# Patient Record
Sex: FEMALE | Race: WHITE | NOT HISPANIC OR LATINO | Employment: OTHER | URBAN - METROPOLITAN AREA
[De-identification: names, ages, dates, MRNs, and addresses within clinical notes are randomized per-mention and may not be internally consistent; named-entity substitution may affect disease eponyms.]

---

## 2019-04-25 ENCOUNTER — TELEPHONE (OUTPATIENT)
Dept: GASTROENTEROLOGY | Facility: CLINIC | Age: 54
End: 2019-04-25

## 2019-04-25 NOTE — TELEPHONE ENCOUNTER
----- Message from Tata Elizabeth sent at 4/24/2019  1:15 PM CDT -----  Contact: Pt   Pt calling in to schedule a Np appointment for Chron[facility] . Pt was seen by doctor colorado and treated several years ago      Pt can be reached at [phone]    Thanks

## 2019-04-25 NOTE — TELEPHONE ENCOUNTER
Last seen by Dr.James Crowell 10 years or longer ago.  Has Crohn's. Recently moved back to [place].  Has been in remission for past 8 years.  On no meds for her Crohn's at this time.  Has had flares but treated thru ED where she was living.  Because of recent flare she feels she needs to reestablish herself with .         Appointment scheduled for her to see Dr.Kevin Aguilar and Dr.James Crowell on 5/13 at 3:00.   Confirmation mailed.

## 2019-04-25 NOTE — TELEPHONE ENCOUNTER
----- Message from Tata Elizabeth sent at 4/24/2019  1:15 PM CDT -----  Contact: Pt   Pt calling in to schedule a Np appointment for Chron's . Pt was seen by doctor colorado and treated several years ago      Pt can be reached at [phone]    Thanks

## 2019-06-12 LAB — CRC RECOMMENDATION EXT: NORMAL

## 2019-10-29 ENCOUNTER — HOSPITAL ENCOUNTER (OUTPATIENT)
Dept: HOSPITAL 92 - SDC | Age: 54
Discharge: HOME | End: 2019-10-29
Attending: ANESTHESIOLOGY
Payer: COMMERCIAL

## 2019-10-29 VITALS — BODY MASS INDEX: 28.2 KG/M2

## 2019-10-29 DIAGNOSIS — Z79.899: ICD-10-CM

## 2019-10-29 DIAGNOSIS — Z88.5: ICD-10-CM

## 2019-10-29 DIAGNOSIS — I10: ICD-10-CM

## 2019-10-29 DIAGNOSIS — G89.4: ICD-10-CM

## 2019-10-29 DIAGNOSIS — Z91.048: ICD-10-CM

## 2019-10-29 DIAGNOSIS — Z88.8: ICD-10-CM

## 2019-10-29 DIAGNOSIS — T85.733A: Primary | ICD-10-CM

## 2019-10-29 DIAGNOSIS — M96.1: ICD-10-CM

## 2019-10-29 DIAGNOSIS — M51.16: ICD-10-CM

## 2019-10-29 PROCEDURE — 0JPT0MZ REMOVAL OF STIMULATOR GENERATOR FROM TRUNK SUBCUTANEOUS TISSUE AND FASCIA, OPEN APPROACH: ICD-10-PCS | Performed by: ANESTHESIOLOGY

## 2019-10-29 PROCEDURE — 00PU3MZ REMOVAL OF NEUROSTIMULATOR LEAD FROM SPINAL CANAL, PERCUTANEOUS APPROACH: ICD-10-PCS | Performed by: ANESTHESIOLOGY

## 2019-10-29 NOTE — OP
DATE OF PROCEDURE:  10/29/2019



PREOPERATIVE DIAGNOSES:  

1. Chronic pain syndrome.

2. Post-laminectomy syndrome.

3. Lumbar radiculopathy.

4. Erosion of lead anchor with skin infection.



POSTOPERATIVE DIAGNOSES:  

1. Chronic pain syndrome.

2. Post-laminectomy syndrome.

3. Lumbar radiculopathy.

4. Erosion of lead anchor with skin infection.



PROCEDURES PERFORMED:  

1. Removal of SCS battery.

2. Removal of lead x2.

3. Removal of extensions x2.

4. Removal of anchors x2.



SPECIMENS REMOVED:  See above.  All intact, all disposed.



ESTIMATED BLOOD LOSS:  5 mL.



DESCRIPTION OF PROCEDURE:  The patient was taken to the procedure room, placed prone

on the procedure room table.  A time-out was performed.  Using DuraPrep, the back

was prepped and sterile drapes were applied.  The skin above the battery site and

the anchors was anesthetized using 0.5% Marcaine with epinephrine.  We took care not

to anesthetize or stick a needle into any erythematous skin.  An incision was made

with a 10 blade scalpel at both levels and again not through the erythematous skin

where the presumed infection was, but inferior to it.  This was blunt dissected down

to fascia.  The battery pocket was opened and the battery was removed easily.  This

was disconnected from the wire.  The battery was removed and discarded.  The

extensions were then dissected out and removed intact and discarded.  The anchors

were then dissected and removed with the leads at the same time x2.  These were also

removed intact.  Irrigation was performed.  Vancomycin powder was then placed

through the anchor sites.  The battery pocket was cauterized and Vicryl sutures were

used to collapse the pocket.  0 Vicryl sutures were used to approximate the fascia

on both sides.  Staples were used to approximate the skin layer.  Telfa dressing and

sterile 4x4s were placed on top of this.  The patient was taken to the PACU under

stable condition. 







Job ID:  [id number]

## 2019-11-13 ENCOUNTER — HOSPITAL ENCOUNTER (OUTPATIENT)
Dept: HOSPITAL 92 - SCSMRI | Age: 54
Discharge: HOME | End: 2019-11-13
Attending: ANESTHESIOLOGY
Payer: COMMERCIAL

## 2019-11-13 DIAGNOSIS — M47.26: ICD-10-CM

## 2019-11-13 DIAGNOSIS — M48.061: ICD-10-CM

## 2019-11-13 DIAGNOSIS — Z98.890: ICD-10-CM

## 2019-11-13 DIAGNOSIS — R60.0: ICD-10-CM

## 2019-11-13 DIAGNOSIS — M51.16: Primary | ICD-10-CM

## 2019-11-13 LAB — ESTIMATED GFR-MDRD - POC: (no result)

## 2019-11-13 PROCEDURE — 72100 X-RAY EXAM L-S SPINE 2/3 VWS: CPT

## 2019-11-13 PROCEDURE — 72158 MRI LUMBAR SPINE W/O & W/DYE: CPT

## 2019-11-13 PROCEDURE — 82565 ASSAY OF CREATININE: CPT

## 2019-11-13 PROCEDURE — A9579 GAD-BASE MR CONTRAST NOS,1ML: HCPCS

## 2019-11-13 NOTE — RAD
Two views lumbar spine:

11/13/19

 

HISTORY: 

Evaluate for residual fragments of a removed stimulator lead.

 

FINDINGS: 

There is no radiographic evidence of a generator or leads associated with a dorsal nerve root stimula
tor. There are bilateral transpedicular screws at L5 and S1 without perihardware lucency. Disc prosth
esis at L5-S1. 

 

Five lumbar type vertebrae. No fractures. There is a laminectomy defect at L5. 

 

IMPRESSION: 

No radiographic evidence of a dorsal column stimulator. 

 

POS: GUERA

## 2019-11-13 NOTE — MRI
MRI lumbar spine with and without contrast:

11/13/2019



COMPARISON: 8/8/2013



HISTORY: Intervertebral disc disorder with radiculopathy, prior lumbar spine surgery recent removal o
f a pain stimulator secondary to infection.



TECHNIQUE: Multiplanar multisequence MR imaging of the lumbar spine with and without contrast



FINDINGS: On the basis of 5 lumbar type vertebral bodies, conus medullaris terminates at T12-L1.



T12-L1: Intervertebral disc height and signal intensity within normal limits with no significant cent
ral canal or neural foraminal stenosis.



L1-2: Mild bilateral facet hypertrophy. Intervertebral disc height and signal intensity within normal
 limits with no significant central canal or neural foraminal stenosis.



L2-3: Mild bilateral facet hypertrophy. Intervertebral disc height and signal intensity within normal
 limits. Mild facet hypertrophy bilaterally, right greater than left. No significant central canal

or neural foraminal stenosis.



L3-4: Intervertebral disc height and signal intensity within normal limits. Bilateral facet hypertrop
hy and hypertrophy of the ligamentum flavum. There is fluid signal intensity within the facet joint

on the right, similar when compared to the prior examination.



No significant central canal or neural foraminal stenosis.



L4-5: Bilateral facet hypertrophy with hypertrophy of the ligamentum flavum, progressed since the priscilla
or exam. Mild central canal stenosis, new. On the basis of facet disease there is mild/moderate

right neural foraminal stenosis and severe left neural foraminal stenosis, worsened bilaterally when 
compared to the prior examination.



L5-S1: Intervertebral disc device noted. Bilateral L5 and S1 pedicle screws are present. Bilateral la
minectomy changes are present at the L5-S1 level. No significant central canal or neural foraminal

stenosis.



STIR imaging demonstrates significant nonspecific diffuse increased signal intensity within the poste
rior subcutaneous fat from the axial level of the L1-2 intervertebral disc space to the axial level

of the upper sacrum, new when compared to the prior examination. There is a fluid collection centered
 within the posterior midline subcutaneous fat at the axial level of the L1-2 interspace measuring

2.5 x 3.1 x 1.9 cm, new when compared to the prior exam. The postcontrast imaging demonstrates rim en
hancement of the above-described fluid collection at the axial level of the L1-2 interspace

posterior to the spinous process of the L1 vertebral body. There is minimal enhancement of the poster
ior subcutaneous fat from the L1-2 level through the sacrum in the region of the above-described

subcutaneous adipose edema.



The postcontrast imaging demonstrates no abnormal enhancement involving the imaged osseous structures
, including the L1 spinous process. There is also no abnormal enhancement involving the

intervertebral discs or the contents of the thecal sac.



Review of the retroperitoneal structures demonstrates no abnormality.



IMPRESSION: Postoperative and degenerative change within the lumbar spine as detailed above. The most
 significant stenosis involves the bilateral neural foramina at the L4-5 level.



There is diffuse edematous change within the subcutaneous fat posteriorly which may be on the basis o
f cellulitis given recent removal of pain stimulator for infection. In addition, the nonspecific

fluid collection posterior to the L1 spinous process within the subcutaneous fat could represent a st
erile or infected collection on the basis of abscess given provided history. Clinical correlation

is essential. This fluid collection is likely amenable to ultrasound-guided aspiration.



CODE T



Reported By: Gunner Millan 

Electronically Signed:  11/13/2019 3:27 PM

## 2020-12-02 ENCOUNTER — CLINICAL SUPPORT (OUTPATIENT)
Dept: URGENT CARE | Facility: CLINIC | Age: 55
End: 2020-12-02
Payer: MEDICARE

## 2020-12-02 DIAGNOSIS — Z20.822 COVID-19 RULED OUT: Primary | ICD-10-CM

## 2020-12-02 LAB
CTP QC/QA: YES
SARS-COV-2 RDRP RESP QL NAA+PROBE: NEGATIVE

## 2020-12-02 PROCEDURE — U0002: ICD-10-PCS | Mod: QW,S$GLB,, | Performed by: PHYSICIAN ASSISTANT

## 2020-12-02 PROCEDURE — U0002 COVID-19 LAB TEST NON-CDC: HCPCS | Mod: QW,S$GLB,, | Performed by: PHYSICIAN ASSISTANT

## 2021-01-28 DIAGNOSIS — M51.16 INTERVERTEBRAL DISC DISORDER WITH RADICULOPATHY OF LUMBAR REGION: Primary | ICD-10-CM

## 2021-01-29 ENCOUNTER — HOSPITAL ENCOUNTER (OUTPATIENT)
Dept: RADIOLOGY | Facility: HOSPITAL | Age: 56
Discharge: HOME | End: 2021-01-29
Attending: ANESTHESIOLOGY
Payer: MEDICARE

## 2021-01-29 DIAGNOSIS — M51.16 INTERVERTEBRAL DISC DISORDER WITH RADICULOPATHY OF LUMBAR REGION: ICD-10-CM

## 2021-01-29 PROCEDURE — 25500020 PHARM REV CODE 255

## 2021-01-29 PROCEDURE — 72158 MRI LUMBAR SPINE W/O & W/DYE: CPT | Mod: 26,,, | Performed by: RADIOLOGY

## 2021-01-29 PROCEDURE — A9585 GADOBUTROL INJECTION: HCPCS

## 2021-01-29 PROCEDURE — 72158 MRI LUMBAR SPINE W/O & W/DYE: CPT | Mod: TC

## 2021-01-29 PROCEDURE — 72158 MRI LUMBAR SPINE W WO CONTRAST: ICD-10-PCS | Mod: 26,,, | Performed by: RADIOLOGY

## 2021-01-29 RX ORDER — GADOBUTROL 604.72 MG/ML
8 INJECTION INTRAVENOUS
Status: COMPLETED | OUTPATIENT
Start: 2021-01-29 | End: 2021-01-29

## 2021-01-29 RX ADMIN — GADOBUTROL 8 ML: 604.72 INJECTION INTRAVENOUS at 07:01

## 2021-02-03 ENCOUNTER — TELEPHONE (OUTPATIENT)
Dept: NEUROSURGERY | Facility: CLINIC | Age: 56
End: 2021-02-03

## 2021-02-05 ENCOUNTER — TELEPHONE (OUTPATIENT)
Dept: NEUROSURGERY | Facility: CLINIC | Age: 56
End: 2021-02-05

## 2021-02-05 RX ORDER — HYDROCODONE BITARTRATE AND ACETAMINOPHEN 10; 325 MG/1; MG/1
1-2 TABLET ORAL EVERY 8 HOURS
COMMUNITY
Start: 2021-01-30

## 2021-02-05 RX ORDER — ESTERIFIED ESTROGEN AND METHYLTESTOSTERONE 1.25; 2.5 MG/1; MG/1
1 TABLET ORAL
COMMUNITY
End: 2021-03-11

## 2021-02-05 RX ORDER — ESTRADIOL 2 MG/1
2 TABLET ORAL
COMMUNITY
Start: 2020-10-15 | End: 2021-08-25 | Stop reason: SDUPTHER

## 2021-02-05 RX ORDER — HYDROCHLOROTHIAZIDE 25 MG/1
25 TABLET ORAL
COMMUNITY
Start: 2020-03-19 | End: 2021-03-11

## 2021-02-05 RX ORDER — HYDROCODONE BITARTRATE AND ACETAMINOPHEN 10; 325 MG/1; MG/1
1 TABLET ORAL EVERY 8 HOURS PRN
COMMUNITY
End: 2021-03-11

## 2021-02-05 RX ORDER — FENTANYL 50 UG/1
1 PATCH TRANSDERMAL
Status: ON HOLD | COMMUNITY
Start: 2021-01-18 | End: 2022-11-29

## 2021-02-05 RX ORDER — ESTRADIOL 2 MG/1
2 TABLET ORAL DAILY
COMMUNITY
Start: 2021-01-14 | End: 2022-08-11 | Stop reason: SDUPTHER

## 2021-02-12 ENCOUNTER — TELEPHONE (OUTPATIENT)
Dept: NEUROSURGERY | Facility: CLINIC | Age: 56
End: 2021-02-12

## 2021-02-12 DIAGNOSIS — M54.9 DORSALGIA, UNSPECIFIED: ICD-10-CM

## 2021-02-23 ENCOUNTER — DOCUMENTATION ONLY (OUTPATIENT)
Dept: INTERNAL MEDICINE | Facility: CLINIC | Age: 56
End: 2021-02-23

## 2021-02-23 ENCOUNTER — TELEPHONE (OUTPATIENT)
Dept: NEUROSURGERY | Facility: CLINIC | Age: 56
End: 2021-02-23

## 2021-03-08 ENCOUNTER — HOSPITAL ENCOUNTER (OUTPATIENT)
Dept: RADIOLOGY | Facility: HOSPITAL | Age: 56
Discharge: HOME | End: 2021-03-08
Attending: NEUROLOGICAL SURGERY
Payer: COMMERCIAL

## 2021-03-08 ENCOUNTER — OFFICE VISIT (OUTPATIENT)
Dept: INTERNAL MEDICINE | Facility: CLINIC | Age: 56
End: 2021-03-08
Payer: COMMERCIAL

## 2021-03-08 ENCOUNTER — TELEPHONE (OUTPATIENT)
Dept: ENDOCRINOLOGY | Facility: CLINIC | Age: 56
End: 2021-03-08

## 2021-03-08 ENCOUNTER — OFFICE VISIT (OUTPATIENT)
Dept: SPINE | Facility: CLINIC | Age: 56
End: 2021-03-08
Payer: COMMERCIAL

## 2021-03-08 VITALS
SYSTOLIC BLOOD PRESSURE: 148 MMHG | DIASTOLIC BLOOD PRESSURE: 88 MMHG | TEMPERATURE: 99 F | BODY MASS INDEX: 29.71 KG/M2 | WEIGHT: 178.31 LBS | HEART RATE: 78 BPM | HEIGHT: 65 IN | OXYGEN SATURATION: 97 %

## 2021-03-08 VITALS
HEIGHT: 65 IN | WEIGHT: 178.13 LBS | SYSTOLIC BLOOD PRESSURE: 137 MMHG | HEART RATE: 83 BPM | DIASTOLIC BLOOD PRESSURE: 95 MMHG | TEMPERATURE: 99 F | BODY MASS INDEX: 29.68 KG/M2

## 2021-03-08 DIAGNOSIS — E66.3 OVER WEIGHT: ICD-10-CM

## 2021-03-08 DIAGNOSIS — Z86.14 HISTORY OF MRSA INFECTION: ICD-10-CM

## 2021-03-08 DIAGNOSIS — K50.919 CROHN'S DISEASE WITH COMPLICATION, UNSPECIFIED GASTROINTESTINAL TRACT LOCATION: ICD-10-CM

## 2021-03-08 DIAGNOSIS — M54.17 LUMBOSACRAL RADICULOPATHY AT L4: Primary | ICD-10-CM

## 2021-03-08 DIAGNOSIS — I10 ESSENTIAL HYPERTENSION: ICD-10-CM

## 2021-03-08 DIAGNOSIS — Z01.818 PREOP EXAMINATION: Primary | ICD-10-CM

## 2021-03-08 DIAGNOSIS — E11.65 TYPE 2 DIABETES MELLITUS WITH HYPERGLYCEMIA, WITHOUT LONG-TERM CURRENT USE OF INSULIN: ICD-10-CM

## 2021-03-08 DIAGNOSIS — Z79.890 HORMONE REPLACEMENT THERAPY (HRT): ICD-10-CM

## 2021-03-08 DIAGNOSIS — Z87.891 FORMER SMOKER: ICD-10-CM

## 2021-03-08 DIAGNOSIS — F11.90 CHRONIC, CONTINUOUS USE OF OPIOIDS: ICD-10-CM

## 2021-03-08 DIAGNOSIS — Z87.19 HISTORY OF FATTY INFILTRATION OF LIVER: ICD-10-CM

## 2021-03-08 DIAGNOSIS — M54.9 DORSALGIA, UNSPECIFIED: ICD-10-CM

## 2021-03-08 DIAGNOSIS — E89.0 H/O PARTIAL THYROIDECTOMY: ICD-10-CM

## 2021-03-08 PROBLEM — K50.90 CROHN'S DISEASE: Status: ACTIVE | Noted: 2021-03-08

## 2021-03-08 PROCEDURE — 72110 XR LUMBAR SPINE 5 VIEW WITH FLEX AND EXT: ICD-10-PCS | Mod: 26,COE,, | Performed by: RADIOLOGY

## 2021-03-08 PROCEDURE — 99203 PR OFFICE/OUTPT VISIT, NEW, LEVL III, 30-44 MIN: ICD-10-PCS | Mod: S$GLB,COE,, | Performed by: NEUROLOGICAL SURGERY

## 2021-03-08 PROCEDURE — 72110 X-RAY EXAM L-2 SPINE 4/>VWS: CPT | Mod: TC

## 2021-03-08 PROCEDURE — 72110 X-RAY EXAM L-2 SPINE 4/>VWS: CPT | Mod: 26,COE,, | Performed by: RADIOLOGY

## 2021-03-08 PROCEDURE — 99204 OFFICE O/P NEW MOD 45 MIN: CPT | Mod: S$GLB,COE,, | Performed by: HOSPITALIST

## 2021-03-08 PROCEDURE — 99999 PR PBB SHADOW E&M-EST. PATIENT-LVL III: CPT | Mod: PBBFAC,COE,, | Performed by: HOSPITALIST

## 2021-03-08 PROCEDURE — 99204 PR OFFICE/OUTPT VISIT, NEW, LEVL IV, 45-59 MIN: ICD-10-PCS | Mod: S$GLB,COE,, | Performed by: HOSPITALIST

## 2021-03-08 PROCEDURE — 99999 PR PBB SHADOW E&M-EST. PATIENT-LVL III: ICD-10-PCS | Mod: PBBFAC,COE,, | Performed by: HOSPITALIST

## 2021-03-08 PROCEDURE — 99999 PR PBB SHADOW E&M-EST. PATIENT-LVL III: ICD-10-PCS | Mod: PBBFAC,COE,, | Performed by: NEUROLOGICAL SURGERY

## 2021-03-08 PROCEDURE — 99213 OFFICE O/P EST LOW 20 MIN: CPT | Mod: PBBFAC,25 | Performed by: NEUROLOGICAL SURGERY

## 2021-03-08 PROCEDURE — 99203 OFFICE O/P NEW LOW 30 MIN: CPT | Mod: S$GLB,COE,, | Performed by: NEUROLOGICAL SURGERY

## 2021-03-08 PROCEDURE — 99213 OFFICE O/P EST LOW 20 MIN: CPT | Mod: PBBFAC,25,27 | Performed by: HOSPITALIST

## 2021-03-08 PROCEDURE — 99999 PR PBB SHADOW E&M-EST. PATIENT-LVL III: CPT | Mod: PBBFAC,COE,, | Performed by: NEUROLOGICAL SURGERY

## 2021-03-08 RX ORDER — ESTERIFIED ESTROGEN AND METHYLTESTOSTERONE .625; 1.25 MG/1; MG/1
1 TABLET ORAL
COMMUNITY
End: 2021-03-11

## 2021-03-08 RX ORDER — FENTANYL 50 UG/1
50 PATCH TRANSDERMAL
COMMUNITY
End: 2021-03-11

## 2021-03-08 RX ORDER — BISMUTH SUBSALICYLATE 525 MG/30ML
15 LIQUID ORAL
COMMUNITY
End: 2021-05-04

## 2021-03-09 ENCOUNTER — TELEPHONE (OUTPATIENT)
Dept: ENDOCRINOLOGY | Facility: CLINIC | Age: 56
End: 2021-03-09

## 2021-03-09 RX ORDER — LANCETS 33 GAUGE
EACH MISCELLANEOUS
Qty: 100 EACH | Refills: 0 | Status: SHIPPED | OUTPATIENT
Start: 2021-03-09

## 2021-03-09 RX ORDER — DEXTROSE 4 G
TABLET,CHEWABLE ORAL
Qty: 1 EACH | Refills: 0 | Status: SHIPPED | OUTPATIENT
Start: 2021-03-09

## 2021-03-10 ENCOUNTER — HOSPITAL ENCOUNTER (OUTPATIENT)
Facility: OTHER | Age: 56
Discharge: HOME | End: 2021-03-10
Attending: ANESTHESIOLOGY | Admitting: ANESTHESIOLOGY
Payer: COMMERCIAL

## 2021-03-10 ENCOUNTER — TELEPHONE (OUTPATIENT)
Dept: ENDOCRINOLOGY | Facility: CLINIC | Age: 56
End: 2021-03-10

## 2021-03-10 ENCOUNTER — PATIENT MESSAGE (OUTPATIENT)
Dept: SPINE | Facility: CLINIC | Age: 56
End: 2021-03-10

## 2021-03-10 VITALS
HEIGHT: 65 IN | DIASTOLIC BLOOD PRESSURE: 89 MMHG | HEART RATE: 82 BPM | BODY MASS INDEX: 29.66 KG/M2 | SYSTOLIC BLOOD PRESSURE: 162 MMHG | TEMPERATURE: 98 F | OXYGEN SATURATION: 100 % | WEIGHT: 178 LBS | RESPIRATION RATE: 18 BRPM

## 2021-03-10 DIAGNOSIS — M54.17 LUMBOSACRAL RADICULOPATHY: ICD-10-CM

## 2021-03-10 DIAGNOSIS — M51.36 DDD (DEGENERATIVE DISC DISEASE), LUMBAR: Primary | ICD-10-CM

## 2021-03-10 DIAGNOSIS — G89.29 CHRONIC PAIN: ICD-10-CM

## 2021-03-10 LAB — POCT GLUCOSE: 180 MG/DL (ref 70–110)

## 2021-03-10 PROCEDURE — 82947 ASSAY GLUCOSE BLOOD QUANT: CPT | Performed by: ANESTHESIOLOGY

## 2021-03-10 PROCEDURE — 25000003 PHARM REV CODE 250: Performed by: ANESTHESIOLOGY

## 2021-03-10 PROCEDURE — 64483 PR EPIDURAL INJ, ANES/STEROID, TRANSFORAMINAL, LUMB/SACR, SNGL LEVL: ICD-10-PCS | Mod: LT,COE,, | Performed by: ANESTHESIOLOGY

## 2021-03-10 PROCEDURE — 25000003 PHARM REV CODE 250: Performed by: STUDENT IN AN ORGANIZED HEALTH CARE EDUCATION/TRAINING PROGRAM

## 2021-03-10 PROCEDURE — 64483 NJX AA&/STRD TFRM EPI L/S 1: CPT | Mod: LT,COE,, | Performed by: ANESTHESIOLOGY

## 2021-03-10 PROCEDURE — 25500020 PHARM REV CODE 255: Performed by: ANESTHESIOLOGY

## 2021-03-10 PROCEDURE — 63600175 PHARM REV CODE 636 W HCPCS: Performed by: ANESTHESIOLOGY

## 2021-03-10 PROCEDURE — 64483 NJX AA&/STRD TFRM EPI L/S 1: CPT | Mod: LT | Performed by: ANESTHESIOLOGY

## 2021-03-10 RX ORDER — SODIUM CHLORIDE 9 MG/ML
INJECTION, SOLUTION INTRAVENOUS CONTINUOUS
Status: DISCONTINUED | OUTPATIENT
Start: 2021-03-10 | End: 2021-03-10 | Stop reason: HOSPADM

## 2021-03-10 RX ORDER — LIDOCAINE HYDROCHLORIDE 10 MG/ML
INJECTION INFILTRATION; PERINEURAL
Status: DISCONTINUED | OUTPATIENT
Start: 2021-03-10 | End: 2021-03-10 | Stop reason: HOSPADM

## 2021-03-10 RX ORDER — MIDAZOLAM HYDROCHLORIDE 1 MG/ML
INJECTION INTRAMUSCULAR; INTRAVENOUS
Status: DISCONTINUED | OUTPATIENT
Start: 2021-03-10 | End: 2021-03-10 | Stop reason: HOSPADM

## 2021-03-10 RX ORDER — DEXAMETHASONE SODIUM PHOSPHATE 10 MG/ML
INJECTION INTRAMUSCULAR; INTRAVENOUS
Status: DISCONTINUED | OUTPATIENT
Start: 2021-03-10 | End: 2021-03-10 | Stop reason: HOSPADM

## 2021-03-10 RX ORDER — LIDOCAINE HYDROCHLORIDE 5 MG/ML
INJECTION, SOLUTION INFILTRATION; INTRAVENOUS
Status: DISCONTINUED | OUTPATIENT
Start: 2021-03-10 | End: 2021-03-10 | Stop reason: HOSPADM

## 2021-03-10 RX ORDER — FENTANYL CITRATE 50 UG/ML
INJECTION, SOLUTION INTRAMUSCULAR; INTRAVENOUS
Status: DISCONTINUED | OUTPATIENT
Start: 2021-03-10 | End: 2021-03-10 | Stop reason: HOSPADM

## 2021-03-10 RX ADMIN — SODIUM CHLORIDE: 0.9 INJECTION, SOLUTION INTRAVENOUS at 12:03

## 2021-03-11 ENCOUNTER — OFFICE VISIT (OUTPATIENT)
Dept: ENDOCRINOLOGY | Facility: CLINIC | Age: 56
End: 2021-03-11
Payer: COMMERCIAL

## 2021-03-11 VITALS
SYSTOLIC BLOOD PRESSURE: 130 MMHG | RESPIRATION RATE: 17 BRPM | WEIGHT: 176.94 LBS | BODY MASS INDEX: 29.48 KG/M2 | HEART RATE: 95 BPM | OXYGEN SATURATION: 97 % | DIASTOLIC BLOOD PRESSURE: 78 MMHG | HEIGHT: 65 IN

## 2021-03-11 DIAGNOSIS — I10 ESSENTIAL HYPERTENSION: ICD-10-CM

## 2021-03-11 DIAGNOSIS — Z87.19 HISTORY OF FATTY INFILTRATION OF LIVER: ICD-10-CM

## 2021-03-11 DIAGNOSIS — E89.0 H/O PARTIAL THYROIDECTOMY: ICD-10-CM

## 2021-03-11 DIAGNOSIS — E11.65 TYPE 2 DIABETES MELLITUS WITH HYPERGLYCEMIA, WITHOUT LONG-TERM CURRENT USE OF INSULIN: Primary | ICD-10-CM

## 2021-03-11 DIAGNOSIS — E66.3 OVER WEIGHT: ICD-10-CM

## 2021-03-11 PROCEDURE — 1125F AMNT PAIN NOTED PAIN PRSNT: CPT | Mod: S$GLB,,, | Performed by: INTERNAL MEDICINE

## 2021-03-11 PROCEDURE — 99204 PR OFFICE/OUTPT VISIT, NEW, LEVL IV, 45-59 MIN: ICD-10-PCS | Mod: S$GLB,,, | Performed by: INTERNAL MEDICINE

## 2021-03-11 PROCEDURE — 99999 PR PBB SHADOW E&M-EST. PATIENT-LVL V: CPT | Mod: PBBFAC,,, | Performed by: INTERNAL MEDICINE

## 2021-03-11 PROCEDURE — 3008F BODY MASS INDEX DOCD: CPT | Mod: CPTII,S$GLB,, | Performed by: INTERNAL MEDICINE

## 2021-03-11 PROCEDURE — 99204 OFFICE O/P NEW MOD 45 MIN: CPT | Mod: S$GLB,,, | Performed by: INTERNAL MEDICINE

## 2021-03-11 PROCEDURE — 99999 PR PBB SHADOW E&M-EST. PATIENT-LVL V: ICD-10-PCS | Mod: PBBFAC,,, | Performed by: INTERNAL MEDICINE

## 2021-03-11 PROCEDURE — 3008F PR BODY MASS INDEX (BMI) DOCUMENTED: ICD-10-PCS | Mod: CPTII,S$GLB,, | Performed by: INTERNAL MEDICINE

## 2021-03-11 PROCEDURE — 1125F PR PAIN SEVERITY QUANTIFIED, PAIN PRESENT: ICD-10-PCS | Mod: S$GLB,,, | Performed by: INTERNAL MEDICINE

## 2021-03-11 RX ORDER — PEN NEEDLE, DIABETIC 30 GX3/16"
1 NEEDLE, DISPOSABLE MISCELLANEOUS DAILY
Qty: 100 EACH | Refills: 3 | Status: SHIPPED | OUTPATIENT
Start: 2021-03-11 | End: 2022-04-11

## 2021-03-11 RX ORDER — INSULIN DEGLUDEC 100 U/ML
10 INJECTION, SOLUTION SUBCUTANEOUS DAILY
Qty: 15 ML | Refills: 3 | Status: SHIPPED | OUTPATIENT
Start: 2021-03-11 | End: 2022-05-04 | Stop reason: SDUPTHER

## 2021-03-11 RX ORDER — EMPAGLIFLOZIN 10 MG/1
10 TABLET, FILM COATED ORAL DAILY
Qty: 30 TABLET | Refills: 6 | Status: SHIPPED | OUTPATIENT
Start: 2021-03-11 | End: 2021-04-15

## 2021-03-15 ENCOUNTER — CLINICAL SUPPORT (OUTPATIENT)
Dept: DIABETES | Facility: CLINIC | Age: 56
End: 2021-03-15
Attending: HOSPITALIST
Payer: COMMERCIAL

## 2021-03-15 ENCOUNTER — HOSPITAL ENCOUNTER (OUTPATIENT)
Dept: RADIOLOGY | Facility: OTHER | Age: 56
Discharge: HOME | End: 2021-03-15
Attending: HOSPITALIST
Payer: COMMERCIAL

## 2021-03-15 ENCOUNTER — PATIENT MESSAGE (OUTPATIENT)
Dept: ENDOCRINOLOGY | Facility: CLINIC | Age: 56
End: 2021-03-15

## 2021-03-15 VITALS — BODY MASS INDEX: 28.58 KG/M2 | WEIGHT: 171.75 LBS

## 2021-03-15 DIAGNOSIS — E11.65 TYPE 2 DIABETES MELLITUS WITH HYPERGLYCEMIA, WITHOUT LONG-TERM CURRENT USE OF INSULIN: ICD-10-CM

## 2021-03-15 DIAGNOSIS — Z87.19 HISTORY OF FATTY INFILTRATION OF LIVER: ICD-10-CM

## 2021-03-15 PROCEDURE — 76700 US EXAM ABDOM COMPLETE: CPT | Mod: TC

## 2021-03-15 PROCEDURE — G0108 DIAB MANAGE TRN  PER INDIV: HCPCS | Mod: S$GLB,,, | Performed by: DIETITIAN, REGISTERED

## 2021-03-15 PROCEDURE — G0108 PR DIAB MANAGE TRN  PER INDIV: ICD-10-PCS | Mod: S$GLB,,, | Performed by: DIETITIAN, REGISTERED

## 2021-03-15 PROCEDURE — 76700 US EXAM ABDOM COMPLETE: CPT | Mod: 26,,, | Performed by: STUDENT IN AN ORGANIZED HEALTH CARE EDUCATION/TRAINING PROGRAM

## 2021-03-15 PROCEDURE — 99999 PR PBB SHADOW E&M-EST. PATIENT-LVL II: ICD-10-PCS | Mod: PBBFAC,,, | Performed by: DIETITIAN, REGISTERED

## 2021-03-15 PROCEDURE — 99999 PR PBB SHADOW E&M-EST. PATIENT-LVL II: CPT | Mod: PBBFAC,,, | Performed by: DIETITIAN, REGISTERED

## 2021-03-15 PROCEDURE — 76700 US ABDOMEN COMPLETE: ICD-10-PCS | Mod: 26,,, | Performed by: STUDENT IN AN ORGANIZED HEALTH CARE EDUCATION/TRAINING PROGRAM

## 2021-03-15 RX ORDER — FLUCONAZOLE 150 MG/1
150 TABLET ORAL DAILY
Qty: 1 TABLET | Refills: 0 | Status: SHIPPED | OUTPATIENT
Start: 2021-03-15 | End: 2021-03-16

## 2021-03-16 ENCOUNTER — PATIENT MESSAGE (OUTPATIENT)
Dept: DIABETES | Facility: CLINIC | Age: 56
End: 2021-03-16

## 2021-03-19 ENCOUNTER — OFFICE VISIT (OUTPATIENT)
Dept: HEPATOLOGY | Facility: CLINIC | Age: 56
End: 2021-03-19
Payer: COMMERCIAL

## 2021-03-19 ENCOUNTER — PROCEDURE VISIT (OUTPATIENT)
Dept: HEPATOLOGY | Facility: CLINIC | Age: 56
End: 2021-03-19
Payer: COMMERCIAL

## 2021-03-19 VITALS
BODY MASS INDEX: 27.85 KG/M2 | HEIGHT: 66 IN | HEART RATE: 97 BPM | DIASTOLIC BLOOD PRESSURE: 93 MMHG | OXYGEN SATURATION: 95 % | WEIGHT: 173.31 LBS | SYSTOLIC BLOOD PRESSURE: 126 MMHG | RESPIRATION RATE: 20 BRPM

## 2021-03-19 DIAGNOSIS — E66.3 OVER WEIGHT: ICD-10-CM

## 2021-03-19 DIAGNOSIS — Z87.19 HISTORY OF FATTY INFILTRATION OF LIVER: ICD-10-CM

## 2021-03-19 DIAGNOSIS — R74.8 ELEVATED LIVER ENZYMES: Primary | ICD-10-CM

## 2021-03-19 DIAGNOSIS — I10 ESSENTIAL HYPERTENSION: ICD-10-CM

## 2021-03-19 DIAGNOSIS — E11.65 TYPE 2 DIABETES MELLITUS WITH HYPERGLYCEMIA, WITHOUT LONG-TERM CURRENT USE OF INSULIN: ICD-10-CM

## 2021-03-19 PROCEDURE — 3080F PR MOST RECENT DIASTOLIC BLOOD PRESSURE >= 90 MM HG: ICD-10-PCS | Mod: CPTII,S$GLB,, | Performed by: PHYSICIAN ASSISTANT

## 2021-03-19 PROCEDURE — 91200 FIBROSCAN (VIBRATION CONTROLLED TRANSIENT ELASTOGRAPHY): ICD-10-PCS | Mod: S$GLB,,, | Performed by: PHYSICIAN ASSISTANT

## 2021-03-19 PROCEDURE — 99999 PR PBB SHADOW E&M-EST. PATIENT-LVL V: CPT | Mod: PBBFAC,,, | Performed by: PHYSICIAN ASSISTANT

## 2021-03-19 PROCEDURE — 3008F PR BODY MASS INDEX (BMI) DOCUMENTED: ICD-10-PCS | Mod: CPTII,S$GLB,, | Performed by: PHYSICIAN ASSISTANT

## 2021-03-19 PROCEDURE — 91200 LIVER ELASTOGRAPHY: CPT | Mod: S$GLB,,, | Performed by: PHYSICIAN ASSISTANT

## 2021-03-19 PROCEDURE — 99215 PR OFFICE/OUTPT VISIT, EST, LEVL V, 40-54 MIN: ICD-10-PCS | Mod: S$GLB,,, | Performed by: PHYSICIAN ASSISTANT

## 2021-03-19 PROCEDURE — 3046F PR MOST RECENT HEMOGLOBIN A1C LEVEL > 9.0%: ICD-10-PCS | Mod: CPTII,S$GLB,, | Performed by: PHYSICIAN ASSISTANT

## 2021-03-19 PROCEDURE — 99215 OFFICE O/P EST HI 40 MIN: CPT | Mod: S$GLB,,, | Performed by: PHYSICIAN ASSISTANT

## 2021-03-19 PROCEDURE — 1125F AMNT PAIN NOTED PAIN PRSNT: CPT | Mod: S$GLB,,, | Performed by: PHYSICIAN ASSISTANT

## 2021-03-19 PROCEDURE — 3074F SYST BP LT 130 MM HG: CPT | Mod: CPTII,S$GLB,, | Performed by: PHYSICIAN ASSISTANT

## 2021-03-19 PROCEDURE — 3080F DIAST BP >= 90 MM HG: CPT | Mod: CPTII,S$GLB,, | Performed by: PHYSICIAN ASSISTANT

## 2021-03-19 PROCEDURE — 3046F HEMOGLOBIN A1C LEVEL >9.0%: CPT | Mod: CPTII,S$GLB,, | Performed by: PHYSICIAN ASSISTANT

## 2021-03-19 PROCEDURE — 99999 PR PBB SHADOW E&M-EST. PATIENT-LVL V: ICD-10-PCS | Mod: PBBFAC,,, | Performed by: PHYSICIAN ASSISTANT

## 2021-03-19 PROCEDURE — 3074F PR MOST RECENT SYSTOLIC BLOOD PRESSURE < 130 MM HG: ICD-10-PCS | Mod: CPTII,S$GLB,, | Performed by: PHYSICIAN ASSISTANT

## 2021-03-19 PROCEDURE — 1125F PR PAIN SEVERITY QUANTIFIED, PAIN PRESENT: ICD-10-PCS | Mod: S$GLB,,, | Performed by: PHYSICIAN ASSISTANT

## 2021-03-19 PROCEDURE — 3008F BODY MASS INDEX DOCD: CPT | Mod: CPTII,S$GLB,, | Performed by: PHYSICIAN ASSISTANT

## 2021-03-22 ENCOUNTER — PATIENT MESSAGE (OUTPATIENT)
Dept: ENDOCRINOLOGY | Facility: CLINIC | Age: 56
End: 2021-03-22

## 2021-03-23 ENCOUNTER — TELEPHONE (OUTPATIENT)
Dept: HEPATOLOGY | Facility: CLINIC | Age: 56
End: 2021-03-23

## 2021-03-23 ENCOUNTER — PATIENT MESSAGE (OUTPATIENT)
Dept: ENDOCRINOLOGY | Facility: CLINIC | Age: 56
End: 2021-03-23

## 2021-03-23 ENCOUNTER — PATIENT MESSAGE (OUTPATIENT)
Dept: SPINE | Facility: CLINIC | Age: 56
End: 2021-03-23

## 2021-03-26 ENCOUNTER — TELEPHONE (OUTPATIENT)
Dept: NEUROSURGERY | Facility: CLINIC | Age: 56
End: 2021-03-26

## 2021-03-29 ENCOUNTER — PATIENT MESSAGE (OUTPATIENT)
Dept: ENDOCRINOLOGY | Facility: CLINIC | Age: 56
End: 2021-03-29

## 2021-04-06 ENCOUNTER — PATIENT MESSAGE (OUTPATIENT)
Dept: ENDOCRINOLOGY | Facility: CLINIC | Age: 56
End: 2021-04-06

## 2021-04-06 RX ORDER — FLUCONAZOLE 150 MG/1
150 TABLET ORAL DAILY
Qty: 1 TABLET | Refills: 0 | Status: SHIPPED | OUTPATIENT
Start: 2021-04-06 | End: 2021-04-07

## 2021-04-14 ENCOUNTER — PATIENT MESSAGE (OUTPATIENT)
Dept: ENDOCRINOLOGY | Facility: CLINIC | Age: 56
End: 2021-04-14

## 2021-04-14 ENCOUNTER — LAB VISIT (OUTPATIENT)
Dept: LAB | Facility: HOSPITAL | Age: 56
End: 2021-04-14
Attending: INTERNAL MEDICINE
Payer: COMMERCIAL

## 2021-04-14 DIAGNOSIS — E11.65 TYPE 2 DIABETES MELLITUS WITH HYPERGLYCEMIA, WITHOUT LONG-TERM CURRENT USE OF INSULIN: ICD-10-CM

## 2021-04-14 LAB
ALBUMIN SERPL BCP-MCNC: 3.9 G/DL (ref 3.5–5.2)
ALP SERPL-CCNC: 37 U/L (ref 55–135)
ALT SERPL W/O P-5'-P-CCNC: 37 U/L (ref 10–44)
ANION GAP SERPL CALC-SCNC: 11 MMOL/L (ref 8–16)
AST SERPL-CCNC: 31 U/L (ref 10–40)
BILIRUB SERPL-MCNC: 0.4 MG/DL (ref 0.1–1)
BUN SERPL-MCNC: 12 MG/DL (ref 6–20)
CALCIUM SERPL-MCNC: 9.8 MG/DL (ref 8.7–10.5)
CHLORIDE SERPL-SCNC: 100 MMOL/L (ref 95–110)
CHOLEST SERPL-MCNC: 170 MG/DL (ref 120–199)
CHOLEST/HDLC SERPL: 3.1 {RATIO} (ref 2–5)
CO2 SERPL-SCNC: 29 MMOL/L (ref 23–29)
CREAT SERPL-MCNC: 0.8 MG/DL (ref 0.5–1.4)
EST. GFR  (AFRICAN AMERICAN): >60 ML/MIN/1.73 M^2
EST. GFR  (NON AFRICAN AMERICAN): >60 ML/MIN/1.73 M^2
GLUCOSE SERPL-MCNC: 109 MG/DL (ref 70–110)
HDLC SERPL-MCNC: 54 MG/DL (ref 40–75)
HDLC SERPL: 31.8 % (ref 20–50)
LDLC SERPL CALC-MCNC: 82.6 MG/DL (ref 63–159)
NONHDLC SERPL-MCNC: 116 MG/DL
POTASSIUM SERPL-SCNC: 3.5 MMOL/L (ref 3.5–5.1)
PROT SERPL-MCNC: 7.4 G/DL (ref 6–8.4)
SODIUM SERPL-SCNC: 140 MMOL/L (ref 136–145)
TRIGL SERPL-MCNC: 167 MG/DL (ref 30–150)

## 2021-04-14 PROCEDURE — 36415 COLL VENOUS BLD VENIPUNCTURE: CPT | Mod: PO | Performed by: INTERNAL MEDICINE

## 2021-04-14 PROCEDURE — 80053 COMPREHEN METABOLIC PANEL: CPT | Performed by: INTERNAL MEDICINE

## 2021-04-14 PROCEDURE — 80061 LIPID PANEL: CPT | Performed by: INTERNAL MEDICINE

## 2021-04-15 ENCOUNTER — PATIENT MESSAGE (OUTPATIENT)
Dept: ENDOCRINOLOGY | Facility: CLINIC | Age: 56
End: 2021-04-15

## 2021-04-15 RX ORDER — METFORMIN HYDROCHLORIDE 500 MG/1
500 TABLET ORAL 2 TIMES DAILY WITH MEALS
Qty: 180 TABLET | Refills: 3 | Status: SHIPPED | OUTPATIENT
Start: 2021-04-15 | End: 2021-05-04

## 2021-04-15 RX ORDER — FLUCONAZOLE 150 MG/1
150 TABLET ORAL DAILY
Qty: 1 TABLET | Refills: 0 | Status: SHIPPED | OUTPATIENT
Start: 2021-04-15 | End: 2021-04-16

## 2021-04-16 ENCOUNTER — PATIENT MESSAGE (OUTPATIENT)
Dept: RESEARCH | Facility: HOSPITAL | Age: 56
End: 2021-04-16

## 2021-04-19 ENCOUNTER — NUTRITION (OUTPATIENT)
Dept: DIABETES | Facility: CLINIC | Age: 56
End: 2021-04-19
Payer: COMMERCIAL

## 2021-04-19 DIAGNOSIS — E11.65 TYPE 2 DIABETES MELLITUS WITH HYPERGLYCEMIA, WITHOUT LONG-TERM CURRENT USE OF INSULIN: Primary | ICD-10-CM

## 2021-04-19 PROCEDURE — G0108 PR DIAB MANAGE TRN  PER INDIV: ICD-10-PCS | Mod: S$GLB,,, | Performed by: DIETITIAN, REGISTERED

## 2021-04-19 PROCEDURE — G0108 DIAB MANAGE TRN  PER INDIV: HCPCS | Mod: S$GLB,,, | Performed by: DIETITIAN, REGISTERED

## 2021-04-21 ENCOUNTER — PATIENT MESSAGE (OUTPATIENT)
Dept: ENDOCRINOLOGY | Facility: CLINIC | Age: 56
End: 2021-04-21

## 2021-04-21 ENCOUNTER — PATIENT MESSAGE (OUTPATIENT)
Dept: SPINE | Facility: CLINIC | Age: 56
End: 2021-04-21

## 2021-04-21 ENCOUNTER — OFFICE VISIT (OUTPATIENT)
Dept: ENDOCRINOLOGY | Facility: CLINIC | Age: 56
End: 2021-04-21
Payer: COMMERCIAL

## 2021-04-21 ENCOUNTER — LAB VISIT (OUTPATIENT)
Dept: LAB | Facility: HOSPITAL | Age: 56
End: 2021-04-21
Attending: INTERNAL MEDICINE
Payer: COMMERCIAL

## 2021-04-21 VITALS
HEIGHT: 66 IN | SYSTOLIC BLOOD PRESSURE: 134 MMHG | RESPIRATION RATE: 16 BRPM | WEIGHT: 176.13 LBS | HEART RATE: 64 BPM | BODY MASS INDEX: 28.31 KG/M2 | DIASTOLIC BLOOD PRESSURE: 86 MMHG

## 2021-04-21 DIAGNOSIS — E11.65 TYPE 2 DIABETES MELLITUS WITH HYPERGLYCEMIA, WITHOUT LONG-TERM CURRENT USE OF INSULIN: Primary | ICD-10-CM

## 2021-04-21 DIAGNOSIS — E11.65 TYPE 2 DIABETES MELLITUS WITH HYPERGLYCEMIA, WITHOUT LONG-TERM CURRENT USE OF INSULIN: ICD-10-CM

## 2021-04-21 DIAGNOSIS — E89.0 H/O PARTIAL THYROIDECTOMY: ICD-10-CM

## 2021-04-21 DIAGNOSIS — E66.3 OVER WEIGHT: ICD-10-CM

## 2021-04-21 DIAGNOSIS — Z87.19 HISTORY OF FATTY INFILTRATION OF LIVER: ICD-10-CM

## 2021-04-21 LAB
ESTIMATED AVG GLUCOSE: 183 MG/DL (ref 68–131)
HBA1C MFR BLD: 8 % (ref 4–5.6)

## 2021-04-21 PROCEDURE — 1126F PR PAIN SEVERITY QUANTIFIED, NO PAIN PRESENT: ICD-10-PCS | Mod: S$GLB,,, | Performed by: INTERNAL MEDICINE

## 2021-04-21 PROCEDURE — 99999 PR PBB SHADOW E&M-EST. PATIENT-LVL IV: ICD-10-PCS | Mod: PBBFAC,,, | Performed by: INTERNAL MEDICINE

## 2021-04-21 PROCEDURE — 99999 PR PBB SHADOW E&M-EST. PATIENT-LVL IV: CPT | Mod: PBBFAC,,, | Performed by: INTERNAL MEDICINE

## 2021-04-21 PROCEDURE — 3052F HG A1C>EQUAL 8.0%<EQUAL 9.0%: CPT | Mod: CPTII,S$GLB,, | Performed by: INTERNAL MEDICINE

## 2021-04-21 PROCEDURE — 1126F AMNT PAIN NOTED NONE PRSNT: CPT | Mod: S$GLB,,, | Performed by: INTERNAL MEDICINE

## 2021-04-21 PROCEDURE — 3008F BODY MASS INDEX DOCD: CPT | Mod: CPTII,S$GLB,, | Performed by: INTERNAL MEDICINE

## 2021-04-21 PROCEDURE — 3008F PR BODY MASS INDEX (BMI) DOCUMENTED: ICD-10-PCS | Mod: CPTII,S$GLB,, | Performed by: INTERNAL MEDICINE

## 2021-04-21 PROCEDURE — 36415 COLL VENOUS BLD VENIPUNCTURE: CPT | Performed by: INTERNAL MEDICINE

## 2021-04-21 PROCEDURE — 83036 HEMOGLOBIN GLYCOSYLATED A1C: CPT | Performed by: INTERNAL MEDICINE

## 2021-04-21 PROCEDURE — 3052F PR MOST RECENT HEMOGLOBIN A1C LEVEL 8.0 - < 9.0%: ICD-10-PCS | Mod: CPTII,S$GLB,, | Performed by: INTERNAL MEDICINE

## 2021-04-21 PROCEDURE — 99214 OFFICE O/P EST MOD 30 MIN: CPT | Mod: S$GLB,,, | Performed by: INTERNAL MEDICINE

## 2021-04-21 PROCEDURE — 99214 PR OFFICE/OUTPT VISIT, EST, LEVL IV, 30-39 MIN: ICD-10-PCS | Mod: S$GLB,,, | Performed by: INTERNAL MEDICINE

## 2021-04-22 ENCOUNTER — TELEPHONE (OUTPATIENT)
Dept: NEUROSURGERY | Facility: CLINIC | Age: 56
End: 2021-04-22

## 2021-04-22 ENCOUNTER — TELEPHONE (OUTPATIENT)
Dept: OPTOMETRY | Facility: CLINIC | Age: 56
End: 2021-04-22

## 2021-04-22 ENCOUNTER — PATIENT MESSAGE (OUTPATIENT)
Dept: ENDOCRINOLOGY | Facility: CLINIC | Age: 56
End: 2021-04-22

## 2021-04-22 DIAGNOSIS — M51.26 DISPLACEMENT OF LUMBAR INTERVERTEBRAL DISC WITHOUT MYELOPATHY: Primary | ICD-10-CM

## 2021-04-22 DIAGNOSIS — Z01.818 PREOP EXAMINATION: ICD-10-CM

## 2021-04-30 ENCOUNTER — PATIENT MESSAGE (OUTPATIENT)
Dept: SPINE | Facility: CLINIC | Age: 56
End: 2021-04-30

## 2021-04-30 ENCOUNTER — OFFICE VISIT (OUTPATIENT)
Dept: OPTOMETRY | Facility: CLINIC | Age: 56
End: 2021-04-30
Payer: COMMERCIAL

## 2021-04-30 DIAGNOSIS — H52.03 HYPEROPIA WITH ASTIGMATISM AND PRESBYOPIA, BILATERAL: ICD-10-CM

## 2021-04-30 DIAGNOSIS — H52.203 HYPEROPIA WITH ASTIGMATISM AND PRESBYOPIA, BILATERAL: ICD-10-CM

## 2021-04-30 DIAGNOSIS — H52.4 HYPEROPIA WITH ASTIGMATISM AND PRESBYOPIA, BILATERAL: ICD-10-CM

## 2021-04-30 DIAGNOSIS — E11.9 TYPE 2 DIABETES MELLITUS WITHOUT RETINOPATHY: Primary | ICD-10-CM

## 2021-04-30 PROCEDURE — 92004 COMPRE OPH EXAM NEW PT 1/>: CPT | Mod: S$GLB,,, | Performed by: OPTOMETRIST

## 2021-04-30 PROCEDURE — 3052F PR MOST RECENT HEMOGLOBIN A1C LEVEL 8.0 - < 9.0%: ICD-10-PCS | Mod: CPTII,S$GLB,, | Performed by: OPTOMETRIST

## 2021-04-30 PROCEDURE — 2023F PR DILATED RETINAL EXAM W/O EVID OF RETINOPATHY: ICD-10-PCS | Mod: S$GLB,,, | Performed by: OPTOMETRIST

## 2021-04-30 PROCEDURE — 1126F PR PAIN SEVERITY QUANTIFIED, NO PAIN PRESENT: ICD-10-PCS | Mod: S$GLB,,, | Performed by: OPTOMETRIST

## 2021-04-30 PROCEDURE — 2023F DILAT RTA XM W/O RTNOPTHY: CPT | Mod: S$GLB,,, | Performed by: OPTOMETRIST

## 2021-04-30 PROCEDURE — 92004 PR EYE EXAM, NEW PATIENT,COMPREHESV: ICD-10-PCS | Mod: S$GLB,,, | Performed by: OPTOMETRIST

## 2021-04-30 PROCEDURE — 1126F AMNT PAIN NOTED NONE PRSNT: CPT | Mod: S$GLB,,, | Performed by: OPTOMETRIST

## 2021-04-30 PROCEDURE — 92015 DETERMINE REFRACTIVE STATE: CPT | Mod: S$GLB,,, | Performed by: OPTOMETRIST

## 2021-04-30 PROCEDURE — 99999 PR PBB SHADOW E&M-EST. PATIENT-LVL III: ICD-10-PCS | Mod: PBBFAC,,, | Performed by: OPTOMETRIST

## 2021-04-30 PROCEDURE — 92015 PR REFRACTION: ICD-10-PCS | Mod: S$GLB,,, | Performed by: OPTOMETRIST

## 2021-04-30 PROCEDURE — 99999 PR PBB SHADOW E&M-EST. PATIENT-LVL III: CPT | Mod: PBBFAC,,, | Performed by: OPTOMETRIST

## 2021-04-30 PROCEDURE — 3052F HG A1C>EQUAL 8.0%<EQUAL 9.0%: CPT | Mod: CPTII,S$GLB,, | Performed by: OPTOMETRIST

## 2021-05-03 ENCOUNTER — OFFICE VISIT (OUTPATIENT)
Dept: SPINE | Facility: CLINIC | Age: 56
End: 2021-05-03
Payer: COMMERCIAL

## 2021-05-03 ENCOUNTER — PATIENT MESSAGE (OUTPATIENT)
Dept: ENDOCRINOLOGY | Facility: CLINIC | Age: 56
End: 2021-05-03

## 2021-05-03 VITALS
DIASTOLIC BLOOD PRESSURE: 92 MMHG | HEART RATE: 67 BPM | BODY MASS INDEX: 28.66 KG/M2 | WEIGHT: 177.56 LBS | SYSTOLIC BLOOD PRESSURE: 137 MMHG

## 2021-05-03 DIAGNOSIS — M51.26 LUMBAR DISC HERNIATION: Primary | ICD-10-CM

## 2021-05-03 PROCEDURE — 99212 PR OFFICE/OUTPT VISIT, EST, LEVL II, 10-19 MIN: ICD-10-PCS | Mod: S$GLB,,, | Performed by: NEUROLOGICAL SURGERY

## 2021-05-03 PROCEDURE — 99999 PR PBB SHADOW E&M-EST. PATIENT-LVL II: CPT | Mod: PBBFAC,COE,, | Performed by: NEUROLOGICAL SURGERY

## 2021-05-03 PROCEDURE — 1125F AMNT PAIN NOTED PAIN PRSNT: CPT | Mod: S$GLB,,, | Performed by: NEUROLOGICAL SURGERY

## 2021-05-03 PROCEDURE — 99999 PR PBB SHADOW E&M-EST. PATIENT-LVL II: ICD-10-PCS | Mod: PBBFAC,COE,, | Performed by: NEUROLOGICAL SURGERY

## 2021-05-03 PROCEDURE — 3008F PR BODY MASS INDEX (BMI) DOCUMENTED: ICD-10-PCS | Mod: CPTII,S$GLB,, | Performed by: NEUROLOGICAL SURGERY

## 2021-05-03 PROCEDURE — 99212 OFFICE O/P EST SF 10 MIN: CPT | Mod: S$GLB,,, | Performed by: NEUROLOGICAL SURGERY

## 2021-05-03 PROCEDURE — 3008F BODY MASS INDEX DOCD: CPT | Mod: CPTII,S$GLB,, | Performed by: NEUROLOGICAL SURGERY

## 2021-05-03 PROCEDURE — 1125F PR PAIN SEVERITY QUANTIFIED, PAIN PRESENT: ICD-10-PCS | Mod: S$GLB,,, | Performed by: NEUROLOGICAL SURGERY

## 2021-05-04 ENCOUNTER — PATIENT MESSAGE (OUTPATIENT)
Dept: ENDOCRINOLOGY | Facility: CLINIC | Age: 56
End: 2021-05-04

## 2021-05-04 ENCOUNTER — HOSPITAL ENCOUNTER (OUTPATIENT)
Dept: RADIOLOGY | Facility: HOSPITAL | Age: 56
Discharge: HOME | End: 2021-05-04
Attending: STUDENT IN AN ORGANIZED HEALTH CARE EDUCATION/TRAINING PROGRAM
Payer: COMMERCIAL

## 2021-05-04 ENCOUNTER — PATIENT MESSAGE (OUTPATIENT)
Dept: SURGERY | Facility: HOSPITAL | Age: 56
End: 2021-05-04

## 2021-05-04 ENCOUNTER — HOSPITAL ENCOUNTER (OUTPATIENT)
Dept: CARDIOLOGY | Facility: CLINIC | Age: 56
Discharge: HOME | End: 2021-05-04
Payer: COMMERCIAL

## 2021-05-04 ENCOUNTER — LAB VISIT (OUTPATIENT)
Dept: INTERNAL MEDICINE | Facility: CLINIC | Age: 56
End: 2021-05-04
Payer: COMMERCIAL

## 2021-05-04 ENCOUNTER — OFFICE VISIT (OUTPATIENT)
Dept: INTERNAL MEDICINE | Facility: CLINIC | Age: 56
End: 2021-05-04
Payer: COMMERCIAL

## 2021-05-04 VITALS
OXYGEN SATURATION: 95 % | DIASTOLIC BLOOD PRESSURE: 80 MMHG | HEIGHT: 65 IN | HEART RATE: 99 BPM | SYSTOLIC BLOOD PRESSURE: 126 MMHG | WEIGHT: 178 LBS | BODY MASS INDEX: 29.66 KG/M2

## 2021-05-04 DIAGNOSIS — Z01.818 PRE-OP EVALUATION: ICD-10-CM

## 2021-05-04 DIAGNOSIS — Z01.818 PRE-OP TESTING: ICD-10-CM

## 2021-05-04 DIAGNOSIS — R93.89 ABNORMAL CHEST X-RAY: ICD-10-CM

## 2021-05-04 DIAGNOSIS — Z01.818 PRE-OP EVALUATION: Primary | ICD-10-CM

## 2021-05-04 LAB
BILIRUB UR QL STRIP: NEGATIVE
CLARITY UR REFRACT.AUTO: CLEAR
COLOR UR AUTO: YELLOW
GLUCOSE UR QL STRIP: ABNORMAL
HGB UR QL STRIP: NEGATIVE
KETONES UR QL STRIP: NEGATIVE
LEUKOCYTE ESTERASE UR QL STRIP: NEGATIVE
NITRITE UR QL STRIP: NEGATIVE
PH UR STRIP: 5 [PH] (ref 5–8)
PROT UR QL STRIP: NEGATIVE
SP GR UR STRIP: 1.02 (ref 1–1.03)
URN SPEC COLLECT METH UR: ABNORMAL

## 2021-05-04 PROCEDURE — 81003 URINALYSIS AUTO W/O SCOPE: CPT | Performed by: STUDENT IN AN ORGANIZED HEALTH CARE EDUCATION/TRAINING PROGRAM

## 2021-05-04 PROCEDURE — 93010 EKG 12-LEAD: ICD-10-PCS | Mod: S$GLB,,, | Performed by: INTERNAL MEDICINE

## 2021-05-04 PROCEDURE — 99213 OFFICE O/P EST LOW 20 MIN: CPT | Mod: S$GLB,CS,, | Performed by: STUDENT IN AN ORGANIZED HEALTH CARE EDUCATION/TRAINING PROGRAM

## 2021-05-04 PROCEDURE — 93005 EKG 12-LEAD: ICD-10-PCS | Mod: S$GLB,,, | Performed by: STUDENT IN AN ORGANIZED HEALTH CARE EDUCATION/TRAINING PROGRAM

## 2021-05-04 PROCEDURE — 99999 PR PBB SHADOW E&M-EST. PATIENT-LVL III: CPT | Mod: PBBFAC,,, | Performed by: STUDENT IN AN ORGANIZED HEALTH CARE EDUCATION/TRAINING PROGRAM

## 2021-05-04 PROCEDURE — 71046 XR CHEST PA AND LATERAL: ICD-10-PCS | Mod: 26,,, | Performed by: RADIOLOGY

## 2021-05-04 PROCEDURE — 93010 ELECTROCARDIOGRAM REPORT: CPT | Mod: S$GLB,,, | Performed by: INTERNAL MEDICINE

## 2021-05-04 PROCEDURE — 71046 X-RAY EXAM CHEST 2 VIEWS: CPT | Mod: 26,,, | Performed by: RADIOLOGY

## 2021-05-04 PROCEDURE — 99999 PR PBB SHADOW E&M-EST. PATIENT-LVL III: ICD-10-PCS | Mod: PBBFAC,,, | Performed by: STUDENT IN AN ORGANIZED HEALTH CARE EDUCATION/TRAINING PROGRAM

## 2021-05-04 PROCEDURE — 93005 ELECTROCARDIOGRAM TRACING: CPT | Mod: S$GLB,,, | Performed by: STUDENT IN AN ORGANIZED HEALTH CARE EDUCATION/TRAINING PROGRAM

## 2021-05-04 PROCEDURE — 99213 PR OFFICE/OUTPT VISIT, EST, LEVL III, 20-29 MIN: ICD-10-PCS | Mod: S$GLB,CS,, | Performed by: STUDENT IN AN ORGANIZED HEALTH CARE EDUCATION/TRAINING PROGRAM

## 2021-05-04 PROCEDURE — 71046 X-RAY EXAM CHEST 2 VIEWS: CPT | Mod: TC

## 2021-05-04 PROCEDURE — U0003 INFECTIOUS AGENT DETECTION BY NUCLEIC ACID (DNA OR RNA); SEVERE ACUTE RESPIRATORY SYNDROME CORONAVIRUS 2 (SARS-COV-2) (CORONAVIRUS DISEASE [COVID-19]), AMPLIFIED PROBE TECHNIQUE, MAKING USE OF HIGH THROUGHPUT TECHNOLOGIES AS DESCRIBED BY CMS-2020-01-R: HCPCS | Performed by: STUDENT IN AN ORGANIZED HEALTH CARE EDUCATION/TRAINING PROGRAM

## 2021-05-04 PROCEDURE — U0005 INFEC AGEN DETEC AMPLI PROBE: HCPCS | Performed by: STUDENT IN AN ORGANIZED HEALTH CARE EDUCATION/TRAINING PROGRAM

## 2021-05-05 ENCOUNTER — PATIENT MESSAGE (OUTPATIENT)
Dept: SURGERY | Facility: HOSPITAL | Age: 56
End: 2021-05-05

## 2021-05-05 LAB — SARS-COV-2 RNA RESP QL NAA+PROBE: NOT DETECTED

## 2021-05-06 ENCOUNTER — OFFICE VISIT (OUTPATIENT)
Dept: OPTOMETRY | Facility: CLINIC | Age: 56
End: 2021-05-06
Payer: COMMERCIAL

## 2021-05-06 DIAGNOSIS — H52.03 HYPEROPIA WITH ASTIGMATISM AND PRESBYOPIA, BILATERAL: Primary | ICD-10-CM

## 2021-05-06 DIAGNOSIS — Z97.3 WEARS CONTACT LENSES: ICD-10-CM

## 2021-05-06 DIAGNOSIS — H52.203 HYPEROPIA WITH ASTIGMATISM AND PRESBYOPIA, BILATERAL: Primary | ICD-10-CM

## 2021-05-06 DIAGNOSIS — H52.4 HYPEROPIA WITH ASTIGMATISM AND PRESBYOPIA, BILATERAL: Primary | ICD-10-CM

## 2021-05-06 PROCEDURE — 99499 UNLISTED E&M SERVICE: CPT | Mod: S$GLB,,, | Performed by: OPTOMETRIST

## 2021-05-06 PROCEDURE — 92310 PR CONTACT LENS FITTING (NO CHANGE): ICD-10-PCS | Mod: CSM,,, | Performed by: OPTOMETRIST

## 2021-05-06 PROCEDURE — 92310 CONTACT LENS FITTING OU: CPT | Mod: CSM,,, | Performed by: OPTOMETRIST

## 2021-05-06 PROCEDURE — 99999 PR PBB SHADOW E&M-EST. PATIENT-LVL I: ICD-10-PCS | Mod: PBBFAC,,, | Performed by: OPTOMETRIST

## 2021-05-06 PROCEDURE — 99999 PR PBB SHADOW E&M-EST. PATIENT-LVL I: CPT | Mod: PBBFAC,,, | Performed by: OPTOMETRIST

## 2021-05-06 PROCEDURE — 99499 NO LOS: ICD-10-PCS | Mod: S$GLB,,, | Performed by: OPTOMETRIST

## 2021-05-10 ENCOUNTER — TELEPHONE (OUTPATIENT)
Dept: NEUROSURGERY | Facility: CLINIC | Age: 56
End: 2021-05-10

## 2021-05-10 ENCOUNTER — TELEPHONE (OUTPATIENT)
Dept: OPTOMETRY | Facility: CLINIC | Age: 56
End: 2021-05-10

## 2021-05-10 ENCOUNTER — LAB VISIT (OUTPATIENT)
Dept: SURGERY | Facility: CLINIC | Age: 56
End: 2021-05-10
Payer: COMMERCIAL

## 2021-05-10 DIAGNOSIS — Z01.818 PREOP EXAMINATION: ICD-10-CM

## 2021-05-10 LAB — SARS-COV-2 RDRP RESP QL NAA+PROBE: NEGATIVE

## 2021-05-10 PROCEDURE — U0002 COVID-19 LAB TEST NON-CDC: HCPCS | Performed by: NEUROLOGICAL SURGERY

## 2021-05-10 RX ORDER — MUPIROCIN 20 MG/G
OINTMENT TOPICAL 2 TIMES DAILY
Qty: 30 G | Refills: 0 | Status: SHIPPED | OUTPATIENT
Start: 2021-05-10 | End: 2021-05-15

## 2021-05-11 ENCOUNTER — ANESTHESIA (OUTPATIENT)
Dept: SURGERY | Facility: HOSPITAL | Age: 56
DRG: 519 | End: 2021-05-11
Payer: COMMERCIAL

## 2021-05-11 ENCOUNTER — ANESTHESIA EVENT (OUTPATIENT)
Dept: SURGERY | Facility: HOSPITAL | Age: 56
DRG: 519 | End: 2021-05-11
Payer: COMMERCIAL

## 2021-05-11 ENCOUNTER — HOSPITAL ENCOUNTER (INPATIENT)
Facility: HOSPITAL | Age: 56
LOS: 1 days | Discharge: HOME | DRG: 519 | End: 2021-05-12
Attending: NEUROLOGICAL SURGERY | Admitting: NEUROLOGICAL SURGERY
Payer: COMMERCIAL

## 2021-05-11 DIAGNOSIS — M51.16 LUMBAR DISC HERNIATION WITH RADICULOPATHY: ICD-10-CM

## 2021-05-11 LAB
ABO + RH BLD: NORMAL
BLD GP AB SCN CELLS X3 SERPL QL: NORMAL
POCT GLUCOSE: 124 MG/DL (ref 70–110)
POCT GLUCOSE: 132 MG/DL (ref 70–110)
POCT GLUCOSE: 139 MG/DL (ref 70–110)
POCT GLUCOSE: 200 MG/DL (ref 70–110)

## 2021-05-11 PROCEDURE — 25000003 PHARM REV CODE 250: Performed by: STUDENT IN AN ORGANIZED HEALTH CARE EDUCATION/TRAINING PROGRAM

## 2021-05-11 PROCEDURE — 25000003 PHARM REV CODE 250: Performed by: NEUROLOGICAL SURGERY

## 2021-05-11 PROCEDURE — D9220A PRA ANESTHESIA: ICD-10-PCS | Mod: COE,,, | Performed by: NURSE ANESTHETIST, CERTIFIED REGISTERED

## 2021-05-11 PROCEDURE — 63056 DECOMPRESS SPINAL CORD LMBR: CPT | Mod: COE,,, | Performed by: NEUROLOGICAL SURGERY

## 2021-05-11 PROCEDURE — 63600175 PHARM REV CODE 636 W HCPCS: Performed by: ANESTHESIOLOGY

## 2021-05-11 PROCEDURE — 71000033 HC RECOVERY, INTIAL HOUR: Performed by: NEUROLOGICAL SURGERY

## 2021-05-11 PROCEDURE — 63600175 PHARM REV CODE 636 W HCPCS: Performed by: NURSE ANESTHETIST, CERTIFIED REGISTERED

## 2021-05-11 PROCEDURE — 36000711: Performed by: NEUROLOGICAL SURGERY

## 2021-05-11 PROCEDURE — 37000009 HC ANESTHESIA EA ADD 15 MINS: Performed by: NEUROLOGICAL SURGERY

## 2021-05-11 PROCEDURE — 36000710: Performed by: NEUROLOGICAL SURGERY

## 2021-05-11 PROCEDURE — 63056 PR DECOMPRESS SPINAL CORD,1 SEG: ICD-10-PCS | Mod: COE,,, | Performed by: NEUROLOGICAL SURGERY

## 2021-05-11 PROCEDURE — 25000003 PHARM REV CODE 250: Performed by: NURSE ANESTHETIST, CERTIFIED REGISTERED

## 2021-05-11 PROCEDURE — 94761 N-INVAS EAR/PLS OXIMETRY MLT: CPT

## 2021-05-11 PROCEDURE — 82962 GLUCOSE BLOOD TEST: CPT | Performed by: NEUROLOGICAL SURGERY

## 2021-05-11 PROCEDURE — 71000015 HC POSTOP RECOV 1ST HR: Performed by: NEUROLOGICAL SURGERY

## 2021-05-11 PROCEDURE — 25000003 PHARM REV CODE 250: Performed by: PHYSICIAN ASSISTANT

## 2021-05-11 PROCEDURE — 63600175 PHARM REV CODE 636 W HCPCS: Performed by: NEUROLOGICAL SURGERY

## 2021-05-11 PROCEDURE — 86900 BLOOD TYPING SEROLOGIC ABO: CPT | Performed by: PHYSICIAN ASSISTANT

## 2021-05-11 PROCEDURE — 37000008 HC ANESTHESIA 1ST 15 MINUTES: Performed by: NEUROLOGICAL SURGERY

## 2021-05-11 PROCEDURE — 11000001 HC ACUTE MED/SURG PRIVATE ROOM

## 2021-05-11 PROCEDURE — 27201423 OPTIME MED/SURG SUP & DEVICES STERILE SUPPLY: Performed by: NEUROLOGICAL SURGERY

## 2021-05-11 PROCEDURE — 36415 COLL VENOUS BLD VENIPUNCTURE: CPT | Performed by: NEUROLOGICAL SURGERY

## 2021-05-11 PROCEDURE — D9220A PRA ANESTHESIA: Mod: COE,,, | Performed by: ANESTHESIOLOGY

## 2021-05-11 PROCEDURE — 63600175 PHARM REV CODE 636 W HCPCS

## 2021-05-11 PROCEDURE — D9220A PRA ANESTHESIA: Mod: COE,,, | Performed by: NURSE ANESTHETIST, CERTIFIED REGISTERED

## 2021-05-11 PROCEDURE — 63600175 PHARM REV CODE 636 W HCPCS: Performed by: STUDENT IN AN ORGANIZED HEALTH CARE EDUCATION/TRAINING PROGRAM

## 2021-05-11 PROCEDURE — 82962 GLUCOSE BLOOD TEST: CPT | Mod: 91 | Performed by: NEUROLOGICAL SURGERY

## 2021-05-11 PROCEDURE — 71000016 HC POSTOP RECOV ADDL HR: Performed by: NEUROLOGICAL SURGERY

## 2021-05-11 PROCEDURE — D9220A PRA ANESTHESIA: ICD-10-PCS | Mod: COE,,, | Performed by: ANESTHESIOLOGY

## 2021-05-11 PROCEDURE — 63600175 PHARM REV CODE 636 W HCPCS: Performed by: PHYSICIAN ASSISTANT

## 2021-05-11 RX ORDER — CEFAZOLIN SODIUM 1 G/3ML
2 INJECTION, POWDER, FOR SOLUTION INTRAMUSCULAR; INTRAVENOUS
Status: COMPLETED | OUTPATIENT
Start: 2021-05-11 | End: 2021-05-12

## 2021-05-11 RX ORDER — PROCHLORPERAZINE EDISYLATE 5 MG/ML
5 INJECTION INTRAMUSCULAR; INTRAVENOUS EVERY 6 HOURS PRN
Status: DISCONTINUED | OUTPATIENT
Start: 2021-05-11 | End: 2021-05-12 | Stop reason: HOSPADM

## 2021-05-11 RX ORDER — IBUPROFEN 200 MG
16 TABLET ORAL
Status: DISCONTINUED | OUTPATIENT
Start: 2021-05-11 | End: 2021-05-12 | Stop reason: HOSPADM

## 2021-05-11 RX ORDER — SODIUM CHLORIDE 9 MG/ML
INJECTION, SOLUTION INTRAVENOUS CONTINUOUS
Status: DISCONTINUED | OUTPATIENT
Start: 2021-05-11 | End: 2021-05-11

## 2021-05-11 RX ORDER — CEFAZOLIN SODIUM 1 G/3ML
2 INJECTION, POWDER, FOR SOLUTION INTRAMUSCULAR; INTRAVENOUS
Status: COMPLETED | OUTPATIENT
Start: 2021-05-11 | End: 2021-05-11

## 2021-05-11 RX ORDER — ONDANSETRON 8 MG/1
8 TABLET, ORALLY DISINTEGRATING ORAL EVERY 6 HOURS PRN
Status: DISCONTINUED | OUTPATIENT
Start: 2021-05-11 | End: 2021-05-12 | Stop reason: HOSPADM

## 2021-05-11 RX ORDER — SODIUM CHLORIDE 0.9 % (FLUSH) 0.9 %
3 SYRINGE (ML) INJECTION
Status: DISCONTINUED | OUTPATIENT
Start: 2021-05-11 | End: 2021-05-12 | Stop reason: HOSPADM

## 2021-05-11 RX ORDER — METHYLPREDNISOLONE ACETATE 40 MG/ML
INJECTION, SUSPENSION INTRA-ARTICULAR; INTRALESIONAL; INTRAMUSCULAR; SOFT TISSUE
Status: DISCONTINUED | OUTPATIENT
Start: 2021-05-11 | End: 2021-05-11 | Stop reason: HOSPADM

## 2021-05-11 RX ORDER — GLUCAGON 1 MG
1 KIT INJECTION
Status: DISCONTINUED | OUTPATIENT
Start: 2021-05-11 | End: 2021-05-12 | Stop reason: HOSPADM

## 2021-05-11 RX ORDER — LORAZEPAM 2 MG/ML
0.25 INJECTION INTRAMUSCULAR ONCE AS NEEDED
Status: COMPLETED | OUTPATIENT
Start: 2021-05-11 | End: 2021-05-11

## 2021-05-11 RX ORDER — OXYCODONE HYDROCHLORIDE 5 MG/1
5 TABLET ORAL EVERY 4 HOURS PRN
Status: DISCONTINUED | OUTPATIENT
Start: 2021-05-11 | End: 2021-05-12 | Stop reason: HOSPADM

## 2021-05-11 RX ORDER — REMIFENTANIL HYDROCHLORIDE 1 MG/ML
INJECTION, POWDER, LYOPHILIZED, FOR SOLUTION INTRAVENOUS CONTINUOUS PRN
Status: DISCONTINUED | OUTPATIENT
Start: 2021-05-11 | End: 2021-05-11

## 2021-05-11 RX ORDER — VANCOMYCIN HYDROCHLORIDE 1 G/20ML
INJECTION, POWDER, LYOPHILIZED, FOR SOLUTION INTRAVENOUS
Status: DISCONTINUED | OUTPATIENT
Start: 2021-05-11 | End: 2021-05-11 | Stop reason: HOSPADM

## 2021-05-11 RX ORDER — SUCCINYLCHOLINE CHLORIDE 20 MG/ML
INJECTION INTRAMUSCULAR; INTRAVENOUS
Status: DISCONTINUED | OUTPATIENT
Start: 2021-05-11 | End: 2021-05-11

## 2021-05-11 RX ORDER — MIDAZOLAM HYDROCHLORIDE 1 MG/ML
INJECTION, SOLUTION INTRAMUSCULAR; INTRAVENOUS
Status: DISCONTINUED | OUTPATIENT
Start: 2021-05-11 | End: 2021-05-11

## 2021-05-11 RX ORDER — ACETAMINOPHEN 10 MG/ML
INJECTION, SOLUTION INTRAVENOUS
Status: DISCONTINUED | OUTPATIENT
Start: 2021-05-11 | End: 2021-05-11

## 2021-05-11 RX ORDER — PHENYLEPHRINE HCL IN 0.9% NACL 1 MG/10 ML
SYRINGE (ML) INTRAVENOUS
Status: DISCONTINUED | OUTPATIENT
Start: 2021-05-11 | End: 2021-05-11

## 2021-05-11 RX ORDER — FENTANYL CITRATE 50 UG/ML
INJECTION, SOLUTION INTRAMUSCULAR; INTRAVENOUS
Status: DISCONTINUED | OUTPATIENT
Start: 2021-05-11 | End: 2021-05-11

## 2021-05-11 RX ORDER — MAG HYDROX/ALUMINUM HYD/SIMETH 200-200-20
30 SUSPENSION, ORAL (FINAL DOSE FORM) ORAL EVERY 4 HOURS PRN
Status: DISCONTINUED | OUTPATIENT
Start: 2021-05-11 | End: 2021-05-12 | Stop reason: HOSPADM

## 2021-05-11 RX ORDER — HYDROMORPHONE HYDROCHLORIDE 1 MG/ML
0.2 INJECTION, SOLUTION INTRAMUSCULAR; INTRAVENOUS; SUBCUTANEOUS EVERY 5 MIN PRN
Status: COMPLETED | OUTPATIENT
Start: 2021-05-11 | End: 2021-05-11

## 2021-05-11 RX ORDER — NEOSTIGMINE METHYLSULFATE 0.5 MG/ML
INJECTION, SOLUTION INTRAVENOUS
Status: DISCONTINUED | OUTPATIENT
Start: 2021-05-11 | End: 2021-05-11

## 2021-05-11 RX ORDER — MUPIROCIN 20 MG/G
OINTMENT TOPICAL 2 TIMES DAILY
Status: DISCONTINUED | OUTPATIENT
Start: 2021-05-11 | End: 2021-05-12 | Stop reason: HOSPADM

## 2021-05-11 RX ORDER — HYDROMORPHONE HYDROCHLORIDE 1 MG/ML
1 INJECTION, SOLUTION INTRAMUSCULAR; INTRAVENOUS; SUBCUTANEOUS
Status: DISCONTINUED | OUTPATIENT
Start: 2021-05-11 | End: 2021-05-12 | Stop reason: HOSPADM

## 2021-05-11 RX ORDER — AMOXICILLIN 250 MG
2 CAPSULE ORAL NIGHTLY PRN
Status: DISCONTINUED | OUTPATIENT
Start: 2021-05-11 | End: 2021-05-12 | Stop reason: HOSPADM

## 2021-05-11 RX ORDER — SODIUM CHLORIDE 9 MG/ML
INJECTION, SOLUTION INTRAVENOUS CONTINUOUS
Status: DISCONTINUED | OUTPATIENT
Start: 2021-05-11 | End: 2021-05-12 | Stop reason: HOSPADM

## 2021-05-11 RX ORDER — LIDOCAINE HYDROCHLORIDE 20 MG/ML
INJECTION, SOLUTION EPIDURAL; INFILTRATION; INTRACAUDAL; PERINEURAL
Status: DISCONTINUED | OUTPATIENT
Start: 2021-05-11 | End: 2021-05-11

## 2021-05-11 RX ORDER — PROPOFOL 10 MG/ML
VIAL (ML) INTRAVENOUS
Status: DISCONTINUED | OUTPATIENT
Start: 2021-05-11 | End: 2021-05-11

## 2021-05-11 RX ORDER — ONDANSETRON 2 MG/ML
INJECTION INTRAMUSCULAR; INTRAVENOUS
Status: DISCONTINUED | OUTPATIENT
Start: 2021-05-11 | End: 2021-05-11

## 2021-05-11 RX ORDER — BISACODYL 10 MG
10 SUPPOSITORY, RECTAL RECTAL DAILY
Status: DISCONTINUED | OUTPATIENT
Start: 2021-05-11 | End: 2021-05-12 | Stop reason: HOSPADM

## 2021-05-11 RX ORDER — METHOCARBAMOL 500 MG/1
500 TABLET, FILM COATED ORAL 4 TIMES DAILY
Status: DISCONTINUED | OUTPATIENT
Start: 2021-05-11 | End: 2021-05-12 | Stop reason: HOSPADM

## 2021-05-11 RX ORDER — METOCLOPRAMIDE HYDROCHLORIDE 5 MG/ML
10 INJECTION INTRAMUSCULAR; INTRAVENOUS EVERY 10 MIN PRN
Status: DISCONTINUED | OUTPATIENT
Start: 2021-05-11 | End: 2021-05-11 | Stop reason: HOSPADM

## 2021-05-11 RX ORDER — MUPIROCIN 20 MG/G
OINTMENT TOPICAL
Status: DISCONTINUED | OUTPATIENT
Start: 2021-05-11 | End: 2021-05-11

## 2021-05-11 RX ORDER — METHOCARBAMOL 500 MG/1
500 TABLET, FILM COATED ORAL 4 TIMES DAILY
Status: DISCONTINUED | OUTPATIENT
Start: 2021-05-11 | End: 2021-05-11

## 2021-05-11 RX ORDER — HYDROMORPHONE HYDROCHLORIDE 1 MG/ML
INJECTION, SOLUTION INTRAMUSCULAR; INTRAVENOUS; SUBCUTANEOUS
Status: COMPLETED
Start: 2021-05-11 | End: 2021-05-11

## 2021-05-11 RX ORDER — HYDROCHLOROTHIAZIDE 25 MG/1
25 TABLET ORAL DAILY
Status: DISCONTINUED | OUTPATIENT
Start: 2021-05-11 | End: 2021-05-12 | Stop reason: HOSPADM

## 2021-05-11 RX ORDER — IBUPROFEN 200 MG
24 TABLET ORAL
Status: DISCONTINUED | OUTPATIENT
Start: 2021-05-11 | End: 2021-05-12 | Stop reason: HOSPADM

## 2021-05-11 RX ORDER — INSULIN ASPART 100 [IU]/ML
1-10 INJECTION, SOLUTION INTRAVENOUS; SUBCUTANEOUS
Status: DISCONTINUED | OUTPATIENT
Start: 2021-05-11 | End: 2021-05-12 | Stop reason: HOSPADM

## 2021-05-11 RX ORDER — ACETAMINOPHEN 325 MG/1
650 TABLET ORAL EVERY 4 HOURS
Status: DISCONTINUED | OUTPATIENT
Start: 2021-05-11 | End: 2021-05-12 | Stop reason: HOSPADM

## 2021-05-11 RX ADMIN — CEFAZOLIN 2 G: 330 INJECTION, POWDER, FOR SOLUTION INTRAMUSCULAR; INTRAVENOUS at 08:05

## 2021-05-11 RX ADMIN — HYDROMORPHONE HYDROCHLORIDE 0.2 MG: 1 INJECTION, SOLUTION INTRAMUSCULAR; INTRAVENOUS; SUBCUTANEOUS at 02:05

## 2021-05-11 RX ADMIN — LORAZEPAM 0.25 MG: 2 INJECTION INTRAMUSCULAR; INTRAVENOUS at 02:05

## 2021-05-11 RX ADMIN — HYDROMORPHONE HYDROCHLORIDE 1 MG: 1 INJECTION, SOLUTION INTRAMUSCULAR; INTRAVENOUS; SUBCUTANEOUS at 08:05

## 2021-05-11 RX ADMIN — ACETAMINOPHEN 650 MG: 325 TABLET ORAL at 11:05

## 2021-05-11 RX ADMIN — PROPOFOL 110 MG: 10 INJECTION, EMULSION INTRAVENOUS at 11:05

## 2021-05-11 RX ADMIN — METHOCARBAMOL 500 MG: 500 TABLET ORAL at 02:05

## 2021-05-11 RX ADMIN — ACETAMINOPHEN 650 MG: 325 TABLET ORAL at 05:05

## 2021-05-11 RX ADMIN — ACETAMINOPHEN 1000 MG: 10 INJECTION INTRAVENOUS at 11:05

## 2021-05-11 RX ADMIN — MUPIROCIN: 20 OINTMENT TOPICAL at 09:05

## 2021-05-11 RX ADMIN — OXYCODONE 5 MG: 5 TABLET ORAL at 11:05

## 2021-05-11 RX ADMIN — Medication 100 MCG: at 01:05

## 2021-05-11 RX ADMIN — METHOCARBAMOL 500 MG: 500 TABLET ORAL at 09:05

## 2021-05-11 RX ADMIN — REMIFENTANIL HYDROCHLORIDE 0.2 MCG/KG/MIN: 1 INJECTION, POWDER, LYOPHILIZED, FOR SOLUTION INTRAVENOUS at 11:05

## 2021-05-11 RX ADMIN — FENTANYL CITRATE 50 MCG: 50 INJECTION INTRAMUSCULAR; INTRAVENOUS at 11:05

## 2021-05-11 RX ADMIN — LIDOCAINE HYDROCHLORIDE 80 MG: 20 INJECTION, SOLUTION EPIDURAL; INFILTRATION; INTRACAUDAL at 11:05

## 2021-05-11 RX ADMIN — GLYCOPYRROLATE 0.4 MCG: 0.2 INJECTION, SOLUTION INTRAMUSCULAR; INTRAVITREAL at 01:05

## 2021-05-11 RX ADMIN — SODIUM CHLORIDE: 0.9 INJECTION, SOLUTION INTRAVENOUS at 10:05

## 2021-05-11 RX ADMIN — HYDROMORPHONE HYDROCHLORIDE 0.2 MG: 1 INJECTION, SOLUTION INTRAMUSCULAR; INTRAVENOUS; SUBCUTANEOUS at 01:05

## 2021-05-11 RX ADMIN — HYDROMORPHONE HYDROCHLORIDE 1 MG: 1 INJECTION, SOLUTION INTRAMUSCULAR; INTRAVENOUS; SUBCUTANEOUS at 04:05

## 2021-05-11 RX ADMIN — MIDAZOLAM 2 MG: 1 INJECTION INTRAMUSCULAR; INTRAVENOUS at 10:05

## 2021-05-11 RX ADMIN — Medication 100 MCG: at 12:05

## 2021-05-11 RX ADMIN — OXYCODONE 5 MG: 5 TABLET ORAL at 02:05

## 2021-05-11 RX ADMIN — ONDANSETRON 4 MG: 2 INJECTION INTRAMUSCULAR; INTRAVENOUS at 01:05

## 2021-05-11 RX ADMIN — MIDAZOLAM 2 MG: 1 INJECTION INTRAMUSCULAR; INTRAVENOUS at 11:05

## 2021-05-11 RX ADMIN — HYDROCHLOROTHIAZIDE 25 MG: 25 TABLET ORAL at 02:05

## 2021-05-11 RX ADMIN — SODIUM CHLORIDE: 0.9 INJECTION, SOLUTION INTRAVENOUS at 03:05

## 2021-05-11 RX ADMIN — CEFAZOLIN 2 G: 330 INJECTION, POWDER, FOR SOLUTION INTRAMUSCULAR; INTRAVENOUS at 11:05

## 2021-05-11 RX ADMIN — INSULIN ASPART 1 UNITS: 100 INJECTION, SOLUTION INTRAVENOUS; SUBCUTANEOUS at 09:05

## 2021-05-11 RX ADMIN — SITAGLIPTIN 100 MG: 100 TABLET, FILM COATED ORAL at 05:05

## 2021-05-11 RX ADMIN — OXYCODONE 5 MG: 5 TABLET ORAL at 07:05

## 2021-05-11 RX ADMIN — FENTANYL CITRATE 50 MCG: 50 INJECTION INTRAMUSCULAR; INTRAVENOUS at 10:05

## 2021-05-11 RX ADMIN — SUCCINYLCHOLINE CHLORIDE 120 MG: 20 INJECTION, SOLUTION INTRAMUSCULAR; INTRAVENOUS; PARENTERAL at 11:05

## 2021-05-11 RX ADMIN — NEOSTIGMINE METHYLSULFATE 4 MG: 0.5 INJECTION INTRAVENOUS at 01:05

## 2021-05-11 RX ADMIN — Medication 100 MCG: at 11:05

## 2021-05-12 ENCOUNTER — TELEPHONE (OUTPATIENT)
Dept: NEUROSURGERY | Facility: CLINIC | Age: 56
End: 2021-05-12

## 2021-05-12 VITALS
WEIGHT: 178 LBS | DIASTOLIC BLOOD PRESSURE: 75 MMHG | BODY MASS INDEX: 29.66 KG/M2 | SYSTOLIC BLOOD PRESSURE: 140 MMHG | RESPIRATION RATE: 16 BRPM | HEIGHT: 65 IN | TEMPERATURE: 98 F | OXYGEN SATURATION: 96 % | HEART RATE: 84 BPM

## 2021-05-12 LAB
ANION GAP SERPL CALC-SCNC: 11 MMOL/L (ref 8–16)
BASOPHILS # BLD AUTO: 0.03 K/UL (ref 0–0.2)
BASOPHILS NFR BLD: 0.3 % (ref 0–1.9)
BUN SERPL-MCNC: 10 MG/DL (ref 6–20)
CALCIUM SERPL-MCNC: 9.5 MG/DL (ref 8.7–10.5)
CHLORIDE SERPL-SCNC: 102 MMOL/L (ref 95–110)
CO2 SERPL-SCNC: 24 MMOL/L (ref 23–29)
CREAT SERPL-MCNC: 0.8 MG/DL (ref 0.5–1.4)
DIFFERENTIAL METHOD: ABNORMAL
EOSINOPHIL # BLD AUTO: 0 K/UL (ref 0–0.5)
EOSINOPHIL NFR BLD: 0 % (ref 0–8)
ERYTHROCYTE [DISTWIDTH] IN BLOOD BY AUTOMATED COUNT: 12.2 % (ref 11.5–14.5)
EST. GFR  (AFRICAN AMERICAN): >60 ML/MIN/1.73 M^2
EST. GFR  (NON AFRICAN AMERICAN): >60 ML/MIN/1.73 M^2
GLUCOSE SERPL-MCNC: 288 MG/DL (ref 70–110)
HCT VFR BLD AUTO: 43.5 % (ref 37–48.5)
HGB BLD-MCNC: 14.6 G/DL (ref 12–16)
IMM GRANULOCYTES # BLD AUTO: 0.04 K/UL (ref 0–0.04)
IMM GRANULOCYTES NFR BLD AUTO: 0.3 % (ref 0–0.5)
LYMPHOCYTES # BLD AUTO: 1.6 K/UL (ref 1–4.8)
LYMPHOCYTES NFR BLD: 13 % (ref 18–48)
MCH RBC QN AUTO: 30.1 PG (ref 27–31)
MCHC RBC AUTO-ENTMCNC: 33.6 G/DL (ref 32–36)
MCV RBC AUTO: 90 FL (ref 82–98)
MONOCYTES # BLD AUTO: 0.5 K/UL (ref 0.3–1)
MONOCYTES NFR BLD: 3.8 % (ref 4–15)
NEUTROPHILS # BLD AUTO: 9.9 K/UL (ref 1.8–7.7)
NEUTROPHILS NFR BLD: 82.6 % (ref 38–73)
NRBC BLD-RTO: 0 /100 WBC
PLATELET # BLD AUTO: 362 K/UL (ref 150–450)
PMV BLD AUTO: 9.8 FL (ref 9.2–12.9)
POCT GLUCOSE: 204 MG/DL (ref 70–110)
POCT GLUCOSE: 264 MG/DL (ref 70–110)
POCT GLUCOSE: 309 MG/DL (ref 70–110)
POTASSIUM SERPL-SCNC: 4.1 MMOL/L (ref 3.5–5.1)
RBC # BLD AUTO: 4.85 M/UL (ref 4–5.4)
SODIUM SERPL-SCNC: 137 MMOL/L (ref 136–145)
WBC # BLD AUTO: 11.99 K/UL (ref 3.9–12.7)

## 2021-05-12 PROCEDURE — 36415 COLL VENOUS BLD VENIPUNCTURE: CPT | Performed by: STUDENT IN AN ORGANIZED HEALTH CARE EDUCATION/TRAINING PROGRAM

## 2021-05-12 PROCEDURE — 80048 BASIC METABOLIC PNL TOTAL CA: CPT | Performed by: STUDENT IN AN ORGANIZED HEALTH CARE EDUCATION/TRAINING PROGRAM

## 2021-05-12 PROCEDURE — 25000003 PHARM REV CODE 250: Performed by: STUDENT IN AN ORGANIZED HEALTH CARE EDUCATION/TRAINING PROGRAM

## 2021-05-12 PROCEDURE — 85025 COMPLETE CBC W/AUTO DIFF WBC: CPT | Performed by: STUDENT IN AN ORGANIZED HEALTH CARE EDUCATION/TRAINING PROGRAM

## 2021-05-12 PROCEDURE — 63600175 PHARM REV CODE 636 W HCPCS: Performed by: STUDENT IN AN ORGANIZED HEALTH CARE EDUCATION/TRAINING PROGRAM

## 2021-05-12 RX ORDER — METHOCARBAMOL 500 MG/1
500 TABLET, FILM COATED ORAL 4 TIMES DAILY
Qty: 40 TABLET | Refills: 0 | Status: SHIPPED | OUTPATIENT
Start: 2021-05-12 | End: 2021-05-22

## 2021-05-12 RX ADMIN — INSULIN ASPART 4 UNITS: 100 INJECTION, SOLUTION INTRAVENOUS; SUBCUTANEOUS at 12:05

## 2021-05-12 RX ADMIN — ACETAMINOPHEN 650 MG: 325 TABLET ORAL at 01:05

## 2021-05-12 RX ADMIN — SITAGLIPTIN 100 MG: 100 TABLET, FILM COATED ORAL at 08:05

## 2021-05-12 RX ADMIN — HYDROMORPHONE HYDROCHLORIDE 1 MG: 1 INJECTION, SOLUTION INTRAMUSCULAR; INTRAVENOUS; SUBCUTANEOUS at 09:05

## 2021-05-12 RX ADMIN — OXYCODONE 5 MG: 5 TABLET ORAL at 12:05

## 2021-05-12 RX ADMIN — OXYCODONE 5 MG: 5 TABLET ORAL at 04:05

## 2021-05-12 RX ADMIN — MUPIROCIN: 20 OINTMENT TOPICAL at 08:05

## 2021-05-12 RX ADMIN — CEFAZOLIN 2 G: 330 INJECTION, POWDER, FOR SOLUTION INTRAMUSCULAR; INTRAVENOUS at 04:05

## 2021-05-12 RX ADMIN — HYDROCHLOROTHIAZIDE 25 MG: 25 TABLET ORAL at 08:05

## 2021-05-12 RX ADMIN — ACETAMINOPHEN 650 MG: 325 TABLET ORAL at 05:05

## 2021-05-12 RX ADMIN — HYDROMORPHONE HYDROCHLORIDE 1 MG: 1 INJECTION, SOLUTION INTRAMUSCULAR; INTRAVENOUS; SUBCUTANEOUS at 05:05

## 2021-05-12 RX ADMIN — METHOCARBAMOL 500 MG: 500 TABLET ORAL at 08:05

## 2021-05-12 RX ADMIN — HYDROMORPHONE HYDROCHLORIDE 1 MG: 1 INJECTION, SOLUTION INTRAMUSCULAR; INTRAVENOUS; SUBCUTANEOUS at 12:05

## 2021-05-12 RX ADMIN — METHOCARBAMOL 500 MG: 500 TABLET ORAL at 12:05

## 2021-05-12 RX ADMIN — HYDROMORPHONE HYDROCHLORIDE 1 MG: 1 INJECTION, SOLUTION INTRAMUSCULAR; INTRAVENOUS; SUBCUTANEOUS at 01:05

## 2021-05-12 RX ADMIN — INSULIN ASPART 6 UNITS: 100 INJECTION, SOLUTION INTRAVENOUS; SUBCUTANEOUS at 08:05

## 2021-05-12 RX ADMIN — OXYCODONE 5 MG: 5 TABLET ORAL at 08:05

## 2021-05-18 ENCOUNTER — PATIENT MESSAGE (OUTPATIENT)
Dept: NEUROSURGERY | Facility: CLINIC | Age: 56
End: 2021-05-18

## 2021-05-19 ENCOUNTER — TELEPHONE (OUTPATIENT)
Dept: NEUROSURGERY | Facility: CLINIC | Age: 56
End: 2021-05-19

## 2021-05-24 ENCOUNTER — PATIENT MESSAGE (OUTPATIENT)
Dept: NEUROSURGERY | Facility: CLINIC | Age: 56
End: 2021-05-24

## 2021-05-26 ENCOUNTER — CLINICAL SUPPORT (OUTPATIENT)
Dept: NEUROSURGERY | Facility: CLINIC | Age: 56
End: 2021-05-26
Payer: COMMERCIAL

## 2021-05-26 VITALS — HEART RATE: 103 BPM | SYSTOLIC BLOOD PRESSURE: 125 MMHG | TEMPERATURE: 97 F | DIASTOLIC BLOOD PRESSURE: 84 MMHG

## 2021-05-26 DIAGNOSIS — Z98.890 S/P LAMINECTOMY: Primary | ICD-10-CM

## 2021-05-26 DIAGNOSIS — M43.27 FUSION OF SPINE, LUMBOSACRAL REGION: ICD-10-CM

## 2021-05-26 PROCEDURE — 99999 PR PBB SHADOW E&M-EST. PATIENT-LVL III: CPT | Mod: PBBFAC,COE,,

## 2021-05-26 PROCEDURE — 99999 PR PBB SHADOW E&M-EST. PATIENT-LVL III: ICD-10-PCS | Mod: PBBFAC,COE,,

## 2021-05-26 RX ORDER — METHOCARBAMOL 500 MG/1
500 TABLET, FILM COATED ORAL 4 TIMES DAILY
Qty: 40 TABLET | Refills: 0 | Status: SHIPPED | OUTPATIENT
Start: 2021-05-26 | End: 2021-06-05

## 2021-06-30 ENCOUNTER — TELEPHONE (OUTPATIENT)
Dept: NEUROSURGERY | Facility: CLINIC | Age: 56
End: 2021-06-30

## 2021-07-01 ENCOUNTER — OFFICE VISIT (OUTPATIENT)
Dept: NEUROSURGERY | Facility: CLINIC | Age: 56
End: 2021-07-01
Payer: COMMERCIAL

## 2021-07-01 ENCOUNTER — HOSPITAL ENCOUNTER (OUTPATIENT)
Dept: RADIOLOGY | Facility: HOSPITAL | Age: 56
Discharge: HOME | End: 2021-07-01
Attending: NEUROLOGICAL SURGERY
Payer: COMMERCIAL

## 2021-07-01 VITALS — DIASTOLIC BLOOD PRESSURE: 92 MMHG | HEART RATE: 105 BPM | SYSTOLIC BLOOD PRESSURE: 126 MMHG

## 2021-07-01 DIAGNOSIS — M51.26 HERNIATED LUMBAR INTERVERTEBRAL DISC: Primary | ICD-10-CM

## 2021-07-01 DIAGNOSIS — M43.27 FUSION OF SPINE, LUMBOSACRAL REGION: ICD-10-CM

## 2021-07-01 PROCEDURE — 99024 POSTOP FOLLOW-UP VISIT: CPT | Mod: S$GLB,,, | Performed by: NEUROLOGICAL SURGERY

## 2021-07-01 PROCEDURE — 1126F AMNT PAIN NOTED NONE PRSNT: CPT | Mod: S$GLB,,, | Performed by: NEUROLOGICAL SURGERY

## 2021-07-01 PROCEDURE — 1126F PR PAIN SEVERITY QUANTIFIED, NO PAIN PRESENT: ICD-10-PCS | Mod: S$GLB,,, | Performed by: NEUROLOGICAL SURGERY

## 2021-07-01 PROCEDURE — 99999 PR PBB SHADOW E&M-EST. PATIENT-LVL III: ICD-10-PCS | Mod: PBBFAC,,, | Performed by: NEUROLOGICAL SURGERY

## 2021-07-01 PROCEDURE — 99024 PR POST-OP FOLLOW-UP VISIT: ICD-10-PCS | Mod: S$GLB,,, | Performed by: NEUROLOGICAL SURGERY

## 2021-07-01 PROCEDURE — 72110 X-RAY EXAM L-2 SPINE 4/>VWS: CPT | Mod: TC

## 2021-07-01 PROCEDURE — 99999 PR PBB SHADOW E&M-EST. PATIENT-LVL III: CPT | Mod: PBBFAC,,, | Performed by: NEUROLOGICAL SURGERY

## 2021-07-01 PROCEDURE — 72110 XR LUMBAR SPINE 5 VIEW WITH FLEX AND EXT: ICD-10-PCS | Mod: 26,,, | Performed by: RADIOLOGY

## 2021-07-01 PROCEDURE — 72110 X-RAY EXAM L-2 SPINE 4/>VWS: CPT | Mod: 26,,, | Performed by: RADIOLOGY

## 2021-08-13 ENCOUNTER — CLINICAL SUPPORT (OUTPATIENT)
Dept: URGENT CARE | Facility: CLINIC | Age: 56
End: 2021-08-13
Payer: COMMERCIAL

## 2021-08-13 DIAGNOSIS — Z11.52 ENCOUNTER FOR SCREENING FOR COVID-19: Primary | ICD-10-CM

## 2021-08-13 LAB
CTP QC/QA: YES
SARS-COV-2 RDRP RESP QL NAA+PROBE: NEGATIVE

## 2021-08-13 PROCEDURE — U0002 COVID-19 LAB TEST NON-CDC: HCPCS | Mod: QW,S$GLB,, | Performed by: NURSE PRACTITIONER

## 2021-08-13 PROCEDURE — 99211 OFF/OP EST MAY X REQ PHY/QHP: CPT | Mod: S$GLB,,, | Performed by: NURSE PRACTITIONER

## 2021-08-13 PROCEDURE — U0002: ICD-10-PCS | Mod: QW,S$GLB,, | Performed by: NURSE PRACTITIONER

## 2021-08-13 PROCEDURE — 99211 PR OFFICE/OUTPT VISIT, EST, LEVL I: ICD-10-PCS | Mod: S$GLB,,, | Performed by: NURSE PRACTITIONER

## 2021-08-25 ENCOUNTER — LAB VISIT (OUTPATIENT)
Dept: LAB | Facility: HOSPITAL | Age: 56
End: 2021-08-25
Attending: INTERNAL MEDICINE
Payer: COMMERCIAL

## 2021-08-25 ENCOUNTER — PATIENT MESSAGE (OUTPATIENT)
Dept: ENDOCRINOLOGY | Facility: CLINIC | Age: 56
End: 2021-08-25

## 2021-08-25 ENCOUNTER — OFFICE VISIT (OUTPATIENT)
Dept: ENDOCRINOLOGY | Facility: CLINIC | Age: 56
End: 2021-08-25
Payer: COMMERCIAL

## 2021-08-25 ENCOUNTER — CLINICAL SUPPORT (OUTPATIENT)
Dept: URGENT CARE | Facility: CLINIC | Age: 56
End: 2021-08-25
Payer: COMMERCIAL

## 2021-08-25 VITALS
HEART RATE: 97 BPM | HEIGHT: 65 IN | OXYGEN SATURATION: 98 % | DIASTOLIC BLOOD PRESSURE: 86 MMHG | WEIGHT: 179.56 LBS | SYSTOLIC BLOOD PRESSURE: 118 MMHG | BODY MASS INDEX: 29.92 KG/M2

## 2021-08-25 DIAGNOSIS — E89.0 H/O PARTIAL THYROIDECTOMY: ICD-10-CM

## 2021-08-25 DIAGNOSIS — E11.65 TYPE 2 DIABETES MELLITUS WITH HYPERGLYCEMIA, WITHOUT LONG-TERM CURRENT USE OF INSULIN: ICD-10-CM

## 2021-08-25 DIAGNOSIS — Z87.19 HISTORY OF FATTY INFILTRATION OF LIVER: ICD-10-CM

## 2021-08-25 DIAGNOSIS — I10 ESSENTIAL HYPERTENSION: ICD-10-CM

## 2021-08-25 DIAGNOSIS — E66.3 OVER WEIGHT: ICD-10-CM

## 2021-08-25 DIAGNOSIS — E11.65 TYPE 2 DIABETES MELLITUS WITH HYPERGLYCEMIA, WITHOUT LONG-TERM CURRENT USE OF INSULIN: Primary | ICD-10-CM

## 2021-08-25 DIAGNOSIS — Z11.59 ENCOUNTER FOR SCREENING FOR OTHER VIRAL DISEASES: Primary | ICD-10-CM

## 2021-08-25 LAB
ALBUMIN SERPL BCP-MCNC: 4 G/DL (ref 3.5–5.2)
ALP SERPL-CCNC: 38 U/L (ref 55–135)
ALT SERPL W/O P-5'-P-CCNC: 41 U/L (ref 10–44)
ANION GAP SERPL CALC-SCNC: 8 MMOL/L (ref 8–16)
AST SERPL-CCNC: 37 U/L (ref 10–40)
BILIRUB SERPL-MCNC: 0.3 MG/DL (ref 0.1–1)
BUN SERPL-MCNC: 12 MG/DL (ref 6–20)
CALCIUM SERPL-MCNC: 9.9 MG/DL (ref 8.7–10.5)
CHLORIDE SERPL-SCNC: 100 MMOL/L (ref 95–110)
CO2 SERPL-SCNC: 29 MMOL/L (ref 23–29)
CREAT SERPL-MCNC: 0.7 MG/DL (ref 0.5–1.4)
CTP QC/QA: YES
EST. GFR  (AFRICAN AMERICAN): >60 ML/MIN/1.73 M^2
EST. GFR  (NON AFRICAN AMERICAN): >60 ML/MIN/1.73 M^2
ESTIMATED AVG GLUCOSE: 171 MG/DL (ref 68–131)
GLUCOSE SERPL-MCNC: 168 MG/DL (ref 70–110)
HBA1C MFR BLD: 7.6 % (ref 4–5.6)
POTASSIUM SERPL-SCNC: 3.9 MMOL/L (ref 3.5–5.1)
PROT SERPL-MCNC: 7.3 G/DL (ref 6–8.4)
SARS-COV-2 RDRP RESP QL NAA+PROBE: NEGATIVE
SODIUM SERPL-SCNC: 137 MMOL/L (ref 136–145)
TSH SERPL DL<=0.005 MIU/L-ACNC: 1.91 UIU/ML (ref 0.4–4)

## 2021-08-25 PROCEDURE — 99211 PR OFFICE/OUTPT VISIT, EST, LEVL I: ICD-10-PCS | Mod: S$GLB,,, | Performed by: NURSE PRACTITIONER

## 2021-08-25 PROCEDURE — 3052F HG A1C>EQUAL 8.0%<EQUAL 9.0%: CPT | Mod: CPTII,S$GLB,, | Performed by: INTERNAL MEDICINE

## 2021-08-25 PROCEDURE — 1159F PR MEDICATION LIST DOCUMENTED IN MEDICAL RECORD: ICD-10-PCS | Mod: CPTII,S$GLB,, | Performed by: INTERNAL MEDICINE

## 2021-08-25 PROCEDURE — 3052F PR MOST RECENT HEMOGLOBIN A1C LEVEL 8.0 - < 9.0%: ICD-10-PCS | Mod: CPTII,S$GLB,, | Performed by: INTERNAL MEDICINE

## 2021-08-25 PROCEDURE — U0002: ICD-10-PCS | Mod: QW,S$GLB,, | Performed by: NURSE PRACTITIONER

## 2021-08-25 PROCEDURE — 3074F SYST BP LT 130 MM HG: CPT | Mod: CPTII,S$GLB,, | Performed by: INTERNAL MEDICINE

## 2021-08-25 PROCEDURE — 99999 PR PBB SHADOW E&M-EST. PATIENT-LVL IV: CPT | Mod: PBBFAC,,, | Performed by: INTERNAL MEDICINE

## 2021-08-25 PROCEDURE — 99999 PR PBB SHADOW E&M-EST. PATIENT-LVL IV: ICD-10-PCS | Mod: PBBFAC,,, | Performed by: INTERNAL MEDICINE

## 2021-08-25 PROCEDURE — U0002 COVID-19 LAB TEST NON-CDC: HCPCS | Mod: QW,S$GLB,, | Performed by: NURSE PRACTITIONER

## 2021-08-25 PROCEDURE — 84443 ASSAY THYROID STIM HORMONE: CPT | Performed by: INTERNAL MEDICINE

## 2021-08-25 PROCEDURE — 80053 COMPREHEN METABOLIC PANEL: CPT | Performed by: INTERNAL MEDICINE

## 2021-08-25 PROCEDURE — 83036 HEMOGLOBIN GLYCOSYLATED A1C: CPT | Performed by: INTERNAL MEDICINE

## 2021-08-25 PROCEDURE — 3079F DIAST BP 80-89 MM HG: CPT | Mod: CPTII,S$GLB,, | Performed by: INTERNAL MEDICINE

## 2021-08-25 PROCEDURE — 1126F PR PAIN SEVERITY QUANTIFIED, NO PAIN PRESENT: ICD-10-PCS | Mod: CPTII,S$GLB,, | Performed by: INTERNAL MEDICINE

## 2021-08-25 PROCEDURE — 3074F PR MOST RECENT SYSTOLIC BLOOD PRESSURE < 130 MM HG: ICD-10-PCS | Mod: CPTII,S$GLB,, | Performed by: INTERNAL MEDICINE

## 2021-08-25 PROCEDURE — 3008F BODY MASS INDEX DOCD: CPT | Mod: CPTII,S$GLB,, | Performed by: INTERNAL MEDICINE

## 2021-08-25 PROCEDURE — 1159F MED LIST DOCD IN RCRD: CPT | Mod: CPTII,S$GLB,, | Performed by: INTERNAL MEDICINE

## 2021-08-25 PROCEDURE — 99211 OFF/OP EST MAY X REQ PHY/QHP: CPT | Mod: S$GLB,,, | Performed by: NURSE PRACTITIONER

## 2021-08-25 PROCEDURE — 99214 PR OFFICE/OUTPT VISIT, EST, LEVL IV, 30-39 MIN: ICD-10-PCS | Mod: S$GLB,,, | Performed by: INTERNAL MEDICINE

## 2021-08-25 PROCEDURE — 3008F PR BODY MASS INDEX (BMI) DOCUMENTED: ICD-10-PCS | Mod: CPTII,S$GLB,, | Performed by: INTERNAL MEDICINE

## 2021-08-25 PROCEDURE — 1160F PR REVIEW ALL MEDS BY PRESCRIBER/CLIN PHARMACIST DOCUMENTED: ICD-10-PCS | Mod: CPTII,S$GLB,, | Performed by: INTERNAL MEDICINE

## 2021-08-25 PROCEDURE — 1126F AMNT PAIN NOTED NONE PRSNT: CPT | Mod: CPTII,S$GLB,, | Performed by: INTERNAL MEDICINE

## 2021-08-25 PROCEDURE — 99214 OFFICE O/P EST MOD 30 MIN: CPT | Mod: S$GLB,,, | Performed by: INTERNAL MEDICINE

## 2021-08-25 PROCEDURE — 1160F RVW MEDS BY RX/DR IN RCRD: CPT | Mod: CPTII,S$GLB,, | Performed by: INTERNAL MEDICINE

## 2021-08-25 PROCEDURE — 3079F PR MOST RECENT DIASTOLIC BLOOD PRESSURE 80-89 MM HG: ICD-10-PCS | Mod: CPTII,S$GLB,, | Performed by: INTERNAL MEDICINE

## 2021-08-25 PROCEDURE — 36415 COLL VENOUS BLD VENIPUNCTURE: CPT | Performed by: INTERNAL MEDICINE

## 2021-08-25 RX ORDER — DULAGLUTIDE 0.75 MG/.5ML
0.75 INJECTION, SOLUTION SUBCUTANEOUS
Qty: 4 PEN | Refills: 5 | Status: SHIPPED | OUTPATIENT
Start: 2021-08-25 | End: 2021-09-20 | Stop reason: SDUPTHER

## 2021-08-25 RX ORDER — ATORVASTATIN CALCIUM 10 MG/1
10 TABLET, FILM COATED ORAL DAILY
Qty: 90 TABLET | Refills: 3 | Status: SHIPPED | OUTPATIENT
Start: 2021-08-25 | End: 2022-08-25

## 2021-09-03 ENCOUNTER — PATIENT MESSAGE (OUTPATIENT)
Dept: NEUROSURGERY | Facility: CLINIC | Age: 56
End: 2021-09-03

## 2021-09-09 ENCOUNTER — PATIENT MESSAGE (OUTPATIENT)
Dept: ENDOCRINOLOGY | Facility: CLINIC | Age: 56
End: 2021-09-09

## 2021-09-20 ENCOUNTER — PATIENT MESSAGE (OUTPATIENT)
Dept: ENDOCRINOLOGY | Facility: CLINIC | Age: 56
End: 2021-09-20

## 2021-09-20 RX ORDER — DULAGLUTIDE 0.75 MG/.5ML
0.75 INJECTION, SOLUTION SUBCUTANEOUS
Qty: 4 PEN | Refills: 5 | Status: SHIPPED | OUTPATIENT
Start: 2021-09-20 | End: 2022-05-04 | Stop reason: SDUPTHER

## 2022-02-25 ENCOUNTER — TELEPHONE (OUTPATIENT)
Dept: NEUROSURGERY | Facility: CLINIC | Age: 57
End: 2022-02-25

## 2022-02-25 NOTE — TELEPHONE ENCOUNTER
----- Message from Gauri Eagle sent at 2/25/2022  7:30 AM CST -----  Name of Who is Calling: AZUL MONTEMAYOR         What is the request in detail: The patient is calling to schedule an appointment. Please advise          Can the clinic reply by [facility]: Yes         [facility]: [phone]

## 2022-04-11 ENCOUNTER — PATIENT MESSAGE (OUTPATIENT)
Dept: ENDOCRINOLOGY | Facility: CLINIC | Age: 57
End: 2022-04-11
Payer: COMMERCIAL

## 2022-04-11 RX ORDER — PEN NEEDLE, DIABETIC 31 GX5/16"
1 NEEDLE, DISPOSABLE MISCELLANEOUS DAILY
Qty: 100 EACH | Refills: 1 | Status: SHIPPED | OUTPATIENT
Start: 2022-04-11 | End: 2022-11-07 | Stop reason: SDUPTHER

## 2022-05-04 DIAGNOSIS — E11.65 TYPE 2 DIABETES MELLITUS WITH HYPERGLYCEMIA, WITHOUT LONG-TERM CURRENT USE OF INSULIN: ICD-10-CM

## 2022-05-05 RX ORDER — DULAGLUTIDE 0.75 MG/.5ML
0.75 INJECTION, SOLUTION SUBCUTANEOUS
Qty: 12 PEN | Refills: 1 | Status: SHIPPED | OUTPATIENT
Start: 2022-05-05 | End: 2022-08-03

## 2022-05-05 RX ORDER — INSULIN DEGLUDEC 100 U/ML
10 INJECTION, SOLUTION SUBCUTANEOUS DAILY
Qty: 3 PEN | Refills: 1 | Status: SHIPPED | OUTPATIENT
Start: 2022-05-05 | End: 2022-08-03

## 2022-08-11 ENCOUNTER — OFFICE VISIT (OUTPATIENT)
Dept: FAMILY MEDICINE | Facility: CLINIC | Age: 57
End: 2022-08-11
Payer: COMMERCIAL

## 2022-08-11 VITALS
TEMPERATURE: 98 F | OXYGEN SATURATION: 98 % | BODY MASS INDEX: 29.99 KG/M2 | SYSTOLIC BLOOD PRESSURE: 168 MMHG | DIASTOLIC BLOOD PRESSURE: 100 MMHG | WEIGHT: 180 LBS | HEIGHT: 65 IN | HEART RATE: 86 BPM

## 2022-08-11 DIAGNOSIS — M51.16 LUMBAR DISC HERNIATION WITH RADICULOPATHY: ICD-10-CM

## 2022-08-11 DIAGNOSIS — Z12.31 SCREENING MAMMOGRAM FOR BREAST CANCER: ICD-10-CM

## 2022-08-11 DIAGNOSIS — K50.919 CROHN'S DISEASE WITH COMPLICATION, UNSPECIFIED GASTROINTESTINAL TRACT LOCATION: ICD-10-CM

## 2022-08-11 DIAGNOSIS — E78.1 HYPERTRIGLYCERIDEMIA: ICD-10-CM

## 2022-08-11 DIAGNOSIS — Z00.00 ANNUAL PHYSICAL EXAM: ICD-10-CM

## 2022-08-11 DIAGNOSIS — G89.29 OTHER CHRONIC PAIN: Primary | ICD-10-CM

## 2022-08-11 DIAGNOSIS — Z79.890 HORMONE REPLACEMENT THERAPY (HRT): ICD-10-CM

## 2022-08-11 DIAGNOSIS — E89.0 H/O PARTIAL THYROIDECTOMY: ICD-10-CM

## 2022-08-11 DIAGNOSIS — E11.65 TYPE 2 DIABETES MELLITUS WITH HYPERGLYCEMIA, WITHOUT LONG-TERM CURRENT USE OF INSULIN: ICD-10-CM

## 2022-08-11 DIAGNOSIS — E66.3 OVER WEIGHT: ICD-10-CM

## 2022-08-11 DIAGNOSIS — I10 ESSENTIAL HYPERTENSION: ICD-10-CM

## 2022-08-11 PROCEDURE — 69209 REMOVE IMPACTED EAR WAX UNI: CPT | Mod: 50,S$GLB,,

## 2022-08-11 PROCEDURE — 90471 IMMUNIZATION ADMIN: CPT | Mod: S$GLB,,,

## 2022-08-11 PROCEDURE — 69209 PR REMOVAL IMPACTED CERUMEN USING IRRIGATION/LAVAGE, UNILATERAL: ICD-10-PCS | Mod: 50,S$GLB,,

## 2022-08-11 PROCEDURE — 90677 PCV20 VACCINE IM: CPT | Mod: S$GLB,,,

## 2022-08-11 PROCEDURE — 90471 PNEUMOCOCCAL CONJUGATE VACCINE 20-VALENT: ICD-10-PCS | Mod: S$GLB,,,

## 2022-08-11 PROCEDURE — 99214 OFFICE O/P EST MOD 30 MIN: CPT | Mod: 25,S$GLB,,

## 2022-08-11 PROCEDURE — 90677 PNEUMOCOCCAL CONJUGATE VACCINE 20-VALENT: ICD-10-PCS | Mod: S$GLB,,,

## 2022-08-11 PROCEDURE — 99214 PR OFFICE/OUTPT VISIT, EST, LEVL IV, 30-39 MIN: ICD-10-PCS | Mod: 25,S$GLB,,

## 2022-08-11 RX ORDER — INSULIN DEGLUDEC 100 U/ML
16 INJECTION, SOLUTION SUBCUTANEOUS DAILY
COMMUNITY
End: 2022-11-07 | Stop reason: SDUPTHER

## 2022-08-11 RX ORDER — HYDROCHLOROTHIAZIDE 25 MG/1
25 TABLET ORAL DAILY
Qty: 90 TABLET | Refills: 1 | Status: SHIPPED | OUTPATIENT
Start: 2022-08-11 | End: 2023-02-09

## 2022-08-11 RX ORDER — DULAGLUTIDE 0.75 MG/.5ML
0.75 INJECTION, SOLUTION SUBCUTANEOUS
COMMUNITY
End: 2022-11-07 | Stop reason: SDUPTHER

## 2022-08-11 RX ORDER — ESTRADIOL 2 MG/1
2 TABLET ORAL DAILY
Qty: 90 TABLET | Refills: 1 | Status: SHIPPED | OUTPATIENT
Start: 2022-08-11 | End: 2023-02-09

## 2022-08-11 RX ORDER — OXYCODONE AND ACETAMINOPHEN 10; 325 MG/1; MG/1
1 TABLET ORAL 4 TIMES DAILY PRN
Status: ON HOLD | COMMUNITY
Start: 2022-08-10 | End: 2022-11-29

## 2022-08-11 RX ORDER — FENTANYL 25 UG/1
1 PATCH TRANSDERMAL
COMMUNITY
Start: 2022-08-02

## 2022-08-11 NOTE — PROGRESS NOTES
Subjective:       Patient ID: Tanesha Chamberlain is a 56 y.o. female.    Chief Complaint: Establish Care (Ck bp , been out bp med 6 mos hctz 25 mg)    New patient presents to clinic to establish care.  She had a previous back injury that occurred when she was a  and has chronic back pain that she follows up with a pain management doctor in [place] in which she states she is happy with her care in that office. Patient sees Dr. Arguello with Endocrinology for management of Type II DM and thyroid function.     Hypertension: patient was previously taking hydrochlorothiazide 25 mg daily but has been out for approximately 6 months.  Blood pressures at home have been high and she would like to get a refill of her medication.    Patient complains of decreased hearing with recent onset, denies ear pain or ringing in ears.    Has not had a pap smear in a while and would like a referral.  Would also like to have mammogram ordered.    She is due for pneumonia, shingles vaccine, and Tdap.     Review of patient's allergies indicates:   Allergen Reactions    Nsaids (non-steroidal anti-inflammatory drug) Other (See Comments)     GI upset  GI upset      Ibuprofen Other (See Comments)     Other reaction(s): crohns flair up  Other reaction(s): crohns flair up     Social Determinants of Health     Tobacco Use: Medium Risk    Smoking Tobacco Use: Former Smoker    Smokeless Tobacco Use: Former User   Alcohol Use: Not on file   Financial Resource Strain: Not on file   Food Insecurity: Not on file   Transportation Needs: Not on file   Physical Activity: Not on file   Stress: Not on file   Social Connections: Not on file   Housing Stability: Not on file   Depression PHQ-4: Low Risk     Last PHQ Score: 0      Past Medical History:   Diagnosis Date    ADHD (attention deficit hyperactivity disorder)     Arthritis     Crohn's disease     Diabetes mellitus, type 2     Hypertension     Hypothyroidism     SBO (small bowel  "obstruction)       Past Surgical History:   Procedure Laterality Date    ANTERIOR CRUCIATE LIGAMENT REPAIR Right     APPENDECTOMY      1988    BUNIONECTOMY Bilateral     CHOLECYSTECTOMY      2005    MINIMALLY INVASIVE SURGICAL REMOVAL OF INTERVERTEBRAL DISC OF SPINE USING MICROSCOPE Left 5/11/2021    Procedure: Left L4-5 Far Lateral Disectomy, MIS;  Surgeon: Johnny Cruz MD;  Location: [facility] OR [address] [place] [address];  Service: Neurosurgery;  Laterality: Left;    SHOULDER SURGERY Right     SPINE SURGERY      TRANSFORAMINAL EPIDURAL INJECTION OF STEROID Left 3/10/2021    Procedure: INJECTION, STEROID, EPIDURAL, TRANSFORAMINAL APPROACH  L4-5;  Surgeon: Jaylon Tyson MD;  Location: [facility];  Service: Pain Management;  Laterality: Left;  consent needed  ([employer] [facility])      Social History     Socioeconomic History    Marital status:          Current Outpatient Medications:     atorvastatin (LIPITOR) 10 MG tablet, Take 1 tablet (10 mg total) by mouth once daily., Disp: 90 tablet, Rfl: 3    BD ULTRA-FINE SHORT PEN NEEDLE 31 gauge x 5/16" Ndle, 1 each by Misc.(Non-Drug; Combo Route) route once daily., Disp: 100 each, Rfl: 1    blood sugar diagnostic (ONETOUCH ULTRA BLUE TEST STRIP) Strp, Check blood glucose readings twice a day-- before meal and 1 to 2 hours post meal, Disp: 100 strip, Rfl: 0    blood-glucose meter Misc, Check blood glucose readings twice a day-- before meal and 1 to 2 hours post meal, Disp: 1 each, Rfl: 0    dulaglutide (TRULICITY) 0.75 mg/0.5 mL pen injector, Inject into the skin every 7 days., Disp: , Rfl:     fentaNYL (DURAGESIC) 25 mcg/hr, 1 patch Every 3 (three) days., Disp: , Rfl:     insulin degludec (TRESIBA FLEXTOUCH U-100) 100 unit/mL (3 mL) insulin pen, Inject 16 Units into the skin once daily., Disp: , Rfl:     lancets 33 gauge Misc, Check blood glucose readings twice a day-- before meal and 1 to 2 hours post meal, Disp: 100 each, Rfl: 0    oxyCODONE-acetaminophen " (PERCOCET)  mg per tablet, Take 1 tablet by mouth 4 (four) times daily as needed., Disp: , Rfl:     estradioL (ESTRACE) 2 MG tablet, Take 1 tablet (2 mg total) by mouth once daily., Disp: 90 tablet, Rfl: 1    fentaNYL (DURAGESIC) 50 mcg/hr, APPLY 1 PATCH TO SKIN EVERY 72 HOURS, Disp: , Rfl:     hydroCHLOROthiazide (HYDRODIURIL) 25 MG tablet, Take 1 tablet (25 mg total) by mouth once daily., Disp: 90 tablet, Rfl: 1    HYDROcodone-acetaminophen (NORCO)  mg per tablet, Take 1-2 tablets by mouth every 8 (eight) hours., Disp: , Rfl:     Lab Results   Component Value Date    WBC 11.99 05/12/2021    HGB 14.6 05/12/2021    HCT 43.5 05/12/2021     05/12/2021    CHOL 170 04/14/2021    TRIG 167 (H) 04/14/2021    HDL 54 04/14/2021    ALT 41 08/25/2021    AST 37 08/25/2021     08/25/2021    K 3.9 08/25/2021     08/25/2021    CREATININE 0.7 08/25/2021    BUN 12 08/25/2021    CO2 29 08/25/2021    TSH 1.908 08/25/2021    INR 1.0 05/04/2021    HGBA1C 7.6 (H) 08/25/2021       Review of Systems   Constitutional: Negative for chills, fatigue and fever.   HENT: Positive for hearing loss. Negative for ear pain and tinnitus.    Respiratory: Negative.    Gastrointestinal: Positive for nausea. Negative for abdominal pain, diarrhea and vomiting.   Endocrine: Negative.    Genitourinary: Positive for bladder incontinence.   Musculoskeletal: Positive for arthralgias, back pain and myalgias.        Pt reports pain is chronic in nature and is managed by pain management.   Integumentary:  Negative.   Neurological: Negative.    Hematological: Negative for adenopathy.   Psychiatric/Behavioral: Negative.        Objective:      Physical Exam  Vitals reviewed.   Constitutional:       Appearance: Normal appearance.   HENT:      Head: Normocephalic and atraumatic.      Right Ear: There is impacted cerumen.      Left Ear: There is impacted cerumen.      Ears:      Comments: Ear lavage done bilaterally.  Bilateral TM  viewed post cerumen removal and were both intact and normal.  Patient reports improved hearing bilaterally.       Nose: Nose normal.      Mouth/Throat:      Mouth: Mucous membranes are moist.      Pharynx: No posterior oropharyngeal erythema.   Eyes:      Pupils: Pupils are equal, round, and reactive to light.   Neck:      Vascular: No carotid bruit.   Cardiovascular:      Rate and Rhythm: Normal rate and regular rhythm.      Pulses: Normal pulses.           Radial pulses are 2+ on the right side and 2+ on the left side.        Dorsalis pedis pulses are 2+ on the right side and 2+ on the left side.        Posterior tibial pulses are 2+ on the right side and 2+ on the left side.      Heart sounds: Normal heart sounds, S1 normal and S2 normal. No murmur heard.  Pulmonary:      Effort: Pulmonary effort is normal.      Breath sounds: Normal breath sounds and air entry.   Abdominal:      General: Abdomen is flat. Bowel sounds are normal.      Palpations: Abdomen is soft.      Tenderness: There is no abdominal tenderness.   Musculoskeletal:         General: Normal range of motion.      Cervical back: Normal range of motion and neck supple. No tenderness.      Right lower leg: No edema.      Left lower leg: No edema.      Right foot: Normal range of motion. No deformity.      Left foot: Normal range of motion. No deformity.   Feet:      Right foot:      Protective Sensation: 8 sites tested. 8 sites sensed.      Skin integrity: Skin integrity normal.      Toenail Condition: Right toenails are normal.      Left foot:      Protective Sensation: 8 sites tested. 8 sites sensed.      Skin integrity: Skin integrity normal.      Toenail Condition: Left toenails are normal.   Lymphadenopathy:      Cervical: No cervical adenopathy.   Skin:     General: Skin is warm and dry.      Capillary Refill: Capillary refill takes less than 2 seconds.   Neurological:      Mental Status: She is alert and oriented to person, place, and time.  Mental status is at baseline.   Psychiatric:         Mood and Affect: Mood normal.         Thought Content: Thought content normal.         Judgment: Judgment normal.         Assessment:       1. Other chronic pain    2. Lumbar disc herniation with radiculopathy    3. Essential hypertension    4. Type 2 diabetes mellitus with hyperglycemia, without long-term current use of insulin    5. H/O partial thyroidectomy    6. Crohn's disease with complication, unspecified gastrointestinal tract location    7. Screening mammogram for breast cancer    8. Over weight    9. Hormone replacement therapy (HRT)    10. Hypertriglyceridemia    11. Annual physical exam        Plan:       Tanesha was seen today for establish care.    Diagnoses and all orders for this visit:    Other chronic pain    Lumbar disc herniation with radiculopathy    Essential hypertension  -     Comprehensive Metabolic Panel; Future  -     hydroCHLOROthiazide (HYDRODIURIL) 25 MG tablet; Take 1 tablet (25 mg total) by mouth once daily.    Type 2 diabetes mellitus with hyperglycemia, without long-term current use of insulin  -     Hemoglobin A1C; Future    H/O partial thyroidectomy  -     TSH; Future    Crohn's disease with complication, unspecified gastrointestinal tract location  -     CBC Auto Differential; Future    Screening mammogram for breast cancer  -     Mammo Digital Screening Bilat w/ Balwinder; Future    Over weight    Hormone replacement therapy (HRT)  -     estradioL (ESTRACE) 2 MG tablet; Take 1 tablet (2 mg total) by mouth once daily.  -     Ambulatory referral/consult to Obstetrics / Gynecology; Future    Hypertriglyceridemia  -     Lipid Panel; Future    Annual physical exam  -     Hepatitis C Antibody; Future    Other orders  -     (In Office Administered) Pneumococcal Conjugate Vaccine (20 Valent) (IM)     Patient will have fasting labs done  She is planning on scheduling an appointment with Dr. Arguello with endocrinology soon.  Will order HA1C  and TSH to be done with routine labs.  Microalbumin/creatnine ratio collected and sent to lab  Cerumen impaction in both ears, bilateral ear irrigation done in office.  Patient instructed on ear care and avoidance of use of cotton swabs.  Pneumonia vaccination done in office.  Patient to get Shingles and Tdap at her pharmacy.  Patient to return to clinic in 2 weeks for blood pressure check.  She was instructed to keep a log of blood pressures twice daily and bring with her to next appointment.  She will also bring her blood pressure monitor for calibration.

## 2022-08-12 LAB
ALBUMIN/CREAT UR: 13 MCG/MG CREAT
CREAT UR-MCNC: 128 MG/DL (ref 20–275)
MICROALBUMIN UR-MCNC: 1.6 MG/DL

## 2022-08-18 ENCOUNTER — HOSPITAL ENCOUNTER (INPATIENT)
Facility: HOSPITAL | Age: 57
LOS: 2 days | Discharge: HOME | DRG: 387 | End: 2022-08-20
Attending: EMERGENCY MEDICINE | Admitting: INTERNAL MEDICINE
Payer: COMMERCIAL

## 2022-08-18 DIAGNOSIS — K50.119 CROHN'S DISEASE OF COLON WITH COMPLICATION: Primary | ICD-10-CM

## 2022-08-18 LAB
ALBUMIN SERPL BCP-MCNC: 4.8 G/DL (ref 3.5–5.2)
ALP SERPL-CCNC: 55 U/L (ref 55–135)
ALT SERPL W/O P-5'-P-CCNC: 41 U/L (ref 10–44)
ANION GAP SERPL CALC-SCNC: 13 MMOL/L (ref 8–16)
AST SERPL-CCNC: 35 U/L (ref 10–40)
BASOPHILS # BLD AUTO: 0.06 K/UL (ref 0–0.2)
BASOPHILS NFR BLD: 0.4 % (ref 0–1.9)
BILIRUB SERPL-MCNC: 0.8 MG/DL (ref 0.1–1)
BILIRUB UR QL STRIP: NEGATIVE
BUN SERPL-MCNC: 9 MG/DL (ref 6–20)
CALCIUM SERPL-MCNC: 9.6 MG/DL (ref 8.7–10.5)
CHLORIDE SERPL-SCNC: 96 MMOL/L (ref 95–110)
CLARITY UR: CLEAR
CO2 SERPL-SCNC: 29 MMOL/L (ref 23–29)
COLOR UR: YELLOW
CREAT SERPL-MCNC: 0.8 MG/DL (ref 0.5–1.4)
DIFFERENTIAL METHOD: ABNORMAL
EOSINOPHIL # BLD AUTO: 1.4 K/UL (ref 0–0.5)
EOSINOPHIL NFR BLD: 10.2 % (ref 0–8)
ERYTHROCYTE [DISTWIDTH] IN BLOOD BY AUTOMATED COUNT: 12.3 % (ref 11.5–14.5)
EST. GFR  (NO RACE VARIABLE): >60 ML/MIN/1.73 M^2
GLUCOSE SERPL-MCNC: 169 MG/DL (ref 70–110)
GLUCOSE UR QL STRIP: NEGATIVE
HCT VFR BLD AUTO: 48.6 % (ref 37–48.5)
HGB BLD-MCNC: 16.8 G/DL (ref 12–16)
HGB UR QL STRIP: ABNORMAL
IMM GRANULOCYTES # BLD AUTO: 0.03 K/UL (ref 0–0.04)
IMM GRANULOCYTES NFR BLD AUTO: 0.2 % (ref 0–0.5)
KETONES UR QL STRIP: NEGATIVE
LEUKOCYTE ESTERASE UR QL STRIP: NEGATIVE
LIPASE SERPL-CCNC: 29 U/L (ref 4–60)
LYMPHOCYTES # BLD AUTO: 3 K/UL (ref 1–4.8)
LYMPHOCYTES NFR BLD: 22.3 % (ref 18–48)
MCH RBC QN AUTO: 29.9 PG (ref 27–31)
MCHC RBC AUTO-ENTMCNC: 34.6 G/DL (ref 32–36)
MCV RBC AUTO: 87 FL (ref 82–98)
MONOCYTES # BLD AUTO: 0.8 K/UL (ref 0.3–1)
MONOCYTES NFR BLD: 5.7 % (ref 4–15)
NEUTROPHILS # BLD AUTO: 8.3 K/UL (ref 1.8–7.7)
NEUTROPHILS NFR BLD: 61.2 % (ref 38–73)
NITRITE UR QL STRIP: NEGATIVE
NRBC BLD-RTO: 0 /100 WBC
PH UR STRIP: 6 [PH] (ref 5–8)
PLATELET # BLD AUTO: 379 K/UL (ref 150–450)
PMV BLD AUTO: 9.8 FL (ref 9.2–12.9)
POTASSIUM SERPL-SCNC: 3.4 MMOL/L (ref 3.5–5.1)
PROT SERPL-MCNC: 8.1 G/DL (ref 6–8.4)
PROT UR QL STRIP: NEGATIVE
RBC # BLD AUTO: 5.62 M/UL (ref 4–5.4)
SARS-COV-2 RDRP RESP QL NAA+PROBE: NEGATIVE
SODIUM SERPL-SCNC: 138 MMOL/L (ref 136–145)
SP GR UR STRIP: 1.02 (ref 1–1.03)
URN SPEC COLLECT METH UR: ABNORMAL
UROBILINOGEN UR STRIP-ACNC: NEGATIVE EU/DL
WBC # BLD AUTO: 13.58 K/UL (ref 3.9–12.7)

## 2022-08-18 PROCEDURE — 85025 COMPLETE CBC W/AUTO DIFF WBC: CPT | Performed by: EMERGENCY MEDICINE

## 2022-08-18 PROCEDURE — 63600175 PHARM REV CODE 636 W HCPCS: Performed by: EMERGENCY MEDICINE

## 2022-08-18 PROCEDURE — 81003 URINALYSIS AUTO W/O SCOPE: CPT | Performed by: EMERGENCY MEDICINE

## 2022-08-18 PROCEDURE — 25000003 PHARM REV CODE 250: Performed by: INTERNAL MEDICINE

## 2022-08-18 PROCEDURE — 63600175 PHARM REV CODE 636 W HCPCS: Performed by: INTERNAL MEDICINE

## 2022-08-18 PROCEDURE — 96365 THER/PROPH/DIAG IV INF INIT: CPT

## 2022-08-18 PROCEDURE — 83690 ASSAY OF LIPASE: CPT | Performed by: EMERGENCY MEDICINE

## 2022-08-18 PROCEDURE — 96375 TX/PRO/DX INJ NEW DRUG ADDON: CPT

## 2022-08-18 PROCEDURE — C9113 INJ PANTOPRAZOLE SODIUM, VIA: HCPCS | Performed by: INTERNAL MEDICINE

## 2022-08-18 PROCEDURE — 25000003 PHARM REV CODE 250: Performed by: EMERGENCY MEDICINE

## 2022-08-18 PROCEDURE — U0002 COVID-19 LAB TEST NON-CDC: HCPCS | Performed by: EMERGENCY MEDICINE

## 2022-08-18 PROCEDURE — 25500020 PHARM REV CODE 255: Performed by: EMERGENCY MEDICINE

## 2022-08-18 PROCEDURE — 12000002 HC ACUTE/MED SURGE SEMI-PRIVATE ROOM

## 2022-08-18 PROCEDURE — 96361 HYDRATE IV INFUSION ADD-ON: CPT

## 2022-08-18 PROCEDURE — 99285 EMERGENCY DEPT VISIT HI MDM: CPT | Mod: 25

## 2022-08-18 PROCEDURE — 80053 COMPREHEN METABOLIC PANEL: CPT | Performed by: EMERGENCY MEDICINE

## 2022-08-18 RX ORDER — CALCIUM GLUCONATE 20 MG/ML
3 INJECTION, SOLUTION INTRAVENOUS
Status: DISCONTINUED | OUTPATIENT
Start: 2022-08-18 | End: 2022-08-20 | Stop reason: HOSPADM

## 2022-08-18 RX ORDER — MAGNESIUM SULFATE HEPTAHYDRATE 40 MG/ML
4 INJECTION, SOLUTION INTRAVENOUS
Status: DISCONTINUED | OUTPATIENT
Start: 2022-08-18 | End: 2022-08-20 | Stop reason: HOSPADM

## 2022-08-18 RX ORDER — ONDANSETRON 2 MG/ML
4 INJECTION INTRAMUSCULAR; INTRAVENOUS EVERY 8 HOURS PRN
Status: DISCONTINUED | OUTPATIENT
Start: 2022-08-18 | End: 2022-08-20 | Stop reason: HOSPADM

## 2022-08-18 RX ORDER — CALCIUM GLUCONATE 20 MG/ML
1 INJECTION, SOLUTION INTRAVENOUS
Status: DISCONTINUED | OUTPATIENT
Start: 2022-08-18 | End: 2022-08-20 | Stop reason: HOSPADM

## 2022-08-18 RX ORDER — SODIUM CHLORIDE AND POTASSIUM CHLORIDE 150; 900 MG/100ML; MG/100ML
INJECTION, SOLUTION INTRAVENOUS CONTINUOUS
Status: DISCONTINUED | OUTPATIENT
Start: 2022-08-18 | End: 2022-08-20 | Stop reason: HOSPADM

## 2022-08-18 RX ORDER — MAGNESIUM SULFATE HEPTAHYDRATE 40 MG/ML
2 INJECTION, SOLUTION INTRAVENOUS
Status: DISCONTINUED | OUTPATIENT
Start: 2022-08-18 | End: 2022-08-20 | Stop reason: HOSPADM

## 2022-08-18 RX ORDER — INSULIN ASPART 100 [IU]/ML
0-5 INJECTION, SOLUTION INTRAVENOUS; SUBCUTANEOUS EVERY 6 HOURS PRN
Status: DISCONTINUED | OUTPATIENT
Start: 2022-08-18 | End: 2022-08-20 | Stop reason: HOSPADM

## 2022-08-18 RX ORDER — POTASSIUM CHLORIDE 7.45 MG/ML
40 INJECTION INTRAVENOUS
Status: DISCONTINUED | OUTPATIENT
Start: 2022-08-18 | End: 2022-08-20 | Stop reason: HOSPADM

## 2022-08-18 RX ORDER — MORPHINE SULFATE 4 MG/ML
4 INJECTION, SOLUTION INTRAMUSCULAR; INTRAVENOUS
Status: COMPLETED | OUTPATIENT
Start: 2022-08-18 | End: 2022-08-18

## 2022-08-18 RX ORDER — SODIUM CHLORIDE 9 MG/ML
1000 INJECTION, SOLUTION INTRAVENOUS
Status: COMPLETED | OUTPATIENT
Start: 2022-08-18 | End: 2022-08-18

## 2022-08-18 RX ORDER — CALCIUM GLUCONATE 20 MG/ML
2 INJECTION, SOLUTION INTRAVENOUS
Status: DISCONTINUED | OUTPATIENT
Start: 2022-08-18 | End: 2022-08-20 | Stop reason: HOSPADM

## 2022-08-18 RX ORDER — POTASSIUM CHLORIDE 7.45 MG/ML
80 INJECTION INTRAVENOUS
Status: DISCONTINUED | OUTPATIENT
Start: 2022-08-18 | End: 2022-08-20 | Stop reason: HOSPADM

## 2022-08-18 RX ORDER — ONDANSETRON 2 MG/ML
4 INJECTION INTRAMUSCULAR; INTRAVENOUS
Status: COMPLETED | OUTPATIENT
Start: 2022-08-18 | End: 2022-08-18

## 2022-08-18 RX ORDER — SODIUM CHLORIDE 9 MG/ML
1000 INJECTION, SOLUTION INTRAVENOUS
Status: COMPLETED | OUTPATIENT
Start: 2022-08-18 | End: 2022-08-19

## 2022-08-18 RX ORDER — SODIUM CHLORIDE 0.9 % (FLUSH) 0.9 %
2 SYRINGE (ML) INJECTION EVERY 6 HOURS PRN
Status: DISCONTINUED | OUTPATIENT
Start: 2022-08-18 | End: 2022-08-20 | Stop reason: HOSPADM

## 2022-08-18 RX ORDER — GLUCAGON 1 MG
1 KIT INJECTION
Status: DISCONTINUED | OUTPATIENT
Start: 2022-08-18 | End: 2022-08-20 | Stop reason: HOSPADM

## 2022-08-18 RX ORDER — POTASSIUM CHLORIDE 7.45 MG/ML
60 INJECTION INTRAVENOUS
Status: DISCONTINUED | OUTPATIENT
Start: 2022-08-18 | End: 2022-08-20 | Stop reason: HOSPADM

## 2022-08-18 RX ORDER — PANTOPRAZOLE SODIUM 40 MG/10ML
40 INJECTION, POWDER, LYOPHILIZED, FOR SOLUTION INTRAVENOUS DAILY
Status: DISCONTINUED | OUTPATIENT
Start: 2022-08-18 | End: 2022-08-20 | Stop reason: HOSPADM

## 2022-08-18 RX ADMIN — MORPHINE SULFATE 4 MG: 4 INJECTION, SOLUTION INTRAMUSCULAR; INTRAVENOUS at 09:08

## 2022-08-18 RX ADMIN — SODIUM CHLORIDE 1000 ML: 0.9 INJECTION, SOLUTION INTRAVENOUS at 08:08

## 2022-08-18 RX ADMIN — PROMETHAZINE HYDROCHLORIDE 12.5 MG: 25 INJECTION INTRAMUSCULAR; INTRAVENOUS at 08:08

## 2022-08-18 RX ADMIN — PANTOPRAZOLE SODIUM 40 MG: 40 INJECTION, POWDER, FOR SOLUTION INTRAVENOUS at 10:08

## 2022-08-18 RX ADMIN — SODIUM CHLORIDE 1000 ML: 0.9 INJECTION, SOLUTION INTRAVENOUS at 06:08

## 2022-08-18 RX ADMIN — IOHEXOL 100 ML: 350 INJECTION, SOLUTION INTRAVENOUS at 07:08

## 2022-08-18 RX ADMIN — METHYLPREDNISOLONE SODIUM SUCCINATE 30 MG: 40 INJECTION, POWDER, FOR SOLUTION INTRAMUSCULAR; INTRAVENOUS at 10:08

## 2022-08-18 RX ADMIN — PIPERACILLIN AND TAZOBACTAM 3.38 G: 3; .375 INJECTION, POWDER, LYOPHILIZED, FOR SOLUTION INTRAVENOUS; PARENTERAL at 09:08

## 2022-08-18 RX ADMIN — ONDANSETRON 4 MG: 2 INJECTION INTRAMUSCULAR; INTRAVENOUS at 06:08

## 2022-08-18 RX ADMIN — PROMETHAZINE HYDROCHLORIDE 12.5 MG: 25 INJECTION INTRAMUSCULAR; INTRAVENOUS at 10:08

## 2022-08-18 NOTE — ED PROVIDER NOTES
Encounter Date: 2022       History     Chief Complaint   Patient presents with    Abdominal Pain     Abd pain x 2 days with N/V without diarrhea. Pt reports Hx of crohns.      HPI  Review of patient's allergies indicates:   Allergen Reactions    Nsaids (non-steroidal anti-inflammatory drug) Other (See Comments)     GI upset  GI upset      Ibuprofen Other (See Comments)     Other reaction(s): crohns flair up  Other reaction(s): crohns flair up     Past Medical History:   Diagnosis Date    ADHD (attention deficit hyperactivity disorder)     Arthritis     Crohn's disease     Diabetes mellitus, type 2     Hypertension     Hypothyroidism     SBO (small bowel obstruction)      Past Surgical History:   Procedure Laterality Date    ANTERIOR CRUCIATE LIGAMENT REPAIR Right     APPENDECTOMY          BUNIONECTOMY Bilateral     CHOLECYSTECTOMY          MINIMALLY INVASIVE SURGICAL REMOVAL OF INTERVERTEBRAL DISC OF SPINE USING MICROSCOPE Left 2021    Procedure: Left L4-5 Far Lateral Disectomy, MIS;  Surgeon: Johnny Cruz MD;  Location: [facility] OR [address] [place] [address];  Service: Neurosurgery;  Laterality: Left;    SHOULDER SURGERY Right     SPINE SURGERY      TRANSFORAMINAL EPIDURAL INJECTION OF STEROID Left 3/10/2021    Procedure: INJECTION, STEROID, EPIDURAL, TRANSFORAMINAL APPROACH  L4-5;  Surgeon: Jaylon Tyson MD;  Location: [facility];  Service: Pain Management;  Laterality: Left;  consent needed  ([employer] [facility])     Family History   Problem Relation Age of Onset    Cirrhosis Neg Hx      Social History     Tobacco Use    Smoking status: Former Smoker     Types: Cigarettes     Quit date: 2020     Years since quittin.6    Smokeless tobacco: Former User    Tobacco comment: in the 8 th grade   Substance Use Topics    Alcohol use: Yes     Alcohol/week: 4.0 standard drinks     Types: 4 Cans of beer per week     Comment: 1 beer , once in 2 weeks    Drug use: No     Review of  "Systems    Physical Exam     Initial Vitals [08/18/22 1754]   BP Pulse Resp Temp SpO2   (!) 152/109 106 19 98.5 °F (36.9 °C) 96 %      MAP       --         Physical Exam    ED Course   Procedures  Labs Reviewed   CBC W/ AUTO DIFFERENTIAL - Abnormal; Notable for the following components:       Result Value    WBC 13.58 (*)     RBC 5.62 (*)     Hemoglobin 16.8 (*)     Hematocrit 48.6 (*)     All other components within normal limits   URINALYSIS, REFLEX TO URINE CULTURE - Abnormal; Notable for the following components:    Occult Blood UA Trace (*)     All other components within normal limits    Narrative:     Specimen Source->Urine   COMPREHENSIVE METABOLIC PANEL   LIPASE   SARS-COV-2 RNA AMPLIFICATION, QUAL          Imaging Results    None          Medications   iohexoL (OMNIPAQUE 350) injection 100 mL (has no administration in time range)   morphine injection 4 mg (has no administration in time range)   ondansetron injection 4 mg (4 mg Intravenous Given 8/18/22 1809)   0.9%  NaCl infusion (1,000 mLs Intravenous New Bag 8/18/22 1809)                 ED Course as of 08/18/22 1840   Thu Aug 18, 2022   1840 WBC(!): 13.58 [EA]   1840 Hemoglobin(!): 16.8 [EA]      ED Course User Index  [EA] Chelsie Turcios MD             Clinical Impression:    ***Please document a Clinical Impression and click the "Refresh" button to refresh your note and automatically pull in before signing.***           "

## 2022-08-19 LAB
ANION GAP SERPL CALC-SCNC: 13 MMOL/L (ref 8–16)
BASOPHILS # BLD AUTO: 0.02 K/UL (ref 0–0.2)
BASOPHILS NFR BLD: 0.2 % (ref 0–1.9)
BUN SERPL-MCNC: 7 MG/DL (ref 6–20)
CALCIUM SERPL-MCNC: 9.5 MG/DL (ref 8.7–10.5)
CHLORIDE SERPL-SCNC: 102 MMOL/L (ref 95–110)
CO2 SERPL-SCNC: 26 MMOL/L (ref 23–29)
CREAT SERPL-MCNC: 0.8 MG/DL (ref 0.5–1.4)
DIFFERENTIAL METHOD: ABNORMAL
EOSINOPHIL # BLD AUTO: 0 K/UL (ref 0–0.5)
EOSINOPHIL NFR BLD: 0 % (ref 0–8)
ERYTHROCYTE [DISTWIDTH] IN BLOOD BY AUTOMATED COUNT: 12.2 % (ref 11.5–14.5)
EST. GFR  (NO RACE VARIABLE): >60 ML/MIN/1.73 M^2
ESTIMATED AVG GLUCOSE: 194 MG/DL (ref 68–131)
GLUCOSE SERPL-MCNC: 205 MG/DL (ref 70–110)
GLUCOSE SERPL-MCNC: 248 MG/DL (ref 70–110)
GLUCOSE SERPL-MCNC: 263 MG/DL (ref 70–110)
GLUCOSE SERPL-MCNC: 296 MG/DL (ref 70–110)
GLUCOSE SERPL-MCNC: 355 MG/DL (ref 70–110)
HBA1C MFR BLD: 8.4 % (ref 4.5–6.2)
HCT VFR BLD AUTO: 43.6 % (ref 37–48.5)
HGB BLD-MCNC: 15.1 G/DL (ref 12–16)
IMM GRANULOCYTES # BLD AUTO: 0.03 K/UL (ref 0–0.04)
IMM GRANULOCYTES NFR BLD AUTO: 0.4 % (ref 0–0.5)
LYMPHOCYTES # BLD AUTO: 1.1 K/UL (ref 1–4.8)
LYMPHOCYTES NFR BLD: 12.8 % (ref 18–48)
MAGNESIUM SERPL-MCNC: 1.8 MG/DL (ref 1.6–2.6)
MCH RBC QN AUTO: 29.8 PG (ref 27–31)
MCHC RBC AUTO-ENTMCNC: 34.6 G/DL (ref 32–36)
MCV RBC AUTO: 86 FL (ref 82–98)
MONOCYTES # BLD AUTO: 0.1 K/UL (ref 0.3–1)
MONOCYTES NFR BLD: 0.9 % (ref 4–15)
NEUTROPHILS # BLD AUTO: 7 K/UL (ref 1.8–7.7)
NEUTROPHILS NFR BLD: 85.7 % (ref 38–73)
NRBC BLD-RTO: 0 /100 WBC
PLATELET # BLD AUTO: 319 K/UL (ref 150–450)
PMV BLD AUTO: 9.4 FL (ref 9.2–12.9)
POTASSIUM SERPL-SCNC: 3.7 MMOL/L (ref 3.5–5.1)
RBC # BLD AUTO: 5.07 M/UL (ref 4–5.4)
SODIUM SERPL-SCNC: 141 MMOL/L (ref 136–145)
TSH SERPL DL<=0.005 MIU/L-ACNC: 0.38 UIU/ML (ref 0.34–5.6)
WBC # BLD AUTO: 8.19 K/UL (ref 3.9–12.7)

## 2022-08-19 PROCEDURE — 63600175 PHARM REV CODE 636 W HCPCS: Performed by: INTERNAL MEDICINE

## 2022-08-19 PROCEDURE — 83036 HEMOGLOBIN GLYCOSYLATED A1C: CPT | Performed by: INTERNAL MEDICINE

## 2022-08-19 PROCEDURE — C9113 INJ PANTOPRAZOLE SODIUM, VIA: HCPCS | Performed by: INTERNAL MEDICINE

## 2022-08-19 PROCEDURE — 12000002 HC ACUTE/MED SURGE SEMI-PRIVATE ROOM

## 2022-08-19 PROCEDURE — 80048 BASIC METABOLIC PNL TOTAL CA: CPT | Performed by: HOSPITALIST

## 2022-08-19 PROCEDURE — 36415 COLL VENOUS BLD VENIPUNCTURE: CPT | Performed by: INTERNAL MEDICINE

## 2022-08-19 PROCEDURE — 36415 COLL VENOUS BLD VENIPUNCTURE: CPT | Performed by: HOSPITALIST

## 2022-08-19 PROCEDURE — 83735 ASSAY OF MAGNESIUM: CPT | Performed by: HOSPITALIST

## 2022-08-19 PROCEDURE — 85025 COMPLETE CBC W/AUTO DIFF WBC: CPT | Performed by: INTERNAL MEDICINE

## 2022-08-19 PROCEDURE — 84443 ASSAY THYROID STIM HORMONE: CPT | Performed by: INTERNAL MEDICINE

## 2022-08-19 RX ORDER — OXYCODONE AND ACETAMINOPHEN 10; 325 MG/1; MG/1
1 TABLET ORAL EVERY 6 HOURS PRN
Status: DISCONTINUED | OUTPATIENT
Start: 2022-08-20 | End: 2022-08-20 | Stop reason: HOSPADM

## 2022-08-19 RX ORDER — MORPHINE SULFATE 2 MG/ML
2 INJECTION, SOLUTION INTRAMUSCULAR; INTRAVENOUS EVERY 4 HOURS PRN
Status: DISCONTINUED | OUTPATIENT
Start: 2022-08-19 | End: 2022-08-19

## 2022-08-19 RX ADMIN — PIPERACILLIN SODIUM AND TAZOBACTAM SODIUM 3.38 G: 3; .375 INJECTION, POWDER, LYOPHILIZED, FOR SOLUTION INTRAVENOUS at 09:08

## 2022-08-19 RX ADMIN — POTASSIUM CHLORIDE AND SODIUM CHLORIDE 100 ML/HR: 900; 150 INJECTION, SOLUTION INTRAVENOUS at 12:08

## 2022-08-19 RX ADMIN — INSULIN ASPART 5 UNITS: 100 INJECTION, SOLUTION INTRAVENOUS; SUBCUTANEOUS at 05:08

## 2022-08-19 RX ADMIN — PIPERACILLIN SODIUM AND TAZOBACTAM SODIUM 3.38 G: 3; .375 INJECTION, POWDER, LYOPHILIZED, FOR SOLUTION INTRAVENOUS at 12:08

## 2022-08-19 RX ADMIN — MORPHINE SULFATE 2 MG: 2 INJECTION, SOLUTION INTRAMUSCULAR; INTRAVENOUS at 08:08

## 2022-08-19 RX ADMIN — METHYLPREDNISOLONE SODIUM SUCCINATE 30 MG: 40 INJECTION, POWDER, FOR SOLUTION INTRAMUSCULAR; INTRAVENOUS at 10:08

## 2022-08-19 RX ADMIN — MORPHINE SULFATE 2 MG: 2 INJECTION, SOLUTION INTRAMUSCULAR; INTRAVENOUS at 05:08

## 2022-08-19 RX ADMIN — INSULIN ASPART 3 UNITS: 100 INJECTION, SOLUTION INTRAVENOUS; SUBCUTANEOUS at 08:08

## 2022-08-19 RX ADMIN — MORPHINE SULFATE 2 MG: 2 INJECTION, SOLUTION INTRAMUSCULAR; INTRAVENOUS at 12:08

## 2022-08-19 RX ADMIN — PANTOPRAZOLE SODIUM 40 MG: 40 INJECTION, POWDER, FOR SOLUTION INTRAVENOUS at 08:08

## 2022-08-19 RX ADMIN — MORPHINE SULFATE 2 MG: 2 INJECTION, SOLUTION INTRAMUSCULAR; INTRAVENOUS at 09:08

## 2022-08-19 RX ADMIN — ONDANSETRON 4 MG: 2 INJECTION INTRAMUSCULAR; INTRAVENOUS at 02:08

## 2022-08-19 RX ADMIN — PIPERACILLIN SODIUM AND TAZOBACTAM SODIUM 3.38 G: 3; .375 INJECTION, POWDER, LYOPHILIZED, FOR SOLUTION INTRAVENOUS at 04:08

## 2022-08-19 RX ADMIN — MORPHINE SULFATE 2 MG: 2 INJECTION, SOLUTION INTRAMUSCULAR; INTRAVENOUS at 04:08

## 2022-08-19 NOTE — HPI
56 years old female with past medical history of Crohn's disease episodes small-bowel obstruction in the past type 2 diabetes hypertension hypothyroidism due to iatrogenic after surgery but currently not taking medications for it to been well control according to patient she has also been off medications for Crohn disease because her last flare was about 5 years ago she comes to the ER today because since yesterday she started having right upper quadrant abdominal pain with radiation to periumbilical area on and off and she states that today she started having clears and abdominal pain got worse she denies vomit but has been having nausea no diarrhea no blood in stools no fevers once she arrived to the ER she had CT abdomen and pelvis that shows signs of Crohn's flare around duodenal area she received Morphine, Phenergan, Zofran and Zosyn.  She will be admitted.

## 2022-08-19 NOTE — ED PROVIDER NOTES
Encounter Date: 8/18/2022       History     Chief Complaint   Patient presents with    Abdominal Pain     Abd pain x 2 days with N/V without diarrhea. Pt reports Hx of crohns.      Patient presents to the emergency department with reported abdominal pain x2 days associated nausea vomiting no diarrhea no blood in her stools no of mucoid stools patient does have a history of Crohn's disease states she has been very well controlled largely in remission over last 5 years she has had difficulty with wall bowel obstructions in the past secondary to Crohn's disease only abdominal surgeries having appendectomy and cholecystectomy she is uncertain what set off this flare        Review of patient's allergies indicates:   Allergen Reactions    Nsaids (non-steroidal anti-inflammatory drug) Other (See Comments)     GI upset  GI upset      Ibuprofen Other (See Comments)     Other reaction(s): crohns flair up  Other reaction(s): crohns flair up     Past Medical History:   Diagnosis Date    ADHD (attention deficit hyperactivity disorder)     Arthritis     Crohn's disease     Diabetes mellitus, type 2     Hypertension     Hypothyroidism     SBO (small bowel obstruction)      Past Surgical History:   Procedure Laterality Date    ANTERIOR CRUCIATE LIGAMENT REPAIR Right     APPENDECTOMY      1988    BUNIONECTOMY Bilateral     CHOLECYSTECTOMY      2005    MINIMALLY INVASIVE SURGICAL REMOVAL OF INTERVERTEBRAL DISC OF SPINE USING MICROSCOPE Left 5/11/2021    Procedure: Left L4-5 Far Lateral Disectomy, MIS;  Surgeon: Johnny Cruz MD;  Location: [facility] OR [address] [place] [address];  Service: Neurosurgery;  Laterality: Left;    SHOULDER SURGERY Right     SPINE SURGERY      TRANSFORAMINAL EPIDURAL INJECTION OF STEROID Left 3/10/2021    Procedure: INJECTION, STEROID, EPIDURAL, TRANSFORAMINAL APPROACH  L4-5;  Surgeon: Jaylon Tyson MD;  Location: [facility];  Service: Pain Management;  Laterality: Left;  consent needed  ([employer] [facility])      Family History   Problem Relation Age of Onset    Cirrhosis Neg Hx      Social History     Tobacco Use    Smoking status: Former Smoker     Types: Cigarettes     Quit date: 2020     Years since quittin.6    Smokeless tobacco: Former User    Tobacco comment: in the 8 th grade   Substance Use Topics    Alcohol use: Yes     Alcohol/week: 4.0 standard drinks     Types: 4 Cans of beer per week     Comment: 1 beer , once in 2 weeks    Drug use: No     Review of Systems   Constitutional: Positive for appetite change. Negative for chills and fever.   HENT: Negative for congestion.    Respiratory: Negative for shortness of breath.    Cardiovascular: Negative for chest pain.   Gastrointestinal: Positive for abdominal pain, nausea and vomiting. Negative for blood in stool and diarrhea.   Genitourinary: Negative for dysuria.   All other systems reviewed and are negative.      Physical Exam     Initial Vitals [22 1754]   BP Pulse Resp Temp SpO2   (!) 152/109 106 19 98.5 °F (36.9 °C) 96 %      MAP       --         Physical Exam    Constitutional: She appears well-developed and well-nourished. No distress.   HENT:   Head: Normocephalic and atraumatic.   Right Ear: External ear normal.   Left Ear: External ear normal.   Mouth/Throat: Oropharynx is clear and moist.   Eyes: Conjunctivae and EOM are normal. Pupils are equal, round, and reactive to light.   Neck: Neck supple.   Normal range of motion.  Cardiovascular: Normal rate, regular rhythm, normal heart sounds and intact distal pulses.   Pulmonary/Chest: Breath sounds normal. No respiratory distress.   Abdominal: Abdomen is soft. Bowel sounds are normal. There is abdominal tenderness. There is guarding.   Musculoskeletal:         General: No edema. Normal range of motion.      Cervical back: Normal range of motion and neck supple.     Neurological: She is alert and oriented to person, place, and time. She has normal strength. GCS score is 15. GCS eye  subscore is 4. GCS verbal subscore is 5. GCS motor subscore is 6.   Skin: Skin is warm and dry. Capillary refill takes less than 2 seconds. No rash noted.   Psychiatric: She has a normal mood and affect. Her behavior is normal.         ED Course   Procedures  Labs Reviewed   CBC W/ AUTO DIFFERENTIAL - Abnormal; Notable for the following components:       Result Value    WBC 13.58 (*)     RBC 5.62 (*)     Hemoglobin 16.8 (*)     Hematocrit 48.6 (*)     Gran # (ANC) 8.3 (*)     Eos # 1.4 (*)     Eosinophil % 10.2 (*)     All other components within normal limits   COMPREHENSIVE METABOLIC PANEL - Abnormal; Notable for the following components:    Potassium 3.4 (*)     Glucose 169 (*)     All other components within normal limits   URINALYSIS, REFLEX TO URINE CULTURE - Abnormal; Notable for the following components:    Occult Blood UA Trace (*)     All other components within normal limits    Narrative:     Specimen Source->Urine   LIPASE   SARS-COV-2 RNA AMPLIFICATION, QUAL          Imaging Results          CT Abdomen Pelvis With Contrast (Final result)  Result time 08/18/22 19:35:25    Final result by Glendy Ocampo MD (08/18/22 19:35:25)                 Narrative:    All CT scans at this facility used dose modulation, iterative reconstruction and/or weight-based dosing when appropriate to reduce radiation doses  as low as reasonably achievable.    HISTORY: Crohn's disease    FINDINGS: Axial postcontrast imaging was performed with 100 mL Omnipaque 350 IV contrast .    COMPARISON: 5/30/2019    CT ABDOMEN: The lung bases are clear. There is no pericardial effusion.    The liver is hypodense compatible with fatty infiltration. There is no biliary duct dilatation. There is no hepatic mass.  Gallbladder is absent  Spleen and pancreas are normal.  Adrenal glands normal  Kidneys enhance symmetrically without hydronephrosis or calculi.    There is mild circumferential wall thickening of the distal ileum compatible with  recurrent Crohn's disease. There is minimal free fluid in the right lower quadrant. There is no fistula or abscess. The remainder of the small bowel and colon are normal.    There is no mesenteric or retroperitoneal adenopathy. The aorta is normal in caliber. The musculature is normal.    CT PELVIS: The bladder and prostate gland are normal. There is no pelvic adenopathy.    The patient has had prior posterior fusion and decompression at L5-S1 with transpedicular screws.    IMPRESSION: Mild circumferential wall thickening of the distal ileum compatible with recurrent Crohn's disease. There is minimal free fluid within the right lower quadrant. There is no obstruction or abscess    Fatty infiltration of the liver    Prior cholecystectomy    Electronically signed by:  Glendy Ocampo MD  8/18/2022 7:35 PM CDT Workstation: [id number]                               Medications   piperacillin-tazobactam 3.375 g in dextrose 5 % 50 mL IVPB (ready to mix system) (has no administration in time range)   iohexoL (OMNIPAQUE 350) injection 100 mL (100 mLs Intravenous Given 8/18/22 1904)   morphine injection 4 mg (4 mg Intravenous Given 8/18/22 2116)   ondansetron injection 4 mg (4 mg Intravenous Given 8/18/22 1809)   0.9%  NaCl infusion (0 mLs Intravenous Stopped 8/18/22 1951)   0.9%  NaCl infusion (1,000 mLs Intravenous New Bag 8/18/22 2010)   promethazine (PHENERGAN) 12.5 mg in dextrose 5 % 50 mL IVPB (0 mg Intravenous Stopped 8/18/22 2116)     Medical Decision Making:   ED Management:  Patient's CT scan shows inflammatory changes at the terminal ileum consistent with Crohn's flare patient does have some minimal free fluid in the right lower quadrant of indeterminate source patient's white blood cell count is mildly elevated there is no evidence of abscess or apparent fistula formation given patient's pain and tenderness I have administered Zosyn in the emergency department will consult hospitalist for further evaluation              ED Course as of 08/18/22 2121   Thu Aug 18, 2022   1840 WBC(!): 13.58 [EA]   1840 Hemoglobin(!): 16.8 [EA]   1841 Lipase: 29 [EA]   1841 Leukocytes, UA: Negative [EA]   1841 NITRITE UA: Negative [EA]   1841 Occult Blood UA(!): Trace [EA]   1841 Ketones, UA: Negative [EA]   1842 Unable to find patient at this time [EA]      ED Course User Index  [EA] Chelsie Turcios MD             Clinical Impression:   Final diagnoses:  [K50.119] Crohn's disease of colon with complication (Primary)          ED Disposition Condition    Admit               Julio Romero MD  08/18/22 2121

## 2022-08-19 NOTE — PLAN OF CARE
[facility]  Initial Discharge Assessment       Primary Care Provider: Catalina Roland NP    Admission Diagnosis: Crohn's disease of colon with complication [K50.119]    Admission Date: 8/18/2022  Expected Discharge Date: 8/19/2022     met with Pt at bedside to complete discharge assessment. Pt's spouse (Alannah Chamberlain (Spouse)   [phone] (Mobile)) also present at time and volunteered to participate. Pt AAOx4s. Demographics, PCP, and insurance verified. No home health. No dialysis. Pt reports ability to complete ADLs with the assistance of: glucometer. Pt verbalized plan to discharge home via family transport. Pt has no other needs to be addressed at this time.      Discharge Barriers Identified: None    Payor: BLUE [employer] BLUE SHIELD / Plan: BCBS ALL OUT OF STATE / Product Type: PPO /     Extended Emergency Contact Information  Primary Emergency Contact: Alannah Chamberlain  Address: [address] [place] [address]. [phone]           [employer] [place] [address] [facility]  Mobile Phone: [phone]  Relation: Spouse    Discharge Plan A: Home with family  Discharge Plan B: Home with family      [id number] - [place], [place]  [address]  [address]  [facility] [address]  Phone: [phone] Fax: [phone]      Initial Assessment (most recent)     Adult Discharge Assessment - 08/19/22 1523        Discharge Assessment    Assessment Type Discharge Planning Assessment     Confirmed/corrected address, phone number and insurance Yes     Confirmed Demographics Correct on Facesheet     Source of Information patient;family     Does patient/caregiver understand observation status Yes     Communicated RAJENDRA with patient/caregiver Yes     Lives With spouse     Do you expect to return to your current living situation? Yes     Do you have help at home or someone to help you manage your care at home? Yes     Who are your caregiver(s) and their phone number(s)? Alannah Chamberlain (Spouse)   [phone] (Mobile)     Prior to  hospitilization cognitive status: Alert/Oriented     Current cognitive status: Alert/Oriented     Walking or Climbing Stairs Difficulty none     Dressing/Bathing Difficulty none     Home Accessibility wheelchair accessible;stairs to enter home     Number of Stairs, Main Entrance four     Surface of Stairs, Main Entrance hardwood     Stair Railings, Main Entrance railings safe and in good condition;railings on both sides of stairs     Landing, Stairs, Main Entrance adequate turning radius     Stairs Comment, Main Entrance Has ramp to enter home     Home Layout Able to live on 1st floor     Equipment Currently Used at Home glucometer     Readmission within 30 days? No     Patient currently being followed by outpatient case management? No     Do you currently have service(s) that help you manage your care at home? No     Do you take prescription medications? Yes     Do you have prescription coverage? Yes     Coverage [employer] BLUE SHIELD - [facility] ALL OUT OF STATE     Do you have any problems affording any of your prescribed medications? No     Is the patient taking medications as prescribed? yes     Who is going to help you get home at discharge? Alannah Chamberlain (Spouse)   [phone] (Mobile)     How do you get to doctors appointments? car, drives self     Are you on dialysis? No     Do you take coumadin? No     Discharge Plan A Home with family     Discharge Plan B Home with family     DME Needed Upon Discharge  none     Discharge Plan discussed with: Spouse/sig other;Patient     Name(s) and Number(s) Alannah Chamberlain (Spouse)   [phone] (Mobile)     Discharge Barriers Identified None        Relationship/Environment    Name(s) of Who Lives With Patient Alannah Chamberlain (Spouse)   [phone] (Mobile)

## 2022-08-19 NOTE — ASSESSMENT & PLAN NOTE
Case discussed with gastroenterologist on-call Dr Galvan, recommend Solu-Medrol IV 20 mg b.i.d. continue with Zosyn input appreciated  NPO  NS with 20 mEq of potassium per L at 100 mL/hours  Potassium replacement IV 10 mEq now  Insulin sliding scale  TSH

## 2022-08-19 NOTE — HOSPITAL COURSE
8/19 Dr. Harrison assumed patient's care   patient states she feels much  Improved.  Abdominal pain resolved.  Wants to advance diet to soft.  8/20 no acute events overnight.  Patient tolerating soft diet without difficulties.  She states she feels back to baseline.  Appears stable for discharge.  Will resume patient's previous home meds including BP/DM  meds   Patient follow with PCP next week  Consider outpatient GI eval

## 2022-08-19 NOTE — H&P
[facility] - Emergency Dept  Hospital Medicine  History & Physical    Patient Name: Tanesha Chamberlain  MRN: [id number]  Patient Class: IP- Inpatient  Admission Date: 8/18/2022  Attending Physician: Abdi Milligan MD   Primary Care Provider: Catalina Roland NP         Patient information was obtained from patient, past medical records and ER records.     Subjective:     Principal Problem:Crohn's disease    Chief Complaint:   Chief Complaint   Patient presents with    Abdominal Pain     Abd pain x 2 days with N/V without diarrhea. Pt reports Hx of crohns.         HPI: 56 years old female with past medical history of Crohn's disease episodes small-bowel obstruction in the past type 2 diabetes hypertension hypothyroidism due to iatrogenic after surgery but currently not taking medications for it to been well control according to patient she has also been off medications for Crohn disease because her last flare was about 5 years ago she comes to the ER today because since yesterday she started having right upper quadrant abdominal pain with radiation to periumbilical area on and off and she states that today she started having clears and abdominal pain got worse she denies vomit but has been having nausea no diarrhea no blood in stools no fevers once she arrived to the ER she had CT abdomen and pelvis that shows signs of Crohn's flare around duodenal area she received Morphine, Phenergan, Zofran and Zosyn.  She will be admitted.      Past Medical History:   Diagnosis Date    ADHD (attention deficit hyperactivity disorder)     Arthritis     Crohn's disease     Diabetes mellitus, type 2     Hypertension     Hypothyroidism     SBO (small bowel obstruction)        Past Surgical History:   Procedure Laterality Date    ANTERIOR CRUCIATE LIGAMENT REPAIR Right     APPENDECTOMY      1988    BUNIONECTOMY Bilateral     CHOLECYSTECTOMY      2005    MINIMALLY INVASIVE SURGICAL REMOVAL OF INTERVERTEBRAL DISC OF  "SPINE USING MICROSCOPE Left 5/11/2021    Procedure: Left L4-5 Far Lateral Disectomy, MIS;  Surgeon: Johnny Cruz MD;  Location: [facility] OR [address] [place] [address];  Service: Neurosurgery;  Laterality: Left;    SHOULDER SURGERY Right     SPINE SURGERY      TRANSFORAMINAL EPIDURAL INJECTION OF STEROID Left 3/10/2021    Procedure: INJECTION, STEROID, EPIDURAL, TRANSFORAMINAL APPROACH  L4-5;  Surgeon: Jaylon Tyson MD;  Location: [facility];  Service: Pain Management;  Laterality: Left;  consent needed  ([employer] [facility])       Review of patient's allergies indicates:   Allergen Reactions    Nsaids (non-steroidal anti-inflammatory drug) Other (See Comments)     GI upset  GI upset      Ibuprofen Other (See Comments)     Other reaction(s): crohns flair up  Other reaction(s): crohns flair up       No current facility-administered medications on file prior to encounter.     Current Outpatient Medications on File Prior to Encounter   Medication Sig    atorvastatin (LIPITOR) 10 MG tablet Take 1 tablet (10 mg total) by mouth once daily.    dulaglutide (TRULICITY) 0.75 mg/0.5 mL pen injector Inject 0.75 mg into the skin every 7 days. THURSDAYS    estradioL (ESTRACE) 2 MG tablet Take 1 tablet (2 mg total) by mouth once daily.    fentaNYL (DURAGESIC) 25 mcg/hr Place 1 patch onto the skin Every 3 (three) days.    hydroCHLOROthiazide (HYDRODIURIL) 25 MG tablet Take 1 tablet (25 mg total) by mouth once daily.    insulin degludec (TRESIBA FLEXTOUCH U-100) 100 unit/mL (3 mL) insulin pen Inject 16 Units into the skin once daily.    oxyCODONE-acetaminophen (PERCOCET)  mg per tablet Take 1 tablet by mouth 4 (four) times daily as needed.    BD ULTRA-FINE SHORT PEN NEEDLE 31 gauge x 5/16" Ndle 1 each by Misc.(Non-Drug; Combo Route) route once daily.    blood sugar diagnostic (ONETOUCH ULTRA BLUE TEST STRIP) Strp Check blood glucose readings twice a day-- before meal and 1 to 2 hours post meal    blood-glucose meter Misc Check blood " glucose readings twice a day-- before meal and 1 to 2 hours post meal    fentaNYL (DURAGESIC) 50 mcg/hr Place 1 patch onto the skin every 72 hours.    HYDROcodone-acetaminophen (NORCO)  mg per tablet Take 1-2 tablets by mouth every 8 (eight) hours.    lancets 33 gauge Misc Check blood glucose readings twice a day-- before meal and 1 to 2 hours post meal     Family History    None       Tobacco Use    Smoking status: Former Smoker     Types: Cigarettes     Quit date: 2020     Years since quittin.6    Smokeless tobacco: Former User    Tobacco comment: in the 8 th grade   Substance and Sexual Activity    Alcohol use: Yes     Alcohol/week: 4.0 standard drinks     Types: 4 Cans of beer per week     Comment: 1 beer , once in 2 weeks    Drug use: No    Sexual activity: Yes     Partners: Female     Comment: Never had pregnant      Review of Systems   Reason unable to perform ROS: [employer] systems review pertinent positives and negatives are present on HPI.   Objective:     Vital Signs (Most Recent):  Temp: 98.1 °F (36.7 °C) (22)  Pulse: 91 (22)  Resp: 18 (22)  BP: (!) 180/84 (22)  SpO2: 99 % (22)   Vital Signs (24h Range):  Temp:  [98.1 °F (36.7 °C)-98.5 °F (36.9 °C)] 98.1 °F (36.7 °C)  Pulse:  [] 91  Resp:  [18-19] 18  SpO2:  [96 %-99 %] 99 %  BP: (152-180)/() 180/84     Weight: 80.7 kg (178 lb)  Body mass index is 29.62 kg/m².    Physical Exam  Constitutional:       General: She is not in acute distress.     Appearance: Normal appearance. She is not ill-appearing, toxic-appearing or diaphoretic.   HENT:      Head: Normocephalic and atraumatic.      Nose: Nose normal.   Eyes:      General: No scleral icterus.        Right eye: No discharge.         Left eye: No discharge.   Cardiovascular:      Rate and Rhythm: Normal rate and regular rhythm.      Pulses: Normal pulses.      Heart sounds: Normal heart sounds. No murmur heard.     No friction rub. No gallop.   Pulmonary:      Effort: Pulmonary effort is normal. No respiratory distress.      Breath sounds: Normal breath sounds. No stridor. No wheezing or rhonchi.   Abdominal:      General: Abdomen is flat. Bowel sounds are normal. There is no distension.      Palpations: Abdomen is soft.      Tenderness: There is abdominal tenderness. There is no rebound.      Comments: Periumbilical tenderness no signs of peritonitis   Musculoskeletal:         General: No swelling or tenderness.      Cervical back: Neck supple.      Right lower leg: No edema.      Left lower leg: No edema.   Neurological:      General: No focal deficit present.      Mental Status: She is alert.      Comments: Awake alert oriented follows commands moves 4 extremities           Significant Labs: All pertinent labs within the past 24 hours have been reviewed.  A1C: No results for input(s): HGBA1C in the last 4320 hours.  CBC:   Recent Labs   Lab 08/18/22  1804   WBC 13.58*   HGB 16.8*   HCT 48.6*        CMP:   Recent Labs   Lab 08/18/22  1804      K 3.4*   CL 96   CO2 29   *   BUN 9   CREATININE 0.8   CALCIUM 9.6   PROT 8.1   ALBUMIN 4.8   BILITOT 0.8   ALKPHOS 55   AST 35   ALT 41   ANIONGAP 13     Magnesium: No results for input(s): MG in the last 48 hours.  Troponin: No results for input(s): TROPONINI in the last 48 hours.  TSH: No results for input(s): TSH in the last 4320 hours.  Urine Culture: No results for input(s): LABURIN in the last 48 hours.  Urine Studies:   Recent Labs   Lab 08/18/22  1809   COLORU Yellow   APPEARANCEUA Clear   PHUR 6.0   SPECGRAV 1.020   PROTEINUA Negative   GLUCUA Negative   KETONESU Negative   BILIRUBINUA Negative   OCCULTUA Trace*   NITRITE Negative   UROBILINOGEN Negative   LEUKOCYTESUR Negative       Significant Imaging: I have reviewed all pertinent imaging results/findings within the past 24 hours.    Assessment/Plan:     * Crohn's disease    Case discussed with  gastroenterologist on-call Dr Galvan, recommend Solu-Medrol IV 20 mg b.i.d. continue with Zosyn input appreciated  NPO  NS with 20 mEq of potassium per L at 100 mL/hours  Potassium replacement IV 10 mEq now  Insulin sliding scale  TSH      VTE Risk Mitigation (From admission, onward)         Ordered     IP VTE LOW RISK PATIENT  Once         08/18/22 2234     Place sequential compression device  Until discontinued         08/18/22 2234                   Abdi Milligan MD  Department of Hospital Medicine   Former[facility], later [facility] - Emergency Dept

## 2022-08-19 NOTE — PROGRESS NOTES
[facility] Medicine  Progress Note    Patient Name: Tanesha Chamberlain  MRN: [id number]  Patient Class: IP- Inpatient   Admission Date: 8/18/2022  Length of Stay: 1 days  Attending Physician: Khadar Harrison DO  Primary Care Provider: Catalina Roland NP        Subjective:     Principal Problem:Crohn's disease        HPI:  56 years old female with past medical history of Crohn's disease episodes small-bowel obstruction in the past type 2 diabetes hypertension hypothyroidism due to iatrogenic after surgery but currently not taking medications for it to been well control according to patient she has also been off medications for Crohn disease because her last flare was about 5 years ago she comes to the ER today because since yesterday she started having right upper quadrant abdominal pain with radiation to periumbilical area on and off and she states that today she started having clears and abdominal pain got worse she denies vomit but has been having nausea no diarrhea no blood in stools no fevers once she arrived to the ER she had CT abdomen and pelvis that shows signs of Crohn's flare around duodenal area she received Morphine, Phenergan, Zofran and Zosyn.  She will be admitted.      Overview/Hospital Course:  8/19 Dr. Harrison assumed patient's care   patient states she feels much  Improved.  Abdominal pain resolved.  Wants to advance diet to soft.      Interval History:  No acute events overnight  Advancing diet to soft    Objective:     Vital Signs (Most Recent):  Temp: 98 °F (36.7 °C) (08/19/22 1212)  Pulse: 83 (08/19/22 1212)  Resp: 18 (08/19/22 1236)  BP: (!) 144/97 (08/19/22 1212)  SpO2: 96 % (08/19/22 1212)   Vital Signs (24h Range):  Temp:  [97.5 °F (36.4 °C)-98.9 °F (37.2 °C)] 98 °F (36.7 °C)  Pulse:  [] 83  Resp:  [16-20] 18  SpO2:  [93 %-99 %] 96 %  BP: (139-180)/() 144/97     Weight: 82 kg (180 lb 12.4 oz)  Body mass index is 30.08 kg/m².    Intake/Output Summary (Last 24  hours) at 8/19/2022 1547  Last data filed at 8/19/2022 0046  Gross per 24 hour   Intake 1725 ml   Output --   Net 1725 ml      Physical Exam  Patient appears no distress  HEENT sclerae nonicteric  Neck supple nontender  Lungs clear to auscultation  Heart regular rate rhythm  Abdomen soft nontender  Extremities no edema  Neuro patient is alert oriented x3 motor exam is non   exam no Moe  Significant Labs: All pertinent labs within the past 24 hours have been reviewed.  BMP:   Recent Labs   Lab 08/18/22  1804   *      K 3.4*   CL 96   CO2 29   BUN 9   CREATININE 0.8   CALCIUM 9.6     CBC:   Recent Labs   Lab 08/18/22  1804 08/19/22  0511   WBC 13.58* 8.19   HGB 16.8* 15.1   HCT 48.6* 43.6    319     CMP:   Recent Labs   Lab 08/18/22  1804      K 3.4*   CL 96   CO2 29   *   BUN 9   CREATININE 0.8   CALCIUM 9.6   PROT 8.1   ALBUMIN 4.8   BILITOT 0.8   ALKPHOS 55   AST 35   ALT 41   ANIONGAP 13       Significant Imaging:       Assessment/Plan:    #1 Abd pain - possible mild crohns dx flare -  Continue IV steroids,.  IV antibiotics and IV PPI  #2 hypokalemia-replace    Advance diet  as tolerated  Possible discharge tomorrow  VTE Risk Mitigation (From admission, onward)         Ordered     IP VTE LOW RISK PATIENT  Once         08/18/22 2234     Place sequential compression device  Until discontinued         08/18/22 2234                Discharge Planning   RAJENDRA:      Code Status: Full Code   Is the patient medically ready for discharge?:     Reason for patient still in hospital (select all that apply): Patient trending condition  Discharge Plan A: Home with family                  Khadar Harrison DO  Department of Hospital Medicine   [facility]

## 2022-08-19 NOTE — PLAN OF CARE
Plan of care reviewed with patient including pain management, antibiotic therapy, NPO status, and nausea management.  Questions answered.  Patient verbalizes understanding of plan of care.

## 2022-08-19 NOTE — SUBJECTIVE & OBJECTIVE
Interval History:  No acute events overnight  Advancing diet to soft    Objective:     Vital Signs (Most Recent):  Temp: 98 °F (36.7 °C) (08/19/22 1212)  Pulse: 83 (08/19/22 1212)  Resp: 18 (08/19/22 1236)  BP: (!) 144/97 (08/19/22 1212)  SpO2: 96 % (08/19/22 1212)   Vital Signs (24h Range):  Temp:  [97.5 °F (36.4 °C)-98.9 °F (37.2 °C)] 98 °F (36.7 °C)  Pulse:  [] 83  Resp:  [16-20] 18  SpO2:  [93 %-99 %] 96 %  BP: (139-180)/() 144/97     Weight: 82 kg (180 lb 12.4 oz)  Body mass index is 30.08 kg/m².    Intake/Output Summary (Last 24 hours) at 8/19/2022 1547  Last data filed at 8/19/2022 0046  Gross per 24 hour   Intake 1725 ml   Output --   Net 1725 ml      Physical Exam  Patient appears no distress  HEENT sclerae nonicteric  Neck supple nontender  Lungs clear to auscultation  Heart regular rate rhythm  Abdomen soft nontender  Extremities no edema  Neuro patient is alert oriented x3 motor exam is non   exam no Moe  Significant Labs: All pertinent labs within the past 24 hours have been reviewed.  BMP:   Recent Labs   Lab 08/18/22  1804   *      K 3.4*   CL 96   CO2 29   BUN 9   CREATININE 0.8   CALCIUM 9.6     CBC:   Recent Labs   Lab 08/18/22  1804 08/19/22  0511   WBC 13.58* 8.19   HGB 16.8* 15.1   HCT 48.6* 43.6    319     CMP:   Recent Labs   Lab 08/18/22  1804      K 3.4*   CL 96   CO2 29   *   BUN 9   CREATININE 0.8   CALCIUM 9.6   PROT 8.1   ALBUMIN 4.8   BILITOT 0.8   ALKPHOS 55   AST 35   ALT 41   ANIONGAP 13       Significant Imaging:

## 2022-08-19 NOTE — SUBJECTIVE & OBJECTIVE
Past Medical History:   Diagnosis Date    ADHD (attention deficit hyperactivity disorder)     Arthritis     Crohn's disease     Diabetes mellitus, type 2     Hypertension     Hypothyroidism     SBO (small bowel obstruction)        Past Surgical History:   Procedure Laterality Date    ANTERIOR CRUCIATE LIGAMENT REPAIR Right     APPENDECTOMY      1988    BUNIONECTOMY Bilateral     CHOLECYSTECTOMY      2005    MINIMALLY INVASIVE SURGICAL REMOVAL OF INTERVERTEBRAL DISC OF SPINE USING MICROSCOPE Left 5/11/2021    Procedure: Left L4-5 Far Lateral Disectomy, MIS;  Surgeon: Johnny Cruz MD;  Location: [facility] OR [address] [place] [address];  Service: Neurosurgery;  Laterality: Left;    SHOULDER SURGERY Right     SPINE SURGERY      TRANSFORAMINAL EPIDURAL INJECTION OF STEROID Left 3/10/2021    Procedure: INJECTION, STEROID, EPIDURAL, TRANSFORAMINAL APPROACH  L4-5;  Surgeon: Jaylon Tyson MD;  Location: [facility];  Service: Pain Management;  Laterality: Left;  consent needed  ([employer] [facility])       Review of patient's allergies indicates:   Allergen Reactions    Nsaids (non-steroidal anti-inflammatory drug) Other (See Comments)     GI upset  GI upset      Ibuprofen Other (See Comments)     Other reaction(s): crohns flair up  Other reaction(s): crohns flair up       No current facility-administered medications on file prior to encounter.     Current Outpatient Medications on File Prior to Encounter   Medication Sig    atorvastatin (LIPITOR) 10 MG tablet Take 1 tablet (10 mg total) by mouth once daily.    dulaglutide (TRULICITY) 0.75 mg/0.5 mL pen injector Inject 0.75 mg into the skin every 7 days. THURSDAYS    estradioL (ESTRACE) 2 MG tablet Take 1 tablet (2 mg total) by mouth once daily.    fentaNYL (DURAGESIC) 25 mcg/hr Place 1 patch onto the skin Every 3 (three) days.    hydroCHLOROthiazide (HYDRODIURIL) 25 MG tablet Take 1 tablet (25 mg total) by mouth once daily.    insulin degludec (TRESIBA FLEXTOUCH U-100) 100 unit/mL (3 mL) insulin  "pen Inject 16 Units into the skin once daily.    oxyCODONE-acetaminophen (PERCOCET)  mg per tablet Take 1 tablet by mouth 4 (four) times daily as needed.    BD ULTRA-FINE SHORT PEN NEEDLE 31 gauge x 5/16" Ndle 1 each by Misc.(Non-Drug; Combo Route) route once daily.    blood sugar diagnostic (ONETOUCH ULTRA BLUE TEST STRIP) Strp Check blood glucose readings twice a day-- before meal and 1 to 2 hours post meal    blood-glucose meter Misc Check blood glucose readings twice a day-- before meal and 1 to 2 hours post meal    fentaNYL (DURAGESIC) 50 mcg/hr Place 1 patch onto the skin every 72 hours.    HYDROcodone-acetaminophen (NORCO)  mg per tablet Take 1-2 tablets by mouth every 8 (eight) hours.    lancets 33 gauge Misc Check blood glucose readings twice a day-- before meal and 1 to 2 hours post meal     Family History    None       Tobacco Use    Smoking status: Former Smoker     Types: Cigarettes     Quit date: 2020     Years since quittin.6    Smokeless tobacco: Former User    Tobacco comment: in the 8 th grade   Substance and Sexual Activity    Alcohol use: Yes     Alcohol/week: 4.0 standard drinks     Types: 4 Cans of beer per week     Comment: 1 beer , once in 2 weeks    Drug use: No    Sexual activity: Yes     Partners: Female     Comment: Never had pregnant      Review of Systems   Reason unable to perform ROS: [employer] systems review pertinent positives and negatives are present on HPI.   Objective:     Vital Signs (Most Recent):  Temp: 98.1 °F (36.7 °C) (22)  Pulse: 91 (22)  Resp: 18 (22)  BP: (!) 180/84 (22)  SpO2: 99 % (22)   Vital Signs (24h Range):  Temp:  [98.1 °F (36.7 °C)-98.5 °F (36.9 °C)] 98.1 °F (36.7 °C)  Pulse:  [] 91  Resp:  [18-19] 18  SpO2:  [96 %-99 %] 99 %  BP: (152-180)/() 180/84     Weight: 80.7 kg (178 lb)  Body mass index is 29.62 kg/m².    Physical Exam  Constitutional:       General: She is not in " acute distress.     Appearance: Normal appearance. She is not ill-appearing, toxic-appearing or diaphoretic.   HENT:      Head: Normocephalic and atraumatic.      Nose: Nose normal.   Eyes:      General: No scleral icterus.        Right eye: No discharge.         Left eye: No discharge.   Cardiovascular:      Rate and Rhythm: Normal rate and regular rhythm.      Pulses: Normal pulses.      Heart sounds: Normal heart sounds. No murmur heard.    No friction rub. No gallop.   Pulmonary:      Effort: Pulmonary effort is normal. No respiratory distress.      Breath sounds: Normal breath sounds. No stridor. No wheezing or rhonchi.   Abdominal:      General: Abdomen is flat. Bowel sounds are normal. There is no distension.      Palpations: Abdomen is soft.      Tenderness: There is abdominal tenderness. There is no rebound.      Comments: Periumbilical tenderness no signs of peritonitis   Musculoskeletal:         General: No swelling or tenderness.      Cervical back: Neck supple.      Right lower leg: No edema.      Left lower leg: No edema.   Neurological:      General: No focal deficit present.      Mental Status: She is alert.      Comments: Awake alert oriented follows commands moves 4 extremities           Significant Labs: All pertinent labs within the past 24 hours have been reviewed.  A1C: No results for input(s): HGBA1C in the last 4320 hours.  CBC:   Recent Labs   Lab 08/18/22  1804   WBC 13.58*   HGB 16.8*   HCT 48.6*        CMP:   Recent Labs   Lab 08/18/22  1804      K 3.4*   CL 96   CO2 29   *   BUN 9   CREATININE 0.8   CALCIUM 9.6   PROT 8.1   ALBUMIN 4.8   BILITOT 0.8   ALKPHOS 55   AST 35   ALT 41   ANIONGAP 13     Magnesium: No results for input(s): MG in the last 48 hours.  Troponin: No results for input(s): TROPONINI in the last 48 hours.  TSH: No results for input(s): TSH in the last 4320 hours.  Urine Culture: No results for input(s): LABURIN in the last 48 hours.  Urine Studies:    Recent Labs   Lab 08/18/22  1809   COLORU Yellow   APPEARANCEUA Clear   PHUR 6.0   SPECGRAV 1.020   PROTEINUA Negative   GLUCUA Negative   KETONESU Negative   BILIRUBINUA Negative   OCCULTUA Trace*   NITRITE Negative   UROBILINOGEN Negative   LEUKOCYTESUR Negative       Significant Imaging: I have reviewed all pertinent imaging results/findings within the past 24 hours.

## 2022-08-20 VITALS
HEIGHT: 65 IN | OXYGEN SATURATION: 96 % | DIASTOLIC BLOOD PRESSURE: 96 MMHG | TEMPERATURE: 98 F | RESPIRATION RATE: 20 BRPM | WEIGHT: 180.75 LBS | HEART RATE: 89 BPM | BODY MASS INDEX: 30.12 KG/M2 | SYSTOLIC BLOOD PRESSURE: 172 MMHG

## 2022-08-20 LAB
ANION GAP SERPL CALC-SCNC: 8 MMOL/L (ref 8–16)
BASOPHILS # BLD AUTO: 0.01 K/UL (ref 0–0.2)
BASOPHILS NFR BLD: 0.1 % (ref 0–1.9)
BUN SERPL-MCNC: 11 MG/DL (ref 6–20)
CALCIUM SERPL-MCNC: 9 MG/DL (ref 8.7–10.5)
CHLORIDE SERPL-SCNC: 102 MMOL/L (ref 95–110)
CO2 SERPL-SCNC: 27 MMOL/L (ref 23–29)
CREAT SERPL-MCNC: 0.8 MG/DL (ref 0.5–1.4)
DIFFERENTIAL METHOD: ABNORMAL
EOSINOPHIL # BLD AUTO: 0 K/UL (ref 0–0.5)
EOSINOPHIL NFR BLD: 0 % (ref 0–8)
ERYTHROCYTE [DISTWIDTH] IN BLOOD BY AUTOMATED COUNT: 12.3 % (ref 11.5–14.5)
EST. GFR  (NO RACE VARIABLE): >60 ML/MIN/1.73 M^2
GLUCOSE SERPL-MCNC: 288 MG/DL (ref 70–110)
GLUCOSE SERPL-MCNC: 291 MG/DL (ref 70–110)
GLUCOSE SERPL-MCNC: 419 MG/DL (ref 70–110)
HCT VFR BLD AUTO: 42.2 % (ref 37–48.5)
HGB BLD-MCNC: 14.6 G/DL (ref 12–16)
IMM GRANULOCYTES # BLD AUTO: 0.09 K/UL (ref 0–0.04)
IMM GRANULOCYTES NFR BLD AUTO: 0.7 % (ref 0–0.5)
LYMPHOCYTES # BLD AUTO: 1.2 K/UL (ref 1–4.8)
LYMPHOCYTES NFR BLD: 9 % (ref 18–48)
MCH RBC QN AUTO: 30 PG (ref 27–31)
MCHC RBC AUTO-ENTMCNC: 34.6 G/DL (ref 32–36)
MCV RBC AUTO: 87 FL (ref 82–98)
MONOCYTES # BLD AUTO: 0.2 K/UL (ref 0.3–1)
MONOCYTES NFR BLD: 1.6 % (ref 4–15)
NEUTROPHILS # BLD AUTO: 11.9 K/UL (ref 1.8–7.7)
NEUTROPHILS NFR BLD: 88.6 % (ref 38–73)
NRBC BLD-RTO: 0 /100 WBC
PLATELET # BLD AUTO: 328 K/UL (ref 150–450)
PMV BLD AUTO: 10.2 FL (ref 9.2–12.9)
POTASSIUM SERPL-SCNC: 3.7 MMOL/L (ref 3.5–5.1)
RBC # BLD AUTO: 4.86 M/UL (ref 4–5.4)
SODIUM SERPL-SCNC: 137 MMOL/L (ref 136–145)
WBC # BLD AUTO: 13.4 K/UL (ref 3.9–12.7)

## 2022-08-20 PROCEDURE — 85025 COMPLETE CBC W/AUTO DIFF WBC: CPT | Performed by: INTERNAL MEDICINE

## 2022-08-20 PROCEDURE — 63600175 PHARM REV CODE 636 W HCPCS: Performed by: INTERNAL MEDICINE

## 2022-08-20 PROCEDURE — 25000003 PHARM REV CODE 250: Performed by: INTERNAL MEDICINE

## 2022-08-20 PROCEDURE — C9113 INJ PANTOPRAZOLE SODIUM, VIA: HCPCS | Performed by: INTERNAL MEDICINE

## 2022-08-20 PROCEDURE — 80048 BASIC METABOLIC PNL TOTAL CA: CPT | Performed by: HOSPITALIST

## 2022-08-20 PROCEDURE — 36415 COLL VENOUS BLD VENIPUNCTURE: CPT | Performed by: HOSPITALIST

## 2022-08-20 RX ORDER — PREDNISONE 20 MG/1
40 TABLET ORAL DAILY
Qty: 6 TABLET | Refills: 0 | Status: SHIPPED | OUTPATIENT
Start: 2022-08-20 | End: 2022-08-23

## 2022-08-20 RX ORDER — PREDNISONE 20 MG/1
20 TABLET ORAL DAILY
Qty: 2 TABLET | Refills: 0 | Status: SHIPPED | OUTPATIENT
Start: 2022-08-23 | End: 2022-08-25

## 2022-08-20 RX ADMIN — INSULIN ASPART 3 UNITS: 100 INJECTION, SOLUTION INTRAVENOUS; SUBCUTANEOUS at 12:08

## 2022-08-20 RX ADMIN — OXYCODONE AND ACETAMINOPHEN 1 TABLET: 10; 325 TABLET ORAL at 06:08

## 2022-08-20 RX ADMIN — PANTOPRAZOLE SODIUM 40 MG: 40 INJECTION, POWDER, FOR SOLUTION INTRAVENOUS at 10:08

## 2022-08-20 RX ADMIN — PIPERACILLIN SODIUM AND TAZOBACTAM SODIUM 3.38 G: 3; .375 INJECTION, POWDER, LYOPHILIZED, FOR SOLUTION INTRAVENOUS at 06:08

## 2022-08-20 RX ADMIN — METHYLPREDNISOLONE SODIUM SUCCINATE 30 MG: 40 INJECTION, POWDER, FOR SOLUTION INTRAMUSCULAR; INTRAVENOUS at 10:08

## 2022-08-20 RX ADMIN — METHYLPREDNISOLONE SODIUM SUCCINATE 30 MG: 40 INJECTION, POWDER, FOR SOLUTION INTRAMUSCULAR; INTRAVENOUS at 12:08

## 2022-08-20 RX ADMIN — POTASSIUM CHLORIDE AND SODIUM CHLORIDE: 900; 150 INJECTION, SOLUTION INTRAVENOUS at 12:08

## 2022-08-20 RX ADMIN — OXYCODONE AND ACETAMINOPHEN 1 TABLET: 10; 325 TABLET ORAL at 12:08

## 2022-08-20 NOTE — PLAN OF CARE
Problem: Adult Inpatient Plan of Care  Goal: Plan of Care Review  Outcome: Met  Goal: Patient-Specific Goal (Individualized)  Outcome: Met  Goal: Absence of Hospital-Acquired Illness or Injury  Outcome: Met  Goal: Optimal Comfort and Wellbeing  Outcome: Met  Goal: Readiness for Transition of Care  Outcome: Met     Problem: Diabetes Comorbidity  Goal: Blood Glucose Level Within Targeted Range  Outcome: Met     Problem: Pain Acute  Goal: Acceptable Pain Control and Functional Ability  Outcome: Met     Problem: Oral Intake Inadequate  Goal: Improved Oral Intake  Outcome: Met

## 2022-08-20 NOTE — PLAN OF CARE
08/20/22 1407   Final Note   Assessment Type Final Discharge Note   Anticipated Discharge Disposition Home   What phone number can be called within the next 1-3 days to see how you are doing after discharge? [phone]   Hospital Resources/Appts/Education Provided Provided patient/caregiver with written discharge plan information   Post-Acute Status   Discharge Delays None known at this time       Orders reviewed. No further needs. Pt clear for discharge by case management.

## 2022-08-20 NOTE — DISCHARGE SUMMARY
[facility] Medicine  Discharge Summary      Patient Name: Tanesha Chamberlain  MRN: [id number]  Patient Class: IP- Inpatient  Admission Date: 8/18/2022  Hospital Length of Stay: 2 days  Discharge Date and Time:  08/20/2022 1:59 PM  Attending Physician: Khadar Harrison DO   Discharging Provider: Khadar Harrison DO  Primary Care Provider: Catalina Roland NP      HPI:   56 years old female with past medical history of Crohn's disease episodes small-bowel obstruction in the past type 2 diabetes hypertension hypothyroidism due to iatrogenic after surgery but currently not taking medications for it to been well control according to patient she has also been off medications for Crohn disease because her last flare was about 5 years ago she comes to the ER today because since yesterday she started having right upper quadrant abdominal pain with radiation to periumbilical area on and off and she states that today she started having clears and abdominal pain got worse she denies vomit but has been having nausea no diarrhea no blood in stools no fevers once she arrived to the ER she had CT abdomen and pelvis that shows signs of Crohn's flare around duodenal area she received Morphine, Phenergan, Zofran and Zosyn.  She will be admitted.      * No surgery found *      Hospital Course:   8/19 Dr. Harrison assumed patient's care   patient states she feels much  Improved.  Abdominal pain resolved.  Wants to advance diet to soft.  8/20 no acute events overnight.  Patient tolerating soft diet without difficulties.  She states she feels back to baseline.  Appears stable for discharge.  Will resume patient's previous home meds including BP/DM  meds   Patient follow with PCP next week  Consider outpatient GI eval       Goals of Care Treatment Preferences:  Code Status: Full Code      Consults:   Consults (From admission, onward)        Status Ordering Provider     Inpatient consult to Hospitalist  Once        Provider:   "Khdaar Harrison, DO    Acknowledged PILO RIVERA          Discharge diagnoses   #1 Abd pain - possible mild crohns dx flare -  #2 hypokalemia-  #3 DM2  #4 Essential HTN     Final Active Diagnoses:    Diagnosis Date Noted POA    PRINCIPAL PROBLEM:  Crohn's disease [K50.90] 03/08/2021 Yes      Problems Resolved During this Admission:        Discharged Condition:   Patient appears no distress  Abdomen is benign  Neuro patient is alert oriented x3    Disposition: Home or Self Care    Follow Up:   Follow-up Information     Catalina Roland NP Follow up.    Specialty: Family Medicine  Contact information:  [facility]  [address]  [place] [address]  [phone]                       Patient Instructions:      Diet diabetic     Activity as tolerated       Significant Diagnostic Studies:     Pending Diagnostic Studies:     None         Medications:  Reconciled Home Medications:      Medication List      START taking these medications    * predniSONE 20 MG tablet  Commonly known as: DELTASONE  Take 2 tablets (40 mg total) by mouth once daily. for 3 days     * predniSONE 20 MG tablet  Commonly known as: DELTASONE  Take 1 tablet (20 mg total) by mouth once daily. for 2 days  Start taking on: August 23, 2022         * This list has 2 medication(s) that are the same as other medications prescribed for you. Read the directions carefully, and ask your doctor or other care provider to review them with you.            CONTINUE taking these medications    atorvastatin 10 MG tablet  Commonly known as: LIPITOR  Take 1 tablet (10 mg total) by mouth once daily.     BD ULTRA-FINE SHORT PEN NEEDLE 31 gauge x 5/16" Ndle  Generic drug: pen needle, diabetic  1 each by Misc.(Non-Drug; Combo Route) route once daily.     estradioL 2 MG tablet  Commonly known as: ESTRACE  Take 1 tablet (2 mg total) by mouth once daily.     * fentaNYL 50 mcg/hr  Commonly known as: DURAGESIC  Place 1 patch onto the skin every 72 hours.     * fentaNYL 25 " mcg/hr  Commonly known as: DURAGESIC  Place 1 patch onto the skin Every 3 (three) days.     hydroCHLOROthiazide 25 MG tablet  Commonly known as: HYDRODIURIL  Take 1 tablet (25 mg total) by mouth once daily.     HYDROcodone-acetaminophen  mg per tablet  Commonly known as: NORCO  Take 1-2 tablets by mouth every 8 (eight) hours.     ONETOUCH DELICA PLUS LANCET 33 gauge Misc  Generic drug: lancets  Check blood glucose readings twice a day-- before meal and 1 to 2 hours post meal     ONETOUCH ULTRA BLUE TEST STRIP Strp  Generic drug: blood sugar diagnostic  Check blood glucose readings twice a day-- before meal and 1 to 2 hours post meal     ONETOUCH ULTRA2 METER Misc  Generic drug: blood-glucose meter  Check blood glucose readings twice a day-- before meal and 1 to 2 hours post meal     oxyCODONE-acetaminophen  mg per tablet  Commonly known as: PERCOCET  Take 1 tablet by mouth 4 (four) times daily as needed.     TRESIBA FLEXTOUCH U-100 100 unit/mL (3 mL) insulin pen  Generic drug: insulin degludec  Inject 16 Units into the skin once daily.     TRULICITY 0.75 mg/0.5 mL pen injector  Generic drug: dulaglutide  Inject 0.75 mg into the skin every 7 days. THURSDAYS         * This list has 2 medication(s) that are the same as other medications prescribed for you. Read the directions carefully, and ask your doctor or other care provider to review them with you.                Indwelling Lines/Drains at time of discharge:   Lines/Drains/Airways     None                 Time spent on the discharge of patient:  22minutes         Khadar Harrison DO  Department of Hospital Medicine  [facility]

## 2022-08-23 ENCOUNTER — HOSPITAL ENCOUNTER (OUTPATIENT)
Dept: RADIOLOGY | Facility: CLINIC | Age: 57
Discharge: HOME | End: 2022-08-23
Payer: COMMERCIAL

## 2022-08-23 DIAGNOSIS — Z12.31 SCREENING MAMMOGRAM FOR BREAST CANCER: ICD-10-CM

## 2022-08-23 PROCEDURE — 77067 SCR MAMMO BI INCL CAD: CPT | Mod: TC,PO

## 2022-08-23 PROCEDURE — 77063 MAMMO DIGITAL SCREENING BILAT WITH TOMO: ICD-10-PCS | Mod: 26,,, | Performed by: RADIOLOGY

## 2022-08-23 PROCEDURE — 77067 MAMMO DIGITAL SCREENING BILAT WITH TOMO: ICD-10-PCS | Mod: 26,,, | Performed by: RADIOLOGY

## 2022-08-23 PROCEDURE — 77067 SCR MAMMO BI INCL CAD: CPT | Mod: 26,,, | Performed by: RADIOLOGY

## 2022-08-23 PROCEDURE — 77063 BREAST TOMOSYNTHESIS BI: CPT | Mod: 26,,, | Performed by: RADIOLOGY

## 2022-08-25 RX ORDER — ATORVASTATIN CALCIUM 10 MG/1
TABLET, FILM COATED ORAL
Qty: 90 TABLET | Refills: 3 | Status: SHIPPED | OUTPATIENT
Start: 2022-08-25 | End: 2023-02-09 | Stop reason: SDUPTHER

## 2022-09-01 ENCOUNTER — OFFICE VISIT (OUTPATIENT)
Dept: FAMILY MEDICINE | Facility: CLINIC | Age: 57
End: 2022-09-01
Payer: COMMERCIAL

## 2022-09-01 ENCOUNTER — TELEPHONE (OUTPATIENT)
Dept: GASTROENTEROLOGY | Facility: CLINIC | Age: 57
End: 2022-09-01
Payer: COMMERCIAL

## 2022-09-01 VITALS
OXYGEN SATURATION: 97 % | WEIGHT: 178.69 LBS | RESPIRATION RATE: 18 BRPM | HEIGHT: 65 IN | TEMPERATURE: 98 F | SYSTOLIC BLOOD PRESSURE: 138 MMHG | DIASTOLIC BLOOD PRESSURE: 82 MMHG | HEART RATE: 89 BPM | BODY MASS INDEX: 29.77 KG/M2

## 2022-09-01 DIAGNOSIS — R22.2 MASS ON BACK: Primary | ICD-10-CM

## 2022-09-01 DIAGNOSIS — I10 ESSENTIAL HYPERTENSION: ICD-10-CM

## 2022-09-01 DIAGNOSIS — K50.919 CROHN'S DISEASE WITH COMPLICATION, UNSPECIFIED GASTROINTESTINAL TRACT LOCATION: ICD-10-CM

## 2022-09-01 PROCEDURE — 99213 PR OFFICE/OUTPT VISIT, EST, LEVL III, 20-29 MIN: ICD-10-PCS | Mod: 25,S$GLB,,

## 2022-09-01 PROCEDURE — 90471 TDAP VACCINE GREATER THAN OR EQUAL TO 7YO IM: ICD-10-PCS | Mod: S$GLB,,,

## 2022-09-01 PROCEDURE — 90715 TDAP VACCINE 7 YRS/> IM: CPT | Mod: S$GLB,,,

## 2022-09-01 PROCEDURE — 90471 IMMUNIZATION ADMIN: CPT | Mod: S$GLB,,,

## 2022-09-01 PROCEDURE — 90715 TDAP VACCINE GREATER THAN OR EQUAL TO 7YO IM: ICD-10-PCS | Mod: S$GLB,,,

## 2022-09-01 PROCEDURE — 99213 OFFICE O/P EST LOW 20 MIN: CPT | Mod: 25,S$GLB,,

## 2022-09-01 NOTE — PROGRESS NOTES
Subjective:       Patient ID: Tanesha Chamberlain is a 56 y.o. female.    Chief Complaint: Follow-up (B/p)    Patient presents to clinic for blood pressure follow up.  She states her blood pressures have been good at home.  Blood pressures running 120s/70-80s.  Blood pressure today 138/82.  She was recently hospitalized for Crohn's exacerbation and was taking prednisone.  While on prednisone her blood pressures were running a little higher but have been trending down when steroid was discontinued.      Crohn's: She denies abdominal pain.  Stools are formed, brown, no mucous.  Denies hematuria. Has not had a flare in over 5 years and does not follow up with Gastroenterology.    Has 2 lipomas on back and would like referral to surgeon.  She states lipoma has been present for at least 10 years.  Not painful, seems have recently slightly increased in size.      Review of patient's allergies indicates:   Allergen Reactions    Nsaids (non-steroidal anti-inflammatory drug) Other (See Comments)     GI upset  GI upset      Ibuprofen Other (See Comments)     Other reaction(s): crohns flair up  Other reaction(s): crohns flair up     Social Determinants of Health     Tobacco Use: Medium Risk    Smoking Tobacco Use: Former    Smokeless Tobacco Use: Former   Alcohol Use: Not on file   Financial Resource Strain: Not on file   Food Insecurity: Not on file   Transportation Needs: Not on file   Physical Activity: Not on file   Stress: Not on file   Social Connections: Not on file   Housing Stability: Not on file   Depression PHQ-4: Low Risk     Last PHQ Score: 0      Past Medical History:   Diagnosis Date    ADHD (attention deficit hyperactivity disorder)     Arthritis     Crohn's disease     Diabetes mellitus, type 2     Hypertension     Hypothyroidism     SBO (small bowel obstruction)       Past Surgical History:   Procedure Laterality Date    ANTERIOR CRUCIATE LIGAMENT REPAIR Right     APPENDECTOMY      1988    BUNIONECTOMY Bilateral  "    CHOLECYSTECTOMY      2005    HYSTERECTOMY      MINIMALLY INVASIVE SURGICAL REMOVAL OF INTERVERTEBRAL DISC OF SPINE USING MICROSCOPE Left 05/11/2021    Procedure: Left L4-5 Far Lateral Disectomy, MIS;  Surgeon: Johnny Cruz MD;  Location: [facility] OR [address] [place] [address];  Service: Neurosurgery;  Laterality: Left;    SHOULDER SURGERY Right     SPINE SURGERY      TRANSFORAMINAL EPIDURAL INJECTION OF STEROID Left 03/10/2021    Procedure: INJECTION, STEROID, EPIDURAL, TRANSFORAMINAL APPROACH  L4-5;  Surgeon: Jaylon Tyson MD;  Location: [facility];  Service: Pain Management;  Laterality: Left;  consent needed  ([employer] [facility])      Social History     Socioeconomic History    Marital status:          Current Outpatient Medications:     atorvastatin (LIPITOR) 10 MG tablet, TAKE 1 TABLET BY MOUTH EVERY DAY, Disp: 90 tablet, Rfl: 3    BD ULTRA-FINE SHORT PEN NEEDLE 31 gauge x 5/16" Ndle, 1 each by Misc.(Non-Drug; Combo Route) route once daily., Disp: 100 each, Rfl: 1    blood sugar diagnostic (ONETOUCH ULTRA BLUE TEST STRIP) Strp, Check blood glucose readings twice a day-- before meal and 1 to 2 hours post meal, Disp: 100 strip, Rfl: 0    blood-glucose meter Misc, Check blood glucose readings twice a day-- before meal and 1 to 2 hours post meal, Disp: 1 each, Rfl: 0    dulaglutide (TRULICITY) 0.75 mg/0.5 mL pen injector, Inject 0.75 mg into the skin every 7 days. THURSDAYS, Disp: , Rfl:     estradioL (ESTRACE) 2 MG tablet, Take 1 tablet (2 mg total) by mouth once daily., Disp: 90 tablet, Rfl: 1    fentaNYL (DURAGESIC) 25 mcg/hr, Place 1 patch onto the skin Every 3 (three) days., Disp: , Rfl:     fentaNYL (DURAGESIC) 50 mcg/hr, Place 1 patch onto the skin every 72 hours., Disp: , Rfl:     hydroCHLOROthiazide (HYDRODIURIL) 25 MG tablet, Take 1 tablet (25 mg total) by mouth once daily., Disp: 90 tablet, Rfl: 1    HYDROcodone-acetaminophen (NORCO)  mg per tablet, Take 1-2 tablets by mouth every 8 (eight) hours., Disp: , " "Rfl:     insulin degludec (TRESIBA FLEXTOUCH U-100) 100 unit/mL (3 mL) insulin pen, Inject 16 Units into the skin once daily., Disp: , Rfl:     lancets 33 gauge Misc, Check blood glucose readings twice a day-- before meal and 1 to 2 hours post meal, Disp: 100 each, Rfl: 0    oxyCODONE-acetaminophen (PERCOCET)  mg per tablet, Take 1 tablet by mouth 4 (four) times daily as needed., Disp: , Rfl:     Lab Results   Component Value Date    WBC 13.40 (H) 08/20/2022    HGB 14.6 08/20/2022    HCT 42.2 08/20/2022     08/20/2022    CHOL 170 04/14/2021    TRIG 167 (H) 04/14/2021    HDL 54 04/14/2021    ALT 41 08/18/2022    AST 35 08/18/2022     08/20/2022    K 3.7 08/20/2022     08/20/2022    CREATININE 0.8 08/20/2022    BUN 11 08/20/2022    CO2 27 08/20/2022    TSH 0.380 08/19/2022    INR 1.0 05/04/2021    HGBA1C 8.4 (H) 08/19/2022    MICROALBUR 1.6 08/11/2022       Review of Systems   Constitutional:  Negative for chills, fatigue and fever.   Respiratory: Negative.     Cardiovascular: Negative.    Gastrointestinal:  Negative for abdominal pain, blood in stool, diarrhea, nausea and vomiting.   Integumentary:         "Lipoma on back"     Objective:      Physical Exam  Vitals reviewed.   Constitutional:       Appearance: Normal appearance.   HENT:      Head: Normocephalic and atraumatic.   Cardiovascular:      Rate and Rhythm: Normal rate and regular rhythm.      Pulses: Normal pulses.      Heart sounds: Normal heart sounds, S1 normal and S2 normal.   Pulmonary:      Effort: Pulmonary effort is normal.      Breath sounds: Normal breath sounds.   Abdominal:      General: Abdomen is flat. Bowel sounds are normal.      Palpations: Abdomen is soft.   Skin:     General: Skin is warm.      Capillary Refill: Capillary refill takes less than 2 seconds.      Comments: Growth to left posterior chest.  Non tender to palpation, solid mass.  No drainage noted.   Neurological:      Mental Status: She is alert. "   Psychiatric:         Mood and Affect: Mood normal.         Behavior: Behavior normal.       Assessment:       1. Mass on back    2. Crohn's disease with complication, unspecified gastrointestinal tract location          Plan:       Tanesha was seen today for follow-up.    Diagnoses and all orders for this visit:    Mass on back  -     Ambulatory referral/consult to General Surgery; Future    Crohn's disease with complication, unspecified gastrointestinal tract location  -     Ambulatory referral/consult to Gastroenterology; Future    Other orders  -     Tdap Vaccine       Patient to keep blood pressure log at home. Blood pressure goal is less than 130/90. Return to clinic if unable to attain this goal.  Continue taking Hydrochlorothiazide 25 mg.  Will consider medication adjustment if unable to maintain blood pressure less than 130/90.    Referral to Gastroenterology for Crohn's Disease  Referral to General Surgery to evaluate mass on left upper back.  Tdap vaccination today.  Patient to get second Shingles vaccine at pharmacy.             Airway patent, Nasal mucosa clear. Mouth with normal mucosa. Throat has no vesicles, no oropharyngeal exudates and uvula is midline.

## 2022-09-06 ENCOUNTER — TELEPHONE (OUTPATIENT)
Dept: GASTROENTEROLOGY | Facility: CLINIC | Age: 57
End: 2022-09-06
Payer: COMMERCIAL

## 2022-09-06 NOTE — TELEPHONE ENCOUNTER
Attempted x3 to call patent to schedule an appointment with Dr Schafer, per referral request. Unsuccessful.

## 2022-09-13 ENCOUNTER — OFFICE VISIT (OUTPATIENT)
Dept: SURGERY | Facility: CLINIC | Age: 57
End: 2022-09-13
Payer: COMMERCIAL

## 2022-09-13 VITALS
BODY MASS INDEX: 29.16 KG/M2 | DIASTOLIC BLOOD PRESSURE: 90 MMHG | WEIGHT: 175 LBS | HEART RATE: 85 BPM | HEIGHT: 65 IN | TEMPERATURE: 98 F | SYSTOLIC BLOOD PRESSURE: 149 MMHG

## 2022-09-13 DIAGNOSIS — R22.2 MASS ON BACK: ICD-10-CM

## 2022-09-13 PROCEDURE — 99204 OFFICE O/P NEW MOD 45 MIN: CPT | Mod: S$GLB,,, | Performed by: SURGERY

## 2022-09-13 PROCEDURE — 99204 PR OFFICE/OUTPT VISIT, NEW, LEVL IV, 45-59 MIN: ICD-10-PCS | Mod: S$GLB,,, | Performed by: SURGERY

## 2022-09-13 NOTE — H&P (VIEW-ONLY)
Subjective:       Patient ID: Tanesha Chamberlain is a 56 y.o. female.    Chief Complaint: Consult (Mass on back)      HPI:  56-year-old female referred to the office with enlarging mass over the left upper back near the flank.  It has been present for several years.  It has slowly enlarged in size.  She has little discomfort.  No overlying skin changes.  No drainage.  No constitutional symptoms.  No history of excision from this area.  She has history of several sebaceous cysts removed in the past.  Most recently a large cyst over the left upper abdominal wall a few years ago.    Past Medical History:   Diagnosis Date    ADHD (attention deficit hyperactivity disorder)     Arthritis     Crohn's disease     Diabetes mellitus, type 2     Hypertension     Hypothyroidism     Lumbar back pain with radiculopathy affecting left lower extremity     SBO (small bowel obstruction)      Past Surgical History:   Procedure Laterality Date    ANTERIOR CRUCIATE LIGAMENT REPAIR Right     APPENDECTOMY      1988    BUNIONECTOMY Bilateral     CHOLECYSTECTOMY      2005    HYSTERECTOMY      MINIMALLY INVASIVE SURGICAL REMOVAL OF INTERVERTEBRAL DISC OF SPINE USING MICROSCOPE Left 05/11/2021    Procedure: Left L4-5 Far Lateral Disectomy, MIS;  Surgeon: Johnny Cruz MD;  Location: [facility] OR [address] [place] [address];  Service: Neurosurgery;  Laterality: Left;    SHOULDER SURGERY Right     SPINE SURGERY      TRANSFORAMINAL EPIDURAL INJECTION OF STEROID Left 03/10/2021    Procedure: INJECTION, STEROID, EPIDURAL, TRANSFORAMINAL APPROACH  L4-5;  Surgeon: Jaylon Tyson MD;  Location: [facility];  Service: Pain Management;  Laterality: Left;  consent needed  ([employer] [facility])     Review of patient's allergies indicates:   Allergen Reactions    Nsaids (non-steroidal anti-inflammatory drug) Other (See Comments)     GI upset  GI upset      Ibuprofen Other (See Comments)     Other reaction(s): crohns flair up  Other reaction(s): crohns flair up     Medication List  "with Changes/Refills   Current Medications    ATORVASTATIN (LIPITOR) 10 MG TABLET    TAKE 1 TABLET BY MOUTH EVERY DAY    BD ULTRA-FINE SHORT PEN NEEDLE 31 GAUGE X 5/16" NDLE    1 each by Misc.(Non-Drug; Combo Route) route once daily.    BLOOD SUGAR DIAGNOSTIC (ONETOUCH ULTRA BLUE TEST STRIP) STRP    Check blood glucose readings twice a day-- before meal and 1 to 2 hours post meal    BLOOD-GLUCOSE METER MISC    Check blood glucose readings twice a day-- before meal and 1 to 2 hours post meal    DULAGLUTIDE (TRULICITY) 0.75 MG/0.5 ML PEN INJECTOR    Inject 0.75 mg into the skin every 7 days.     ESTRADIOL (ESTRACE) 2 MG TABLET    Take 1 tablet (2 mg total) by mouth once daily.    FENTANYL (DURAGESIC) 25 MCG/HR    Place 1 patch onto the skin Every 3 (three) days.    FENTANYL (DURAGESIC) 50 MCG/HR    Place 1 patch onto the skin every 72 hours.    HYDROCHLOROTHIAZIDE (HYDRODIURIL) 25 MG TABLET    Take 1 tablet (25 mg total) by mouth once daily.    HYDROCODONE-ACETAMINOPHEN (NORCO)  MG PER TABLET    Take 1-2 tablets by mouth every 8 (eight) hours.    INSULIN DEGLUDEC (TRESIBA FLEXTOUCH U-100) 100 UNIT/ML (3 ML) INSULIN PEN    Inject 16 Units into the skin once daily.    LANCETS 33 GAUGE MISC    Check blood glucose readings twice a day-- before meal and 1 to 2 hours post meal    OXYCODONE-ACETAMINOPHEN (PERCOCET)  MG PER TABLET    Take 1 tablet by mouth 4 (four) times daily as needed.     Family History   Problem Relation Age of Onset    Cirrhosis Neg Hx      Social History     Socioeconomic History    Marital status:    Occupational History    Occupation: Disabled     Comment: Previous occupation:    Tobacco Use    Smoking status: Former     Types: Cigarettes     Quit date: 2020     Years since quittin.7    Smokeless tobacco: Former    Tobacco comments:     in the 8 th grade   Substance and Sexual Activity    Alcohol use: Yes     Alcohol/week: 4.0 standard " drinks     Types: 4 Cans of beer per week     Comment: 1 beer , once in 2 weeks    Drug use: No    Sexual activity: Yes     Partners: Female     Comment: Never had pregnant          Review of Systems   Constitutional:  Negative for appetite change, chills, fever and unexpected weight change.   HENT:  Negative for hearing loss, rhinorrhea, sore throat and voice change.    Eyes:  Negative for photophobia and visual disturbance.   Respiratory:  Negative for cough, choking and shortness of breath.    Cardiovascular:  Negative for chest pain, palpitations and leg swelling.   Gastrointestinal:  Negative for abdominal pain, blood in stool, constipation, diarrhea, nausea and vomiting.   Endocrine: Negative for cold intolerance, heat intolerance, polydipsia and polyuria.   Musculoskeletal:  Negative for arthralgias, back pain, joint swelling and neck stiffness.   Skin:  Negative for color change, pallor and rash.   Neurological:  Negative for dizziness, seizures, syncope and headaches.   Hematological:  Negative for adenopathy. Does not bruise/bleed easily.   Psychiatric/Behavioral:  Negative for agitation, behavioral problems and confusion.      Objective:      Physical Exam  Constitutional:       General: She is not in acute distress.     Appearance: Normal appearance. She is well-developed. She is not toxic-appearing.   HENT:      Head: Normocephalic and atraumatic. No abrasion or laceration.      Right Ear: External ear normal.      Left Ear: External ear normal.      Nose: Nose normal.   Eyes:      Pupils: Pupils are equal, round, and reactive to light.   Neck:      Trachea: Trachea normal. No tracheal deviation.   Cardiovascular:      Rate and Rhythm: Normal rate and regular rhythm.   Pulmonary:      Effort: Pulmonary effort is normal. No tachypnea, accessory muscle usage or respiratory distress.          Comments: Firm mass left upper back.  Wider than it is tall.  Feels to be about 4 5 cm in length.  There is some  skin involvement.  Feels to be like a large sebaceous cyst versus lipoma.  Abdominal:      General: There is no distension.      Palpations: Abdomen is soft.      Tenderness: There is no abdominal tenderness.      Hernia: No hernia is present.   Musculoskeletal:      Cervical back: Neck supple. Normal range of motion.   Lymphadenopathy:      Cervical: No cervical adenopathy.   Skin:     General: Skin is warm.   Neurological:      Mental Status: She is alert and oriented to person, place, and time.      Coordination: Coordination normal.      Gait: Gait normal.   Psychiatric:         Speech: Speech normal.         Behavior: Behavior normal.       Assessment/Plan:   Tanesha was seen today for consult.    Diagnoses and all orders for this visit:    Mass on back  -     Ambulatory referral/consult to General Surgery  -     Case Request Operating Room: EXCISION, MASS, BACK  -     Basic metabolic panel; Future  -     CBC auto differential; Future  -     EKG 12-lead; Future  -     X-Ray Chest PA And Lateral; Future    Other orders  -     Full code; Standing  -     Vital signs; Standing  -     Insert peripheral IV; Standing  -     Diet NPO; Standing  -     IP VTE LOW RISK PATIENT; Standing  -     Place sequential compression device; Standing  -     ceFAZolin (ANCEF) 2 g in dextrose 5 % 50 mL IVPB  -     Pulse Oximetry Q4H; Standing  -     Place in Outpatient; Standing  -     Full code  -     Insert peripheral IV    Plan for excision of the mass September 21st.  Risks and benefits of surgery discussed with the patient.    I discussed the proposed procedures with the patient including risks, benefits, indications, alternatives and special concerns.  The patient appears to understand and agrees to go ahead with surgery.  I have made no promises, warranties or verbal agreements beyond what was discussed above.    No follow-ups on file.

## 2022-09-13 NOTE — PROGRESS NOTES
Subjective:       Patient ID: Tanesha Chamberlain is a 56 y.o. female.    Chief Complaint: Consult (Mass on back)      HPI:  56-year-old female referred to the office with enlarging mass over the left upper back near the flank.  It has been present for several years.  It has slowly enlarged in size.  She has little discomfort.  No overlying skin changes.  No drainage.  No constitutional symptoms.  No history of excision from this area.  She has history of several sebaceous cysts removed in the past.  Most recently a large cyst over the left upper abdominal wall a few years ago.    Past Medical History:   Diagnosis Date    ADHD (attention deficit hyperactivity disorder)     Arthritis     Crohn's disease     Diabetes mellitus, type 2     Hypertension     Hypothyroidism     Lumbar back pain with radiculopathy affecting left lower extremity     SBO (small bowel obstruction)      Past Surgical History:   Procedure Laterality Date    ANTERIOR CRUCIATE LIGAMENT REPAIR Right     APPENDECTOMY      1988    BUNIONECTOMY Bilateral     CHOLECYSTECTOMY      2005    HYSTERECTOMY      MINIMALLY INVASIVE SURGICAL REMOVAL OF INTERVERTEBRAL DISC OF SPINE USING MICROSCOPE Left 05/11/2021    Procedure: Left L4-5 Far Lateral Disectomy, MIS;  Surgeon: Johnyn Cruz MD;  Location: [facility] OR [address] [place] [address];  Service: Neurosurgery;  Laterality: Left;    SHOULDER SURGERY Right     SPINE SURGERY      TRANSFORAMINAL EPIDURAL INJECTION OF STEROID Left 03/10/2021    Procedure: INJECTION, STEROID, EPIDURAL, TRANSFORAMINAL APPROACH  L4-5;  Surgeon: Jaylon Tyson MD;  Location: [facility];  Service: Pain Management;  Laterality: Left;  consent needed  ([employer] [facility])     Review of patient's allergies indicates:   Allergen Reactions    Nsaids (non-steroidal anti-inflammatory drug) Other (See Comments)     GI upset  GI upset      Ibuprofen Other (See Comments)     Other reaction(s): crohns flair up  Other reaction(s): crohns flair up     Medication List  "with Changes/Refills   Current Medications    ATORVASTATIN (LIPITOR) 10 MG TABLET    TAKE 1 TABLET BY MOUTH EVERY DAY    BD ULTRA-FINE SHORT PEN NEEDLE 31 GAUGE X 5/16" NDLE    1 each by Misc.(Non-Drug; Combo Route) route once daily.    BLOOD SUGAR DIAGNOSTIC (ONETOUCH ULTRA BLUE TEST STRIP) STRP    Check blood glucose readings twice a day-- before meal and 1 to 2 hours post meal    BLOOD-GLUCOSE METER MISC    Check blood glucose readings twice a day-- before meal and 1 to 2 hours post meal    DULAGLUTIDE (TRULICITY) 0.75 MG/0.5 ML PEN INJECTOR    Inject 0.75 mg into the skin every 7 days.     ESTRADIOL (ESTRACE) 2 MG TABLET    Take 1 tablet (2 mg total) by mouth once daily.    FENTANYL (DURAGESIC) 25 MCG/HR    Place 1 patch onto the skin Every 3 (three) days.    FENTANYL (DURAGESIC) 50 MCG/HR    Place 1 patch onto the skin every 72 hours.    HYDROCHLOROTHIAZIDE (HYDRODIURIL) 25 MG TABLET    Take 1 tablet (25 mg total) by mouth once daily.    HYDROCODONE-ACETAMINOPHEN (NORCO)  MG PER TABLET    Take 1-2 tablets by mouth every 8 (eight) hours.    INSULIN DEGLUDEC (TRESIBA FLEXTOUCH U-100) 100 UNIT/ML (3 ML) INSULIN PEN    Inject 16 Units into the skin once daily.    LANCETS 33 GAUGE MISC    Check blood glucose readings twice a day-- before meal and 1 to 2 hours post meal    OXYCODONE-ACETAMINOPHEN (PERCOCET)  MG PER TABLET    Take 1 tablet by mouth 4 (four) times daily as needed.     Family History   Problem Relation Age of Onset    Cirrhosis Neg Hx      Social History     Socioeconomic History    Marital status:    Occupational History    Occupation: Disabled     Comment: Previous occupation:    Tobacco Use    Smoking status: Former     Types: Cigarettes     Quit date: 2020     Years since quittin.7    Smokeless tobacco: Former    Tobacco comments:     in the 8 th grade   Substance and Sexual Activity    Alcohol use: Yes     Alcohol/week: 4.0 standard " drinks     Types: 4 Cans of beer per week     Comment: 1 beer , once in 2 weeks    Drug use: No    Sexual activity: Yes     Partners: Female     Comment: Never had pregnant          Review of Systems   Constitutional:  Negative for appetite change, chills, fever and unexpected weight change.   HENT:  Negative for hearing loss, rhinorrhea, sore throat and voice change.    Eyes:  Negative for photophobia and visual disturbance.   Respiratory:  Negative for cough, choking and shortness of breath.    Cardiovascular:  Negative for chest pain, palpitations and leg swelling.   Gastrointestinal:  Negative for abdominal pain, blood in stool, constipation, diarrhea, nausea and vomiting.   Endocrine: Negative for cold intolerance, heat intolerance, polydipsia and polyuria.   Musculoskeletal:  Negative for arthralgias, back pain, joint swelling and neck stiffness.   Skin:  Negative for color change, pallor and rash.   Neurological:  Negative for dizziness, seizures, syncope and headaches.   Hematological:  Negative for adenopathy. Does not bruise/bleed easily.   Psychiatric/Behavioral:  Negative for agitation, behavioral problems and confusion.      Objective:      Physical Exam  Constitutional:       General: She is not in acute distress.     Appearance: Normal appearance. She is well-developed. She is not toxic-appearing.   HENT:      Head: Normocephalic and atraumatic. No abrasion or laceration.      Right Ear: External ear normal.      Left Ear: External ear normal.      Nose: Nose normal.   Eyes:      Pupils: Pupils are equal, round, and reactive to light.   Neck:      Trachea: Trachea normal. No tracheal deviation.   Cardiovascular:      Rate and Rhythm: Normal rate and regular rhythm.   Pulmonary:      Effort: Pulmonary effort is normal. No tachypnea, accessory muscle usage or respiratory distress.          Comments: Firm mass left upper back.  Wider than it is tall.  Feels to be about 4 5 cm in length.  There is some  skin involvement.  Feels to be like a large sebaceous cyst versus lipoma.  Abdominal:      General: There is no distension.      Palpations: Abdomen is soft.      Tenderness: There is no abdominal tenderness.      Hernia: No hernia is present.   Musculoskeletal:      Cervical back: Neck supple. Normal range of motion.   Lymphadenopathy:      Cervical: No cervical adenopathy.   Skin:     General: Skin is warm.   Neurological:      Mental Status: She is alert and oriented to person, place, and time.      Coordination: Coordination normal.      Gait: Gait normal.   Psychiatric:         Speech: Speech normal.         Behavior: Behavior normal.       Assessment/Plan:   Tanesha was seen today for consult.    Diagnoses and all orders for this visit:    Mass on back  -     Ambulatory referral/consult to General Surgery  -     Case Request Operating Room: EXCISION, MASS, BACK  -     Basic metabolic panel; Future  -     CBC auto differential; Future  -     EKG 12-lead; Future  -     X-Ray Chest PA And Lateral; Future    Other orders  -     Full code; Standing  -     Vital signs; Standing  -     Insert peripheral IV; Standing  -     Diet NPO; Standing  -     IP VTE LOW RISK PATIENT; Standing  -     Place sequential compression device; Standing  -     ceFAZolin (ANCEF) 2 g in dextrose 5 % 50 mL IVPB  -     Pulse Oximetry Q4H; Standing  -     Place in Outpatient; Standing  -     Full code  -     Insert peripheral IV    Plan for excision of the mass September 21st.  Risks and benefits of surgery discussed with the patient.    I discussed the proposed procedures with the patient including risks, benefits, indications, alternatives and special concerns.  The patient appears to understand and agrees to go ahead with surgery.  I have made no promises, warranties or verbal agreements beyond what was discussed above.    No follow-ups on file.

## 2022-09-19 ENCOUNTER — HOSPITAL ENCOUNTER (OUTPATIENT)
Dept: RADIOLOGY | Facility: HOSPITAL | Age: 57
Discharge: HOME | End: 2022-09-19
Attending: SURGERY
Payer: COMMERCIAL

## 2022-09-19 ENCOUNTER — HOSPITAL ENCOUNTER (OUTPATIENT)
Dept: PREADMISSION TESTING | Facility: HOSPITAL | Age: 57
Discharge: HOME | End: 2022-09-19
Attending: SURGERY
Payer: COMMERCIAL

## 2022-09-19 VITALS
RESPIRATION RATE: 16 BRPM | DIASTOLIC BLOOD PRESSURE: 93 MMHG | HEART RATE: 93 BPM | OXYGEN SATURATION: 95 % | HEIGHT: 65 IN | WEIGHT: 175 LBS | BODY MASS INDEX: 29.16 KG/M2 | TEMPERATURE: 99 F | SYSTOLIC BLOOD PRESSURE: 154 MMHG

## 2022-09-19 DIAGNOSIS — R22.2 MASS ON BACK: ICD-10-CM

## 2022-09-19 PROCEDURE — 93005 ELECTROCARDIOGRAM TRACING: CPT | Performed by: SPECIALIST

## 2022-09-19 PROCEDURE — 71046 X-RAY EXAM CHEST 2 VIEWS: CPT | Mod: TC

## 2022-09-19 PROCEDURE — 93010 EKG 12-LEAD: ICD-10-PCS | Mod: ,,, | Performed by: SPECIALIST

## 2022-09-19 PROCEDURE — 93010 ELECTROCARDIOGRAM REPORT: CPT | Mod: ,,, | Performed by: SPECIALIST

## 2022-09-19 NOTE — DISCHARGE INSTRUCTIONS
INSTRUCTIONS  To confirm your doctor has scheduled your surgery for:  9/21/22    COVID TESTING SCHEDULED: not needed      Morning of surgery please check in with registration near Parking Garage Entrance then proceed to Outpatient Surgery Department.    Preop nurses will call the afternoon prior to surgery between 4:00 and 6:00 PM with your final arrival time.  PLEASE NOTE:  The surgery schedule has many variables which may affect the time of your surgery case. Family members should be available if your surgery time changes. Plan to be here the day of your procedure between 4-6 hours.    TAKE ONLY THESE MEDICATIONS WITH A SMALL SIP OF WATER THE MORNING OF SURGERY: see list    DO NOT TAKE THESE MEDICATIONS 5-7 DAYS PRIOR to your procedure per your surgeon's request: ASPIRIN, ALEVE, BC powder, MICH SELTZER, IBUPROFEN, FISH OIL, VITAMIN E, OR HERBALS   (May take Tylenol)    If you are prescribed any types of blood thinners (Aspirin, Coumadin, Plavix, Pradaxa, Xarelto, Aggrenox, Effient, Eliquis, Savasya, Brilinta or any other), please ask your surgeon how many days before scheduled procedure should you stop taking them. You may also need to verify with prescribing physician if it is OK to stop your blood thinners.      INSTRUCTIONS IMPORTANT!!  Do not eat or drink anything after midnight.  ONLY if you are diabetic, check your sugar in the morning before your procedure.  Do not smoke, vape or drink alcoholic beverages 24 hours prior to your procedure.  Shower the night before AND the morning of your procedure with a Chlorhexidine wash such as hibiclens or Dial antibacterial soap from neck down. You may use your own shampoo and face wash. This helps your skin to be as bacteria free as possible.  If you wear contact lenses, dentures, hearing aids or glasses, bring a container to put them in and give to a family member.    Please leave all jewelry, piercings and valuables at home.  DO NOT remove hair from the surgery site.    If your condition changes such as fever, cough, etc, please notify your surgeon.   ONLY if you have been diagnosed with sleep apnea please bring your C-PAP machine.  ONLY if you wear home oxygen please bring your portable oxygen tank the day of your procedure.   ONLY for patients requiring bowel prep, written instructions will be given by your doctor's office.  ONLY if you have a neuro stimulator, please bring the controller with you the morning of surgery  Make arrangements in advance for transportation home by a responsible adult.  You must make arrangements for transportation, TAXI'S, UBER'S OR LYFTS ARE NOT ALLOWED.        If you have any questions about these instructions, call Pre-Op Admit  Nursing at [phone] or the Pre-Op Day Surgery Unit at [phone].

## 2022-09-21 ENCOUNTER — ANESTHESIA EVENT (OUTPATIENT)
Dept: SURGERY | Facility: HOSPITAL | Age: 57
End: 2022-09-21
Payer: COMMERCIAL

## 2022-09-21 ENCOUNTER — ANESTHESIA (OUTPATIENT)
Dept: SURGERY | Facility: HOSPITAL | Age: 57
End: 2022-09-21
Payer: COMMERCIAL

## 2022-09-21 ENCOUNTER — HOSPITAL ENCOUNTER (OUTPATIENT)
Facility: HOSPITAL | Age: 57
Discharge: HOME | End: 2022-09-21
Attending: SURGERY | Admitting: SURGERY
Payer: COMMERCIAL

## 2022-09-21 VITALS
TEMPERATURE: 98 F | HEIGHT: 65 IN | RESPIRATION RATE: 16 BRPM | HEART RATE: 84 BPM | DIASTOLIC BLOOD PRESSURE: 90 MMHG | BODY MASS INDEX: 29.16 KG/M2 | SYSTOLIC BLOOD PRESSURE: 139 MMHG | OXYGEN SATURATION: 94 % | WEIGHT: 175 LBS

## 2022-09-21 DIAGNOSIS — R22.2 MASS ON BACK: ICD-10-CM

## 2022-09-21 LAB
GLUCOSE SERPL-MCNC: 134 MG/DL (ref 70–110)
GLUCOSE SERPL-MCNC: 146 MG/DL (ref 70–110)

## 2022-09-21 PROCEDURE — 12032 PR LAYR CLOS WND TRUNK,ARM,LEG 2.6-7.5 CM: ICD-10-PCS | Mod: ,,, | Performed by: SURGERY

## 2022-09-21 PROCEDURE — 11404 PR EXC SKIN BENIG 3.1-4 CM TRUNK,ARM,LEG: ICD-10-PCS | Mod: 51,,, | Performed by: SURGERY

## 2022-09-21 PROCEDURE — 27000653 HC CATH, IV CATHLN: Performed by: STUDENT IN AN ORGANIZED HEALTH CARE EDUCATION/TRAINING PROGRAM

## 2022-09-21 PROCEDURE — 71000033 HC RECOVERY, INTIAL HOUR: Performed by: SURGERY

## 2022-09-21 PROCEDURE — 27000671 HC TUBING MICROBORE EXT: Performed by: STUDENT IN AN ORGANIZED HEALTH CARE EDUCATION/TRAINING PROGRAM

## 2022-09-21 PROCEDURE — 27201107 HC STYLET, STANDARD: Performed by: STUDENT IN AN ORGANIZED HEALTH CARE EDUCATION/TRAINING PROGRAM

## 2022-09-21 PROCEDURE — 63600175 PHARM REV CODE 636 W HCPCS: Performed by: SURGERY

## 2022-09-21 PROCEDURE — 27202105 HC BIS BILATERAL SENSOR: Performed by: STUDENT IN AN ORGANIZED HEALTH CARE EDUCATION/TRAINING PROGRAM

## 2022-09-21 PROCEDURE — 37000008 HC ANESTHESIA 1ST 15 MINUTES: Performed by: SURGERY

## 2022-09-21 PROCEDURE — 12032 INTMD RPR S/A/T/EXT 2.6-7.5: CPT | Mod: ,,, | Performed by: SURGERY

## 2022-09-21 PROCEDURE — 25000003 PHARM REV CODE 250: Performed by: NURSE ANESTHETIST, CERTIFIED REGISTERED

## 2022-09-21 PROCEDURE — 71000015 HC POSTOP RECOV 1ST HR: Performed by: SURGERY

## 2022-09-21 PROCEDURE — 11404 EXC TR-EXT B9+MARG 3.1-4 CM: CPT | Mod: 51,,, | Performed by: SURGERY

## 2022-09-21 PROCEDURE — 37000009 HC ANESTHESIA EA ADD 15 MINS: Performed by: SURGERY

## 2022-09-21 PROCEDURE — 63600175 PHARM REV CODE 636 W HCPCS: Performed by: NURSE ANESTHETIST, CERTIFIED REGISTERED

## 2022-09-21 PROCEDURE — C9290 INJ, BUPIVACAINE LIPOSOME: HCPCS | Performed by: SURGERY

## 2022-09-21 PROCEDURE — 36000707: Performed by: SURGERY

## 2022-09-21 PROCEDURE — 25000003 PHARM REV CODE 250: Performed by: SURGERY

## 2022-09-21 PROCEDURE — 82962 GLUCOSE BLOOD TEST: CPT | Performed by: SURGERY

## 2022-09-21 PROCEDURE — 27201423 OPTIME MED/SURG SUP & DEVICES STERILE SUPPLY: Performed by: SURGERY

## 2022-09-21 PROCEDURE — 36000706: Performed by: SURGERY

## 2022-09-21 PROCEDURE — 27000673 HC TUBING BLOOD Y: Performed by: STUDENT IN AN ORGANIZED HEALTH CARE EDUCATION/TRAINING PROGRAM

## 2022-09-21 RX ORDER — SUCCINYLCHOLINE CHLORIDE 20 MG/ML
INJECTION INTRAMUSCULAR; INTRAVENOUS
Status: DISCONTINUED | OUTPATIENT
Start: 2022-09-21 | End: 2022-09-21

## 2022-09-21 RX ORDER — KETAMINE HYDROCHLORIDE 50 MG/ML
INJECTION, SOLUTION INTRAMUSCULAR; INTRAVENOUS
Status: DISCONTINUED | OUTPATIENT
Start: 2022-09-21 | End: 2022-09-21

## 2022-09-21 RX ORDER — MIDAZOLAM HYDROCHLORIDE 1 MG/ML
INJECTION INTRAMUSCULAR; INTRAVENOUS
Status: DISCONTINUED | OUTPATIENT
Start: 2022-09-21 | End: 2022-09-21

## 2022-09-21 RX ORDER — CEFAZOLIN SODIUM 2 G/50ML
2 SOLUTION INTRAVENOUS
Status: COMPLETED | OUTPATIENT
Start: 2022-09-21 | End: 2022-09-21

## 2022-09-21 RX ORDER — BUPIVACAINE HYDROCHLORIDE 2.5 MG/ML
INJECTION, SOLUTION EPIDURAL; INFILTRATION; INTRACAUDAL
Status: DISCONTINUED | OUTPATIENT
Start: 2022-09-21 | End: 2022-09-21 | Stop reason: HOSPADM

## 2022-09-21 RX ORDER — ROCURONIUM BROMIDE 10 MG/ML
INJECTION, SOLUTION INTRAVENOUS
Status: DISCONTINUED | OUTPATIENT
Start: 2022-09-21 | End: 2022-09-21

## 2022-09-21 RX ORDER — HYDROMORPHONE HYDROCHLORIDE 1 MG/ML
0.2 INJECTION, SOLUTION INTRAMUSCULAR; INTRAVENOUS; SUBCUTANEOUS EVERY 5 MIN PRN
Status: DISCONTINUED | OUTPATIENT
Start: 2022-09-21 | End: 2022-09-21 | Stop reason: HOSPADM

## 2022-09-21 RX ORDER — DIPHENHYDRAMINE HYDROCHLORIDE 50 MG/ML
12.5 INJECTION INTRAMUSCULAR; INTRAVENOUS
Status: DISCONTINUED | OUTPATIENT
Start: 2022-09-21 | End: 2022-09-21 | Stop reason: HOSPADM

## 2022-09-21 RX ORDER — OXYCODONE HYDROCHLORIDE 5 MG/1
5 TABLET ORAL
Status: DISCONTINUED | OUTPATIENT
Start: 2022-09-21 | End: 2022-09-21 | Stop reason: HOSPADM

## 2022-09-21 RX ORDER — MEPERIDINE HYDROCHLORIDE 50 MG/ML
12.5 INJECTION INTRAMUSCULAR; INTRAVENOUS; SUBCUTANEOUS EVERY 10 MIN PRN
Status: DISCONTINUED | OUTPATIENT
Start: 2022-09-21 | End: 2022-09-21 | Stop reason: HOSPADM

## 2022-09-21 RX ORDER — LIDOCAINE HYDROCHLORIDE 10 MG/ML
INJECTION, SOLUTION EPIDURAL; INFILTRATION; INTRACAUDAL; PERINEURAL
Status: DISCONTINUED | OUTPATIENT
Start: 2022-09-21 | End: 2022-09-21

## 2022-09-21 RX ORDER — ONDANSETRON 2 MG/ML
INJECTION INTRAMUSCULAR; INTRAVENOUS
Status: DISCONTINUED | OUTPATIENT
Start: 2022-09-21 | End: 2022-09-21

## 2022-09-21 RX ORDER — ACETAMINOPHEN 10 MG/ML
INJECTION, SOLUTION INTRAVENOUS
Status: DISCONTINUED | OUTPATIENT
Start: 2022-09-21 | End: 2022-09-21

## 2022-09-21 RX ORDER — ONDANSETRON 2 MG/ML
4 INJECTION INTRAMUSCULAR; INTRAVENOUS DAILY PRN
Status: DISCONTINUED | OUTPATIENT
Start: 2022-09-21 | End: 2022-09-21 | Stop reason: HOSPADM

## 2022-09-21 RX ORDER — SODIUM CHLORIDE, SODIUM LACTATE, POTASSIUM CHLORIDE, CALCIUM CHLORIDE 600; 310; 30; 20 MG/100ML; MG/100ML; MG/100ML; MG/100ML
INJECTION, SOLUTION INTRAVENOUS CONTINUOUS PRN
Status: DISCONTINUED | OUTPATIENT
Start: 2022-09-21 | End: 2022-09-21

## 2022-09-21 RX ORDER — PROPOFOL 10 MG/ML
VIAL (ML) INTRAVENOUS
Status: DISCONTINUED | OUTPATIENT
Start: 2022-09-21 | End: 2022-09-21

## 2022-09-21 RX ORDER — SODIUM CHLORIDE 0.9 % (FLUSH) 0.9 %
10 SYRINGE (ML) INJECTION
Status: DISCONTINUED | OUTPATIENT
Start: 2022-09-21 | End: 2022-09-21 | Stop reason: HOSPADM

## 2022-09-21 RX ORDER — FENTANYL CITRATE 50 UG/ML
INJECTION, SOLUTION INTRAMUSCULAR; INTRAVENOUS
Status: DISCONTINUED | OUTPATIENT
Start: 2022-09-21 | End: 2022-09-21

## 2022-09-21 RX ADMIN — SODIUM CHLORIDE, SODIUM LACTATE, POTASSIUM CHLORIDE, AND CALCIUM CHLORIDE: .6; .31; .03; .02 INJECTION, SOLUTION INTRAVENOUS at 06:09

## 2022-09-21 RX ADMIN — FENTANYL CITRATE 100 MCG: 50 INJECTION INTRAMUSCULAR; INTRAVENOUS at 07:09

## 2022-09-21 RX ADMIN — LIDOCAINE HYDROCHLORIDE 80 MG: 10 INJECTION, SOLUTION EPIDURAL; INFILTRATION; INTRACAUDAL; PERINEURAL at 07:09

## 2022-09-21 RX ADMIN — ACETAMINOPHEN 1000 MG: 10 INJECTION, SOLUTION INTRAVENOUS at 07:09

## 2022-09-21 RX ADMIN — ROCURONIUM BROMIDE 35 MG: 10 INJECTION, SOLUTION INTRAVENOUS at 07:09

## 2022-09-21 RX ADMIN — SUGAMMADEX 200 MG: 100 INJECTION, SOLUTION INTRAVENOUS at 08:09

## 2022-09-21 RX ADMIN — ROCURONIUM BROMIDE 5 MG: 10 INJECTION, SOLUTION INTRAVENOUS at 07:09

## 2022-09-21 RX ADMIN — KETAMINE HYDROCHLORIDE 25 MG: 50 INJECTION INTRAMUSCULAR; INTRAVENOUS at 07:09

## 2022-09-21 RX ADMIN — MIDAZOLAM HYDROCHLORIDE 2 MG: 1 INJECTION, SOLUTION INTRAMUSCULAR; INTRAVENOUS at 07:09

## 2022-09-21 RX ADMIN — ONDANSETRON 4 MG: 2 INJECTION INTRAMUSCULAR; INTRAVENOUS at 07:09

## 2022-09-21 RX ADMIN — Medication 160 MG: at 07:09

## 2022-09-21 RX ADMIN — PROPOFOL 150 MG: 10 INJECTION, EMULSION INTRAVENOUS at 07:09

## 2022-09-21 RX ADMIN — CEFAZOLIN SODIUM 2 G: 2 SOLUTION INTRAVENOUS at 07:09

## 2022-09-21 NOTE — ANESTHESIA PROCEDURE NOTES
Intubation    Date/Time: 9/21/2022 7:29 AM  Performed by: David Caballero CRNA  Authorized by: Uriah Senior MD     Intubation:     Induction:  Intravenous    Intubated:  Postinduction    Attempts:  1    Attempted By:  CRNA    Method of Intubation:  Direct    Blade:  Paez 2    Laryngeal View Grade: Grade I - full view of cords      Difficult Airway Encountered?: No      Complications:  None    Airway Device:  Oral endotracheal tube    Airway Device Size:  7.5    Style/Cuff Inflation:  Cuffed    Inflation Amount (mL):  5    Tube secured:  22    Secured at:  The lips    Placement Verified By:  Capnometry    Complicating Factors:  None    Findings Post-Intubation:  BS equal bilateral and atraumatic/condition of teeth unchanged

## 2022-09-21 NOTE — ANESTHESIA PREPROCEDURE EVALUATION
09/21/2022  Tanesha Chamberlain is a 56 y.o., female.      Patient Active Problem List   Diagnosis    Essential hypertension    Type 2 diabetes mellitus with hyperglycemia    Former smoker    Hormone replacement therapy (HRT)    Chronic, continuous use of opioids    Crohn's disease    History of MRSA infection    Over weight    History of fatty infiltration of liver    H/O partial thyroidectomy    Chronic pain    Elevated liver enzymes    Wears contact lenses    Lumbar disc herniation with radiculopathy       Past Surgical History:   Procedure Laterality Date    ANTERIOR CRUCIATE LIGAMENT REPAIR Right     APPENDECTOMY      1988    BUNIONECTOMY Bilateral     CHOLECYSTECTOMY      2005    HYSTERECTOMY      MINIMALLY INVASIVE SURGICAL REMOVAL OF INTERVERTEBRAL DISC OF SPINE USING MICROSCOPE Left 05/11/2021    Procedure: Left L4-5 Far Lateral Disectomy, MIS;  Surgeon: Johnny Cruz MD;  Location: [facility] OR [address] [place] [address];  Service: Neurosurgery;  Laterality: Left;    SHOULDER SURGERY Right     SPINE SURGERY      TRANSFORAMINAL EPIDURAL INJECTION OF STEROID Left 03/10/2021    Procedure: INJECTION, STEROID, EPIDURAL, TRANSFORAMINAL APPROACH  L4-5;  Surgeon: Jaylon Tyson MD;  Location: [facility];  Service: Pain Management;  Laterality: Left;  consent needed  ([employer] [facility])        Tobacco Use:  The patient  reports that she quit smoking about 20 months ago. Her smoking use included cigarettes. She has quit using smokeless tobacco.     Results for orders placed or performed during the hospital encounter of 09/19/22   EKG 12-lead    Collection Time: 09/19/22  1:32 PM    Narrative    Test Reason : R22.2,    Vent. Rate : 089 BPM     Atrial Rate : 089 BPM     P-R Int : 170 ms          QRS Dur : 090 ms      QT Int : 370 ms       P-R-T Axes : 048 051 034 degrees     QTc Int : 450 ms    Normal sinus  rhythm  Septal infarct ,age undetermined  Abnormal ECG  When compared with ECG of 04-MAY-2021 15:30,  Septal infarct is now Present    Referred By: DYLAN VASQUES           Confirmed By:              Lab Results   Component Value Date    WBC 8.44 09/19/2022    HGB 15.2 09/19/2022    HCT 45.0 09/19/2022    MCV 88 09/19/2022     09/19/2022     BMP  Lab Results   Component Value Date     09/19/2022    K 3.4 (L) 09/19/2022    CL 99 09/19/2022    CO2 31 (H) 09/19/2022    BUN 9 09/19/2022    CREATININE 0.7 09/19/2022    CALCIUM 9.2 09/19/2022    ANIONGAP 6 (L) 09/19/2022     (H) 09/19/2022     (H) 08/20/2022     (H) 08/19/2022       No results found for this or any previous visit.          Pre-op Assessment    I have reviewed the Patient Summary Reports.     I have reviewed the Nursing Notes. I have reviewed the NPO Status.   I have reviewed the Medications.     Review of Systems  Anesthesia Hx:  Denies Family Hx of Anesthesia complications.   Denies Personal Hx of Anesthesia complications.   Social:  Former Smoker    Cardiovascular:   Exercise tolerance: good Hypertension ECG has been reviewed.    Musculoskeletal:   Arthritis   Spine Disorders: lumbar Disc disease and Degenerative disease    Neurological:   Fentanyl patch  Chronic Pain Syndrome   Endocrine:   Diabetes        Physical Exam  General: Well nourished, Cooperative, Alert and Oriented    Airway:  Mallampati: II   Mouth Opening: Normal  TM Distance: Normal  Tongue: Normal  Neck ROM: Normal ROM    Dental:  Intact    Chest/Lungs:  Clear to auscultation    Heart:  Rate: Normal  Rhythm: Regular Rhythm  Sounds: Normal        Anesthesia Plan  Type of Anesthesia, risks & benefits discussed:    Anesthesia Type: Gen ETT  Intra-op Monitoring Plan: Standard ASA Monitors  Post Op Pain Control Plan: multimodal analgesia  Induction:  IV  Airway Plan: Video and Direct  Informed Consent: Informed consent signed with the Patient and all  parties understand the risks and agree with anesthesia plan.  All questions answered.   ASA Score: 2  Anesthesia Plan Notes: Ketamine, ofirmev    Ready For Surgery From Anesthesia Perspective.     .

## 2022-09-21 NOTE — TRANSFER OF CARE
"Anesthesia Transfer of Care Note    Patient: Tanesha Chamberlain    Procedure(s) Performed: Procedure(s) (LRB):  EXCISION, MASS, BACK (Left)    Patient location: PACU    Anesthesia Type: general    Transport from OR: Transported from OR on room air with adequate spontaneous ventilation    Post pain: adequate analgesia    Post assessment: no apparent anesthetic complications    Post vital signs: stable    Level of consciousness: awake    Nausea/Vomiting: no nausea/vomiting    Complications: none    Transfer of care protocol was followed      Last vitals:   Visit Vitals  /82 (BP Location: Left arm, Patient Position: Lying)   Pulse 89   Temp 36.6 °C (97.8 °F) (Oral)   Resp 16   Ht 5' 5" (1.651 m)   Wt 79.4 kg (175 lb)   SpO2 97%   Breastfeeding No   BMI 29.12 kg/m²     "

## 2022-09-21 NOTE — ANESTHESIA POSTPROCEDURE EVALUATION
Anesthesia Post Evaluation    Patient: Tanesha Chamberlain    Procedure(s) Performed: Procedure(s) (LRB):  EXCISION, MASS, BACK (Left)    Final Anesthesia Type: general      Patient location during evaluation: PACU  Patient participation: Yes- Able to Participate  Level of consciousness: awake and alert, oriented and awake  Post-procedure vital signs: reviewed and stable  Pain management: adequate  Airway patency: patent    PONV status at discharge: No PONV  Anesthetic complications: no      Cardiovascular status: blood pressure returned to baseline, hemodynamically stable and stable  Respiratory status: unassisted, spontaneous ventilation and room air  Hydration status: euvolemic  Follow-up not needed.          Vitals Value Taken Time   /82 09/21/22 0900   Temp 36.2 °C (97.2 °F) 09/21/22 0845   Pulse 80 09/21/22 0908   Resp 18 09/21/22 0856   SpO2 100 % 09/21/22 0908   Vitals shown include unvalidated device data.      No case tracking events are documented in the log.      Pain/Deb Score: Deb Score: 10 (9/21/2022  9:00 AM)

## 2022-09-21 NOTE — DISCHARGE SUMMARY
"Discharge Summary  General Surgery      Admit Date: 9/21/2022    Discharge Date :9/21/2022    Attending Physician: Sae Collins III     Discharge Physician: Sae Collins III    Discharged Condition: good    Discharge Diagnosis: Mass on back [R22.2]    Treatments/Procedures: Procedure(s) (LRB):  EXCISION, MASS, BACK (Left)    Hospital Course: Uneventful; Discharged home from Recovery    Significant Diagnostic Studies: none    Disposition: Home or Self Care    Diet: Regular    Follow up: Office 10-14 days    Activity: No heavy lifting till seen in office.    Patient Instructions:   Discharge Medication List as of 9/21/2022  9:53 AM        CONTINUE these medications which have NOT CHANGED    Details   atorvastatin (LIPITOR) 10 MG tablet TAKE 1 TABLET BY MOUTH EVERY DAY, Normal      BD ULTRA-FINE SHORT PEN NEEDLE 31 gauge x 5/16" Ndle 1 each by Misc.(Non-Drug; Combo Route) route once daily., Starting Mon 4/11/2022, Normal      blood sugar diagnostic (ONETOUCH ULTRA BLUE TEST STRIP) Strp Check blood glucose readings twice a day-- before meal and 1 to 2 hours post meal, Normal      blood-glucose meter Misc Check blood glucose readings twice a day-- before meal and 1 to 2 hours post meal, Normal      dulaglutide (TRULICITY) 0.75 mg/0.5 mL pen injector Inject 0.75 mg into the skin every 7 days. THURSDAYS, Historical Med      estradioL (ESTRACE) 2 MG tablet Take 1 tablet (2 mg total) by mouth once daily., Starting Thu 8/11/2022, Normal      fentaNYL (DURAGESIC) 25 mcg/hr Place 1 patch onto the skin Every 3 (three) days., Starting Tue 8/2/2022, Historical Med      fentaNYL (DURAGESIC) 50 mcg/hr Place 1 patch onto the skin every 72 hours., Starting Mon 1/18/2021, Historical Med      hydroCHLOROthiazide (HYDRODIURIL) 25 MG tablet Take 1 tablet (25 mg total) by mouth once daily., Starting Thu 8/11/2022, Normal      HYDROcodone-acetaminophen (NORCO)  mg per tablet Take 1-2 tablets by mouth every 8 (eight) hours., " Starting Sat 1/30/2021, Historical Med      insulin degludec (TRESIBA FLEXTOUCH U-100) 100 unit/mL (3 mL) insulin pen Inject 16 Units into the skin once daily., Historical Med      lancets 33 gauge Misc Check blood glucose readings twice a day-- before meal and 1 to 2 hours post meal, Normal      oxyCODONE-acetaminophen (PERCOCET)  mg per tablet Take 1 tablet by mouth 4 (four) times daily as needed., Starting Wed 8/10/2022, Historical Med             Discharge Procedure Orders   Diet Adult Regular     Lifting restrictions   Order Comments: No lifting over twenty pounds for six weeks     Remove dressing in 48 hours

## 2022-09-21 NOTE — BRIEF OP NOTE
[facility]  Brief Operative Note    SUMMARY     Surgery Date: 9/21/2022     Surgeon(s) and Role:     * Sae Collins III, MD - Primary    Assisting Surgeon: None    Pre-op Diagnosis:  Mass on back [R22.2]    Post-op Diagnosis:  Post-Op Diagnosis Codes:     * Mass on back [R22.2] - large sebaceous cyst     Procedure(s) (LRB):  EXCISION, MASS, BACK (Left)    Anesthesia: General    Operative Findings:  Patient brought to the operating room, transferred the operating room table in supine position.  She was given general anesthesia and intubated.  She was then put on her right side with left side up.The large cyst was located in the left upper flank.  Elliptical incision was made around the large cyst.  Dissection was carried down through the skin into the subcutaneous tissue.  The large cyst was encountered in the subcutaneous tissue.  It was dissected free from the surrounding tissue.  It measured about 4 cm in diameter.  It was completely excised.  It was sent for specimen.  Hemostasis obtained.  The subcutaneous tissue was closed with interrupted 4-0 Vicryl.  Skin closed with running 4-0 Monocryl in a subcuticular fashion.  Dermabond placed over the incision.  Patient was put back in the supine position and extubated.  She was brought to recovery room stable condition.    Complications none   Instrument counts correct   Estimated Blood Loss:  Estimated Blood Loss has been documented.         Specimens:   Specimen (24h ago, onward)       Start     Ordered    09/21/22 0752  Specimen to Pathology - Surgery  Once        Comments: Pre-op Diagnosis: Mass on back [R22.2]Procedure(s):EXCISION, MASS, BACK Number of specimens: 1Name of specimens: 1) Left flank sebaceous cyst     Question:  Release to patient  Answer:  Immediate    09/21/22 0755                    [id number]

## 2022-09-21 NOTE — OP NOTE
Surgery Date: 9/21/2022     Surgeon(s) and Role:     * Sae Collins III, MD - Primary    Assisting Surgeon: None    Pre-op Diagnosis:  Mass on back [R22.2]    Post-op Diagnosis:  Post-Op Diagnosis Codes:     * Mass on back [R22.2] - large sebaceous cyst     Procedure(s) (LRB):  EXCISION, MASS, BACK (Left)    Anesthesia: General    Operative Findings:  Patient brought to the operating room, transferred the operating room table in supine position.  She was given general anesthesia and intubated.  She was then put on her right side with left side up.The large cyst was located in the left upper flank.  Elliptical incision was made around the large cyst.  Dissection was carried down through the skin into the subcutaneous tissue.  The large cyst was encountered in the subcutaneous tissue.  It was dissected free from the surrounding tissue.  It measured about 4 cm in diameter.  It was completely excised.  It was sent for specimen.  Hemostasis obtained.  The subcutaneous tissue was closed with interrupted 4-0 Vicryl.  Skin closed with running 4-0 Monocryl in a subcuticular fashion.  Dermabond placed over the incision.  Patient was put back in the supine position and extubated.  She was brought to recovery room stable condition.    Complications none   Instrument counts correct   Estimated Blood Loss:  Estimated Blood Loss has been documented.         Specimens:   Specimen (24h ago, onward)       Start     Ordered    09/21/22 0752  Specimen to Pathology - Surgery  Once        Comments: Pre-op Diagnosis: Mass on back [R22.2]Procedure(s):EXCISION, MASS, BACK Number of specimens: 1Name of specimens: 1) Left flank sebaceous cyst     Question:  Release to patient  Answer:  Immediate    09/21/22 0751                    [id number]

## 2022-09-21 NOTE — DISCHARGE INSTRUCTIONS
DISCHARGE INSTRUCTIONS    No driving, operating heavy machinery or signing legal documents x 24 hours  No drinking alcohol x 24 hours or while taking pain medication  You should not be driving while taking pain medication  Do not remove glue, it will fall off on its own  Do Not submerge wound in a tub, any pools of water or swimming pools until wound is completely healed  Keep an eye on wound- edges should be pink and well approximated-  the wound should not be red and inflamed.  Wound edges should not be .   Your wound should not be draining anything green, yellow or foul smelling- for these call the MD  You should not be running a temp greater than 101   Keep follow up appt

## 2022-10-04 ENCOUNTER — OFFICE VISIT (OUTPATIENT)
Dept: SURGERY | Facility: CLINIC | Age: 57
End: 2022-10-04
Payer: COMMERCIAL

## 2022-10-04 VITALS
DIASTOLIC BLOOD PRESSURE: 101 MMHG | WEIGHT: 175.06 LBS | HEIGHT: 65 IN | SYSTOLIC BLOOD PRESSURE: 170 MMHG | BODY MASS INDEX: 29.17 KG/M2 | RESPIRATION RATE: 16 BRPM | TEMPERATURE: 97 F | HEART RATE: 87 BPM

## 2022-10-04 DIAGNOSIS — R22.2 MASS ON BACK: Primary | ICD-10-CM

## 2022-10-04 DIAGNOSIS — L72.0 EPIDERMAL CYST: ICD-10-CM

## 2022-10-04 PROCEDURE — 99024 PR POST-OP FOLLOW-UP VISIT: ICD-10-PCS | Mod: S$GLB,COE,, | Performed by: SURGERY

## 2022-10-04 PROCEDURE — 99024 POSTOP FOLLOW-UP VISIT: CPT | Mod: S$GLB,COE,, | Performed by: SURGERY

## 2022-10-04 NOTE — PROGRESS NOTES
Subjective:       Patient ID: Tanesha Chamberlain is a 56 y.o. female.    Chief Complaint:  Postop visit    HPI:  56-year-old female status post excision of large cyst from left upper back.  She is feeling well.  No complaints.  Pathology benign     SKIN, LEFT FLANK, EXCISION:   - EPIDERMOID CYST, FOLLICULAR INFUNDIBULAR TYPE.   _______________________________________________________  Review of Systems    Objective:      Physical Exam  Constitutional:       General: She is not in acute distress.  Pulmonary:      Effort: Pulmonary effort is normal. No respiratory distress.          Comments: Incision clean dry intact.  No evidence of infection, seroma, hematoma.  Neurological:      Mental Status: She is alert and oriented to person, place, and time.       Assessment/Plan:   Diagnoses and all orders for this visit:    Mass on back    Epidermal cyst    Status post excision.  Path benign.  Doing very well.  Return to clinic p.r.n..

## 2022-10-25 ENCOUNTER — TELEPHONE (OUTPATIENT)
Dept: NEUROSURGERY | Facility: CLINIC | Age: 57
End: 2022-10-25
Payer: COMMERCIAL

## 2022-10-25 DIAGNOSIS — M54.9 DORSALGIA: Primary | ICD-10-CM

## 2022-10-25 NOTE — TELEPHONE ENCOUNTER
[facility] Patient has hx: back surger with Dr. Cruz in May 2021. Patient c/o pain to same area and to the other leg. No new imaging of MRI. Patient agreeable to see ORA Mcclellan NP to start workup again.    Reva Desai, RN, CCM, CMSRN  RN Navigator  [facility]

## 2022-10-26 ENCOUNTER — HOSPITAL ENCOUNTER (OUTPATIENT)
Dept: RADIOLOGY | Facility: HOSPITAL | Age: 57
Discharge: HOME | End: 2022-10-26
Attending: NEUROLOGICAL SURGERY
Payer: COMMERCIAL

## 2022-10-26 ENCOUNTER — OFFICE VISIT (OUTPATIENT)
Dept: NEUROSURGERY | Facility: CLINIC | Age: 57
End: 2022-10-26
Payer: COMMERCIAL

## 2022-10-26 VITALS
WEIGHT: 175.06 LBS | HEIGHT: 65 IN | SYSTOLIC BLOOD PRESSURE: 145 MMHG | HEART RATE: 118 BPM | BODY MASS INDEX: 29.17 KG/M2 | DIASTOLIC BLOOD PRESSURE: 93 MMHG

## 2022-10-26 DIAGNOSIS — M54.9 DORSALGIA, UNSPECIFIED: Primary | ICD-10-CM

## 2022-10-26 DIAGNOSIS — Z98.890 S/P LAMINECTOMY: ICD-10-CM

## 2022-10-26 DIAGNOSIS — M54.16 LUMBAR RADICULOPATHY: ICD-10-CM

## 2022-10-26 DIAGNOSIS — M43.27 FUSION OF SPINE, LUMBOSACRAL REGION: ICD-10-CM

## 2022-10-26 DIAGNOSIS — M54.9 DORSALGIA: ICD-10-CM

## 2022-10-26 PROCEDURE — 99999 PR PBB SHADOW E&M-EST. PATIENT-LVL IV: ICD-10-PCS | Mod: PBBFAC,COE,, | Performed by: NURSE PRACTITIONER

## 2022-10-26 PROCEDURE — 99999 PR PBB SHADOW E&M-EST. PATIENT-LVL IV: CPT | Mod: PBBFAC,COE,, | Performed by: NURSE PRACTITIONER

## 2022-10-26 PROCEDURE — 72114 X-RAY EXAM L-S SPINE BENDING: CPT | Mod: 26,,, | Performed by: RADIOLOGY

## 2022-10-26 PROCEDURE — 99213 PR OFFICE/OUTPT VISIT, EST, LEVL III, 20-29 MIN: ICD-10-PCS | Mod: S$GLB,,, | Performed by: NURSE PRACTITIONER

## 2022-10-26 PROCEDURE — 72114 XR LUMBAR SPINE 5 VIEW WITH FLEX AND EXT: ICD-10-PCS | Mod: 26,,, | Performed by: RADIOLOGY

## 2022-10-26 PROCEDURE — 72114 X-RAY EXAM L-S SPINE BENDING: CPT | Mod: TC

## 2022-10-26 PROCEDURE — 99213 OFFICE O/P EST LOW 20 MIN: CPT | Mod: S$GLB,,, | Performed by: NURSE PRACTITIONER

## 2022-10-26 NOTE — PROGRESS NOTES
"Neurosurgery  Established Patient    SUBJECTIVE:     History of Present Illness: Tanesha Chamberlain is a 56 y.o. female with a surgical hx of Left L4-5 far lateral microdiscectomy from MIS approach 05/11/2021 with Dr. Cruz and previously undergone a L4-5 fusion  in 2006 with Dr. Ro at [facility]. She is being seen in clinic today to discuss concerns with worsening mid back pain that radiates down into the left buttock wrapping anteriorly into the left thigh. Reports intermittent right leg pain. She has been struggling with the pain for at least 10 month after chasing after her dog. Describes the pain as constant and aching. Rates the pain as a 8/10. Aggravating factors include standing and walking. Alleviating factors include rest. Denies weakness, b/b dysfunction, saddle anesthesia, or gait instability.      Review of patient's allergies indicates:   Allergen Reactions    Nsaids (non-steroidal anti-inflammatory drug) Other (See Comments)     GI upset  GI upset      Ibuprofen Other (See Comments)     Other reaction(s): crohns flair up  Other reaction(s): crohns flair up       Current Outpatient Medications   Medication Sig Dispense Refill    atorvastatin (LIPITOR) 10 MG tablet TAKE 1 TABLET BY MOUTH EVERY DAY 90 tablet 3    BD ULTRA-FINE SHORT PEN NEEDLE 31 gauge x 5/16" Ndle 1 each by Misc.(Non-Drug; Combo Route) route once daily. 100 each 1    blood sugar diagnostic (ONETOUCH ULTRA BLUE TEST STRIP) Strp Check blood glucose readings twice a day-- before meal and 1 to 2 hours post meal 100 strip 0    blood-glucose meter Misc Check blood glucose readings twice a day-- before meal and 1 to 2 hours post meal 1 each 0    dulaglutide (TRULICITY) 0.75 mg/0.5 mL pen injector Inject 0.75 mg into the skin every 7 days. THURSDAYS      estradioL (ESTRACE) 2 MG tablet Take 1 tablet (2 mg total) by mouth once daily. 90 tablet 1    fentaNYL (DURAGESIC) 25 mcg/hr Place 1 patch onto the skin Every 3 (three) days.      " hydroCHLOROthiazide (HYDRODIURIL) 25 MG tablet Take 1 tablet (25 mg total) by mouth once daily. 90 tablet 1    HYDROcodone-acetaminophen (NORCO)  mg per tablet Take 1-2 tablets by mouth every 8 (eight) hours.      insulin degludec (TRESIBA FLEXTOUCH U-100) 100 unit/mL (3 mL) insulin pen Inject 16 Units into the skin once daily.      lancets 33 gauge Misc Check blood glucose readings twice a day-- before meal and 1 to 2 hours post meal 100 each 0    oxyCODONE-acetaminophen (PERCOCET)  mg per tablet Take 1 tablet by mouth 4 (four) times daily as needed.      fentaNYL (DURAGESIC) 50 mcg/hr Place 1 patch onto the skin every 72 hours.       No current facility-administered medications for this visit.       Past Medical History:   Diagnosis Date    ADHD (attention deficit hyperactivity disorder)     Arthritis     Crohn's disease     Diabetes mellitus, type 2     Hypertension     Hypothyroidism     Lumbar back pain with radiculopathy affecting left lower extremity     SBO (small bowel obstruction)      Past Surgical History:   Procedure Laterality Date    ANTERIOR CRUCIATE LIGAMENT REPAIR Right     APPENDECTOMY      1988    BUNIONECTOMY Bilateral     CHOLECYSTECTOMY      2005    EXCISION OF MASS OF BACK Left 9/21/2022    Procedure: EXCISION, MASS, BACK;  Surgeon: Sae Collins III, MD;  Location: [facility] OR;  Service: General;  Laterality: Left;    HYSTERECTOMY      MINIMALLY INVASIVE SURGICAL REMOVAL OF INTERVERTEBRAL DISC OF SPINE USING MICROSCOPE Left 05/11/2021    Procedure: Left L4-5 Far Lateral Disectomy, MIS;  Surgeon: Johnny Cruz MD;  Location: [address];  Service: Neurosurgery;  Laterality: Left;    SHOULDER SURGERY Right     SPINE SURGERY      TRANSFORAMINAL EPIDURAL INJECTION OF STEROID Left 03/10/2021    Procedure: INJECTION, STEROID, EPIDURAL, TRANSFORAMINAL APPROACH  L4-5;  Surgeon: Jaylon Tyson MD;  Location: [facility];  Service: Pain Management;  Laterality: Left;  consent  "needed  ([facility])     Family History    None       Social History     Socioeconomic History    Marital status:    Occupational History    Occupation: Disabled     Comment: Previous occupation:    Tobacco Use    Smoking status: Former     Types: Cigarettes     Quit date: 2020     Years since quittin.8    Smokeless tobacco: Former    Tobacco comments:     in the 8 th grade   Substance and Sexual Activity    Alcohol use: Yes     Alcohol/week: 4.0 standard drinks     Types: 4 Cans of beer per week     Comment: 1 beer , once in 2 weeks    Drug use: No    Sexual activity: Yes     Partners: Female     Comment: Never had pregnant        Review of Systems   Constitutional:  Negative for activity change, appetite change and fever.   HENT:  Negative for ear discharge.    Eyes:  Negative for pain and visual disturbance.   Respiratory:  Negative for cough, chest tightness and shortness of breath.    Cardiovascular:  Negative for chest pain and palpitations.   Gastrointestinal:  Negative for abdominal distention and abdominal pain.   Endocrine: Negative for polydipsia, polyphagia and polyuria.   Musculoskeletal:  Positive for back pain and myalgias. Negative for arthralgias and neck pain.   Skin:  Negative for color change and wound.   Neurological:  Negative for dizziness, weakness, numbness and headaches.   Psychiatric/Behavioral:  Negative for behavioral problems, confusion and dysphoric mood.      OBJECTIVE:     Vital Signs  Pulse: (!) 118  BP: (!) 145/93  Pain Score:   8  Height: 5' 5" (165.1 cm)  Weight: 79.4 kg (175 lb 0.7 oz)  Body mass index is 29.13 kg/m².    Neurosurgery Physical Exam  General: well developed, well nourished, no distress.   Head: normocephalic, atraumatic  Neurologic: Alert and oriented. Thought content appropriate.  GCS: Motor: 6/Verbal: 5/Eyes: 4 GCS Total: 15  Mental Status: Awake, Alert, Oriented x 4  Language: No aphasia  Speech: No dysarthria  Cranial " nerves: face symmetric, tongue midline, CN II-XII grossly intact.   Eyes: pupils equal, round, reactive to light with accomodation, EOMI.   Pulmonary: normal respirations, no signs of respiratory distress  Abdomen: soft, non-distended  Skin: Skin is warm, dry and intact.  Sensory: intact to light touch throughout  Motor Strength:Moves all extremities spontaneously with good tone.    Strength  Deltoids Triceps Biceps Wrist Extension Wrist Flexion Hand    Upper: R 5/5 5/5 5/5 5/5 5/5 5/5    L 5/5 5/5 5/5 5/5 5/5 5/5     HF KE KF DF PF EHL   Lower: R 5/5 5/5 5/5 5/5 5/5 5/5    L 5/5 5/5 5/5 5/5 5/5 5/5        Cerebellar:   Gait stable, antalgic     Cervical:   ROM: Full with flexion, extension, lateral rotation and ear-to-shoulder bend.   Midline TTP: Negative.     Thoracic:  Midline TTP: Negative.     Lumbar:  Midline TTP: Positive  Straight Leg Test: Negative.    Other:  SI joint TTP: Negative.  Greater trochanter TTP: Negative.  Tenderness with external/internal hip rotation: Negative.      Diagnostic Results:  I have personally reviewed the x-ray lumbar flex/ex dated 10/26/22. The imaging shows  L5-S1 level with disc spacer laminectomy and particle screws.  Alignment is satisfactory and no instability is seen in flexion and extension.    ASSESSMENT/PLAN:   Tanesha Chamberlain is a 56 y.o. female with a surgical hx of Left L4-5 far lateral microdiscectomy from MIS approach 05/11/2021 with Dr. Cruz and previously undergone a L4-5 fusion  in 2006 with Dr. Ro at [facility]. She was seen in clinic today to discuss concerns with worsening mid back pain that radiates down into the left buttock wrapping anteriorly into the left thigh. A MRI lumbar spine has been ordered to evaluate for stenosis given her surgical hx and radicular complaints.     I would like the patient to follow-up in clinic in 4 weeks to review the image findings and discuss next steps. I have encouraged her to contact the clinic with any questions,  concerns, or adverse clinical changes. She verbalized understanding.      BERYL Rankin-MICHAEL  Neurosurgery  [facility].      Note dictated with voice recognition software, please excuse any grammatical errors.

## 2022-11-03 DIAGNOSIS — I10 ESSENTIAL HYPERTENSION: ICD-10-CM

## 2022-11-03 DIAGNOSIS — Z79.890 HORMONE REPLACEMENT THERAPY (HRT): ICD-10-CM

## 2022-11-07 DIAGNOSIS — E11.65 TYPE 2 DIABETES MELLITUS WITH HYPERGLYCEMIA, WITHOUT LONG-TERM CURRENT USE OF INSULIN: Primary | ICD-10-CM

## 2022-11-07 RX ORDER — INSULIN DEGLUDEC 100 U/ML
16 INJECTION, SOLUTION SUBCUTANEOUS DAILY
Qty: 2 PEN | Refills: 2 | Status: SHIPPED | OUTPATIENT
Start: 2022-11-07 | End: 2022-11-15 | Stop reason: SDUPTHER

## 2022-11-07 RX ORDER — PEN NEEDLE, DIABETIC 31 GX5/16"
1 NEEDLE, DISPOSABLE MISCELLANEOUS DAILY
Qty: 100 EACH | Refills: 1 | Status: SHIPPED | OUTPATIENT
Start: 2022-11-07 | End: 2023-02-09 | Stop reason: SDUPTHER

## 2022-11-07 RX ORDER — DULAGLUTIDE 0.75 MG/.5ML
0.75 INJECTION, SOLUTION SUBCUTANEOUS
Qty: 4 PEN | Refills: 2 | Status: SHIPPED | OUTPATIENT
Start: 2022-11-07 | End: 2022-11-15 | Stop reason: SDUPTHER

## 2022-11-09 ENCOUNTER — HOSPITAL ENCOUNTER (OUTPATIENT)
Dept: RADIOLOGY | Facility: HOSPITAL | Age: 57
Discharge: HOME | End: 2022-11-09
Attending: NURSE PRACTITIONER
Payer: COMMERCIAL

## 2022-11-09 ENCOUNTER — PATIENT MESSAGE (OUTPATIENT)
Dept: FAMILY MEDICINE | Facility: CLINIC | Age: 57
End: 2022-11-09
Payer: COMMERCIAL

## 2022-11-09 DIAGNOSIS — Z98.890 S/P LAMINECTOMY: ICD-10-CM

## 2022-11-09 DIAGNOSIS — M54.16 LUMBAR RADICULOPATHY: ICD-10-CM

## 2022-11-09 DIAGNOSIS — M54.9 DORSALGIA, UNSPECIFIED: ICD-10-CM

## 2022-11-09 DIAGNOSIS — M43.27 FUSION OF SPINE, LUMBOSACRAL REGION: ICD-10-CM

## 2022-11-09 PROCEDURE — 72148 MRI LUMBAR SPINE W/O DYE: CPT | Mod: TC,PO

## 2022-11-11 ENCOUNTER — TELEPHONE (OUTPATIENT)
Dept: NEUROSURGERY | Facility: CLINIC | Age: 57
End: 2022-11-11
Payer: COMMERCIAL

## 2022-11-11 NOTE — TELEPHONE ENCOUNTER
Left message on pt vm to call office back to get scheduled for a virtual or audio visit with Starla to discuss MRI recommendations.

## 2022-11-11 NOTE — TELEPHONE ENCOUNTER
----- Message from Starla Mcclellan NP sent at 11/11/2022  3:35 PM CST -----  Regarding: appointment  Could you please reach out to her for a virtual or Audio with me to discuss the MRI results and Dr. Cruz' recommendations. Nothing scary just wanted to document the visit.

## 2022-11-14 ENCOUNTER — TELEPHONE (OUTPATIENT)
Dept: NEUROSURGERY | Facility: CLINIC | Age: 57
End: 2022-11-14
Payer: COMMERCIAL

## 2022-11-14 ENCOUNTER — PATIENT MESSAGE (OUTPATIENT)
Dept: NEUROSURGERY | Facility: CLINIC | Age: 57
End: 2022-11-14
Payer: COMMERCIAL

## 2022-11-14 NOTE — TELEPHONE ENCOUNTER
Patient returned phone call to schedule follow up appointment. Patient requested tomorrow. I offered patient 2pm and patient accepted. Pt verbalizes understanding and agreement.

## 2022-11-15 ENCOUNTER — OFFICE VISIT (OUTPATIENT)
Dept: NEUROSURGERY | Facility: CLINIC | Age: 57
End: 2022-11-15
Payer: COMMERCIAL

## 2022-11-15 DIAGNOSIS — Z98.890 S/P LAMINECTOMY: Primary | ICD-10-CM

## 2022-11-15 DIAGNOSIS — E11.65 TYPE 2 DIABETES MELLITUS WITH HYPERGLYCEMIA, WITHOUT LONG-TERM CURRENT USE OF INSULIN: ICD-10-CM

## 2022-11-15 DIAGNOSIS — M54.16 LUMBAR RADICULOPATHY: ICD-10-CM

## 2022-11-15 PROCEDURE — 99213 OFFICE O/P EST LOW 20 MIN: CPT | Mod: 95,,, | Performed by: NURSE PRACTITIONER

## 2022-11-15 PROCEDURE — 99213 PR OFFICE/OUTPT VISIT, EST, LEVL III, 20-29 MIN: ICD-10-PCS | Mod: 95,,, | Performed by: NURSE PRACTITIONER

## 2022-11-15 RX ORDER — INSULIN DEGLUDEC 100 U/ML
16 INJECTION, SOLUTION SUBCUTANEOUS DAILY
Qty: 2 PEN | Refills: 2 | Status: SHIPPED | OUTPATIENT
Start: 2022-11-15 | End: 2023-02-13

## 2022-11-15 RX ORDER — DULAGLUTIDE 0.75 MG/.5ML
0.75 INJECTION, SOLUTION SUBCUTANEOUS
Qty: 4 PEN | Refills: 2 | Status: SHIPPED | OUTPATIENT
Start: 2022-11-15 | End: 2022-12-13 | Stop reason: SDUPTHER

## 2022-11-15 NOTE — H&P (VIEW-ONLY)
Established Patient - Audio Only Telehealth Visit     The patient location is: Home  The chief complaint leading to consultation is: MRI results  Visit type: Virtual visit with audio only (telephone)  Total time spent with patient: 10 minutes       The reason for the audio only service rather than synchronous audio and video virtual visit was related to technical difficulties or patient preference/necessity.     Each patient to whom I provide medical services by telemedicine is:  (1) informed of the relationship between the physician and patient and the respective role of any other health care provider with respect to management of the patient; and (2) notified that they may decline to receive medical services by telemedicine and may withdraw from such care at any time. Patient verbally consented to receive this service via voice-only telephone call.       HPI: Tanesha Chamberlain is a 56 y.o. female with a surgical hx of Left L4-5 far lateral microdiscectomy from MIS approach 05/11/2021 with Dr. Cruz and previously undergone a L4-5 fusion  in 2006 with Dr. Ro at [facility]. She is being seen in clinic today to discuss concerns with worsening mid back pain that radiates down into the left buttock wrapping anteriorly into the left thigh. Reports intermittent right leg pain. She has been struggling with the pain for at least 10 month after chasing after her dog. Describes the pain as constant and aching. Rates the pain as a 8/10. Aggravating factors include standing and walking. Alleviating factors include rest. Denies weakness, b/b dysfunction, saddle anesthesia, or gait instability.      Interval Hx: After the last appointment, it was recommended that the patient obtain additional imaging to further evaluate her concerns. She is being seen today to discuss the image findings. The x-ray dated 10/26/22 shows no instability. The MRI dated 11/9/22 shows degenerative changes contributing to central canal and foraminal  narrowing, most notably at L4-5. Instrumented fusion at L5-S1 in stable position.      Assessment and plan:  After reviewing the imaging and concerns with Dr. Cruz, he recommends injections with pain management to assistive with the pain and degenerative findings. I have placed the referral. I would like the patient to follow-up in clinic as needed. I have encouraged her to contact the clinic with any questions, concerns, or adverse clinical changes. She verbalized understanding.      BERYL Rankin-MICHAEL  Neurosurgery  [facility]         This service was not originating from a related E/M service provided within the previous 7 days nor will  to an E/M service or procedure within the next 24 hours or my soonest available appointment.  Prevailing standard of care was able to be met in this audio-only visit.

## 2022-11-15 NOTE — PROGRESS NOTES
Established Patient - Audio Only Telehealth Visit     The patient location is: Home  The chief complaint leading to consultation is: MRI results  Visit type: Virtual visit with audio only (telephone)  Total time spent with patient: 10 minutes       The reason for the audio only service rather than synchronous audio and video virtual visit was related to technical difficulties or patient preference/necessity.     Each patient to whom I provide medical services by telemedicine is:  (1) informed of the relationship between the physician and patient and the respective role of any other health care provider with respect to management of the patient; and (2) notified that they may decline to receive medical services by telemedicine and may withdraw from such care at any time. Patient verbally consented to receive this service via voice-only telephone call.       HPI: Tanesha Chamberlain is a 56 y.o. female with a surgical hx of Left L4-5 far lateral microdiscectomy from MIS approach 05/11/2021 with Dr. Cruz and previously undergone a L4-5 fusion  in 2006 with Dr. Ro at [facility]. She is being seen in clinic today to discuss concerns with worsening mid back pain that radiates down into the left buttock wrapping anteriorly into the left thigh. Reports intermittent right leg pain. She has been struggling with the pain for at least 10 month after chasing after her dog. Describes the pain as constant and aching. Rates the pain as a 8/10. Aggravating factors include standing and walking. Alleviating factors include rest. Denies weakness, b/b dysfunction, saddle anesthesia, or gait instability.      Interval Hx: After the last appointment, it was recommended that the patient obtain additional imaging to further evaluate her concerns. She is being seen today to discuss the image findings. The x-ray dated 10/26/22 shows no instability. The MRI dated 11/9/22 shows degenerative changes contributing to central canal and foraminal  narrowing, most notably at L4-5. Instrumented fusion at L5-S1 in stable position.      Assessment and plan:  After reviewing the imaging and concerns with Dr. Cruz, he recommends injections with pain management to assistive with the pain and degenerative findings. I have placed the referral. I would like the patient to follow-up in clinic as needed. I have encouraged her to contact the clinic with any questions, concerns, or adverse clinical changes. She verbalized understanding.      BERYL Rankin-MICHAEL  Neurosurgery  [facility]         This service was not originating from a related E/M service provided within the previous 7 days nor will  to an E/M service or procedure within the next 24 hours or my soonest available appointment.  Prevailing standard of care was able to be met in this audio-only visit.

## 2022-11-28 ENCOUNTER — TELEPHONE (OUTPATIENT)
Dept: PAIN MEDICINE | Facility: CLINIC | Age: 57
End: 2022-11-28
Payer: COMMERCIAL

## 2022-11-28 NOTE — TELEPHONE ENCOUNTER
This message is for patient in regards to his/her appointment 11/29/22 at 7:30 am   With Dr. Anderson Ly MD.      [facility] Policy: For the safety of all patients and staff members.   During this visit we're informing all patients and visitors to wear face mask at all time here at [facility].  If you have any questions or concerns please contact [phone]

## 2022-11-29 ENCOUNTER — HOSPITAL ENCOUNTER (OUTPATIENT)
Facility: OTHER | Age: 57
Discharge: HOME | End: 2022-11-29
Attending: ANESTHESIOLOGY | Admitting: ANESTHESIOLOGY
Payer: COMMERCIAL

## 2022-11-29 ENCOUNTER — OFFICE VISIT (OUTPATIENT)
Dept: PAIN MEDICINE | Facility: CLINIC | Age: 57
End: 2022-11-29
Attending: ANESTHESIOLOGY
Payer: COMMERCIAL

## 2022-11-29 VITALS
OXYGEN SATURATION: 97 % | SYSTOLIC BLOOD PRESSURE: 153 MMHG | DIASTOLIC BLOOD PRESSURE: 90 MMHG | RESPIRATION RATE: 16 BRPM | TEMPERATURE: 97 F | HEART RATE: 86 BPM | WEIGHT: 178 LBS | BODY MASS INDEX: 30.39 KG/M2 | HEIGHT: 64 IN

## 2022-11-29 VITALS
DIASTOLIC BLOOD PRESSURE: 80 MMHG | HEIGHT: 65 IN | RESPIRATION RATE: 18 BRPM | HEART RATE: 80 BPM | WEIGHT: 174.19 LBS | SYSTOLIC BLOOD PRESSURE: 118 MMHG | BODY MASS INDEX: 29.02 KG/M2 | TEMPERATURE: 98 F

## 2022-11-29 DIAGNOSIS — M96.1 POSTLAMINECTOMY SYNDROME OF LUMBAR REGION: ICD-10-CM

## 2022-11-29 DIAGNOSIS — M54.16 LUMBAR RADICULOPATHY: ICD-10-CM

## 2022-11-29 DIAGNOSIS — Z98.890 S/P LAMINECTOMY: ICD-10-CM

## 2022-11-29 DIAGNOSIS — G89.29 CHRONIC PAIN: ICD-10-CM

## 2022-11-29 DIAGNOSIS — M47.26 OSTEOARTHRITIS OF SPINE WITH RADICULOPATHY, LUMBAR REGION: Primary | ICD-10-CM

## 2022-11-29 DIAGNOSIS — M51.16 LUMBAR DISC HERNIATION WITH RADICULOPATHY: Primary | ICD-10-CM

## 2022-11-29 PROBLEM — M51.37 DDD (DEGENERATIVE DISC DISEASE), LUMBOSACRAL: Status: ACTIVE | Noted: 2022-11-29

## 2022-11-29 PROBLEM — M51.379 DDD (DEGENERATIVE DISC DISEASE), LUMBOSACRAL: Status: ACTIVE | Noted: 2022-11-29

## 2022-11-29 LAB — POCT GLUCOSE: 117 MG/DL (ref 70–110)

## 2022-11-29 PROCEDURE — 63600175 PHARM REV CODE 636 W HCPCS: Performed by: ANESTHESIOLOGY

## 2022-11-29 PROCEDURE — 99999 PR PBB SHADOW E&M-EST. PATIENT-LVL IV: CPT | Mod: PBBFAC,COE,, | Performed by: ANESTHESIOLOGY

## 2022-11-29 PROCEDURE — 25000003 PHARM REV CODE 250: Performed by: EMERGENCY MEDICINE

## 2022-11-29 PROCEDURE — 82947 ASSAY GLUCOSE BLOOD QUANT: CPT | Performed by: ANESTHESIOLOGY

## 2022-11-29 PROCEDURE — 25500020 PHARM REV CODE 255: Performed by: ANESTHESIOLOGY

## 2022-11-29 PROCEDURE — 64483 NJX AA&/STRD TFRM EPI L/S 1: CPT | Mod: LT | Performed by: ANESTHESIOLOGY

## 2022-11-29 PROCEDURE — 25000003 PHARM REV CODE 250: Performed by: ANESTHESIOLOGY

## 2022-11-29 PROCEDURE — 99205 OFFICE O/P NEW HI 60 MIN: CPT | Mod: S$GLB,,, | Performed by: ANESTHESIOLOGY

## 2022-11-29 PROCEDURE — 64483 NJX AA&/STRD TFRM EPI L/S 1: CPT | Mod: LT,,, | Performed by: ANESTHESIOLOGY

## 2022-11-29 PROCEDURE — 99999 PR PBB SHADOW E&M-EST. PATIENT-LVL IV: ICD-10-PCS | Mod: PBBFAC,COE,, | Performed by: ANESTHESIOLOGY

## 2022-11-29 PROCEDURE — 64483 PR EPIDURAL INJ, ANES/STEROID, TRANSFORAMINAL, LUMB/SACR, SNGL LEVL: ICD-10-PCS | Mod: LT,,, | Performed by: ANESTHESIOLOGY

## 2022-11-29 PROCEDURE — 99205 PR OFFICE/OUTPT VISIT, NEW, LEVL V, 60-74 MIN: ICD-10-PCS | Mod: S$GLB,,, | Performed by: ANESTHESIOLOGY

## 2022-11-29 RX ORDER — DEXAMETHASONE SODIUM PHOSPHATE 10 MG/ML
INJECTION INTRAMUSCULAR; INTRAVENOUS
Status: DISCONTINUED | OUTPATIENT
Start: 2022-11-29 | End: 2022-11-29 | Stop reason: HOSPADM

## 2022-11-29 RX ORDER — LIDOCAINE HYDROCHLORIDE 20 MG/ML
INJECTION, SOLUTION INFILTRATION; PERINEURAL
Status: DISCONTINUED | OUTPATIENT
Start: 2022-11-29 | End: 2022-11-29 | Stop reason: HOSPADM

## 2022-11-29 RX ORDER — SODIUM CHLORIDE 9 MG/ML
500 INJECTION, SOLUTION INTRAVENOUS CONTINUOUS
Status: DISCONTINUED | OUTPATIENT
Start: 2022-11-29 | End: 2022-11-29 | Stop reason: HOSPADM

## 2022-11-29 RX ORDER — LIDOCAINE HYDROCHLORIDE 10 MG/ML
INJECTION, SOLUTION EPIDURAL; INFILTRATION; INTRACAUDAL; PERINEURAL
Status: DISCONTINUED | OUTPATIENT
Start: 2022-11-29 | End: 2022-11-29 | Stop reason: HOSPADM

## 2022-11-29 RX ORDER — MIDAZOLAM HYDROCHLORIDE 1 MG/ML
INJECTION INTRAMUSCULAR; INTRAVENOUS
Status: DISCONTINUED | OUTPATIENT
Start: 2022-11-29 | End: 2022-11-29 | Stop reason: HOSPADM

## 2022-11-29 RX ORDER — FENTANYL CITRATE 50 UG/ML
INJECTION, SOLUTION INTRAMUSCULAR; INTRAVENOUS
Status: DISCONTINUED | OUTPATIENT
Start: 2022-11-29 | End: 2022-11-29 | Stop reason: HOSPADM

## 2022-11-29 NOTE — DISCHARGE SUMMARY
"Discharge Note  Short Stay      SUMMARY     Admit Date: 11/29/2022    Attending Physician: Loreta Brito      Discharge Physician: Loreta Brito      Discharge Date: 11/29/2022 11:46 AM    Procedure(s) (LRB):  LUMBAR TRANSFORAMINAL LEFT L4/5 MEDICALLY URGENT (Left)    Final Diagnosis: Osteoarthritis of spine with radiculopathy, lumbar region [M47.26]    Disposition: Home or self care    Patient Instructions:   Current Discharge Medication List        CONTINUE these medications which have NOT CHANGED    Details   atorvastatin (LIPITOR) 10 MG tablet TAKE 1 TABLET BY MOUTH EVERY DAY  Qty: 90 tablet, Refills: 3      BD ULTRA-FINE SHORT PEN NEEDLE 31 gauge x 5/16" Ndle 1 each by Misc.(Non-Drug; Combo Route) route once daily.  Qty: 100 each, Refills: 1    Associated Diagnoses: Type 2 diabetes mellitus with hyperglycemia, without long-term current use of insulin      blood sugar diagnostic (ONETOUCH ULTRA BLUE TEST STRIP) Strp Check blood glucose readings twice a day-- before meal and 1 to 2 hours post meal  Qty: 100 strip, Refills: 0      blood-glucose meter Misc Check blood glucose readings twice a day-- before meal and 1 to 2 hours post meal  Qty: 1 each, Refills: 0      dulaglutide (TRULICITY) 0.75 mg/0.5 mL pen injector Inject 0.75 mg into the skin every 7 days. THURSDAYS  Qty: 4 pen, Refills: 2    Associated Diagnoses: Type 2 diabetes mellitus with hyperglycemia, without long-term current use of insulin      estradioL (ESTRACE) 2 MG tablet Take 1 tablet (2 mg total) by mouth once daily.  Qty: 90 tablet, Refills: 1    Associated Diagnoses: Hormone replacement therapy (HRT)      fentaNYL (DURAGESIC) 25 mcg/hr Place 1 patch onto the skin Every 3 (three) days.      hydroCHLOROthiazide (HYDRODIURIL) 25 MG tablet Take 1 tablet (25 mg total) by mouth once daily.  Qty: 90 tablet, Refills: 1    Comments: .  Associated Diagnoses: Essential hypertension      HYDROcodone-acetaminophen (NORCO)  mg per tablet Take " 1-2 tablets by mouth every 8 (eight) hours.      insulin degludec (TRESIBA FLEXTOUCH U-100) 100 unit/mL (3 mL) insulin pen Inject 16 Units into the skin once daily.  Qty: 2 pen, Refills: 2    Associated Diagnoses: Type 2 diabetes mellitus with hyperglycemia, without long-term current use of insulin      lancets 33 gauge Misc Check blood glucose readings twice a day-- before meal and 1 to 2 hours post meal  Qty: 100 each, Refills: 0                 Discharge Diagnosis: Osteoarthritis of spine with radiculopathy, lumbar region [M47.26]  Condition on Discharge: Stable with no complications to procedure   Diet on Discharge: Same as before.  Activity: as per instruction sheet.  Discharge to: Home with a responsible adult.  Follow up: 2-4 weeks       Please call my office or pager at [phone] if experienced any weakness or loss of sensation, fever > 101.5, pain uncontrolled with oral medications, persistent nausea/vomiting/or diarrhea, redness or drainage from the incisions, or any other worrisome concerns. If physician on call was not reached or could not communicate with our office for any reason please go to the nearest emergency department      Loreta Brito  11/29/2022

## 2022-11-29 NOTE — DISCHARGE INSTRUCTIONS
Thank you for allowing us to care for you today. You may receive a survey about the care we provided. Your feedback is valuable and helps us provide excellent care throughout the community.     Home Care Instructions for Pain Management:    1. DIET:   You may resume your normal diet today.   2. BATHING:   You may shower with luke warm water. No tub baths or anything that will soak injection sites under water for the next 24 hours.  3. DRESSING:   You may remove your bandage today.   4. ACTIVITY LEVEL:   You may resume your normal activities 24 hrs after your procedure. Nothing strenuous today.  5. MEDICATIONS:   You may resume your normal medications today. To restart blood thinners, ask your doctor.  6. DRIVING    If you have received any sedatives by mouth today, you may not drive for 12 hours.    If you have received any sedation through your IV, you may not drive for 24 hrs.   7. SPECIAL INSTRUCTIONS:   No heat to the injection site for 24 hrs including, hot bath or shower, heating pad, moist heat, or hot tubs.    Use ice pack to injection site for any pain or discomfort.  Apply ice packs for 20 minute intervals as needed.    IF you have diabetes, be sure to monitor your blood sugar more closely. IF your injection contained steroids your blood sugar levels may become higher than normal.    If you are still having pain upon discharge:  Your pain may improve over the next 48 hours. The anesthetic (numbing medication) works immediately to 48 hours. IF your injection contained a steroid (anti-inflammatory medication), it takes approximately 3 days to start feeling relief and 7-10 days to see your greatest results from the medication. It is possible you may need subsequent injections. This would be discussed at your follow up appointment with pain management or your referring doctor.    Please call the PAIN MANAGEMENT office at [phone] or ON CALL pager at [phone] if you experienced any:   -Weakness or  loss of sensation  -Fever > 101.5  -Pain uncontrolled with oral medications   -Persistent nausea, vomiting, or diarrhea  -Redness or drainage from the injection sites, or any other worrisome concerns.   If physician on call was not reached or could not communicate with our office for any reason please go to the nearest emergency department. Adult Procedural Sedation Instructions    Recovery After Procedural Sedation (Adult)  You have been given medicine by vein to make you sleep during your surgery. This may have included both a pain medicine and sleeping medicine. Most of the effects have worn off. But you may still have some drowsiness for the next 6 to 8 hours.  Home care  Follow these guidelines when you get home:  For the next 8 hours, you should be watched by a responsible adult. This person should make sure your condition is not getting worse.  Don't drink any alcohol for the next 24 hours.  Don't drive, operate dangerous machinery, or make important business or personal decisions during the next 24 hours.  Note: Your healthcare provider may tell you not to take any medicine by mouth for pain or sleep in the next 4 hours. These medicines may react with the medicines you were given in the hospital. This could cause a much stronger response than usual.  Follow-up care  Follow up with your healthcare provider if you are not alert and back to your usual level of activity within 12 hours.  When to seek medical advice  Call your healthcare provider right away if any of these occur:  Drowsiness gets worse  Weakness or dizziness gets worse  Repeated vomiting  You can't be awakened   Date Last Reviewed: 10/18/2016  © 6723-5159 [employer]. [address] [place] [address], [place] [address]. All rights reserved. This information is not intended as a substitute for professional medical care. Always follow your healthcare professional's instructions.

## 2022-11-29 NOTE — OP NOTE
Lumbar Transforaminal Epidural Steroid Injection under Fluoroscopic Guidance    The procedure, risks, benefits, and options were discussed with the patient. There are no contraindications to the procedure. The patent expressed understanding and agreed to the procedure. Informed written consent was obtained prior to the start of the procedure and can be found in the patient's chart.    PATIENT NAME: Tanesha Chamberlain   MRN: [id number]     DATE OF PROCEDURE: 11/29/2022    PROCEDURE:  Left  L4/5 Lumbar Transforaminal Epidural Steroid Injection under Fluoroscopic Guidance    PRE-OP DIAGNOSIS: Osteoarthritis of spine with radiculopathy, lumbar region [M47.26] Lumbar radiculopathy [M54.16]    POST-OP DIAGNOSIS: Same    PHYSICIAN: Loreta Brito MD    ASSISTANTS: Sandro Fay MD     MEDICATIONS INJECTED: Preservative-free Decadron 10mg with 5cc of Lidocaine 1% MPF     LOCAL ANESTHETIC INJECTED: Xylocaine 2%     SEDATION: Versed 2mg and Fentanyl 50mcg                                                                                                                                                                                     Conscious sedation ordered by M.BRANDON. Patient re-evaluation prior to administration of conscious sedation. No changes noted in patient's status from initial evaluation. The patient's vital signs were monitored by RN and patient remained hemodynamically stable throughout the procedure.    Event Time In   Sedation Start 1136   Sedation End 1145       ESTIMATED BLOOD LOSS: None    COMPLICATIONS: None    TECHNIQUE: Time-out was performed to identify the patient and procedure to be performed. With the patient laying in a prone position, the surgical area was prepped and draped in the usual sterile fashion using ChloraPrep and a fenestrated drape.The levels were determined under fluoroscopy guidance. Skin anesthesia was achieved by injecting Lidocaine 2% over the injection sites. The transforaminal spaces  were then approached with a 25 gauge, 3.5 inch spinal quinke needle that was introduced under fluoroscopic guidance in the AP and Lateral views. Once the needle tip was in the area of the transforaminal space, and there was no blood, CSF or paraesthesias, contrast dye Omnipaque (240mg/mL) was injected to confirm placement and there was no vascular runoff. Fluoroscopic imaging in the AP and lateral views revealed a clear outline of the spinal nerve with proximal spread of agent through the neural foramen into the epidural space. 3 mL of the medication mixture listed above was injected slowly at each site. Displacement of the radio opaque contrast after injection of the medication confirmed that the medication went into the area of the transforaminal spaces. The needles were removed and bleeding was nil. A sterile dressing was applied. No specimens collected. The patient tolerated the procedure well.       The patient was monitored after the procedure in the recovery area. They were given post-procedure and discharge instructions to follow at home. The patient was discharged in a stable condition.      Loreta Brito MD

## 2022-11-29 NOTE — PROGRESS NOTES
Subjective:      Patient ID: Tanesha Chamberlain is a 56 y.o. female.    Chief Complaint: Low-back Pain    Referred by: Starla Mcclellan NP     Initial HPI (11/29/2022):  Tanesha Chamberlain is a 56 y.o. female presenting as referral from Spine Surgery/Dr. Cruz for evaluation of back pain and possible injections. Patient arrived today under the impression she was here for a procedure, not an evaluation. She has previously had a  She states she has constant back pain which has been present for approximately the past year. Her pain is located in her lower back and radiates down her left leg to the level of the knee. Pain is exacerbated by prolonged sitting, standing, walking, and bending over.        Interventional Pain History  N/A    Past Medical History:   Diagnosis Date    ADHD (attention deficit hyperactivity disorder)     Arthritis     Crohn's disease     Diabetes mellitus, type 2     Hypertension     Hypothyroidism     Lumbar back pain with radiculopathy affecting left lower extremity     SBO (small bowel obstruction)        Past Surgical History:   Procedure Laterality Date    ANTERIOR CRUCIATE LIGAMENT REPAIR Right     APPENDECTOMY      1988    BUNIONECTOMY Bilateral     CHOLECYSTECTOMY      2005    EXCISION OF MASS OF BACK Left 9/21/2022    Procedure: EXCISION, MASS, BACK;  Surgeon: Sae Collins III, MD;  Location: [facility];  Service: General;  Laterality: Left;    HYSTERECTOMY      MINIMALLY INVASIVE SURGICAL REMOVAL OF INTERVERTEBRAL DISC OF SPINE USING MICROSCOPE Left 05/11/2021    Procedure: Left L4-5 Far Lateral Disectomy, MIS;  Surgeon: Johnny Cruz MD;  Location: [address];  Service: Neurosurgery;  Laterality: Left;    SHOULDER SURGERY Right     SPINE SURGERY      TRANSFORAMINAL EPIDURAL INJECTION OF STEROID Left 03/10/2021    Procedure: INJECTION, STEROID, EPIDURAL, TRANSFORAMINAL APPROACH  L4-5;  Surgeon: Jaylon Tyson MD;  Location: [facility];  Service: Pain Management;   "Laterality: Left;  consent needed  ([facility])       Review of patient's allergies indicates:   Allergen Reactions    Nsaids (non-steroidal anti-inflammatory drug) Other (See Comments)     GI upset  GI upset      Ibuprofen Other (See Comments)     Other reaction(s): crohns flair up  Other reaction(s): crohns flair up       No current facility-administered medications for this visit.     No current outpatient medications on file.     Facility-Administered Medications Ordered in Other Visits   Medication Dose Route Frequency Provider Last Rate Last Admin    0.9%  NaCl infusion  500 mL Intravenous Continuous Francesco Jameson MD           Family History   Problem Relation Age of Onset    Cirrhosis Neg Hx        Social History     Socioeconomic History    Marital status:    Occupational History    Occupation: Disabled     Comment: Previous occupation:    Tobacco Use    Smoking status: Former     Types: Cigarettes     Quit date: 2020     Years since quittin.9    Smokeless tobacco: Former    Tobacco comments:     in the 8 th grade   Substance and Sexual Activity    Alcohol use: Yes     Alcohol/week: 4.0 standard drinks     Types: 4 Cans of beer per week     Comment: 1 beer , once in 2 weeks    Drug use: No    Sexual activity: Yes     Partners: Female     Comment: Never had pregnant            Review of Systems   Constitutional: Negative for chills and fever.   Cardiovascular:  Negative for chest pain and dyspnea on exertion.   Musculoskeletal:  Positive for back pain and myalgias. Negative for falls.   Gastrointestinal:  Negative for abdominal pain.   Neurological:  Positive for paresthesias. Negative for focal weakness and light-headedness.   Psychiatric/Behavioral:  Negative for altered mental status.          Objective:   /80   Pulse 80   Temp 97.7 °F (36.5 °C)   Resp 18   Ht 5' 5" (1.651 m)   Wt 79 kg (174 lb 2.6 oz)   BMI 28.98 kg/m²   Pain Disability Index " Review:  Last 3 PDI Scores 2022   Pain Disability Index (PDI) 35     Normocephalic.  Atraumatic.  Affect appropriate.  Breathing unlabored.  Extra ocular muscles intact.               General Musculoskeletal Exam   Gait: antalgic     Back (L-Spine & T-Spine) / Neck (C-Spine) Exam     Back (L-Spine & T-Spine) Tests   Right Side Tests  Straight leg raise:          Supine SLR: > 70 degrees  Femoral Stretch: negative  Left Side Tests  Straight leg raise:         Supine SLR:  60 degrees    Other   Spinal Kyphosis:  present      Muscle Strength   Right Lower Extremity   Hip Flexion: 5/5   Hip Extensors: 5/5  Quadriceps:  5/5   Hamstrin/5   Anterior tibial:  5/5   Gastrocsoleus:  5/5   Left Lower Extremity   Hip Flexion: 5/5   Hip Extensors: 5/5  Quadriceps:  5/5   Hamstrin/5   Anterior tibial:  5/5   Gastrocsoleus:  5/5     Reflexes     Left Side  Achilles:  2+  Ankle Clonus:  absent  Quadriceps:  1+    Right Side   Achilles:  2+  Ankle Clonus:  absent  Quadriceps:  2+    We reviewed MRI images with patient and there is L L4/5 NF narrowing most likely the reason for the pain  Assessment:       Encounter Diagnoses   Name Primary?    S/P laminectomy     Lumbar radiculopathy     Osteoarthritis of spine with radiculopathy, lumbar region Yes    Postlaminectomy syndrome of lumbar region          Plan:   We discussed with the patient the assessment and recommendations. The following is the plan we agreed on:  Spine imaging reviewed and discussed with patient today.  Patient has completed at least 6 weeks of physical therapy and continues to participate in structured home exercise program  Will schedule for medically urgent left L4/5 TF CONSTANTINO   Patient can follow up in 2 weeks after procedure        Tanesha was seen today for low-back pain.    Diagnoses and all orders for this visit:    Osteoarthritis of spine with radiculopathy, lumbar region  -     Procedure Order to Pain Management; Future    S/P laminectomy  -      Ambulatory referral/consult to Pain Clinic    Lumbar radiculopathy  -     Ambulatory referral/consult to Pain Clinic    Postlaminectomy syndrome of lumbar region     Sandro Boland MD  Anesthesiology, PGY-3  [facility]      I have personally taken the history and examined this patient and agree with the resident's note as stated above.

## 2022-12-01 DIAGNOSIS — E11.9 TYPE 2 DIABETES MELLITUS WITHOUT COMPLICATION, UNSPECIFIED WHETHER LONG TERM INSULIN USE: ICD-10-CM

## 2022-12-05 ENCOUNTER — PATIENT MESSAGE (OUTPATIENT)
Dept: ADMINISTRATIVE | Facility: HOSPITAL | Age: 57
End: 2022-12-05
Payer: COMMERCIAL

## 2022-12-06 RX ORDER — HYDROCHLOROTHIAZIDE 25 MG/1
25 TABLET ORAL DAILY
Qty: 90 TABLET | Refills: 1 | OUTPATIENT
Start: 2022-12-06

## 2022-12-06 RX ORDER — ESTRADIOL 2 MG/1
2 TABLET ORAL DAILY
Qty: 90 TABLET | Refills: 1 | OUTPATIENT
Start: 2022-12-06

## 2022-12-09 ENCOUNTER — PATIENT MESSAGE (OUTPATIENT)
Dept: FAMILY MEDICINE | Facility: CLINIC | Age: 57
End: 2022-12-09
Payer: COMMERCIAL

## 2022-12-13 DIAGNOSIS — E11.65 TYPE 2 DIABETES MELLITUS WITH HYPERGLYCEMIA, WITHOUT LONG-TERM CURRENT USE OF INSULIN: ICD-10-CM

## 2022-12-13 RX ORDER — DULAGLUTIDE 0.75 MG/.5ML
0.75 INJECTION, SOLUTION SUBCUTANEOUS
Qty: 4 PEN | Refills: 2 | Status: SHIPPED | OUTPATIENT
Start: 2022-12-13 | End: 2023-02-09 | Stop reason: SDUPTHER

## 2022-12-23 ENCOUNTER — PATIENT MESSAGE (OUTPATIENT)
Dept: FAMILY MEDICINE | Facility: CLINIC | Age: 57
End: 2022-12-23
Payer: COMMERCIAL

## 2022-12-23 NOTE — TELEPHONE ENCOUNTER
I hope you feel better. You can continue to treat your symptoms as you stated.  Be sure to quarantine for 5 full days from onset of symptoms.  If you need anything let me know. Hope you have a Merry [place] and a Happy New year.

## 2023-02-01 DIAGNOSIS — Z79.890 HORMONE REPLACEMENT THERAPY (HRT): ICD-10-CM

## 2023-02-01 DIAGNOSIS — I10 ESSENTIAL HYPERTENSION: ICD-10-CM

## 2023-02-01 DIAGNOSIS — E78.5 HYPERLIPIDEMIA, UNSPECIFIED HYPERLIPIDEMIA TYPE: Primary | ICD-10-CM

## 2023-02-01 DIAGNOSIS — E11.65 TYPE 2 DIABETES MELLITUS WITH HYPERGLYCEMIA, WITHOUT LONG-TERM CURRENT USE OF INSULIN: ICD-10-CM

## 2023-02-08 ENCOUNTER — PATIENT MESSAGE (OUTPATIENT)
Dept: FAMILY MEDICINE | Facility: CLINIC | Age: 58
End: 2023-02-08
Payer: COMMERCIAL

## 2023-02-09 ENCOUNTER — PATIENT MESSAGE (OUTPATIENT)
Dept: FAMILY MEDICINE | Facility: CLINIC | Age: 58
End: 2023-02-09
Payer: COMMERCIAL

## 2023-02-09 DIAGNOSIS — E11.65 TYPE 2 DIABETES MELLITUS WITH HYPERGLYCEMIA, WITHOUT LONG-TERM CURRENT USE OF INSULIN: ICD-10-CM

## 2023-02-09 RX ORDER — DULAGLUTIDE 0.75 MG/.5ML
0.75 INJECTION, SOLUTION SUBCUTANEOUS
Qty: 4 PEN | Refills: 2 | OUTPATIENT
Start: 2023-02-09 | End: 2023-05-10

## 2023-02-09 RX ORDER — PEN NEEDLE, DIABETIC 31 GX5/16"
1 NEEDLE, DISPOSABLE MISCELLANEOUS DAILY
Qty: 100 EACH | Refills: 1 | Status: SHIPPED | OUTPATIENT
Start: 2023-02-09 | End: 2023-12-21 | Stop reason: SDUPTHER

## 2023-02-09 RX ORDER — ATORVASTATIN CALCIUM 10 MG/1
10 TABLET, FILM COATED ORAL DAILY
Qty: 90 TABLET | Refills: 1 | Status: SHIPPED | OUTPATIENT
Start: 2023-02-09 | End: 2023-03-09 | Stop reason: SDUPTHER

## 2023-02-09 RX ORDER — HYDROCHLOROTHIAZIDE 25 MG/1
TABLET ORAL
Qty: 90 TABLET | Refills: 1 | Status: SHIPPED | OUTPATIENT
Start: 2023-02-09 | End: 2023-05-16 | Stop reason: SDUPTHER

## 2023-02-09 RX ORDER — DULAGLUTIDE 0.75 MG/.5ML
0.75 INJECTION, SOLUTION SUBCUTANEOUS
Qty: 4 PEN | Refills: 2 | Status: SHIPPED | OUTPATIENT
Start: 2023-02-09 | End: 2023-04-04 | Stop reason: DRUGHIGH

## 2023-02-09 RX ORDER — ESTRADIOL 2 MG/1
TABLET ORAL
Qty: 90 TABLET | Refills: 1 | Status: SHIPPED | OUTPATIENT
Start: 2023-02-09 | End: 2023-05-16 | Stop reason: SDUPTHER

## 2023-02-10 ENCOUNTER — HOSPITAL ENCOUNTER (OUTPATIENT)
Dept: RADIOLOGY | Facility: HOSPITAL | Age: 58
Discharge: HOME | End: 2023-02-10
Payer: COMMERCIAL

## 2023-02-10 ENCOUNTER — OFFICE VISIT (OUTPATIENT)
Dept: FAMILY MEDICINE | Facility: CLINIC | Age: 58
End: 2023-02-10
Payer: COMMERCIAL

## 2023-02-10 VITALS
HEART RATE: 78 BPM | WEIGHT: 181 LBS | HEIGHT: 64 IN | OXYGEN SATURATION: 98 % | BODY MASS INDEX: 30.9 KG/M2 | TEMPERATURE: 99 F | DIASTOLIC BLOOD PRESSURE: 86 MMHG | SYSTOLIC BLOOD PRESSURE: 138 MMHG

## 2023-02-10 DIAGNOSIS — E78.1 HYPERTRIGLYCERIDEMIA: ICD-10-CM

## 2023-02-10 DIAGNOSIS — I10 ESSENTIAL HYPERTENSION: ICD-10-CM

## 2023-02-10 DIAGNOSIS — Z79.890 HORMONE REPLACEMENT THERAPY (HRT): ICD-10-CM

## 2023-02-10 DIAGNOSIS — M51.16 LUMBAR DISC HERNIATION WITH RADICULOPATHY: ICD-10-CM

## 2023-02-10 DIAGNOSIS — M79.641 RIGHT HAND PAIN: Primary | ICD-10-CM

## 2023-02-10 DIAGNOSIS — E11.65 TYPE 2 DIABETES MELLITUS WITH HYPERGLYCEMIA, WITHOUT LONG-TERM CURRENT USE OF INSULIN: ICD-10-CM

## 2023-02-10 DIAGNOSIS — M79.641 RIGHT HAND PAIN: ICD-10-CM

## 2023-02-10 DIAGNOSIS — K50.919 CROHN'S DISEASE WITH COMPLICATION, UNSPECIFIED GASTROINTESTINAL TRACT LOCATION: ICD-10-CM

## 2023-02-10 PROCEDURE — 90471 ZOSTER RECOMBINANT VACCINE: ICD-10-PCS | Mod: S$GLB,,,

## 2023-02-10 PROCEDURE — 90750 ZOSTER RECOMBINANT VACCINE: ICD-10-PCS | Mod: S$GLB,,,

## 2023-02-10 PROCEDURE — 90750 HZV VACC RECOMBINANT IM: CPT | Mod: S$GLB,,,

## 2023-02-10 PROCEDURE — 73130 X-RAY EXAM OF HAND: CPT | Mod: TC,RT

## 2023-02-10 PROCEDURE — 99214 PR OFFICE/OUTPT VISIT, EST, LEVL IV, 30-39 MIN: ICD-10-PCS | Mod: 25,S$GLB,,

## 2023-02-10 PROCEDURE — 90471 IMMUNIZATION ADMIN: CPT | Mod: S$GLB,,,

## 2023-02-10 PROCEDURE — 99214 OFFICE O/P EST MOD 30 MIN: CPT | Mod: 25,S$GLB,,

## 2023-02-10 RX ORDER — TIZANIDINE 4 MG/1
4 TABLET ORAL 4 TIMES DAILY PRN
COMMUNITY
Start: 2022-12-08

## 2023-02-10 NOTE — PROGRESS NOTES
Subjective:       Patient ID: Tanesha Chamberlain is a 57 y.o. female.    Chief Complaint: Follow-up (Rx refills , ck rt hand)    Patient presents to clinic for follow up.  Complains of right hand pain at base of thumb.  Started back in November. She was fishing and caught a big fish that took a long time to reel in and had trouble using hand for about a week after due to pain and weakness.  This improved but never completely resolved. Now she is able to use her hand but if she puts any pressure on a certain  area it has very bad pain.  Area under right thumb and on palm also swells at times.   No numbness of tingling. History of DDD with chronic pain and is followed by pain management in [place].  Will be having an ablation in [place] soon for back pain. Type 2 DM: blood sugars at home 120s-130s.  Last HA1C: dejuan at 8.4. Hyperlipidemia: last lipids: TC: 170, Tri, HDL: 54, LDL: 82.6. Hypertension, doing well, no chest pain or shortness of breath.  Chrohns disease: no problems, no abdominal pain, nausea, or diarrhea.  BMI 31.07      Review of patient's allergies indicates:   Allergen Reactions    Nsaids (non-steroidal anti-inflammatory drug) Other (See Comments)     GI upset  GI upset      Ibuprofen Other (See Comments)     Other reaction(s): crohns flair up  Other reaction(s): crohns flair up     Social Determinants of Health     Tobacco Use: Medium Risk    Smoking Tobacco Use: Former    Smokeless Tobacco Use: Former    Passive Exposure: Not on file   Alcohol Use: Not on file   Financial Resource Strain: Not on file   Food Insecurity: Not on file   Transportation Needs: Not on file   Physical Activity: Not on file   Stress: Not on file   Social Connections: Not on file   Housing Stability: Not on file   Depression: Low Risk     Last PHQ Score: 0      Past Medical History:   Diagnosis Date    ADHD (attention deficit hyperactivity disorder)     Arthritis     Crohn's disease     Diabetes mellitus, type 2      "Hypertension     Hypothyroidism     Lumbar back pain with radiculopathy affecting left lower extremity     SBO (small bowel obstruction)       Past Surgical History:   Procedure Laterality Date    ANTERIOR CRUCIATE LIGAMENT REPAIR Right     APPENDECTOMY      1988    BUNIONECTOMY Bilateral     CHOLECYSTECTOMY      2005    EXCISION OF MASS OF BACK Left 9/21/2022    Procedure: EXCISION, MASS, BACK;  Surgeon: Sae Collins III, MD;  Location[facility];  Service: General;  Laterality: Left;    HYSTERECTOMY      MINIMALLY INVASIVE SURGICAL REMOVAL OF INTERVERTEBRAL DISC OF SPINE USING MICROSCOPE Left 05/11/2021    Procedure: Left L4-5 Far Lateral Disectomy, MIS;  Surgeon: Johnny Cruz MD;  [address];  Service: Neurosurgery;  Laterality: Left;    SHOULDER SURGERY Right     SPINE SURGERY      TRANSFORAMINAL EPIDURAL INJECTION OF STEROID Left 03/10/2021    Procedure: INJECTION, STEROID, EPIDURAL, TRANSFORAMINAL APPROACH  L4-5;  Surgeon: Jaylon Tyson MD;  Location: [facility];  Service: Pain Management;  Laterality: Left;  consent needed  ([facility])    TRANSFORAMINAL EPIDURAL INJECTION OF STEROID Left 11/29/2022    Procedure: LUMBAR TRANSFORAMINAL LEFT L4/5 MEDICALLY URGENT;  Surgeon: Loreta Brito MD;  Location: [facility];  Service: Pain Management;  Laterality: Left;      Social History     Socioeconomic History    Marital status:          Current Outpatient Medications:     atorvastatin (LIPITOR) 10 MG tablet, Take 1 tablet (10 mg total) by mouth once daily., Disp: 90 tablet, Rfl: 1    BD ULTRA-FINE SHORT PEN NEEDLE 31 gauge x 5/16" Ndle, 1 each by Misc.(Non-Drug; Combo Route) route once daily., Disp: 100 each, Rfl: 1    blood sugar diagnostic (ONETOUCH ULTRA BLUE TEST STRIP) Strp, Check blood glucose readings twice a day-- before meal and 1 to 2 hours post meal, Disp: 100 strip, Rfl: 0    blood-glucose meter Misc, Check blood glucose readings twice a day-- before meal and " 1 to 2 hours post meal, Disp: 1 each, Rfl: 0    dulaglutide (TRULICITY) 0.75 mg/0.5 mL pen injector, Inject 0.75 mg into the skin every 7 days. THURSDAYS, Disp: 4 pen, Rfl: 2    estradioL (ESTRACE) 2 MG tablet, TAKE 1 TABLET BY MOUTH ONCE DAILY., Disp: 90 tablet, Rfl: 1    fentaNYL (DURAGESIC) 25 mcg/hr, Place 1 patch onto the skin Every 3 (three) days., Disp: , Rfl:     hydroCHLOROthiazide (HYDRODIURIL) 25 MG tablet, TAKE 1 TABLET BY MOUTH EVERY DAY, Disp: 90 tablet, Rfl: 1    HYDROcodone-acetaminophen (NORCO)  mg per tablet, Take 1-2 tablets by mouth every 8 (eight) hours., Disp: , Rfl:     insulin degludec (TRESIBA FLEXTOUCH U-100) 100 unit/mL (3 mL) insulin pen, Inject 16 Units into the skin once daily., Disp: 2 pen, Rfl: 2    lancets 33 gauge Misc, Check blood glucose readings twice a day-- before meal and 1 to 2 hours post meal, Disp: 100 each, Rfl: 0    tiZANidine (ZANAFLEX) 4 MG tablet, Take 4 mg by mouth 4 (four) times daily as needed., Disp: , Rfl:     Lab Results   Component Value Date    WBC 8.44 09/19/2022    HGB 15.2 09/19/2022    HCT 45.0 09/19/2022     09/19/2022    CHOL 170 04/14/2021    TRIG 167 (H) 04/14/2021    HDL 54 04/14/2021    ALT 41 08/18/2022    AST 35 08/18/2022     09/19/2022    K 3.4 (L) 09/19/2022    CL 99 09/19/2022    CREATININE 0.7 09/19/2022    BUN 9 09/19/2022    CO2 31 (H) 09/19/2022    TSH 0.380 08/19/2022    INR 1.0 05/04/2021    HGBA1C 8.4 (H) 08/19/2022    MICROALBUR 1.6 08/11/2022       Review of Systems   Constitutional:  Negative for chills and fever.   Respiratory:  Negative for shortness of breath and wheezing.    Cardiovascular:  Negative for chest pain, palpitations and leg swelling.     Objective:      Physical Exam  Vitals reviewed.   Constitutional:       Appearance: Normal appearance.   Cardiovascular:      Rate and Rhythm: Normal rate and regular rhythm.      Pulses: Normal pulses.           Radial pulses are 2+ on the right side and 2+ on the  left side.        Posterior tibial pulses are 2+ on the right side and 2+ on the left side.      Heart sounds: Normal heart sounds, S1 normal and S2 normal.   Pulmonary:      Effort: Pulmonary effort is normal.      Breath sounds: Normal breath sounds.   Musculoskeletal:         General: Tenderness present. Normal range of motion.      Right hand: Tenderness present. No swelling. Normal range of motion. Normal strength. Normal sensation.      Left hand: Normal.      Right lower leg: No edema.      Left lower leg: No edema.   Skin:     General: Skin is warm and dry.      Capillary Refill: Capillary refill takes less than 2 seconds.   Neurological:      Mental Status: She is alert and oriented to person, place, and time.   Psychiatric:         Mood and Affect: Mood normal.         Behavior: Behavior normal.         Thought Content: Thought content normal.         Judgment: Judgment normal.       Assessment:       1. Right hand pain    2. Lumbar disc herniation with radiculopathy    3. Essential hypertension    4. Type 2 diabetes mellitus with hyperglycemia, without long-term current use of insulin    5. Hypertriglyceridemia    6. Hormone replacement therapy (HRT)    7. Crohn's disease with complication, unspecified gastrointestinal tract location    8. BMI 31.0-31.9,adult        Plan:       Tanesha was seen today for follow-up.    Diagnoses and all orders for this visit:    Right hand pain  -     X-Ray Hand 3 View Right; Future  -     Ambulatory referral/consult to Orthopedics; Future    Lumbar disc herniation with radiculopathy    Essential hypertension    Type 2 diabetes mellitus with hyperglycemia, without long-term current use of insulin    Hypertriglyceridemia    Hormone replacement therapy (HRT)    Crohn's disease with complication, unspecified gastrointestinal tract location    BMI 31.0-31.9,adult    Other orders  -     (In Office Administered) Zoster Recombinant Vaccine    Xray of right hand shows no  fractures. Will refer to Orthopedics for evaluation.    Patient overdue for labs that were previously ordered. She will have them done next week.  Type 2 DM: uncontrolled with HA1C of 8.1.  Will repeat with labs.  Due for second Shingles vaccine. Will do today.  Refills were sent yesterday.  No med changes at this time but will evaluate labs and will adjust if needed.   Routine follow up in 3-6 months

## 2023-02-23 ENCOUNTER — LAB VISIT (OUTPATIENT)
Dept: LAB | Facility: HOSPITAL | Age: 58
End: 2023-02-23
Payer: COMMERCIAL

## 2023-02-23 DIAGNOSIS — K50.919 CROHN'S DISEASE WITH COMPLICATION, UNSPECIFIED GASTROINTESTINAL TRACT LOCATION: ICD-10-CM

## 2023-02-23 DIAGNOSIS — E78.1 HYPERTRIGLYCERIDEMIA: ICD-10-CM

## 2023-02-23 DIAGNOSIS — I10 ESSENTIAL HYPERTENSION: ICD-10-CM

## 2023-02-23 DIAGNOSIS — Z00.00 ANNUAL PHYSICAL EXAM: ICD-10-CM

## 2023-02-23 DIAGNOSIS — E89.0 H/O PARTIAL THYROIDECTOMY: ICD-10-CM

## 2023-02-23 DIAGNOSIS — E11.65 TYPE 2 DIABETES MELLITUS WITH HYPERGLYCEMIA, WITHOUT LONG-TERM CURRENT USE OF INSULIN: ICD-10-CM

## 2023-02-23 LAB
ALBUMIN SERPL BCP-MCNC: 4.3 G/DL (ref 3.5–5.2)
ALP SERPL-CCNC: 45 U/L (ref 55–135)
ALT SERPL W/O P-5'-P-CCNC: 34 U/L (ref 10–44)
ANION GAP SERPL CALC-SCNC: 14 MMOL/L (ref 8–16)
AST SERPL-CCNC: 36 U/L (ref 10–40)
BASOPHILS NFR BLD: 0 % (ref 0–1.9)
BILIRUB SERPL-MCNC: 0.4 MG/DL (ref 0.1–1)
BUN SERPL-MCNC: 14 MG/DL (ref 6–20)
CALCIUM SERPL-MCNC: 9.6 MG/DL (ref 8.7–10.5)
CHLORIDE SERPL-SCNC: 96 MMOL/L (ref 95–110)
CHOLEST SERPL-MCNC: 112 MG/DL (ref 120–199)
CHOLEST/HDLC SERPL: 2.2 {RATIO} (ref 2–5)
CO2 SERPL-SCNC: 27 MMOL/L (ref 23–29)
CREAT SERPL-MCNC: 0.6 MG/DL (ref 0.5–1.4)
DIFFERENTIAL METHOD: NORMAL
EOSINOPHIL NFR BLD: 4 % (ref 0–8)
ERYTHROCYTE [DISTWIDTH] IN BLOOD BY AUTOMATED COUNT: 12.1 % (ref 11.5–14.5)
EST. GFR  (NO RACE VARIABLE): >60 ML/MIN/1.73 M^2
ESTIMATED AVG GLUCOSE: 183 MG/DL (ref 68–131)
GLUCOSE SERPL-MCNC: 141 MG/DL (ref 70–110)
HBA1C MFR BLD: 8 % (ref 4.5–6.2)
HCT VFR BLD AUTO: 46.7 % (ref 37–48.5)
HDLC SERPL-MCNC: 50 MG/DL (ref 40–75)
HDLC SERPL: 44.6 % (ref 20–50)
HGB BLD-MCNC: 15.6 G/DL (ref 12–16)
IMM GRANULOCYTES # BLD AUTO: NORMAL K/UL (ref 0–0.04)
IMM GRANULOCYTES NFR BLD AUTO: NORMAL % (ref 0–0.5)
LDLC SERPL CALC-MCNC: 41.6 MG/DL (ref 63–159)
LYMPHOCYTES NFR BLD: 39 % (ref 18–48)
MCH RBC QN AUTO: 29.7 PG (ref 27–31)
MCHC RBC AUTO-ENTMCNC: 33.4 G/DL (ref 32–36)
MCV RBC AUTO: 89 FL (ref 82–98)
MONOCYTES NFR BLD: 6 % (ref 4–15)
NEUTROPHILS NFR BLD: 50 % (ref 38–73)
NEUTS BAND NFR BLD MANUAL: 1 %
NONHDLC SERPL-MCNC: 62 MG/DL
NRBC BLD-RTO: 0 /100 WBC
PLATELET # BLD AUTO: 425 K/UL (ref 150–450)
PMV BLD AUTO: 9.2 FL (ref 9.2–12.9)
POTASSIUM SERPL-SCNC: 3.5 MMOL/L (ref 3.5–5.1)
PROT SERPL-MCNC: 8 G/DL (ref 6–8.4)
RBC # BLD AUTO: 5.25 M/UL (ref 4–5.4)
SODIUM SERPL-SCNC: 137 MMOL/L (ref 136–145)
TRIGL SERPL-MCNC: 102 MG/DL (ref 30–150)
TSH SERPL DL<=0.005 MIU/L-ACNC: 1.57 UIU/ML (ref 0.34–5.6)
WBC # BLD AUTO: 8.12 K/UL (ref 3.9–12.7)

## 2023-02-23 PROCEDURE — 85007 BL SMEAR W/DIFF WBC COUNT: CPT

## 2023-02-23 PROCEDURE — 85027 COMPLETE CBC AUTOMATED: CPT

## 2023-02-23 PROCEDURE — 84443 ASSAY THYROID STIM HORMONE: CPT

## 2023-02-23 PROCEDURE — 80053 COMPREHEN METABOLIC PANEL: CPT

## 2023-02-23 PROCEDURE — 80061 LIPID PANEL: CPT

## 2023-02-23 PROCEDURE — 83036 HEMOGLOBIN GLYCOSYLATED A1C: CPT

## 2023-02-23 PROCEDURE — 86803 HEPATITIS C AB TEST: CPT

## 2023-02-23 PROCEDURE — 36415 COLL VENOUS BLD VENIPUNCTURE: CPT

## 2023-02-24 LAB — HCV AB S/CO SERPL IA: NON REACTIVE

## 2023-03-01 ENCOUNTER — PATIENT MESSAGE (OUTPATIENT)
Dept: FAMILY MEDICINE | Facility: CLINIC | Age: 58
End: 2023-03-01
Payer: COMMERCIAL

## 2023-03-02 DIAGNOSIS — E11.65 TYPE 2 DIABETES MELLITUS WITH HYPERGLYCEMIA, WITHOUT LONG-TERM CURRENT USE OF INSULIN: ICD-10-CM

## 2023-03-02 RX ORDER — DULAGLUTIDE 0.75 MG/.5ML
0.75 INJECTION, SOLUTION SUBCUTANEOUS
Qty: 4 PEN | Refills: 2 | OUTPATIENT
Start: 2023-03-02 | End: 2023-05-31

## 2023-03-03 ENCOUNTER — TELEPHONE (OUTPATIENT)
Dept: FAMILY MEDICINE | Facility: CLINIC | Age: 58
End: 2023-03-03
Payer: COMMERCIAL

## 2023-03-03 NOTE — TELEPHONE ENCOUNTER
----- Message from Sharonda Chirinos sent at 3/3/2023 12:30 PM CST -----  Contact: pt  Type:  Needs Medical Advice    Who Called: pt  Would the patient rather a call back or a response via [facility]? call  Best Call Back Number: [phone] (home)     Additional Information: States that pharmacy need prior auth on daily insulin. Please advise thank you

## 2023-03-08 ENCOUNTER — TELEPHONE (OUTPATIENT)
Dept: FAMILY MEDICINE | Facility: CLINIC | Age: 58
End: 2023-03-08
Payer: COMMERCIAL

## 2023-03-09 ENCOUNTER — OFFICE VISIT (OUTPATIENT)
Dept: FAMILY MEDICINE | Facility: CLINIC | Age: 58
End: 2023-03-09
Payer: COMMERCIAL

## 2023-03-09 VITALS
HEART RATE: 96 BPM | WEIGHT: 182 LBS | DIASTOLIC BLOOD PRESSURE: 86 MMHG | HEIGHT: 64 IN | SYSTOLIC BLOOD PRESSURE: 132 MMHG | BODY MASS INDEX: 31.07 KG/M2 | TEMPERATURE: 98 F | OXYGEN SATURATION: 98 %

## 2023-03-09 DIAGNOSIS — E78.5 HYPERLIPIDEMIA, UNSPECIFIED HYPERLIPIDEMIA TYPE: ICD-10-CM

## 2023-03-09 DIAGNOSIS — E11.65 TYPE 2 DIABETES MELLITUS WITH HYPERGLYCEMIA, WITHOUT LONG-TERM CURRENT USE OF INSULIN: ICD-10-CM

## 2023-03-09 PROCEDURE — 99214 PR OFFICE/OUTPT VISIT, EST, LEVL IV, 30-39 MIN: ICD-10-PCS | Mod: S$GLB,,,

## 2023-03-09 PROCEDURE — 99214 OFFICE O/P EST MOD 30 MIN: CPT | Mod: S$GLB,,,

## 2023-03-09 RX ORDER — DULAGLUTIDE 1.5 MG/.5ML
1.5 INJECTION, SOLUTION SUBCUTANEOUS
Qty: 4 PEN | Refills: 5 | Status: SHIPPED | OUTPATIENT
Start: 2023-03-09 | End: 2023-04-04 | Stop reason: SDUPTHER

## 2023-03-09 RX ORDER — INSULIN DEGLUDEC 100 U/ML
INJECTION, SOLUTION SUBCUTANEOUS
COMMUNITY
Start: 2023-03-04 | End: 2023-05-16 | Stop reason: SDUPTHER

## 2023-03-09 RX ORDER — ATORVASTATIN CALCIUM 10 MG/1
10 TABLET, FILM COATED ORAL DAILY
Qty: 90 TABLET | Refills: 1 | Status: SHIPPED | OUTPATIENT
Start: 2023-03-09 | End: 2023-05-16 | Stop reason: SDUPTHER

## 2023-03-10 NOTE — PROGRESS NOTES
Subjective:       Patient ID: Tanesha Chamberlain is a 57 y.o. female.    Chief Complaint: Follow-up (Bw res discuss med. Also throat feels inflamed pollen and home covid test neg today)    Patient presents to clinic to follow up from recently lab work.  History of type 2 diabetes and HA1C was high at 8.0.  she is taking Tresiba and Trulicity. She does not watch her diet. She is also complaining of a sore throat.  No fever or chills. Thinks it could possibly be sinus related.         Review of patient's allergies indicates:   Allergen Reactions    Nsaids (non-steroidal anti-inflammatory drug) Other (See Comments)     GI upset  GI upset      Ibuprofen Other (See Comments)     Other reaction(s): crohns flair up  Other reaction(s): crohns flair up     Social Determinants of Health     Tobacco Use: Medium Risk    Smoking Tobacco Use: Former    Smokeless Tobacco Use: Former    Passive Exposure: Not on file   Alcohol Use: Not on file   Financial Resource Strain: Not on file   Food Insecurity: Not on file   Transportation Needs: Not on file   Physical Activity: Not on file   Stress: Not on file   Social Connections: Not on file   Housing Stability: Not on file   Depression: Low Risk     Last PHQ Score: 0      Past Medical History:   Diagnosis Date    ADHD (attention deficit hyperactivity disorder)     Arthritis     Crohn's disease     Diabetes mellitus, type 2     Hypertension     Hypothyroidism     Lumbar back pain with radiculopathy affecting left lower extremity     SBO (small bowel obstruction)       Past Surgical History:   Procedure Laterality Date    ANTERIOR CRUCIATE LIGAMENT REPAIR Right     APPENDECTOMY      1988    BUNIONECTOMY Bilateral     CHOLECYSTECTOMY      2005    EXCISION OF MASS OF BACK Left 9/21/2022    Procedure: EXCISION, MASS, BACK;  Surgeon: Sae Collins III, MD;  Location: [facility];  Service: General;  Laterality: Left;    HYSTERECTOMY      MINIMALLY INVASIVE SURGICAL REMOVAL OF INTERVERTEBRAL  "DISC OF SPINE USING MICROSCOPE Left 05/11/2021    Procedure: Left L4-5 Far Lateral Disectomy, MIS;  Surgeon: Johnny Cruz MD;  Location: [facility] OR [address] [place] [address];  Service: Neurosurgery;  Laterality: Left;    SHOULDER SURGERY Right     SPINE SURGERY      TRANSFORAMINAL EPIDURAL INJECTION OF STEROID Left 03/10/2021    Procedure: INJECTION, STEROID, EPIDURAL, TRANSFORAMINAL APPROACH  L4-5;  Surgeon: Jaylon Tyson MD;  Location: [facility];  Service: Pain Management;  Laterality: Left;  consent needed  ([employer] [facility])    TRANSFORAMINAL EPIDURAL INJECTION OF STEROID Left 11/29/2022    Procedure: LUMBAR TRANSFORAMINAL LEFT L4/5 MEDICALLY URGENT;  Surgeon: Loreta Brito MD;  Location: [facility];  Service: Pain Management;  Laterality: Left;      Social History     Socioeconomic History    Marital status:          Current Outpatient Medications:     BD ULTRA-FINE SHORT PEN NEEDLE 31 gauge x 5/16" Ndle, 1 each by Misc.(Non-Drug; Combo Route) route once daily., Disp: 100 each, Rfl: 1    blood sugar diagnostic (ONETOUCH ULTRA BLUE TEST STRIP) Strp, Check blood glucose readings twice a day-- before meal and 1 to 2 hours post meal, Disp: 100 strip, Rfl: 0    blood-glucose meter Misc, Check blood glucose readings twice a day-- before meal and 1 to 2 hours post meal, Disp: 1 each, Rfl: 0    dulaglutide (TRULICITY) 0.75 mg/0.5 mL pen injector, Inject 0.75 mg into the skin every 7 days. THURSDAYS, Disp: 4 pen, Rfl: 2    estradioL (ESTRACE) 2 MG tablet, TAKE 1 TABLET BY MOUTH ONCE DAILY., Disp: 90 tablet, Rfl: 1    fentaNYL (DURAGESIC) 25 mcg/hr, Place 1 patch onto the skin Every 3 (three) days., Disp: , Rfl:     hydroCHLOROthiazide (HYDRODIURIL) 25 MG tablet, TAKE 1 TABLET BY MOUTH EVERY DAY, Disp: 90 tablet, Rfl: 1    HYDROcodone-acetaminophen (NORCO)  mg per tablet, Take 1-2 tablets by mouth every 8 (eight) hours., Disp: , Rfl:     lancets 33 gauge Misc, Check blood glucose readings twice a day-- before meal " and 1 to 2 hours post meal, Disp: 100 each, Rfl: 0    tiZANidine (ZANAFLEX) 4 MG tablet, Take 4 mg by mouth 4 (four) times daily as needed., Disp: , Rfl:     TRESIBA FLEXTOUCH U-100 100 unit/mL (3 mL) insulin pen, SMARTSI Unit(s) SUB-Q Daily, Disp: , Rfl:     atorvastatin (LIPITOR) 10 MG tablet, Take 1 tablet (10 mg total) by mouth once daily., Disp: 90 tablet, Rfl: 1    dulaglutide (TRULICITY) 1.5 mg/0.5 mL pen injector, Inject 1.5 mg into the skin every 7 days., Disp: 4 pen, Rfl: 5    Lab Results   Component Value Date    WBC 8.12 2023    HGB 15.6 2023    HCT 46.7 2023     2023    CHOL 112 (L) 2023    TRIG 102 2023    HDL 50 2023    ALT 34 2023    AST 36 2023     2023    K 3.5 2023    CL 96 2023    CREATININE 0.6 2023    BUN 14 2023    CO2 27 2023    TSH 1.570 2023    INR 1.0 2021    HGBA1C 8.0 (H) 2023    MICROALBUR 1.6 2022       Review of Systems   Constitutional:  Negative for chills and fever.   HENT:  Positive for postnasal drip and sore throat.    Cardiovascular:  Negative for chest pain.     Objective:      Physical Exam  Vitals reviewed.   Constitutional:       Appearance: Normal appearance.   HENT:      Head: Normocephalic and atraumatic.      Nose: Nose normal.      Mouth/Throat:      Mouth: Mucous membranes are moist.      Pharynx: Oropharynx is clear. Posterior oropharyngeal erythema present.   Cardiovascular:      Rate and Rhythm: Normal rate and regular rhythm.      Pulses: Normal pulses.      Heart sounds: Normal heart sounds.   Pulmonary:      Effort: Pulmonary effort is normal.      Breath sounds: Normal breath sounds.   Neurological:      Mental Status: She is alert.       Assessment:       1. Hyperlipidemia, unspecified hyperlipidemia type    2. Type 2 diabetes mellitus with hyperglycemia, without long-term current use of insulin          Plan:       Tanesha was  seen today for follow-up.    Diagnoses and all orders for this visit:    Hyperlipidemia, unspecified hyperlipidemia type  -     atorvastatin (LIPITOR) 10 MG tablet; Take 1 tablet (10 mg total) by mouth once daily.    Type 2 diabetes mellitus with hyperglycemia, without long-term current use of insulin  -     dulaglutide (TRULICITY) 1.5 mg/0.5 mL pen injector; Inject 1.5 mg into the skin every 7 days.  -     Hemoglobin A1C; Future  -     Basic Metabolic Panel; Future  -     CBC Auto Differential; Future     Diabetes is uncontrolled. Will increase Trulicity to 1.5 mg every 7 days. Monitor fasting blood sugars daily.  Discussed appropriate diet including to avoid excess sugar, carbohydrates and fats.  Exercise and weight loss is also beneficial.  Will follow up in 3 months and will recheck labs at that time.

## 2023-03-23 ENCOUNTER — PATIENT MESSAGE (OUTPATIENT)
Dept: ADMINISTRATIVE | Facility: HOSPITAL | Age: 58
End: 2023-03-23
Payer: COMMERCIAL

## 2023-04-03 ENCOUNTER — PATIENT MESSAGE (OUTPATIENT)
Dept: FAMILY MEDICINE | Facility: CLINIC | Age: 58
End: 2023-04-03
Payer: COMMERCIAL

## 2023-04-04 ENCOUNTER — TELEPHONE (OUTPATIENT)
Dept: NEUROSURGERY | Facility: CLINIC | Age: 58
End: 2023-04-04
Payer: COMMERCIAL

## 2023-04-04 DIAGNOSIS — E11.65 TYPE 2 DIABETES MELLITUS WITH HYPERGLYCEMIA, WITHOUT LONG-TERM CURRENT USE OF INSULIN: ICD-10-CM

## 2023-04-04 RX ORDER — DULAGLUTIDE 1.5 MG/.5ML
1.5 INJECTION, SOLUTION SUBCUTANEOUS
Qty: 4 PEN | Refills: 5 | Status: SHIPPED | OUTPATIENT
Start: 2023-04-04 | End: 2024-03-08 | Stop reason: SDUPTHER

## 2023-04-04 RX ORDER — DULAGLUTIDE 0.75 MG/.5ML
0.75 INJECTION, SOLUTION SUBCUTANEOUS
Qty: 4 PEN | Refills: 2 | Status: CANCELLED | OUTPATIENT
Start: 2023-04-04 | End: 2023-07-03

## 2023-04-04 NOTE — TELEPHONE ENCOUNTER
LVM to schedule [facility] [place] screen.    Reva Desai, RN, CCM, CMSRN  RN Navigator  [facility]

## 2023-04-04 NOTE — TELEPHONE ENCOUNTER
No new care gaps identified.  [facility] Embedded Care Gaps. Reference number: [id number]. 4/04/2023   11:31:11 AM LAURENT

## 2023-04-18 ENCOUNTER — OFFICE VISIT (OUTPATIENT)
Dept: DERMATOLOGY | Facility: CLINIC | Age: 58
End: 2023-04-18
Payer: COMMERCIAL

## 2023-04-18 ENCOUNTER — TELEPHONE (OUTPATIENT)
Dept: NEUROSURGERY | Facility: CLINIC | Age: 58
End: 2023-04-18
Payer: COMMERCIAL

## 2023-04-18 DIAGNOSIS — L21.9 SEBORRHEIC DERMATITIS: ICD-10-CM

## 2023-04-18 DIAGNOSIS — D22.9 BENIGN NEVUS: ICD-10-CM

## 2023-04-18 DIAGNOSIS — L81.4 LENTIGO: ICD-10-CM

## 2023-04-18 DIAGNOSIS — L57.8 ACTINIC SKIN DAMAGE: ICD-10-CM

## 2023-04-18 DIAGNOSIS — D18.01 CHERRY ANGIOMA: ICD-10-CM

## 2023-04-18 DIAGNOSIS — L57.0 ACTINIC KERATOSIS: Primary | ICD-10-CM

## 2023-04-18 DIAGNOSIS — D23.9 DERMATOFIBROMA: ICD-10-CM

## 2023-04-18 DIAGNOSIS — L82.1 SEBORRHEIC KERATOSES: ICD-10-CM

## 2023-04-18 PROCEDURE — 17000 PR DESTRUCTION(LASER SURGERY,CRYOSURGERY,CHEMOSURGERY),PREMALIGNANT LESIONS,FIRST LESION: ICD-10-PCS | Mod: S$GLB,,, | Performed by: STUDENT IN AN ORGANIZED HEALTH CARE EDUCATION/TRAINING PROGRAM

## 2023-04-18 PROCEDURE — 99204 OFFICE O/P NEW MOD 45 MIN: CPT | Mod: 25,S$GLB,, | Performed by: STUDENT IN AN ORGANIZED HEALTH CARE EDUCATION/TRAINING PROGRAM

## 2023-04-18 PROCEDURE — 99204 PR OFFICE/OUTPT VISIT, NEW, LEVL IV, 45-59 MIN: ICD-10-PCS | Mod: 25,S$GLB,, | Performed by: STUDENT IN AN ORGANIZED HEALTH CARE EDUCATION/TRAINING PROGRAM

## 2023-04-18 PROCEDURE — 99999 PR PBB SHADOW E&M-EST. PATIENT-LVL II: CPT | Mod: PBBFAC,COE,, | Performed by: STUDENT IN AN ORGANIZED HEALTH CARE EDUCATION/TRAINING PROGRAM

## 2023-04-18 PROCEDURE — 17000 DESTRUCT PREMALG LESION: CPT | Mod: S$GLB,,, | Performed by: STUDENT IN AN ORGANIZED HEALTH CARE EDUCATION/TRAINING PROGRAM

## 2023-04-18 PROCEDURE — 17003 DESTRUCTION, PREMALIGNANT LESIONS; SECOND THROUGH 14 LESIONS: ICD-10-PCS | Mod: S$GLB,,, | Performed by: STUDENT IN AN ORGANIZED HEALTH CARE EDUCATION/TRAINING PROGRAM

## 2023-04-18 PROCEDURE — 17003 DESTRUCT PREMALG LES 2-14: CPT | Mod: S$GLB,,, | Performed by: STUDENT IN AN ORGANIZED HEALTH CARE EDUCATION/TRAINING PROGRAM

## 2023-04-18 PROCEDURE — 99999 PR PBB SHADOW E&M-EST. PATIENT-LVL II: ICD-10-PCS | Mod: PBBFAC,COE,, | Performed by: STUDENT IN AN ORGANIZED HEALTH CARE EDUCATION/TRAINING PROGRAM

## 2023-04-18 RX ORDER — KETOCONAZOLE 20 MG/G
CREAM TOPICAL NIGHTLY
Qty: 60 G | Refills: 1 | Status: SHIPPED | OUTPATIENT
Start: 2023-04-18

## 2023-04-18 NOTE — TELEPHONE ENCOUNTER
"Spoke to patient on: 4/18/23    Patient works- wife works at [employer] - patient on long term disability   Are you currently working? : no   Are you on workers comp?no   Are you involved in any ligation at this time? no   Do you have HSA insurance? yes                Have support to help with care and appointments: yes   Travel Caregiver:         What is your chief complaint? had surgery and was great for 7-8 months   Has MIS laminectomy May 2021 with Dr. Cruz    New symptoms to right side lower back- localed to right hip outside and inside of right groin    Pain on left side lower back - but right lower back now (since last year)    How long has it been going on? in the last year    Have you had pain like this before?yes     Have you sought treatment for this condition in the past?yes 1 injection Dr. Brito- 11/22- had Left L4-5 TFA DID NOT impact the back but helped with the pain to the leg- totally elinated pain to leg     If yes, what treatments did you then?  If yes, when did those stop working?    Where is the pain the worst?     Does the pain radiate? it was - got     Where else do you hurt?     Is the pain constant or does it go away to a 0/10 at times even if the pain comes right back?  constant    What makes the pain worse? everything    What makes the pain better or decreases the pain (which position is least uncomfortable)?  nothing  Any other symptoms?    Any bowel or bladder incontinence?  no  Or changes?    What treatments have you tried for your current pain, and did they help?   Physical therapy? yes- through [facility]- doing virtual PT - send you bands/monitors- not working- 3 months   Chiropractor?   Injections? What kinds- yes 11/22    Prior back surgery? yes    Medication? see med list      BMI:   Ht: 5'4"  Weight: 179    AIC: last 8.0 on 2/23/23    Social Hx:     -  Nicotine no   - Alcohol no   - Other substances no              Allergies:   Review of patient's allergies indicates:   Allergen " "Reactions    Nsaids (non-steroidal anti-inflammatory drug) Other (See Comments)     GI upset  GI upset      Ibuprofen Other (See Comments)     Other reaction(s): crohns flair up  Other reaction(s): crohns flair up         Medication list:     Current Outpatient Medications on File Prior to Visit   Medication Sig Dispense Refill    atorvastatin (LIPITOR) 10 MG tablet Take 1 tablet (10 mg total) by mouth once daily. 90 tablet 1    BD ULTRA-FINE SHORT PEN NEEDLE 31 gauge x 5/16" Ndle 1 each by Misc.(Non-Drug; Combo Route) route once daily. 100 each 1    blood sugar diagnostic (ONETOUCH ULTRA BLUE TEST STRIP) Strp Check blood glucose readings twice a day-- before meal and 1 to 2 hours post meal 100 strip 0    blood-glucose meter Misc Check blood glucose readings twice a day-- before meal and 1 to 2 hours post meal 1 each 0    dulaglutide (TRULICITY) 1.5 mg/0.5 mL pen injector Inject 1.5 mg into the skin every 7 days. 4 pen 5    estradioL (ESTRACE) 2 MG tablet TAKE 1 TABLET BY MOUTH ONCE DAILY. 90 tablet 1    fentaNYL (DURAGESIC) 25 mcg/hr Place 1 patch onto the skin Every 3 (three) days.      hydroCHLOROthiazide (HYDRODIURIL) 25 MG tablet TAKE 1 TABLET BY MOUTH EVERY DAY 90 tablet 1    HYDROcodone-acetaminophen (NORCO)  mg per tablet Take 1-2 tablets by mouth every 8 (eight) hours.      ketoconazole (NIZORAL) 2 % cream Apply topically every evening. 60 g 1    lancets 33 gauge Misc Check blood glucose readings twice a day-- before meal and 1 to 2 hours post meal 100 each 0    tiZANidine (ZANAFLEX) 4 MG tablet Take 4 mg by mouth 4 (four) times daily as needed.      TRESIBA FLEXTOUCH U-100 100 unit/mL (3 mL) insulin pen SMARTSI Unit(s) SUB-Q Daily       No current facility-administered medications on file prior to visit.             Covid-19:   - vaccine: yes   - diagnosed with: no      Health Hx: see EPIC    Surgical Hx: see EPIC    Can you take one flight of stairs: yes- getting more difficult    RCRI:   - " Coronary Artery Disease: no   - Congestive Heart Failure: no   - Cerebrovascular Disease: no   - Diabetes Mellitus on insulin: yes        STOPBANG   - S  snore loudly no   - T  tired during the day no   - O  stop breathing in sleep no   - P  BP/HTN no   - B  BMI    - A  over 50 no   - N  neck size   - G  male gender no    Images Received:  MRI yes    Type:  Xray yes   Type:  EMG  no       Saw Pain Management in [place]- did ablation- made it so I can sleep.    Surgery recommended: no NOT yet- will see dr. Cruz for further discussion    PCP Information:  Dr. Norberto Ac    Spoke with pt, reviewed history, discussed timeframe and expectation regarding the [facility] Spine program, discussed logging in to the [facility] portal and to expect link to complete online seminar, confirmed PCP. Explained I would be sending imaging, chart review and spine screen assessment to Dr. Cruz then to Dr. Clifford to see if any additional orders are needed other than CXR and non bundled labs and I would reach back out when this information if obtained.  Explained that initial trip is an evaluation visit and if surgery needed and determined a surgery date would be determined with surgeon and patient. From there, I would send preop orders to PCP for them to complete at home.   Patient verbalized understanding of the above and instructed to reach out with any questions or concerns. Direct phone # given and explained the use of patient portal communication.      Discussed initial evaluation dates of 5/1-5/3/23. will submit POC to [facility]. Patient declined to stay at [facility]. will commute for appointments.    Reva Desai, RN, CCM, CMSRN  RN Navigator  [facility]

## 2023-04-18 NOTE — PROGRESS NOTES
Subjective:      Patient ID:  Tanesha Chamberlain is a 57 y.o. female who presents for   Chief Complaint   Patient presents with    Spot     Multiple spots     New patient     Patient here today for skin check   Patient states she has a few spots of concern   Complains of spot on upper back x years. Very bothersome, rubs on clothing.    Also complains of dry flaky skin around nose. Notices it around weather changes. Applies triple antibiotic cream, does not help much.    Denies Phx of NMSC   Denies Fhx of MM    Review of Systems   Constitutional:  Negative for fever, chills and fatigue.   Respiratory:  Negative for cough and shortness of breath.    Gastrointestinal:  Negative for nausea and vomiting.   Skin:  Positive for activity-related sunscreen use and wears hat. Negative for daily sunscreen use.   Hematologic/Lymphatic: Does not bruise/bleed easily.     Objective:   Physical Exam   Constitutional: She appears well-developed and well-nourished. No distress.   Neurological: She is alert and oriented to person, place, and time. She is not disoriented.   Psychiatric: She has a normal mood and affect.   Skin:   Areas Examined (abnormalities noted in diagram):   Scalp / Hair Palpated and Inspected  Head / Face Inspection Performed  Neck Inspection Performed  Chest / Axilla Inspection Performed  Abdomen Inspection Performed  Genitals / Buttocks / Groin Inspection Performed  Back Inspection Performed  RUE Inspected  LUE Inspection Performed  RLE Inspected  LLE Inspection Performed  Nails and Digits Inspection Performed               Diagram Legend     Erythematous scaling macule/papule c/w actinic keratosis       Vascular papule c/w angioma      Pigmented verrucoid papule/plaque c/w seborrheic keratosis      Yellow umbilicated papule c/w sebaceous hyperplasia      Irregularly shaped tan macule c/w lentigo     1-2 mm smooth white papules consistent with Milia      Movable subcutaneous cyst with punctum c/w epidermal  inclusion cyst      Subcutaneous movable cyst c/w pilar cyst      Firm pink to brown papule c/w dermatofibroma      Pedunculated fleshy papule(s) c/w skin tag(s)      Evenly pigmented macule c/w junctional nevus     Mildly variegated pigmented, slightly irregular-bordered macule c/w mildly atypical nevus      Flesh colored to evenly pigmented papule c/w intradermal nevus       Pink pearly papule/plaque c/w basal cell carcinoma      Erythematous hyperkeratotic cursted plaque c/w SCC      Surgical scar with no sign of skin cancer recurrence      Open and closed comedones      Inflammatory papules and pustules      Verrucoid papule consistent consistent with wart     Erythematous eczematous patches and plaques     Dystrophic onycholytic nail with subungual debris c/w onychomycosis     Umbilicated papule    Erythematous-base heme-crusted tan verrucoid plaque consistent with inflamed seborrheic keratosis     Erythematous Silvery Scaling Plaque c/w Psoriasis     See annotation      Assessment / Plan:        Actinic keratosis  Cryosurgery Procedure Note    Verbal consent from the patient is obtained and the patient is aware of the precancerous quality and need for treatment of these lesions. Liquid nitrogen cryosurgery is applied to the 9 actinic keratoses, as detailed in the physical exam, to produce a freeze injury. The patient is aware that blisters may form and is instructed on wound care with gentle cleansing and use of vaseline ointment to keep moist until healed. The patient is supplied a handout on cryosurgery and is instructed to call if lesions do not completely resolve.    Actinic skin damage  Total body skin examination performed today including at least 12 points as noted in physical examination. No lesions suspicious for malignancy noted.  Patient instructed in importance in daily broad spectrum sun protection of at least spf 30. Mineral sunscreen ingredients preferred (Zinc +/- Titanium) and can be found  OTC.   Patient encouraged to wear hat for all outdoor exposure.   Also discussed sun avoidance and use of protective clothing.    Seborrheic keratoses  These are benign inherited growths without a malignant potential. Reassurance given to patient. No treatment is necessary.     Benign nevus  Careful dermoscopy evaluation of nevi performed with none identified as needing biopsy today  Monitor for new mole or moles that are becoming bigger, darker, irritated, or developing irregular borders.     Seborrheic dermatitis  -     ketoconazole (NIZORAL) 2 % cream; Apply topically every evening.  Dispense: 60 g; Refill: 1  - use nightly around nose  - can add HC cream if needed    Lentigo  This is a benign hyperpigmented sun induced lesion. Daily sun protection will reduce the number of new lesions. Treatment of these benign lesions are considered cosmetic.    Cherry angioma  This is a benign vascular lesion. Reassurance given. No treatment required.     Dermatofibroma  This is a benign scar-like lesion secondary to minor trauma. No treatment required.            1 year or sooner if AKs do not resolve  No follow-ups on file.

## 2023-04-18 NOTE — PATIENT INSTRUCTIONS

## 2023-04-24 ENCOUNTER — LAB VISIT (OUTPATIENT)
Dept: LAB | Facility: HOSPITAL | Age: 58
End: 2023-04-24
Payer: COMMERCIAL

## 2023-04-24 ENCOUNTER — TELEPHONE (OUTPATIENT)
Dept: NEUROSURGERY | Facility: CLINIC | Age: 58
End: 2023-04-24
Payer: COMMERCIAL

## 2023-04-24 DIAGNOSIS — M54.50 DORSALGIA OF LUMBAR REGION: Primary | ICD-10-CM

## 2023-04-24 DIAGNOSIS — E11.65 TYPE 2 DIABETES MELLITUS WITH HYPERGLYCEMIA, WITHOUT LONG-TERM CURRENT USE OF INSULIN: ICD-10-CM

## 2023-04-24 LAB
ANION GAP SERPL CALC-SCNC: 13 MMOL/L (ref 8–16)
BASOPHILS # BLD AUTO: 0.11 K/UL (ref 0–0.2)
BASOPHILS NFR BLD: 0.8 % (ref 0–1.9)
BUN SERPL-MCNC: 13 MG/DL (ref 6–20)
CALCIUM SERPL-MCNC: 10.2 MG/DL (ref 8.7–10.5)
CHLORIDE SERPL-SCNC: 101 MMOL/L (ref 95–110)
CO2 SERPL-SCNC: 29 MMOL/L (ref 23–29)
CREAT SERPL-MCNC: 0.7 MG/DL (ref 0.5–1.4)
DIFFERENTIAL METHOD: ABNORMAL
EOSINOPHIL # BLD AUTO: 1.6 K/UL (ref 0–0.5)
EOSINOPHIL NFR BLD: 11.6 % (ref 0–8)
ERYTHROCYTE [DISTWIDTH] IN BLOOD BY AUTOMATED COUNT: 12.7 % (ref 11.5–14.5)
EST. GFR  (NO RACE VARIABLE): >60 ML/MIN/1.73 M^2
ESTIMATED AVG GLUCOSE: 180 MG/DL (ref 68–131)
GLUCOSE SERPL-MCNC: 113 MG/DL (ref 70–110)
HBA1C MFR BLD: 7.9 % (ref 4.5–6.2)
HCT VFR BLD AUTO: 52.4 % (ref 37–48.5)
HGB BLD-MCNC: 17.3 G/DL (ref 12–16)
IMM GRANULOCYTES # BLD AUTO: 0.06 K/UL (ref 0–0.04)
IMM GRANULOCYTES NFR BLD AUTO: 0.4 % (ref 0–0.5)
LYMPHOCYTES # BLD AUTO: 4.9 K/UL (ref 1–4.8)
LYMPHOCYTES NFR BLD: 35.6 % (ref 18–48)
MCH RBC QN AUTO: 30 PG (ref 27–31)
MCHC RBC AUTO-ENTMCNC: 33 G/DL (ref 32–36)
MCV RBC AUTO: 91 FL (ref 82–98)
MONOCYTES # BLD AUTO: 1.1 K/UL (ref 0.3–1)
MONOCYTES NFR BLD: 7.7 % (ref 4–15)
NEUTROPHILS # BLD AUTO: 6.1 K/UL (ref 1.8–7.7)
NEUTROPHILS NFR BLD: 43.9 % (ref 38–73)
NRBC BLD-RTO: 0 /100 WBC
PLATELET # BLD AUTO: 415 K/UL (ref 150–450)
PLATELET BLD QL SMEAR: ABNORMAL
PMV BLD AUTO: 8.9 FL (ref 9.2–12.9)
POTASSIUM SERPL-SCNC: 3.7 MMOL/L (ref 3.5–5.1)
RBC # BLD AUTO: 5.76 M/UL (ref 4–5.4)
SODIUM SERPL-SCNC: 143 MMOL/L (ref 136–145)
WBC # BLD AUTO: 13.83 K/UL (ref 3.9–12.7)

## 2023-04-24 PROCEDURE — 80048 BASIC METABOLIC PNL TOTAL CA: CPT

## 2023-04-24 PROCEDURE — 36415 COLL VENOUS BLD VENIPUNCTURE: CPT

## 2023-04-24 PROCEDURE — 83036 HEMOGLOBIN GLYCOSYLATED A1C: CPT

## 2023-04-24 PROCEDURE — 85025 COMPLETE CBC W/AUTO DIFF WBC: CPT

## 2023-04-26 ENCOUNTER — TELEPHONE (OUTPATIENT)
Dept: FAMILY MEDICINE | Facility: CLINIC | Age: 58
End: 2023-04-26
Payer: COMMERCIAL

## 2023-04-26 NOTE — TELEPHONE ENCOUNTER
----- Message from Liz Banerjee sent at 4/25/2023 11:26 AM CDT -----  Contact: pt  Type:  Patient Returning Call    Who Called:  pt  Who Left Message for Patient:  Catalina Roland  Does the patient know what this is regarding?:  yes  Best Call Back Number:  [phone]   Additional Information:  pt is ret call to Ms. Roland

## 2023-04-26 NOTE — TELEPHONE ENCOUNTER
----- Message from Catalina Roland NP sent at 4/25/2023  5:36 PM CDT -----  WBC is elevated. Please call patient to see if she has been sick.  Also HA1C is still high. Needs follow up appointment to discuss treatment.

## 2023-04-27 NOTE — TELEPHONE ENCOUNTER
Spoke with pt aware her a1c is high and make apt . Also spoke with pt re her wbc elevated ,pt aware and said it has been like that for many many years ,she will discuss at fu.

## 2023-05-01 ENCOUNTER — OFFICE VISIT (OUTPATIENT)
Dept: SPINE | Facility: CLINIC | Age: 58
End: 2023-05-01
Payer: MEDICARE

## 2023-05-01 ENCOUNTER — HOSPITAL ENCOUNTER (OUTPATIENT)
Dept: RADIOLOGY | Facility: OTHER | Age: 58
Discharge: HOME | End: 2023-05-01
Attending: NEUROLOGICAL SURGERY
Payer: COMMERCIAL

## 2023-05-01 VITALS
DIASTOLIC BLOOD PRESSURE: 97 MMHG | HEART RATE: 87 BPM | SYSTOLIC BLOOD PRESSURE: 152 MMHG | DIASTOLIC BLOOD PRESSURE: 97 MMHG | SYSTOLIC BLOOD PRESSURE: 152 MMHG | WEIGHT: 176.69 LBS | BODY MASS INDEX: 30.17 KG/M2 | HEIGHT: 64 IN | HEART RATE: 87 BPM

## 2023-05-01 DIAGNOSIS — M54.50 DORSALGIA OF LUMBAR REGION: ICD-10-CM

## 2023-05-01 DIAGNOSIS — M70.61 TROCHANTERIC BURSITIS OF RIGHT HIP: Primary | ICD-10-CM

## 2023-05-01 DIAGNOSIS — M25.551 PAIN OF RIGHT HIP: ICD-10-CM

## 2023-05-01 DIAGNOSIS — M47.816 LUMBAR SPONDYLOSIS: Primary | ICD-10-CM

## 2023-05-01 DIAGNOSIS — M79.10 MYALGIA: ICD-10-CM

## 2023-05-01 PROCEDURE — 72114 X-RAY EXAM L-S SPINE BENDING: CPT | Mod: TC,FY

## 2023-05-01 PROCEDURE — 99999 PR PBB SHADOW E&M-EST. PATIENT-LVL III: CPT | Mod: PBBFAC,COE,, | Performed by: NEUROLOGICAL SURGERY

## 2023-05-01 PROCEDURE — 99999 PR PBB SHADOW E&M-EST. PATIENT-LVL III: ICD-10-PCS | Mod: PBBFAC,COE,, | Performed by: ANESTHESIOLOGY

## 2023-05-01 PROCEDURE — 72114 XR LUMBAR SPINE 5 VIEW WITH FLEX AND EXT: ICD-10-PCS | Mod: 26,COE,, | Performed by: RADIOLOGY

## 2023-05-01 PROCEDURE — 99999 PR PBB SHADOW E&M-EST. PATIENT-LVL III: CPT | Mod: PBBFAC,COE,, | Performed by: ANESTHESIOLOGY

## 2023-05-01 PROCEDURE — 99214 PR OFFICE/OUTPT VISIT, EST, LEVL IV, 30-39 MIN: ICD-10-PCS | Mod: S$GLB,COE,, | Performed by: ANESTHESIOLOGY

## 2023-05-01 PROCEDURE — 99213 OFFICE O/P EST LOW 20 MIN: CPT | Mod: PBBFAC | Performed by: NEUROLOGICAL SURGERY

## 2023-05-01 PROCEDURE — 72114 X-RAY EXAM L-S SPINE BENDING: CPT | Mod: 26,COE,, | Performed by: RADIOLOGY

## 2023-05-01 PROCEDURE — 99214 OFFICE O/P EST MOD 30 MIN: CPT | Mod: S$GLB,COE,, | Performed by: NEUROLOGICAL SURGERY

## 2023-05-01 PROCEDURE — 99213 OFFICE O/P EST LOW 20 MIN: CPT | Mod: PBBFAC,27 | Performed by: ANESTHESIOLOGY

## 2023-05-01 PROCEDURE — 99214 OFFICE O/P EST MOD 30 MIN: CPT | Mod: S$GLB,COE,, | Performed by: ANESTHESIOLOGY

## 2023-05-01 PROCEDURE — 99214 PR OFFICE/OUTPT VISIT, EST, LEVL IV, 30-39 MIN: ICD-10-PCS | Mod: S$GLB,COE,, | Performed by: NEUROLOGICAL SURGERY

## 2023-05-01 PROCEDURE — 99999 PR PBB SHADOW E&M-EST. PATIENT-LVL III: ICD-10-PCS | Mod: PBBFAC,COE,, | Performed by: NEUROLOGICAL SURGERY

## 2023-05-01 NOTE — H&P (VIEW-ONLY)
PCP: Norberto Ac III, MD    REFERRING PHYSICIAN: No ref. provider found    CHIEF COMPLAINT: Right hip pain    Original HISTORY OF PRESENT ILLNESS: Tanesha Chamberlain presents to the clinic for the evaluation of the above pain. The pain started in the last couple of year, but did not become more prominent until after taking care of her left sided pain.     Original Pain Description:  The pain is located in the right hip, butt cheek, and groin are. The pain is described as sharp and consistent . Exacerbating factors: Sitting, Standing, Laying, Bending, Touching, and Getting out of bed/chair. Mitigating factors massage and medications. Symptoms interfere with daily activity, sleeping, and fishing. The patient feels like symptoms have been worsening. Patient denies night fever/night sweats, urinary incontinence, bowel incontinence, and significant motor weakness. She was recently evaluated by her pain management specialist in [place] who was working her up for potential ablation.     Original PAIN SCORES:  Best: Pain is 6  Worst: Pain is 10  Current: Pain is 6    INTERVAL HISTORY:     6 weeks of Conservative therapy:  PT: February - May, 2023  Chiro: No        Treatments / Medications: (Ice/Heat/NSAIDS/APAP/etc):  Ice and heat with minimal improvement.   NSAIDS - Unable due to Crohn's       Interventional Pain Procedures: (Previous injections)  11/29/22 L4-L5 TFESI. 100% relief for 6 months and counting      Past Medical History:   Diagnosis Date    ADHD (attention deficit hyperactivity disorder)     Arthritis     Crohn's disease     Diabetes mellitus, type 2     Hypertension     Hypothyroidism     Lumbar back pain with radiculopathy affecting left lower extremity     SBO (small bowel obstruction)      Past Surgical History:   Procedure Laterality Date    ANTERIOR CRUCIATE LIGAMENT REPAIR Right     APPENDECTOMY      1988    BUNIONECTOMY Bilateral     CHOLECYSTECTOMY      2005    EXCISION OF MASS OF BACK Left 9/21/2022     Procedure: EXCISION, MASS, BACK;  Surgeon: Sae Collins III, MD;  Location: [facility] OR;  Service: General;  Laterality: Left;    HYSTERECTOMY      MINIMALLY INVASIVE SURGICAL REMOVAL OF INTERVERTEBRAL DISC OF SPINE USING MICROSCOPE Left 2021    Procedure: Left L4-5 Far Lateral Disectomy, MIS;  Surgeon: Johnny Cruz MD;  Location: [facility] OR 2ND FLR;  Service: Neurosurgery;  Laterality: Left;    SHOULDER SURGERY Right     SPINE SURGERY      TRANSFORAMINAL EPIDURAL INJECTION OF STEROID Left 03/10/2021    Procedure: INJECTION, STEROID, EPIDURAL, TRANSFORAMINAL APPROACH  L4-5;  Surgeon: Jaylon Tyson MD;  Location: [facility] PAIN MGT;  Service: Pain Management;  Laterality: Left;  consent needed  ([facility])    TRANSFORAMINAL EPIDURAL INJECTION OF STEROID Left 2022    Procedure: LUMBAR TRANSFORAMINAL LEFT L4/5 MEDICALLY URGENT;  Surgeon: Loreta Brito MD;  Location: [facility] PAIN MGT;  Service: Pain Management;  Laterality: Left;     Social History     Socioeconomic History    Marital status:    Occupational History    Occupation: Disabled     Comment: Previous occupation:    Tobacco Use    Smoking status: Former     Types: Cigarettes     Quit date: 2020     Years since quittin.3    Smokeless tobacco: Former    Tobacco comments:     in the 8 th grade   Substance and Sexual Activity    Alcohol use: Yes     Alcohol/week: 4.0 standard drinks     Types: 4 Cans of beer per week     Comment: 1 beer , once in 2 weeks    Drug use: No    Sexual activity: Yes     Partners: Female     Comment: Never had pregnant      Family History   Problem Relation Age of Onset    Cirrhosis Neg Hx        Review of patient's allergies indicates:   Allergen Reactions    Nsaids (non-steroidal anti-inflammatory drug) Other (See Comments)     GI upset  GI upset      Ibuprofen Other (See Comments)     Other reaction(s): crohns flair up  Other reaction(s): crohns flair up       Current  "Outpatient Medications   Medication Sig    atorvastatin (LIPITOR) 10 MG tablet Take 1 tablet (10 mg total) by mouth once daily.    BD ULTRA-FINE SHORT PEN NEEDLE 31 gauge x 5/16" Ndle 1 each by Misc.(Non-Drug; Combo Route) route once daily.    blood sugar diagnostic (ONETOUCH ULTRA BLUE TEST STRIP) Strp Check blood glucose readings twice a day-- before meal and 1 to 2 hours post meal    blood-glucose meter Misc Check blood glucose readings twice a day-- before meal and 1 to 2 hours post meal    dulaglutide (TRULICITY) 1.5 mg/0.5 mL pen injector Inject 1.5 mg into the skin every 7 days.    estradioL (ESTRACE) 2 MG tablet TAKE 1 TABLET BY MOUTH ONCE DAILY.    fentaNYL (DURAGESIC) 25 mcg/hr Place 1 patch onto the skin Every 3 (three) days.    hydroCHLOROthiazide (HYDRODIURIL) 25 MG tablet TAKE 1 TABLET BY MOUTH EVERY DAY    HYDROcodone-acetaminophen (NORCO)  mg per tablet Take 1-2 tablets by mouth every 8 (eight) hours.    ketoconazole (NIZORAL) 2 % cream Apply topically every evening.    lancets 33 gauge Misc Check blood glucose readings twice a day-- before meal and 1 to 2 hours post meal    tiZANidine (ZANAFLEX) 4 MG tablet Take 4 mg by mouth 4 (four) times daily as needed.    TRESIBA FLEXTOUCH U-100 100 unit/mL (3 mL) insulin pen SMARTSI Unit(s) SUB-Q Daily     No current facility-administered medications for this visit.       ROS:  GENERAL: No fever. No chills. No fatigue. Denies weight loss. Denies weight gain.  HEENT: Denies headaches. Denies vision change. Denies eye pain. Denies double vision. Denies ear pain.   CV: Denies chest pain.   PULM: Denies of shortness of breath.  GI: Denies constipation. No diarrhea. No abdominal pain. Denies nausea. Denies vomiting. No blood in stool.  HEME: Denies bleeding problems.  : Denies urgency. No painful urination. No blood in urine.  MS: Denies joint stiffness. Denies joint swelling.  Denies back pain.  SKIN: Denies rash.   NEURO: Denies seizures. No " weakness.  PSYCH:  Denies difficulty sleeping. No anxiety. Denies depression. No suicidal thoughts.       VITALS:   Vitals:    05/01/23 1028   BP: (!) 152/97   Pulse: 87   PainSc:   6   PainLoc: Back         PHYSICAL EXAM:   GENERAL: Well appearing, in no acute distress, alert and oriented x3.  PSYCH:  Mood and affect appropriate.  SKIN: Skin color, texture, turgor normal, no rashes or lesions.  HEENT:  Normocephalic, atraumatic. Cranial nerves grossly intact.  PULM: No evidence of respiratory difficulty, symmetric chest rise.  GI:  Non-distended  BACK: Normal range of motion. No pain to palpation over the spinous processes. No pain to palpation over facet joints. There is no pain with palpation over the sacroiliac joints bilaterally.   EXTREMITIES: No deformities, edema, or skin discoloration.   MUSCULOSKELETAL: Shoulder, and knee provocative maneuvers are negative. No atrophy is noted. Pain reproduced over the right piriformis and GTB. Discomfort with palpation over the right anterior hip joint.   NEURO: Sensation is equal and appropriate bilaterally. Bilateral upper and lower extremity strength is normal and symmetric. Bilateral upper and lower extremity coordination and muscle stretch reflexes are physiologic and symmetric. Plantar response are downgoing. Straight leg raising in the supine position is negative to radicular pain.   GAIT: normal.      LABS:      IMAGING:    EXAMINATION:  XR LUMBAR SPINE 5 VIEW WITH FLEX AND EXT     CLINICAL HISTORY:  Low back pain, unspecified     TECHNIQUE:  AP, lateral, and oblique images are performed through the lumbar spine.     COMPARISON:  10/26/2022     FINDINGS:  Prior posterior and interbody fusion L5-S1.  Hardware appears intact.     Lumbar vertebral body heights are maintained.     Disc spaces are maintained.     Slight grade 1 anterolisthesis L4-5 without significant change with flexion or extension.  Mild levoconvex curvature lumbar spine.    MRI of the lumbar spine  without contrast     HISTORY: Low back pain, previous back surgery.     Multiplanar noncontrast imaging is performed.     There is leftward curvature of the lumbar spine.     Laminectomy defects are observed at the L5 level. There has been instrumented fusion at L5-S1 with bilateral posterior grace and pedicle screw fixation. Metallic interbody fusion devices are appropriately positioned at the L5-S1 disc space.     There is relative preservation of disc space height and disc signal at the remaining lumbar levels.     Vertebral body heights and alignment are satisfactorily maintained.     At L1-2, there is no disc bulging or soft disc herniation. The central canal and foramina are patent.     At L2-3, there is no significant disc bulging or focal soft disc herniation. There is mild ligamentum flavum hypertrophy. The central canal and foramina are not significantly encroached.     At L3-4, is no significant disc bulging or focal soft disc herniation. There are mild facet degenerative changes and ligamentum flavum hypertrophy slightly encroaching the central spinal canal and contributing to minor foraminal narrowing.     At L4-5, broad-based bulging of the disc margin is asymmetric towards the left of midline. There is bilateral facet arthropathy and ligamentum flavum hypertrophy resulting in a mild to moderate degree of encroachment of the central spinal canal. There is moderate left foraminal and mild right foraminal narrowing.     At L5-S1, there is mild posterior osteophytic ridging. The central spinal canal and foramina are not significantly narrowed.     The conus terminates appropriately and demonstrates normal signal. The paraspinal soft tissues appear unremarkable.     IMPRESSION:     Surgical changes associated with instrumented fusion at L5-S1.     Additional degenerative changes contributing to central canal and foraminal narrowing, most notably at L4-5. See above details.     Electronically signed by:  Maya  Raquel SQUIRES  11/9/2022 8:20 AM CST      Impression:     Please see above.        Electronically signed by: Rossana Riky  Date:                                            05/01/2023    ASSESSMENT: 57 y.o. year old female with pain, consistent with:    Encounter Diagnoses   Name Primary?    Pain of right hip     Trochanteric bursitis of right hip Yes    Myalgia        DISCUSSION: Ms. Chamberlain is on disability and originally came to me with left leg pain. This was resolved with a TFESI. She presented back with right hip pain. Exam indicated pain in the piriformis and the GTB. Dr. Cruz was concerned about her right L4/5 facet, which does have fluid in it. Pt notes that she has a strange feeling in this area, but that her pain is in the buttock/hip.     OPIOID MANAGEMENT: Fentanyl 25 Patch and Hydrocodone 10 QID per outside MD in [place]  MME: 100    PLAN:  Rt Hip Xray  Recommend heat and not sleeping on right side  Rt GTB and Piriformis injection  Follow up 2 weeks later  Will address facet as needed    Loreta Brito  05/01/2023

## 2023-05-01 NOTE — PROGRESS NOTES
PCP: Norberto Ac III, MD    REFERRING PHYSICIAN: No ref. provider found    CHIEF COMPLAINT: Right hip pain    Original HISTORY OF PRESENT ILLNESS: Tanesha Chamberlain presents to the clinic for the evaluation of the above pain. The pain started in the last couple of year, but did not become more prominent until after taking care of her left sided pain.     Original Pain Description:  The pain is located in the right hip, butt cheek, and groin are. The pain is described as sharp and consistent . Exacerbating factors: Sitting, Standing, Laying, Bending, Touching, and Getting out of bed/chair. Mitigating factors massage and medications. Symptoms interfere with daily activity, sleeping, and fishing. The patient feels like symptoms have been worsening. Patient denies night fever/night sweats, urinary incontinence, bowel incontinence, and significant motor weakness. She was recently evaluated by her pain management specialist in [place] who was working her up for potential ablation.     Original PAIN SCORES:  Best: Pain is 6  Worst: Pain is 10  Current: Pain is 6    INTERVAL HISTORY:     6 weeks of Conservative therapy:  PT: February - May, 2023  Chiro: No        Treatments / Medications: (Ice/Heat/NSAIDS/APAP/etc):  Ice and heat with minimal improvement.   NSAIDS - Unable due to Crohn's       Interventional Pain Procedures: (Previous injections)  11/29/22 L4-L5 TFESI. 100% relief for 6 months and counting      Past Medical History:   Diagnosis Date    ADHD (attention deficit hyperactivity disorder)     Arthritis     Crohn's disease     Diabetes mellitus, type 2     Hypertension     Hypothyroidism     Lumbar back pain with radiculopathy affecting left lower extremity     SBO (small bowel obstruction)      Past Surgical History:   Procedure Laterality Date    ANTERIOR CRUCIATE LIGAMENT REPAIR Right     APPENDECTOMY      1988    BUNIONECTOMY Bilateral     CHOLECYSTECTOMY      2005    EXCISION OF MASS OF BACK Left 9/21/2022     Procedure: EXCISION, MASS, BACK;  Surgeon: Sae Collins III, MD;  Location: [facility] OR;  Service: General;  Laterality: Left;    HYSTERECTOMY      MINIMALLY INVASIVE SURGICAL REMOVAL OF INTERVERTEBRAL DISC OF SPINE USING MICROSCOPE Left 2021    Procedure: Left L4-5 Far Lateral Disectomy, MIS;  Surgeon: Johnny Cruz MD;  Location: [facility] OR 2ND FLR;  Service: Neurosurgery;  Laterality: Left;    SHOULDER SURGERY Right     SPINE SURGERY      TRANSFORAMINAL EPIDURAL INJECTION OF STEROID Left 03/10/2021    Procedure: INJECTION, STEROID, EPIDURAL, TRANSFORAMINAL APPROACH  L4-5;  Surgeon: Jaylon Tyson MD;  Location: [facility] PAIN MGT;  Service: Pain Management;  Laterality: Left;  consent needed  ([facility])    TRANSFORAMINAL EPIDURAL INJECTION OF STEROID Left 2022    Procedure: LUMBAR TRANSFORAMINAL LEFT L4/5 MEDICALLY URGENT;  Surgeon: Loreta Brito MD;  Location: [facility] PAIN MGT;  Service: Pain Management;  Laterality: Left;     Social History     Socioeconomic History    Marital status:    Occupational History    Occupation: Disabled     Comment: Previous occupation:    Tobacco Use    Smoking status: Former     Types: Cigarettes     Quit date: 2020     Years since quittin.3    Smokeless tobacco: Former    Tobacco comments:     in the 8 th grade   Substance and Sexual Activity    Alcohol use: Yes     Alcohol/week: 4.0 standard drinks     Types: 4 Cans of beer per week     Comment: 1 beer , once in 2 weeks    Drug use: No    Sexual activity: Yes     Partners: Female     Comment: Never had pregnant      Family History   Problem Relation Age of Onset    Cirrhosis Neg Hx        Review of patient's allergies indicates:   Allergen Reactions    Nsaids (non-steroidal anti-inflammatory drug) Other (See Comments)     GI upset  GI upset      Ibuprofen Other (See Comments)     Other reaction(s): crohns flair up  Other reaction(s): crohns flair up       Current  "Outpatient Medications   Medication Sig    atorvastatin (LIPITOR) 10 MG tablet Take 1 tablet (10 mg total) by mouth once daily.    BD ULTRA-FINE SHORT PEN NEEDLE 31 gauge x 5/16" Ndle 1 each by Misc.(Non-Drug; Combo Route) route once daily.    blood sugar diagnostic (ONETOUCH ULTRA BLUE TEST STRIP) Strp Check blood glucose readings twice a day-- before meal and 1 to 2 hours post meal    blood-glucose meter Misc Check blood glucose readings twice a day-- before meal and 1 to 2 hours post meal    dulaglutide (TRULICITY) 1.5 mg/0.5 mL pen injector Inject 1.5 mg into the skin every 7 days.    estradioL (ESTRACE) 2 MG tablet TAKE 1 TABLET BY MOUTH ONCE DAILY.    fentaNYL (DURAGESIC) 25 mcg/hr Place 1 patch onto the skin Every 3 (three) days.    hydroCHLOROthiazide (HYDRODIURIL) 25 MG tablet TAKE 1 TABLET BY MOUTH EVERY DAY    HYDROcodone-acetaminophen (NORCO)  mg per tablet Take 1-2 tablets by mouth every 8 (eight) hours.    ketoconazole (NIZORAL) 2 % cream Apply topically every evening.    lancets 33 gauge Misc Check blood glucose readings twice a day-- before meal and 1 to 2 hours post meal    tiZANidine (ZANAFLEX) 4 MG tablet Take 4 mg by mouth 4 (four) times daily as needed.    TRESIBA FLEXTOUCH U-100 100 unit/mL (3 mL) insulin pen SMARTSI Unit(s) SUB-Q Daily     No current facility-administered medications for this visit.       ROS:  GENERAL: No fever. No chills. No fatigue. Denies weight loss. Denies weight gain.  HEENT: Denies headaches. Denies vision change. Denies eye pain. Denies double vision. Denies ear pain.   CV: Denies chest pain.   PULM: Denies of shortness of breath.  GI: Denies constipation. No diarrhea. No abdominal pain. Denies nausea. Denies vomiting. No blood in stool.  HEME: Denies bleeding problems.  : Denies urgency. No painful urination. No blood in urine.  MS: Denies joint stiffness. Denies joint swelling.  Denies back pain.  SKIN: Denies rash.   NEURO: Denies seizures. No " weakness.  PSYCH:  Denies difficulty sleeping. No anxiety. Denies depression. No suicidal thoughts.       VITALS:   Vitals:    05/01/23 1028   BP: (!) 152/97   Pulse: 87   PainSc:   6   PainLoc: Back         PHYSICAL EXAM:   GENERAL: Well appearing, in no acute distress, alert and oriented x3.  PSYCH:  Mood and affect appropriate.  SKIN: Skin color, texture, turgor normal, no rashes or lesions.  HEENT:  Normocephalic, atraumatic. Cranial nerves grossly intact.  PULM: No evidence of respiratory difficulty, symmetric chest rise.  GI:  Non-distended  BACK: Normal range of motion. No pain to palpation over the spinous processes. No pain to palpation over facet joints. There is no pain with palpation over the sacroiliac joints bilaterally.   EXTREMITIES: No deformities, edema, or skin discoloration.   MUSCULOSKELETAL: Shoulder, and knee provocative maneuvers are negative. No atrophy is noted. Pain reproduced over the right piriformis and GTB. Discomfort with palpation over the right anterior hip joint.   NEURO: Sensation is equal and appropriate bilaterally. Bilateral upper and lower extremity strength is normal and symmetric. Bilateral upper and lower extremity coordination and muscle stretch reflexes are physiologic and symmetric. Plantar response are downgoing. Straight leg raising in the supine position is negative to radicular pain.   GAIT: normal.      LABS:      IMAGING:    EXAMINATION:  XR LUMBAR SPINE 5 VIEW WITH FLEX AND EXT     CLINICAL HISTORY:  Low back pain, unspecified     TECHNIQUE:  AP, lateral, and oblique images are performed through the lumbar spine.     COMPARISON:  10/26/2022     FINDINGS:  Prior posterior and interbody fusion L5-S1.  Hardware appears intact.     Lumbar vertebral body heights are maintained.     Disc spaces are maintained.     Slight grade 1 anterolisthesis L4-5 without significant change with flexion or extension.  Mild levoconvex curvature lumbar spine.    MRI of the lumbar spine  without contrast     HISTORY: Low back pain, previous back surgery.     Multiplanar noncontrast imaging is performed.     There is leftward curvature of the lumbar spine.     Laminectomy defects are observed at the L5 level. There has been instrumented fusion at L5-S1 with bilateral posterior grace and pedicle screw fixation. Metallic interbody fusion devices are appropriately positioned at the L5-S1 disc space.     There is relative preservation of disc space height and disc signal at the remaining lumbar levels.     Vertebral body heights and alignment are satisfactorily maintained.     At L1-2, there is no disc bulging or soft disc herniation. The central canal and foramina are patent.     At L2-3, there is no significant disc bulging or focal soft disc herniation. There is mild ligamentum flavum hypertrophy. The central canal and foramina are not significantly encroached.     At L3-4, is no significant disc bulging or focal soft disc herniation. There are mild facet degenerative changes and ligamentum flavum hypertrophy slightly encroaching the central spinal canal and contributing to minor foraminal narrowing.     At L4-5, broad-based bulging of the disc margin is asymmetric towards the left of midline. There is bilateral facet arthropathy and ligamentum flavum hypertrophy resulting in a mild to moderate degree of encroachment of the central spinal canal. There is moderate left foraminal and mild right foraminal narrowing.     At L5-S1, there is mild posterior osteophytic ridging. The central spinal canal and foramina are not significantly narrowed.     The conus terminates appropriately and demonstrates normal signal. The paraspinal soft tissues appear unremarkable.     IMPRESSION:     Surgical changes associated with instrumented fusion at L5-S1.     Additional degenerative changes contributing to central canal and foraminal narrowing, most notably at L4-5. See above details.     Electronically signed by:  Maya  Raquel SQUIRES  11/9/2022 8:20 AM CST      Impression:     Please see above.        Electronically signed by: Rossana Riky  Date:                                            05/01/2023    ASSESSMENT: 57 y.o. year old female with pain, consistent with:    Encounter Diagnoses   Name Primary?    Pain of right hip     Trochanteric bursitis of right hip Yes    Myalgia        DISCUSSION: Ms. Chamberlain is on disability and originally came to me with left leg pain. This was resolved with a TFESI. She presented back with right hip pain. Exam indicated pain in the piriformis and the GTB. Dr. Cruz was concerned about her right L4/5 facet, which does have fluid in it. Pt notes that she has a strange feeling in this area, but that her pain is in the buttock/hip.     OPIOID MANAGEMENT: Fentanyl 25 Patch and Hydrocodone 10 QID per outside MD in [place]  MME: 100    PLAN:  Rt Hip Xray  Recommend heat and not sleeping on right side  Rt GTB and Piriformis injection  Follow up 2 weeks later  Will address facet as needed    Loreta Brito  05/01/2023

## 2023-05-01 NOTE — PROGRESS NOTES
Dear   ,    Thank you for referring this patient to my clinic. The full details of my evaluation will also be forthcoming to you by letter.    CHIEF COMPLAINT:  Low back pain    HPI:  Tanesha Chamberlain is a 57 y.o.  female with h/o of a left L4 radic in setting of far lateral disc herniation. Her pain improved with surgery and repeat injections. Now she is having debilitating low back and right hip and groin pain. It is worse in morning and night and triggered by certain movements in bed. She had an injection that helped for a few days but she doesn't recall level. She denies radicular leg pain.    Additionally she is concerned about feeling that her spine feels unstable at the lower mid back but denies pain.    Review of patient's allergies indicates:   Allergen Reactions    Nsaids (non-steroidal anti-inflammatory drug) Other (See Comments)     GI upset  GI upset      Ibuprofen Other (See Comments)     Other reaction(s): crohns flair up  Other reaction(s): crohns flair up       Past Medical History:   Diagnosis Date    ADHD (attention deficit hyperactivity disorder)     Arthritis     Crohn's disease     Diabetes mellitus, type 2     Hypertension     Hypothyroidism     Lumbar back pain with radiculopathy affecting left lower extremity     SBO (small bowel obstruction)      Past Surgical History:   Procedure Laterality Date    ANTERIOR CRUCIATE LIGAMENT REPAIR Right     APPENDECTOMY      1988    BUNIONECTOMY Bilateral     CHOLECYSTECTOMY      2005    EXCISION OF MASS OF BACK Left 9/21/2022    Procedure: EXCISION, MASS, BACK;  Surgeon: Sae Collins III, MD;  Location: [facility] OR;  Service: General;  Laterality: Left;    HYSTERECTOMY      MINIMALLY INVASIVE SURGICAL REMOVAL OF INTERVERTEBRAL DISC OF SPINE USING MICROSCOPE Left 05/11/2021    Procedure: Left L4-5 Far Lateral Disectomy, MIS;  Surgeon: Johnny Cruz MD;  Location: [address];  Service: Neurosurgery;  Laterality: Left;    SHOULDER SURGERY Right      SPINE SURGERY      TRANSFORAMINAL EPIDURAL INJECTION OF STEROID Left 03/10/2021    Procedure: INJECTION, STEROID, EPIDURAL, TRANSFORAMINAL APPROACH  L4-5;  Surgeon: Jaylon Tyson MD;  Location: [facility];  Service: Pain Management;  Laterality: Left;  consent needed  ([employer] [facility])    TRANSFORAMINAL EPIDURAL INJECTION OF STEROID Left 2022    Procedure: LUMBAR TRANSFORAMINAL LEFT L4/5 MEDICALLY URGENT;  Surgeon: Loreta Brito MD;  Location: [facility];  Service: Pain Management;  Laterality: Left;     Family History   Problem Relation Age of Onset    Cirrhosis Neg Hx      Social History     Tobacco Use    Smoking status: Former     Types: Cigarettes     Quit date: 2020     Years since quittin.3    Smokeless tobacco: Former    Tobacco comments:     in the 8 th grade   Substance Use Topics    Alcohol use: Yes     Alcohol/week: 4.0 standard drinks     Types: 4 Cans of beer per week     Comment: 1 beer , once in 2 weeks    Drug use: No        Review of Systems    OBJECTIVE:       Vital Signs:  Pulse: 87 (23 1004)  BP: (!) 152/97 (23 1004)    Physical Exam:    Vital signs: All nursing notes and vital signs reviewed -- afebrile, vital signs stable.  Constitutional: Patient sitting comfortably in chair. Appears well developed and well nourished.  Skin: Exposed areas are intact without abnormal markings, rashes or other lesions.  HEENT: Normocephalic. Normal conjunctivae.  Cardiovascular: Normal rate and regular rhythm.  Respiratory: Chest wall rises and falls symmetrically, without signs of respiratory distress.  Abdomen: Soft and non-tender.  Extremities: Warm and without edema. Calves supple, non-tender.  Psych/Behavior: Normal affect.    Neurological:    Mental status: Alert and oriented. Conversational and appropriate.       Cranial Nerves: Grossly intact    Motor:       Lower:  HF KE KF DF PF EHL    R 5/5 5/5 5/5 5/5 5/5 5/5    L 5/5 5/5 5/5 5/5 5/5 5/5        Spine:    Well healed surgical scars in lumbar region    Thoracic:     Midline TTP: Negative    Lumbar:     Midline TTP: Positive over right L4-5 facet    Other:     SI joint TTP: Negative.     Greater trochanter TTP: Negative.     Tenderness with external/internal hip rotation: Negative.    Diagnostic Results:  All imaging was independently reviewed by me.      MRI L spine:  1. Spondylosis without significant central or foraminal stenosis    Flex/Ex X-ray L spine:  1. No gross instability, including at the TL junction      ASSESSMENT/PLAN:     Tanesha Chamberlain has low back pain which appears to be facet mediated at L4-5 and with referred pain to the right hip and groin. I do not see surgical indications at the moment but I would recommend right L4-5 facet injections for diagnostic and therapeutic purposes. She will RTC PRN. An L4-5 fusion could be considered in the future if refractory to medical management.    The patient understands and agrees with the plan of care. All questions were answered.     1. Right L4-5 facet injections by PM  2. RTC PRN          Johnny Cruz M.D.  Department of Neurosurgery  [facility]      .

## 2023-05-02 ENCOUNTER — HOSPITAL ENCOUNTER (OUTPATIENT)
Dept: RADIOLOGY | Facility: OTHER | Age: 58
Discharge: HOME | End: 2023-05-02
Attending: ANESTHESIOLOGY
Payer: COMMERCIAL

## 2023-05-02 ENCOUNTER — PATIENT MESSAGE (OUTPATIENT)
Dept: PAIN MEDICINE | Facility: OTHER | Age: 58
End: 2023-05-02
Payer: COMMERCIAL

## 2023-05-02 DIAGNOSIS — M25.551 PAIN OF RIGHT HIP: ICD-10-CM

## 2023-05-02 PROCEDURE — 73502 XR HIP WITH PELVIS WHEN PERFORMED, 2 OR 3  VIEWS RIGHT: ICD-10-PCS | Mod: 26,RT,COE, | Performed by: RADIOLOGY

## 2023-05-02 PROCEDURE — 73502 X-RAY EXAM HIP UNI 2-3 VIEWS: CPT | Mod: 26,RT,COE, | Performed by: RADIOLOGY

## 2023-05-02 PROCEDURE — 73502 X-RAY EXAM HIP UNI 2-3 VIEWS: CPT | Mod: TC,FY,RT

## 2023-05-04 ENCOUNTER — PATIENT MESSAGE (OUTPATIENT)
Dept: FAMILY MEDICINE | Facility: CLINIC | Age: 58
End: 2023-05-04
Payer: COMMERCIAL

## 2023-05-09 ENCOUNTER — HOSPITAL ENCOUNTER (OUTPATIENT)
Facility: OTHER | Age: 58
Discharge: HOME | End: 2023-05-09
Attending: ANESTHESIOLOGY | Admitting: ANESTHESIOLOGY
Payer: COMMERCIAL

## 2023-05-09 VITALS
OXYGEN SATURATION: 97 % | DIASTOLIC BLOOD PRESSURE: 87 MMHG | RESPIRATION RATE: 16 BRPM | HEART RATE: 80 BPM | TEMPERATURE: 97 F | SYSTOLIC BLOOD PRESSURE: 144 MMHG

## 2023-05-09 DIAGNOSIS — M70.61 GREATER TROCHANTERIC BURSITIS OF RIGHT HIP: ICD-10-CM

## 2023-05-09 DIAGNOSIS — G89.29 CHRONIC PAIN: ICD-10-CM

## 2023-05-09 DIAGNOSIS — G57.01 PIRIFORMIS SYNDROME OF RIGHT SIDE: Primary | ICD-10-CM

## 2023-05-09 LAB — POCT GLUCOSE: 102 MG/DL (ref 70–110)

## 2023-05-09 PROCEDURE — 20552 PR INJECT TRIGGER POINT, 1 OR 2: ICD-10-PCS | Mod: 59,,, | Performed by: ANESTHESIOLOGY

## 2023-05-09 PROCEDURE — 63600175 PHARM REV CODE 636 W HCPCS: Performed by: ANESTHESIOLOGY

## 2023-05-09 PROCEDURE — 20552 NJX 1/MLT TRIGGER POINT 1/2: CPT | Mod: 59,,, | Performed by: ANESTHESIOLOGY

## 2023-05-09 PROCEDURE — 20552 NJX 1/MLT TRIGGER POINT 1/2: CPT | Mod: 59 | Performed by: ANESTHESIOLOGY

## 2023-05-09 PROCEDURE — 20610 PR DRAIN/INJECT LARGE JOINT/BURSA: ICD-10-PCS | Mod: RT,,, | Performed by: ANESTHESIOLOGY

## 2023-05-09 PROCEDURE — 20610 DRAIN/INJ JOINT/BURSA W/O US: CPT | Mod: RT,,, | Performed by: ANESTHESIOLOGY

## 2023-05-09 PROCEDURE — 25000003 PHARM REV CODE 250: Performed by: ANESTHESIOLOGY

## 2023-05-09 PROCEDURE — 20610 DRAIN/INJ JOINT/BURSA W/O US: CPT | Mod: RT | Performed by: ANESTHESIOLOGY

## 2023-05-09 PROCEDURE — 25500020 PHARM REV CODE 255: Performed by: ANESTHESIOLOGY

## 2023-05-09 RX ORDER — BUPIVACAINE HYDROCHLORIDE 2.5 MG/ML
INJECTION, SOLUTION EPIDURAL; INFILTRATION; INTRACAUDAL
Status: DISCONTINUED | OUTPATIENT
Start: 2023-05-09 | End: 2023-05-09 | Stop reason: HOSPADM

## 2023-05-09 RX ORDER — TRIAMCINOLONE ACETONIDE 40 MG/ML
INJECTION, SUSPENSION INTRA-ARTICULAR; INTRAMUSCULAR
Status: DISCONTINUED | OUTPATIENT
Start: 2023-05-09 | End: 2023-05-09 | Stop reason: HOSPADM

## 2023-05-09 RX ORDER — LIDOCAINE HYDROCHLORIDE 20 MG/ML
INJECTION, SOLUTION INFILTRATION; PERINEURAL
Status: DISCONTINUED | OUTPATIENT
Start: 2023-05-09 | End: 2023-05-09 | Stop reason: HOSPADM

## 2023-05-09 RX ORDER — ALPRAZOLAM 0.5 MG/1
1 TABLET ORAL ONCE
Status: COMPLETED | OUTPATIENT
Start: 2023-05-09 | End: 2023-05-09

## 2023-05-09 RX ORDER — SODIUM CHLORIDE 9 MG/ML
500 INJECTION, SOLUTION INTRAVENOUS CONTINUOUS
Status: DISCONTINUED | OUTPATIENT
Start: 2023-05-09 | End: 2023-05-09 | Stop reason: HOSPADM

## 2023-05-09 RX ORDER — DEXAMETHASONE SODIUM PHOSPHATE 10 MG/ML
INJECTION INTRAMUSCULAR; INTRAVENOUS
Status: DISCONTINUED | OUTPATIENT
Start: 2023-05-09 | End: 2023-05-09 | Stop reason: HOSPADM

## 2023-05-09 RX ADMIN — ALPRAZOLAM 1 MG: 0.5 TABLET ORAL at 07:05

## 2023-05-09 NOTE — INTERVAL H&P NOTE
The patient has been examined and the H&P has been reviewed:    I concur with the findings and no changes have occurred since H&P was written.    Procedure risks, benefits and alternative options discussed and understood by patient/family.

## 2023-05-09 NOTE — OP NOTE
Patient Name: Tanesha Chamberlain  MRN: [id number]    INFORMED CONSENT: The procedure, risks, benefits and options were discussed with patient. There are no contraindications to the procedure. The patient expressed understanding and agreed to proceed. The personnel performing the procedure was discussed. I verify that I personally obtained Tanesha's consent prior to the start of the procedure and the signed consent can be found on the patient's chart.    Procedure Date: 05/09/2023    MEDICATIONS INJECTED: Preservative-free Kenalog 40mg with 7cc of Bupivacine 0.50%     LOCAL ANESTHETIC INJECTED: Xylocaine 2%     ASSISTANT: Francesco Jameson MD Pain Fellow    Pre Procedure diagnosis: Myositis, Unspecified, M60.9, Myalgia M79.10    Post-Procedure diagnosis: SAME    Sedation: None    PROCEDURE: RIGHT PIRIFORMIS MUSCLE INJECTION and RIGHT GTB INJECTION    DESCRIPTION OF PROCEDURE: The patient was brought to the procedure room. The patient was positioned prone on the fluoroscopy table. Continuous hemodynamic monitoring was initiated including blood pressure, EKG, and pulse oximetry. The skin was prepped with chlorhexidine three times, and draped in a sterile fashion. The lower pole of the sacroiliac joint was visualized using AP fluoroscopy. Skin anesthesia was achieved by injecting Lidocaine 2% over the injection site. A 20G 3.5 inch Tuohy needle was advanced to the lower pole of the SIJ. Noting this depth, the Tuohy was retracted and redirected to a location 1cm lateral, 1cm inferior, and 1cm deeper than the lower pole of the SIJ. After negative aspiration for blood, 1 ml of Omnipaque 300 contrast agent was slowly injected. There was no pain with injection. Confirmation of spread of contrast within the piriformis muscle was made in the AP fluoroscopic view. Subsequently, 4 mL of the medication mixture listed above was injected slowly. The needle was removed and bleeding was nil. A sterile dressing was applied.    The area  overlying the greater trochanteric bursa was determined under fluoroscopy guidance. Skin anesthesia was achieved by injecting Lidocaine 2% over the injection site. The greater trochanteric bursa was then approached with a 22 gauge, 5 inch spinal quinke needle that was introduced under fluoroscopic guidance in the AP view. Once the needle tip was in the area of the bursa, and there was no blood aspiration, Contrast dye  Omnipaque (300mg/mL) was injected to confirm placement and there was no vascular runoff. 4 mL of the medication mixture listed above was injected slowly. The needles were removed and bleeding was nil. A sterile dressing was applied. No specimens collected. The patient tolerated the procedure well.    The patient was monitored after the procedure in the recovery area. They were given post-procedure and discharge instructions to follow at home. The patient was discharged in a stable condition.     Tanesha was taken back to the recovery room for further observation.     Blood Loss: Nill  Specimen: None    Loreta Brito MD  05/09/2023

## 2023-05-09 NOTE — DISCHARGE INSTRUCTIONS

## 2023-05-09 NOTE — DISCHARGE SUMMARY
"Discharge Note  Short Stay      SUMMARY     Admit Date: 5/9/2023    Attending Physician: Loreta Brito      Discharge Physician: Loreta Brito      Discharge Date: 5/9/2023 8:32 AM    Procedure(s) (LRB):  INJECTION, JOINT, RIGHT PIRIFORMIS AND RIGHT GTB (Right)    Final Diagnosis: Myalgia [M79.10]    Disposition: Home or self care    Patient Instructions:   Current Discharge Medication List        CONTINUE these medications which have NOT CHANGED    Details   atorvastatin (LIPITOR) 10 MG tablet Take 1 tablet (10 mg total) by mouth once daily.  Qty: 90 tablet, Refills: 1    Associated Diagnoses: Hyperlipidemia, unspecified hyperlipidemia type      BD ULTRA-FINE SHORT PEN NEEDLE 31 gauge x 5/16" Ndle 1 each by Misc.(Non-Drug; Combo Route) route once daily.  Qty: 100 each, Refills: 1    Associated Diagnoses: Type 2 diabetes mellitus with hyperglycemia, without long-term current use of insulin      blood sugar diagnostic (ONETOUCH ULTRA BLUE TEST STRIP) Strp Check blood glucose readings twice a day-- before meal and 1 to 2 hours post meal  Qty: 100 strip, Refills: 0      blood-glucose meter Misc Check blood glucose readings twice a day-- before meal and 1 to 2 hours post meal  Qty: 1 each, Refills: 0      dulaglutide (TRULICITY) 1.5 mg/0.5 mL pen injector Inject 1.5 mg into the skin every 7 days.  Qty: 4 pen, Refills: 5    Associated Diagnoses: Type 2 diabetes mellitus with hyperglycemia, without long-term current use of insulin      estradioL (ESTRACE) 2 MG tablet TAKE 1 TABLET BY MOUTH ONCE DAILY.  Qty: 90 tablet, Refills: 1    Associated Diagnoses: Hormone replacement therapy (HRT)      fentaNYL (DURAGESIC) 25 mcg/hr Place 1 patch onto the skin Every 3 (three) days.      hydroCHLOROthiazide (HYDRODIURIL) 25 MG tablet TAKE 1 TABLET BY MOUTH EVERY DAY  Qty: 90 tablet, Refills: 1    Associated Diagnoses: Essential hypertension      HYDROcodone-acetaminophen (NORCO)  mg per tablet Take 1-2 tablets by mouth " every 8 (eight) hours.      ketoconazole (NIZORAL) 2 % cream Apply topically every evening.  Qty: 60 g, Refills: 1    Associated Diagnoses: Seborrheic dermatitis      lancets 33 gauge Misc Check blood glucose readings twice a day-- before meal and 1 to 2 hours post meal  Qty: 100 each, Refills: 0      tiZANidine (ZANAFLEX) 4 MG tablet Take 4 mg by mouth 4 (four) times daily as needed.      TRESIBA FLEXTOUCH U-100 100 unit/mL (3 mL) insulin pen SMARTSI Unit(s) SUB-Q Daily                 Discharge Diagnosis: Myalgia [M79.10]  Condition on Discharge: Stable with no complications to procedure   Diet on Discharge: Same as before.  Activity: as per instruction sheet.  Discharge to: Home with a responsible adult.  Follow up: 2-4 weeks       Please call my office or pager at [phone] if experienced any weakness or loss of sensation, fever > 101.5, pain uncontrolled with oral medications, persistent nausea/vomiting/or diarrhea, redness or drainage from the incisions, or any other worrisome concerns. If physician on call was not reached or could not communicate with our office for any reason please go to the nearest emergency department      Loreta Brito  2023

## 2023-05-16 ENCOUNTER — PATIENT MESSAGE (OUTPATIENT)
Dept: ADMINISTRATIVE | Facility: OTHER | Age: 58
End: 2023-05-16
Payer: COMMERCIAL

## 2023-05-16 ENCOUNTER — OFFICE VISIT (OUTPATIENT)
Dept: FAMILY MEDICINE | Facility: CLINIC | Age: 58
End: 2023-05-16
Payer: COMMERCIAL

## 2023-05-16 VITALS
OXYGEN SATURATION: 97 % | BODY MASS INDEX: 30.42 KG/M2 | DIASTOLIC BLOOD PRESSURE: 82 MMHG | SYSTOLIC BLOOD PRESSURE: 136 MMHG | HEART RATE: 103 BPM | TEMPERATURE: 97 F | HEIGHT: 64 IN | WEIGHT: 178.19 LBS

## 2023-05-16 DIAGNOSIS — Z79.4 TYPE 2 DIABETES MELLITUS WITH HYPERGLYCEMIA, WITH LONG-TERM CURRENT USE OF INSULIN: ICD-10-CM

## 2023-05-16 DIAGNOSIS — E11.65 TYPE 2 DIABETES MELLITUS WITH HYPERGLYCEMIA, WITH LONG-TERM CURRENT USE OF INSULIN: ICD-10-CM

## 2023-05-16 DIAGNOSIS — M51.9 LUMBAR DISC DISEASE: ICD-10-CM

## 2023-05-16 DIAGNOSIS — E89.0 H/O PARTIAL THYROIDECTOMY: ICD-10-CM

## 2023-05-16 DIAGNOSIS — F98.8 ATTENTION DEFICIT DISORDER, UNSPECIFIED HYPERACTIVITY PRESENCE: ICD-10-CM

## 2023-05-16 DIAGNOSIS — Z87.891 FORMER SMOKER: ICD-10-CM

## 2023-05-16 DIAGNOSIS — G89.29 OTHER CHRONIC PAIN: ICD-10-CM

## 2023-05-16 DIAGNOSIS — K50.919 CROHN'S DISEASE WITH COMPLICATION, UNSPECIFIED GASTROINTESTINAL TRACT LOCATION: ICD-10-CM

## 2023-05-16 DIAGNOSIS — E78.5 HYPERLIPIDEMIA, UNSPECIFIED HYPERLIPIDEMIA TYPE: Primary | ICD-10-CM

## 2023-05-16 PROCEDURE — 99214 OFFICE O/P EST MOD 30 MIN: CPT | Mod: S$GLB,,, | Performed by: FAMILY MEDICINE

## 2023-05-16 PROCEDURE — 99214 PR OFFICE/OUTPT VISIT, EST, LEVL IV, 30-39 MIN: ICD-10-PCS | Mod: S$GLB,,, | Performed by: FAMILY MEDICINE

## 2023-05-16 RX ORDER — ESTRADIOL 2 MG/1
2 TABLET ORAL DAILY
Qty: 90 TABLET | Refills: 1 | Status: SHIPPED | OUTPATIENT
Start: 2023-05-16 | End: 2024-02-08

## 2023-05-16 RX ORDER — ATORVASTATIN CALCIUM 10 MG/1
10 TABLET, FILM COATED ORAL DAILY
Qty: 90 TABLET | Refills: 1 | Status: SHIPPED | OUTPATIENT
Start: 2023-05-16

## 2023-05-16 RX ORDER — ATOMOXETINE 40 MG/1
40 CAPSULE ORAL DAILY
Qty: 30 CAPSULE | Refills: 1 | Status: SHIPPED | OUTPATIENT
Start: 2023-05-16 | End: 2023-07-14

## 2023-05-16 RX ORDER — ATOMOXETINE 40 MG/1
40 CAPSULE ORAL DAILY
COMMUNITY
End: 2023-05-16 | Stop reason: SDUPTHER

## 2023-05-16 RX ORDER — HYDROCHLOROTHIAZIDE 25 MG/1
25 TABLET ORAL DAILY
Qty: 90 TABLET | Refills: 1 | Status: SHIPPED | OUTPATIENT
Start: 2023-05-16 | End: 2024-02-08

## 2023-05-16 RX ORDER — INSULIN DEGLUDEC 100 U/ML
25 INJECTION, SOLUTION SUBCUTANEOUS DAILY
Qty: 5 PEN | Refills: 0 | Status: SHIPPED | OUTPATIENT
Start: 2023-05-16 | End: 2023-07-14 | Stop reason: SDUPTHER

## 2023-05-17 PROBLEM — E78.5 HYPERLIPIDEMIA: Status: ACTIVE | Noted: 2023-05-17

## 2023-05-20 PROBLEM — F98.8 ATTENTION DEFICIT DISORDER: Status: ACTIVE | Noted: 2023-05-20

## 2023-05-20 NOTE — PROGRESS NOTES
Subjective:       Patient ID: Tanesha Chamberlain is a 57 y.o. female.    Chief Complaint: Follow-up    Lumbar disc disease.  Needs fusion.  Had prior surgery [facility].  Diabetes mellitus type 2 diagnosed March of 2021.  On insulin.  Tresiba 17 units per day Trulicity 1.5 glucose increased with epidural steroids started around 130 then went up.  Intolerant of metformin.  Glucose now 190.  A1c 7.9 on April 24th.  Crohn's disease no surgery for this.  No medications for it currently has used Dr. Fraser.  Chronic pain fentanyl and hydrocodone.  Former smoker only a 3rd pack per day quit in 2019.  Partial thyroidectomy.  TSH is 1.5 in February.  Hyperlipidemia cholesterol 112 LDL 42.  Doing well.  Attention deficit disorder.  Adderall for as a  ADD symptoms all her life.  Very poor focus.  No medication since 2016.  Would like to get back on it.  There is a family history of stroke.    Physical examination.  Vital signs noted.  No acute distress.  Alert oriented.  Neck without bruit.  Chest clear.  Heart regular rate and rhythm.  Abdomen bowel sounds positive soft nontender.  Extremities without edema positive pulses.      Objective:        Assessment:       1. Hyperlipidemia, unspecified hyperlipidemia type    2. Crohn's disease with complication, unspecified gastrointestinal tract location    3. Lumbar disc disease    4. Other chronic pain    5. Former smoker    6. H/O partial thyroidectomy    7. Attention deficit disorder, unspecified hyperactivity presence    8. Type 2 diabetes mellitus with hyperglycemia, with long-term current use of insulin        Plan:       Hyperlipidemia, unspecified hyperlipidemia type    Crohn's disease with complication, unspecified gastrointestinal tract location    Lumbar disc disease    Other chronic pain    Former smoker    H/O partial thyroidectomy    Attention deficit disorder, unspecified hyperactivity presence    Type 2 diabetes mellitus with hyperglycemia,  with long-term current use of insulin    Other orders  -     estradioL (ESTRACE) 2 MG tablet; Take 1 tablet (2 mg total) by mouth once daily.  Dispense: 90 tablet; Refill: 1  -     atorvastatin (LIPITOR) 10 MG tablet; Take 1 tablet (10 mg total) by mouth once daily.  Dispense: 90 tablet; Refill: 1  -     hydroCHLOROthiazide (HYDRODIURIL) 25 MG tablet; Take 1 tablet (25 mg total) by mouth once daily.  Dispense: 90 tablet; Refill: 1  -     TRESIBA FLEXTOUCH U-100 100 unit/mL (3 mL) insulin pen; Inject 25 Units into the skin once daily.  Dispense: 5 pen; Refill: 0  -     atomoxetine (STRATTERA) 40 MG capsule; Take 1 capsule (40 mg total) by mouth once daily.  Dispense: 30 capsule; Refill: 1    Handicapped forearm completed.  Aspirin 81 daily recommended.  Colonoscopy report from Dr. Fraser.  Increase the Tresiba to 25 units per day.  Call morning sugars to us in 7 days.  Eye exam in July.  Will see Dr. Gold.  Start Strattera 40 mg daily 30 with a refill.  Follow-up on this in 1 month.

## 2023-05-22 ENCOUNTER — PATIENT OUTREACH (OUTPATIENT)
Dept: ADMINISTRATIVE | Facility: HOSPITAL | Age: 58
End: 2023-05-22
Payer: COMMERCIAL

## 2023-05-22 NOTE — PROGRESS NOTES
Population Health Chart Review & Patient Outreach Details:     Reason for Outreach Encounter:     [x]  Non-Compliant Report   []  Payor Report ([employer], PHN, BCBS, MSSP, MCIP, UHC, etc.)   []  Pre-Visit Chart Review     Updates Requested / Reviewed:     []  Care Everywhere    []     []  External Sources ([facility], [facility], DIS, etc.)   []  Care Team Updated    Patient Outreach Method:    []  Telephone Outreach Completed   [] Successful   [] Left Voicemail   [] Unable to Contact (wrong number, no voicemail)  []  [facility] Portal Outreach Sent  []  Letter Outreach Mailed  [x]  Fax Sent for External Records  []  External Records Upload    Health Maintenance Topics Addressed and Outreach Outcomes / Actions Taken:        []      Breast Cancer Screening []  Mammo Scheduled      []  External Records Requested     []  Added Reminder to Complete to Upcoming Primary Care Appt Notes     []  Patient Declined     []  Patient Will Call Back to Schedule     []  Patient Will Schedule with External Provider / Order Routed if Applicable             []       Cervical Cancer Screening []  Pap Scheduled      []  External Records Requested     []  Added Reminder to Complete to Upcoming Primary Care Appt Notes     []  Patient Declined     []  Patient Will Call Back to Schedule     []  Patient Will Schedule with External Provider               [x]          Colorectal Cancer Screening []  Colonoscopy Case Request or Referral Placed     [x]  External Records Requested from Dr Fraser     []  Added Reminder to Complete to Upcoming Primary Care Appt Notes     []  Patient Declined     []  Patient Will Call Back to Schedule     []  Patient Will Schedule with External Provider     []  Fit Kit Mailed (add the SmartPhrase under additional notes)     []  Reminded Patient to Complete Home Test             []      Diabetic Eye Exam []  Eye Camera Scheduled or Optometry Referral Placed     []  External Records Requested     []  Added Reminder  to Complete to Upcoming Primary Care Appt Notes     []  Patient Declined     []  Patient Will Call Back to Schedule     []  Patient Will Schedule with External Provider             []      Blood Pressure Control []  Primary Care Follow Up Visit Scheduled     []  Remote Blood Pressure Reading Captured     []  Added Reminder to Complete to Upcoming Primary Care Appt Notes     []  Patient Declined     []  Patient Will Call Back / Patient Will Send Portal Message with Reading     []  Patient Will Call Back to Schedule Provider Visit             []       HbA1c & Other Labs []  Lab Appt Scheduled for Due Labs     []  Primary Care Follow Up Visit Scheduled      []  Reminded Patient to Complete Home Test     []  Added Reminder to Complete to Upcoming Primary Care Appt Notes     []  Patient Declined     []  Patient Will Call Back to Schedule     []  Patient Will Schedule with External Provider / Order Routed if Applicable           []    Schedule Primary Care Appt []  Primary Care Appt Scheduled     []  Patient Declined     []  Patient Will Call Back to Schedule     []  Pt Established with External Provider & Updated Care Team             []      Medication Adherence []  Primary Care Appointment Scheduled     []  Added Reminder to Upcoming Primary Care Appt Notes     []  Patient Reminded to  Prescription     []  Patient Declined, Provider Notified if Needed     []  Sent Provider Message to Review and/or Add Exclusion to Problem List             []      Osteoporosis Screening []  DXA Appointment Scheduled     []  External Records Requested     []  Added Reminder to Complete to Upcoming Primary Care Appt Notes     []  Patient Declined     []  Patient Will Call Back to Schedule     []  Patient Will Schedule with External Provider / Order Routed if Applicable     Additional Care Coordinator Notes:         Further Action Needed If Patient Returns Outreach:

## 2023-05-22 NOTE — LETTER
AUTHORIZATION FOR RELEASE OF   CONFIDENTIAL INFORMATION    Dear Dr Fraser,    We are seeing Tanesha Chamberlain, date of birth 1965, in the clinic at [facility]. Norberto Ac III, MD is the patient's PCP. Tanesha Chamberlain has an outstanding lab/procedure at the time we reviewed her chart. In order to help keep her health information updated, she has authorized us to request the following medical record(s):        (  )  MAMMOGRAM                                      ( X )  COLONOSCOPY      (  )  PAP SMEAR                                          (  )  OUTSIDE LAB RESULTS     (  )  DEXA SCAN                                          (  )  EYE EXAM            (  )  FOOT EXAM                                          (  )  ENTIRE RECORD     (  )  OUTSIDE IMMUNIZATIONS                 (  )  _______________         Please fax records to [facility], Clinton H Sharp III, MD at [phone]    Thanks so much and have a great day!    Laurie Garcia LPN [facility]  [address] [facility] [place] [address]  - [phone]   [phone]          Patient Name: Tanesha Chamberlain  : 1965  Patient Phone #: [phone]

## 2023-05-25 ENCOUNTER — PATIENT MESSAGE (OUTPATIENT)
Dept: FAMILY MEDICINE | Facility: CLINIC | Age: 58
End: 2023-05-25
Payer: COMMERCIAL

## 2023-06-12 ENCOUNTER — PATIENT MESSAGE (OUTPATIENT)
Dept: SPINE | Facility: CLINIC | Age: 58
End: 2023-06-12
Payer: COMMERCIAL

## 2023-06-15 ENCOUNTER — PATIENT MESSAGE (OUTPATIENT)
Dept: FAMILY MEDICINE | Facility: CLINIC | Age: 58
End: 2023-06-15
Payer: COMMERCIAL

## 2023-06-15 ENCOUNTER — LAB VISIT (OUTPATIENT)
Dept: LAB | Facility: HOSPITAL | Age: 58
End: 2023-06-15
Attending: FAMILY MEDICINE
Payer: COMMERCIAL

## 2023-06-15 DIAGNOSIS — E11.65 TYPE 2 DIABETES MELLITUS WITH HYPERGLYCEMIA, WITH LONG-TERM CURRENT USE OF INSULIN: ICD-10-CM

## 2023-06-15 DIAGNOSIS — Z79.4 TYPE 2 DIABETES MELLITUS WITH HYPERGLYCEMIA, WITH LONG-TERM CURRENT USE OF INSULIN: Primary | ICD-10-CM

## 2023-06-15 DIAGNOSIS — E78.5 HYPERLIPIDEMIA, UNSPECIFIED HYPERLIPIDEMIA TYPE: ICD-10-CM

## 2023-06-15 DIAGNOSIS — Z79.4 TYPE 2 DIABETES MELLITUS WITH HYPERGLYCEMIA, WITH LONG-TERM CURRENT USE OF INSULIN: ICD-10-CM

## 2023-06-15 DIAGNOSIS — E11.65 TYPE 2 DIABETES MELLITUS WITH HYPERGLYCEMIA, WITH LONG-TERM CURRENT USE OF INSULIN: Primary | ICD-10-CM

## 2023-06-15 DIAGNOSIS — I10 ESSENTIAL HYPERTENSION: ICD-10-CM

## 2023-06-15 LAB
ALBUMIN SERPL BCP-MCNC: 4.3 G/DL (ref 3.5–5.2)
ALP SERPL-CCNC: 42 U/L (ref 55–135)
ALT SERPL W/O P-5'-P-CCNC: 33 U/L (ref 10–44)
ANION GAP SERPL CALC-SCNC: 11 MMOL/L (ref 8–16)
AST SERPL-CCNC: 29 U/L (ref 10–40)
BASOPHILS # BLD AUTO: 0.07 K/UL (ref 0–0.2)
BASOPHILS NFR BLD: 0.7 % (ref 0–1.9)
BILIRUB SERPL-MCNC: 0.6 MG/DL (ref 0.1–1)
BUN SERPL-MCNC: 10 MG/DL (ref 6–20)
CALCIUM SERPL-MCNC: 9.4 MG/DL (ref 8.7–10.5)
CHLORIDE SERPL-SCNC: 101 MMOL/L (ref 95–110)
CHOLEST SERPL-MCNC: 131 MG/DL (ref 120–199)
CHOLEST/HDLC SERPL: 2 {RATIO} (ref 2–5)
CO2 SERPL-SCNC: 28 MMOL/L (ref 23–29)
CREAT SERPL-MCNC: 0.7 MG/DL (ref 0.5–1.4)
DIFFERENTIAL METHOD: ABNORMAL
EOSINOPHIL # BLD AUTO: 0 K/UL (ref 0–0.5)
EOSINOPHIL NFR BLD: 0 % (ref 0–8)
ERYTHROCYTE [DISTWIDTH] IN BLOOD BY AUTOMATED COUNT: 13.9 % (ref 11.5–14.5)
EST. GFR  (NO RACE VARIABLE): >60 ML/MIN/1.73 M^2
ESTIMATED AVG GLUCOSE: 163 MG/DL (ref 68–131)
GLUCOSE SERPL-MCNC: 90 MG/DL (ref 70–110)
HBA1C MFR BLD: 7.3 % (ref 4.5–6.2)
HCT VFR BLD AUTO: 48.9 % (ref 37–48.5)
HDLC SERPL-MCNC: 64 MG/DL (ref 40–75)
HDLC SERPL: 48.9 % (ref 20–50)
HGB BLD-MCNC: 16.5 G/DL (ref 12–16)
IMM GRANULOCYTES # BLD AUTO: 0.03 K/UL (ref 0–0.04)
IMM GRANULOCYTES NFR BLD AUTO: 0.3 % (ref 0–0.5)
LDLC SERPL CALC-MCNC: 44.2 MG/DL (ref 63–159)
LYMPHOCYTES # BLD AUTO: 3.3 K/UL (ref 1–4.8)
LYMPHOCYTES NFR BLD: 33.7 % (ref 18–48)
MCH RBC QN AUTO: 30.2 PG (ref 27–31)
MCHC RBC AUTO-ENTMCNC: 33.7 G/DL (ref 32–36)
MCV RBC AUTO: 90 FL (ref 82–98)
MONOCYTES # BLD AUTO: 0.7 K/UL (ref 0.3–1)
MONOCYTES NFR BLD: 7.5 % (ref 4–15)
NEUTROPHILS # BLD AUTO: 5.7 K/UL (ref 1.8–7.7)
NEUTROPHILS NFR BLD: 57.8 % (ref 38–73)
NONHDLC SERPL-MCNC: 67 MG/DL
NRBC BLD-RTO: 0 /100 WBC
PLATELET # BLD AUTO: 397 K/UL (ref 150–450)
PMV BLD AUTO: 8.6 FL (ref 9.2–12.9)
POTASSIUM SERPL-SCNC: 3.8 MMOL/L (ref 3.5–5.1)
PROT SERPL-MCNC: 7.7 G/DL (ref 6–8.4)
RBC # BLD AUTO: 5.46 M/UL (ref 4–5.4)
SODIUM SERPL-SCNC: 140 MMOL/L (ref 136–145)
TRIGL SERPL-MCNC: 114 MG/DL (ref 30–150)
WBC # BLD AUTO: 9.92 K/UL (ref 3.9–12.7)

## 2023-06-15 PROCEDURE — 85025 COMPLETE CBC W/AUTO DIFF WBC: CPT | Performed by: FAMILY MEDICINE

## 2023-06-15 PROCEDURE — 80053 COMPREHEN METABOLIC PANEL: CPT | Performed by: FAMILY MEDICINE

## 2023-06-15 PROCEDURE — 80061 LIPID PANEL: CPT | Performed by: FAMILY MEDICINE

## 2023-06-15 PROCEDURE — 83036 HEMOGLOBIN GLYCOSYLATED A1C: CPT | Performed by: FAMILY MEDICINE

## 2023-06-15 PROCEDURE — 36415 COLL VENOUS BLD VENIPUNCTURE: CPT | Performed by: FAMILY MEDICINE

## 2023-06-16 ENCOUNTER — OFFICE VISIT (OUTPATIENT)
Dept: FAMILY MEDICINE | Facility: CLINIC | Age: 58
End: 2023-06-16
Payer: COMMERCIAL

## 2023-06-16 VITALS
HEART RATE: 90 BPM | OXYGEN SATURATION: 98 % | HEIGHT: 64 IN | BODY MASS INDEX: 30.59 KG/M2 | TEMPERATURE: 95 F | SYSTOLIC BLOOD PRESSURE: 138 MMHG | DIASTOLIC BLOOD PRESSURE: 86 MMHG | WEIGHT: 179.19 LBS

## 2023-06-16 DIAGNOSIS — F98.8 ATTENTION DEFICIT DISORDER, UNSPECIFIED HYPERACTIVITY PRESENCE: Primary | ICD-10-CM

## 2023-06-16 PROCEDURE — 99213 PR OFFICE/OUTPT VISIT, EST, LEVL III, 20-29 MIN: ICD-10-PCS | Mod: S$GLB,,, | Performed by: FAMILY MEDICINE

## 2023-06-16 PROCEDURE — 99213 OFFICE O/P EST LOW 20 MIN: CPT | Mod: S$GLB,,, | Performed by: FAMILY MEDICINE

## 2023-06-16 RX ORDER — DEXTROAMPHETAMINE SACCHARATE, AMPHETAMINE ASPARTATE, DEXTROAMPHETAMINE SULFATE AND AMPHETAMINE SULFATE 2.5; 2.5; 2.5; 2.5 MG/1; MG/1; MG/1; MG/1
TABLET ORAL
Qty: 60 TABLET | Refills: 0 | Status: SHIPPED | OUTPATIENT
Start: 2023-06-16 | End: 2023-07-14

## 2023-06-20 NOTE — PROGRESS NOTES
Subjective:       Patient ID: Tanesha Chamberlain is a 57 y.o. female.    Chief Complaint: Follow-up (1m)    Try the Strattera for 3 weeks.  Made her very angry.  Discontinued it doing better.  Was okay with in for 5 days.  No prior issues.  Doing well now.  Would like to try different medication.    Physical examination.  Vital signs noted.  Pleasant female no acute distress.  Alert oriented.  Chest clear.  Heart regular rate rhythm.      Labs cholesterol 131 HDL 64 LDL 44.2.  A1c 7.3 down from 7.9 CBC hemoglobin is little high at 16.5 was dehydrated then.  CMP is normal.      Objective:        Assessment:       1. Attention deficit disorder, unspecified hyperactivity presence    2. BMI 30.0-30.9,adult        Plan:       Attention deficit disorder, unspecified hyperactivity presence    BMI 30.0-30.9,adult    Other orders  -     dextroamphetamine-amphetamine (ADDERALL) 10 mg Tab; Take one tab bid  Dispense: 60 tablet; Refill: 0    Follow-up in 1 month on Adderall 10 mg b.i.d. 60 pills.

## 2023-07-14 ENCOUNTER — OFFICE VISIT (OUTPATIENT)
Dept: FAMILY MEDICINE | Facility: CLINIC | Age: 58
End: 2023-07-14
Payer: COMMERCIAL

## 2023-07-14 ENCOUNTER — PATIENT OUTREACH (OUTPATIENT)
Dept: ADMINISTRATIVE | Facility: HOSPITAL | Age: 58
End: 2023-07-14
Payer: COMMERCIAL

## 2023-07-14 VITALS
BODY MASS INDEX: 30.3 KG/M2 | SYSTOLIC BLOOD PRESSURE: 128 MMHG | HEART RATE: 99 BPM | DIASTOLIC BLOOD PRESSURE: 76 MMHG | TEMPERATURE: 99 F | OXYGEN SATURATION: 96 % | HEIGHT: 64 IN | WEIGHT: 177.5 LBS

## 2023-07-14 DIAGNOSIS — E11.65 TYPE 2 DIABETES MELLITUS WITH HYPERGLYCEMIA, WITH LONG-TERM CURRENT USE OF INSULIN: ICD-10-CM

## 2023-07-14 DIAGNOSIS — Z79.4 TYPE 2 DIABETES MELLITUS WITH HYPERGLYCEMIA, WITH LONG-TERM CURRENT USE OF INSULIN: ICD-10-CM

## 2023-07-14 DIAGNOSIS — F98.8 ATTENTION DEFICIT DISORDER, UNSPECIFIED HYPERACTIVITY PRESENCE: ICD-10-CM

## 2023-07-14 DIAGNOSIS — I10 ESSENTIAL HYPERTENSION: Primary | ICD-10-CM

## 2023-07-14 DIAGNOSIS — E78.5 HYPERLIPIDEMIA, UNSPECIFIED HYPERLIPIDEMIA TYPE: ICD-10-CM

## 2023-07-14 PROCEDURE — 99214 PR OFFICE/OUTPT VISIT, EST, LEVL IV, 30-39 MIN: ICD-10-PCS | Mod: S$GLB,,, | Performed by: FAMILY MEDICINE

## 2023-07-14 PROCEDURE — 99214 OFFICE O/P EST MOD 30 MIN: CPT | Mod: S$GLB,,, | Performed by: FAMILY MEDICINE

## 2023-07-14 RX ORDER — DEXTROAMPHETAMINE SACCHARATE, AMPHETAMINE ASPARTATE MONOHYDRATE, DEXTROAMPHETAMINE SULFATE AND AMPHETAMINE SULFATE 5; 5; 5; 5 MG/1; MG/1; MG/1; MG/1
20 CAPSULE, EXTENDED RELEASE ORAL EVERY MORNING
COMMUNITY
End: 2023-07-14

## 2023-07-14 RX ORDER — DEXTROAMPHETAMINE SACCHARATE, AMPHETAMINE ASPARTATE, DEXTROAMPHETAMINE SULFATE AND AMPHETAMINE SULFATE 5; 5; 5; 5 MG/1; MG/1; MG/1; MG/1
1 TABLET ORAL DAILY
COMMUNITY
End: 2023-07-14 | Stop reason: SDUPTHER

## 2023-07-14 RX ORDER — INSULIN DEGLUDEC 100 U/ML
30 INJECTION, SOLUTION SUBCUTANEOUS DAILY
Qty: 5 PEN | Refills: 1 | Status: SHIPPED | OUTPATIENT
Start: 2023-07-14 | End: 2023-10-18

## 2023-07-14 RX ORDER — DEXTROAMPHETAMINE SACCHARATE, AMPHETAMINE ASPARTATE, DEXTROAMPHETAMINE SULFATE AND AMPHETAMINE SULFATE 5; 5; 5; 5 MG/1; MG/1; MG/1; MG/1
1 TABLET ORAL DAILY
Qty: 60 TABLET | Refills: 0 | Status: SHIPPED | OUTPATIENT
Start: 2023-07-14 | End: 2024-02-23 | Stop reason: SDUPTHER

## 2023-07-14 NOTE — PATIENT INSTRUCTIONS
Follow up in 1 month     Tresiba 30 units     [facility] will call to schedule      Adderall 20 mg every 12 hours

## 2023-07-14 NOTE — LETTER
AUTHORIZATION FOR RELEASE OF   CONFIDENTIAL INFORMATION    Dear Dr Fraser,    We are seeing Tanesha Chamberlain, date of birth 1965, in the clinic at [facility]. Norberto cA III, MD is the patient's PCP. Tanesha Chamberlain has an outstanding lab/procedure at the time we reviewed her chart. In order to help keep her health information updated, she has authorized us to request the following medical record(s):        (  )  MAMMOGRAM                                      ( X )  COLONOSCOPY      (  )  PAP SMEAR                                          (  )  OUTSIDE LAB RESULTS     (  )  DEXA SCAN                                          (  )  EYE EXAM            (  )  FOOT EXAM                                          (  )  ENTIRE RECORD     (  )  OUTSIDE IMMUNIZATIONS                 (  )  _______________         Please fax records to [facility], Clinton H Sharp III, MD at [phone]    Thanks so much and have a great day!    Laurie Garcia LPN [facility]  [address] [place] [address]  - [phone]  - [phone]           Patient Name: Tanesha Chamberlain  : 1965  Patient Phone #: [phone]

## 2023-07-19 NOTE — PROGRESS NOTES
Subjective:       Patient ID: Tanesha Chamberlain is a 57 y.o. female.    Chief Complaint: Follow-up    Attention deficit disorder.  Medicine helped at 1st.  But quit working after about 2 weeks.  Diabetes mellitus type 2 on insulin.  Sugar high the last 3 weeks.  Does have hyperlipidemia cholesterol 131 HDL 64 LDL 44.  A1c 7.3 CMP is okay.    Physical examination.  Vital signs noted.  Chest clear.  Heart regular rate and rhythm.  Extremities without edema.      Objective:        Assessment:       1. Essential hypertension    2. Attention deficit disorder, unspecified hyperactivity presence    3. Type 2 diabetes mellitus with hyperglycemia, with long-term current use of insulin    4. Hyperlipidemia, unspecified hyperlipidemia type        Plan:       Essential hypertension    Attention deficit disorder, unspecified hyperactivity presence    Type 2 diabetes mellitus with hyperglycemia, with long-term current use of insulin    Hyperlipidemia, unspecified hyperlipidemia type    Other orders  -     TRESIBA FLEXTOUCH U-100 100 unit/mL (3 mL) insulin pen; Inject 30 Units into the skin once daily.  Dispense: 5 pen ; Refill: 1  -     dextroamphetamine-amphetamine (ADDERALL) 20 mg tablet; Take 1 tablet by mouth once daily.  Dispense: 60 tablet; Refill: 0    Refill Tresiba U 100.  Mammogram ordered.  Adderall increase this to 20 mg b.i.d. 60 pills.  Follow-up on this.  Increase the Tresiba to 30 units daily.  Follow-up in 1 month.  Good control of her lipids.

## 2023-07-24 ENCOUNTER — OFFICE VISIT (OUTPATIENT)
Dept: SPINE | Facility: CLINIC | Age: 58
End: 2023-07-24
Payer: COMMERCIAL

## 2023-07-24 VITALS
BODY MASS INDEX: 30.28 KG/M2 | HEIGHT: 64 IN | SYSTOLIC BLOOD PRESSURE: 172 MMHG | DIASTOLIC BLOOD PRESSURE: 93 MMHG | WEIGHT: 177.38 LBS

## 2023-07-24 DIAGNOSIS — M43.16 SPONDYLOLISTHESIS OF LUMBAR REGION: Primary | ICD-10-CM

## 2023-07-24 PROCEDURE — 99999 PR PBB SHADOW E&M-EST. PATIENT-LVL III: CPT | Mod: PBBFAC,COE,, | Performed by: NEUROLOGICAL SURGERY

## 2023-07-24 PROCEDURE — 99213 PR OFFICE/OUTPT VISIT, EST, LEVL III, 20-29 MIN: ICD-10-PCS | Mod: S$GLB,,, | Performed by: NEUROLOGICAL SURGERY

## 2023-07-24 PROCEDURE — 99213 OFFICE O/P EST LOW 20 MIN: CPT | Mod: S$GLB,,, | Performed by: NEUROLOGICAL SURGERY

## 2023-07-24 PROCEDURE — 99213 OFFICE O/P EST LOW 20 MIN: CPT | Mod: PBBFAC | Performed by: NEUROLOGICAL SURGERY

## 2023-07-24 PROCEDURE — 99999 PR PBB SHADOW E&M-EST. PATIENT-LVL III: ICD-10-PCS | Mod: PBBFAC,COE,, | Performed by: NEUROLOGICAL SURGERY

## 2023-07-24 NOTE — PROGRESS NOTES
"  CHIEF COMPLAINT:  Discussion for possible surgical intervention.    I, Smita Chang, attest that this documentation has been prepared under the direction and in the presence of Johnny Curz MD.    HPI 5/1/23: Tanesha Chamberlain is a 57 y.o. female with h/o of a left L4 radic in setting of far lateral disc herniation. Her pain improved with surgery and repeat injections. Now she is having debilitating low back and right hip and groin pain. It is worse in morning and night and triggered by certain movements in bed. She had an injection that helped for a few days but she doesn't recall level. She denies radicular leg pain.     Additionally she is concerned about feeling that her spine feels unstable at the lower mid back but denies pain.    Interval Hx 7/24/23: Today the Pt reports she has lost weight. Injections helped for a few months, however, they are no longer helping her symptoms. She endorses continued right buttock, mid back, and groin pain. She states in January she started feeling a pain that she states felt like "disc rubbing on disc." She also reports that injections have only helped the pain in her hip and not the pain she experiences in her back.        Review of patient's allergies indicates:   Allergen Reactions    Nsaids (non-steroidal anti-inflammatory drug) Other (See Comments)     GI upset  GI upset      Ibuprofen Other (See Comments)     Other reaction(s): crohns flair up  Other reaction(s): crohns flair up       Past Medical History:   Diagnosis Date    ADHD (attention deficit hyperactivity disorder)     Arthritis     Crohn's disease     Diabetes mellitus, type 2     Hypertension     Hypothyroidism     Lumbar back pain with radiculopathy affecting left lower extremity     SBO (small bowel obstruction)      Past Surgical History:   Procedure Laterality Date    ANTERIOR CRUCIATE LIGAMENT REPAIR Right     APPENDECTOMY      1988    BUNIONECTOMY Bilateral     CHOLECYSTECTOMY      2005    EXCISION OF " MASS OF BACK Left 2022    Procedure: EXCISION, MASS, BACK;  Surgeon: Sae Collins III, MD;  Location: [facility] OR;  Service: General;  Laterality: Left;    HYSTERECTOMY      INJECTION OF JOINT Right 2023    Procedure: INJECTION, JOINT, RIGHT PIRIFORMIS AND RIGHT GTB;  Surgeon: Loreta Brito MD;  Location: [facility] PAIN MGT;  Service: Pain Management;  Laterality: Right;    MINIMALLY INVASIVE SURGICAL REMOVAL OF INTERVERTEBRAL DISC OF SPINE USING MICROSCOPE Left 2021    Procedure: Left L4-5 Far Lateral Disectomy, MIS;  Surgeon: Johnny Cruz MD;  Location: [facility] OR [facility] FLR;  Service: Neurosurgery;  Laterality: Left;    SHOULDER SURGERY Right     SPINE SURGERY      TRANSFORAMINAL EPIDURAL INJECTION OF STEROID Left 03/10/2021    Procedure: INJECTION, STEROID, EPIDURAL, TRANSFORAMINAL APPROACH  L4-5;  Surgeon: Jaylon Tyson MD;  Location: [facility] PAIN MGT;  Service: Pain Management;  Laterality: Left;  consent needed  ([employer] [facility])    TRANSFORAMINAL EPIDURAL INJECTION OF STEROID Left 2022    Procedure: LUMBAR TRANSFORAMINAL LEFT L4/5 MEDICALLY URGENT;  Surgeon: Loreta Brito MD;  Location: [facility] PAIN MGT;  Service: Pain Management;  Laterality: Left;     Family History   Problem Relation Age of Onset    Cirrhosis Neg Hx      Social History     Tobacco Use    Smoking status: Former     Types: Cigarettes     Quit date: 2020     Years since quittin.5    Smokeless tobacco: Former    Tobacco comments:     in the 8 th grade   Substance Use Topics    Alcohol use: Yes     Alcohol/week: 4.0 standard drinks     Types: 4 Cans of beer per week     Comment: 1 beer , once in 2 weeks    Drug use: No        Review of Systems   Constitutional: Negative.    HENT: Negative.     Eyes: Negative.    Respiratory: Negative.     Cardiovascular: Negative.    Gastrointestinal: Negative.    Endocrine: Negative.    Genitourinary:  Positive for dysuria.   Musculoskeletal:  Positive for back pain (mid back)  and myalgias (rigth buttock and groin). Negative for gait problem and neck pain.   Skin: Negative.    Allergic/Immunologic: Negative.    Neurological:  Negative for weakness, light-headedness, numbness and headaches.   Hematological: Negative.    Psychiatric/Behavioral: Negative.     All other systems reviewed and are negative.    OBJECTIVE:   Vital Signs:       Physical Exam:    Vital signs: All nursing notes and vital signs reviewed -- afebrile, vital signs stable.  Constitutional: Patient sitting comfortably in chair. Appears well developed and well nourished.  Skin: Exposed areas are intact without abnormal markings, rashes or other lesions.  HEENT: Normocephalic. Normal conjunctivae.  Cardiovascular: Normal rate and regular rhythm.  Respiratory: Chest wall rises and falls symmetrically, without signs of respiratory distress.  Abdomen: Soft and non-tender.  Extremities: Warm and without edema. Calves supple, non-tender.  Psych/Behavior: Normal affect.    Neurological:    Mental status: Alert and oriented. Conversational and appropriate.       Cranial Nerves: VFF to confrontation. PERRL. EOMI without nystagmus. Facial STLT normal and symmetric. Strong, symmetric muscles of mastication. Facial strength full and symmetric. Hearing equal bilaterally to finger rub. Palate and uvula rise and fall normally in midline. Shoulder shrug 5/5 strength. Tongue midline.     Motor:    Upper:  Deltoids Triceps Biceps WE WF     R 5/5 5/5 5/5 5/5 5/5 5/5    L 5/5 5/5 5/5 5/5 5/5 5/5      Lower:  HF KE KF DF PF EHL    R 5/5 5/5 5/5 5/5 5/5 5/5    L 5/5 5/5 5/5 5/5 5/5 5/5     Sensory: Intact sensation to light touch in all extremities. Romberg negative.    Reflexes:          DTR: 2+ symmetrically throughout.     Simpson's: Negative.     Babinski's: Negative.     Clonus: Negative.    Cerebellar: Finger-to-nose and rapid alternating movements normal. Gait stable, fluid.    Spine:    Posture: Head well aligned over pelvis in front  and side views.  No focal or global spinal deformity visible on inspection. Shoulders and hips even. No obvious leg length discrepancy. No scapula winging.    Bending: Full ROM with forward, back and lateral bending. No rib prominence with forward bend.    Cervical:      ROM: Full with flexion, extension, lateral rotation and ear-to-shoulder bend.      Midline TTP: Negative.     Spurling's test: Negative.     Lhermitte's: Negative.    Thoracic:     Midline TTP: Negative    Lumbar:     Midline TTP: Negative     Straight Leg Test: Negative     Crossed Straight Leg Test: Negative     Sciatic notch tenderness: Negative.    Other:     SI joint TTP: Negative.     Greater trochanter TTP: Negative.     Tenderness with external/internal hip rotation: Negative.    Diagnostic Results:  All imaging was independently reviewed by me.    XR Lumbar with Flex/Ext dated 5/1/23:  No instability.    ASSESSMENT/PLAN:     Tanesha Chamberlain has axial low back pain, right hip and right groin pain. She has had no relief from facet injections in the past but she did have significant but temporary relief from right hip and piriformis injections. Under consideration for me is the utility of an L4-5 TLIF where she does have some facet arthropathy and stenosis. As of now I see no hard indications. I recommend a NM bone scan to help road map for PM facet injections; I anticipate it being bilateral L4-5 or perhaps higher up. She should also pursue repeat right hip and piriformis injections. RTC with GABBIE in 3 months PRN.    The patient understands and agrees with the plan of care. All questions were answered.     1. NM scan for facetogenic pain targets for PM   2. RTC PRN with GABBIE      I, Dr. Johnny Cruz personally performed the services described in this documentation. All medical record entries made by the scribe, Smita Chang, were at my direction and in my presence.  I have reviewed the chart and agree that the record reflects my personal  performance and is accurate and complete.      Johnny Cruz M.D.  Department of Neurosurgery  [facility]

## 2023-07-25 ENCOUNTER — PATIENT OUTREACH (OUTPATIENT)
Dept: ADMINISTRATIVE | Facility: HOSPITAL | Age: 58
End: 2023-07-25
Payer: COMMERCIAL

## 2023-07-27 ENCOUNTER — HOSPITAL ENCOUNTER (OUTPATIENT)
Dept: RADIOLOGY | Facility: OTHER | Age: 58
Discharge: HOME | End: 2023-07-27
Attending: NEUROLOGICAL SURGERY
Payer: COMMERCIAL

## 2023-07-27 DIAGNOSIS — M43.16 SPONDYLOLISTHESIS OF LUMBAR REGION: ICD-10-CM

## 2023-07-27 PROCEDURE — 78803 RP LOCLZJ TUM SPECT 1 AREA: CPT | Mod: TC

## 2023-07-27 PROCEDURE — 78803 NM BONE SCAN SPECT: ICD-10-PCS | Mod: 26,,, | Performed by: RADIOLOGY

## 2023-07-27 PROCEDURE — 78803 RP LOCLZJ TUM SPECT 1 AREA: CPT | Mod: 26,,, | Performed by: RADIOLOGY

## 2023-08-10 ENCOUNTER — PATIENT MESSAGE (OUTPATIENT)
Dept: ADMINISTRATIVE | Facility: HOSPITAL | Age: 58
End: 2023-08-10
Payer: COMMERCIAL

## 2023-08-31 ENCOUNTER — PATIENT MESSAGE (OUTPATIENT)
Dept: ADMINISTRATIVE | Facility: HOSPITAL | Age: 58
End: 2023-08-31
Payer: COMMERCIAL

## 2023-09-14 ENCOUNTER — PATIENT MESSAGE (OUTPATIENT)
Dept: ADMINISTRATIVE | Facility: HOSPITAL | Age: 58
End: 2023-09-14
Payer: COMMERCIAL

## 2023-11-03 ENCOUNTER — PATIENT MESSAGE (OUTPATIENT)
Dept: ADMINISTRATIVE | Facility: HOSPITAL | Age: 58
End: 2023-11-03
Payer: COMMERCIAL

## 2023-11-08 ENCOUNTER — PATIENT OUTREACH (OUTPATIENT)
Dept: ADMINISTRATIVE | Facility: HOSPITAL | Age: 58
End: 2023-11-08
Payer: COMMERCIAL

## 2023-11-08 DIAGNOSIS — E11.9 DIABETES MELLITUS WITH NO COMPLICATION: ICD-10-CM

## 2023-11-08 DIAGNOSIS — Z12.31 BREAST CANCER SCREENING BY MAMMOGRAM: ICD-10-CM

## 2023-11-08 NOTE — PROGRESS NOTES
Population Health Chart Review & Patient Outreach Details    Outreach Performed: YES Portal    Additional [facility] Health Notes:           Updates Requested / Reviewed:      Updated Care Coordination Note         Health Maintenance Topics Overdue:    Health Maintenance Due   Topic Date Due    Eye Exam  04/30/2022    Diabetes Urine Screening  08/11/2023    Foot Exam  08/11/2023    Mammogram  08/23/2023    Influenza Vaccine (1) Never done    COVID-19 Vaccine (4 - 2023-24 season) 09/01/2023         Health Maintenance Topic(s) Outreach Outcomes & Actions Taken:    Breast Cancer Screening - Outreach Outcomes & Actions Taken  : Mammogram Order Placed and portal message sent out regarding overdue Mammogram    HbA1c & Other Lab(s) - Outreach Outcomes & Actions Taken  : Overdue Lab(s) Ordered    Eye Exam - Outreach Outcomes & Actions Taken  : Portal message sent out regarding scheduling Diabetic eye exam

## 2023-12-12 ENCOUNTER — PATIENT MESSAGE (OUTPATIENT)
Dept: ADMINISTRATIVE | Facility: HOSPITAL | Age: 58
End: 2023-12-12
Payer: COMMERCIAL

## 2023-12-13 ENCOUNTER — LAB VISIT (OUTPATIENT)
Dept: LAB | Facility: HOSPITAL | Age: 58
End: 2023-12-13
Attending: FAMILY MEDICINE
Payer: COMMERCIAL

## 2023-12-13 DIAGNOSIS — E11.9 DIABETES MELLITUS WITH NO COMPLICATION: ICD-10-CM

## 2023-12-13 LAB
ESTIMATED AVG GLUCOSE: 151 MG/DL (ref 68–131)
HBA1C MFR BLD: 6.9 % (ref 4.5–6.2)

## 2023-12-13 PROCEDURE — 36415 COLL VENOUS BLD VENIPUNCTURE: CPT | Performed by: FAMILY MEDICINE

## 2023-12-13 PROCEDURE — 83036 HEMOGLOBIN GLYCOSYLATED A1C: CPT | Performed by: FAMILY MEDICINE

## 2023-12-18 ENCOUNTER — PATIENT MESSAGE (OUTPATIENT)
Dept: ADMINISTRATIVE | Facility: HOSPITAL | Age: 58
End: 2023-12-18
Payer: COMMERCIAL

## 2023-12-20 ENCOUNTER — PATIENT MESSAGE (OUTPATIENT)
Dept: FAMILY MEDICINE | Facility: CLINIC | Age: 58
End: 2023-12-20
Payer: COMMERCIAL

## 2023-12-20 DIAGNOSIS — E11.65 TYPE 2 DIABETES MELLITUS WITH HYPERGLYCEMIA, WITHOUT LONG-TERM CURRENT USE OF INSULIN: ICD-10-CM

## 2023-12-21 RX ORDER — PEN NEEDLE, DIABETIC 31 GX5/16"
1 NEEDLE, DISPOSABLE MISCELLANEOUS DAILY
Qty: 100 EACH | Refills: 1 | Status: SHIPPED | OUTPATIENT
Start: 2023-12-21

## 2023-12-21 NOTE — TELEPHONE ENCOUNTER
No care due was identified.  [facility] Embedded Care Due Messages. Reference number: [id number].   12/21/2023 8:22:03 AM CST

## 2024-01-13 NOTE — TELEPHONE ENCOUNTER
No care due was identified.  [facility] Embedded Care Due Messages. Reference number: [id number].   1/13/2024 9:06:05 AM CST

## 2024-01-14 RX ORDER — INSULIN DEGLUDEC INJECTION 100 U/ML
30 INJECTION, SOLUTION SUBCUTANEOUS DAILY
Qty: 30 ML | Refills: 0 | Status: SHIPPED | OUTPATIENT
Start: 2024-01-14

## 2024-01-14 NOTE — TELEPHONE ENCOUNTER
Refill Decision Note   Palmerbella Chamberlain  is requesting a refill authorization.  Brief Assessment and Rationale for Refill:  Approve     Medication Therapy Plan:       Medication Reconciliation Completed: No   Comments:     No Care Gaps recommended.     Note composed:12:09 PM 01/14/2024

## 2024-01-22 ENCOUNTER — PATIENT MESSAGE (OUTPATIENT)
Dept: ADMINISTRATIVE | Facility: HOSPITAL | Age: 59
End: 2024-01-22
Payer: COMMERCIAL

## 2024-02-08 RX ORDER — HYDROCHLOROTHIAZIDE 25 MG/1
25 TABLET ORAL
Qty: 90 TABLET | Refills: 1 | Status: SHIPPED | OUTPATIENT
Start: 2024-02-08

## 2024-02-08 RX ORDER — ESTRADIOL 2 MG/1
2 TABLET ORAL
Qty: 90 TABLET | Refills: 1 | Status: SHIPPED | OUTPATIENT
Start: 2024-02-08

## 2024-02-08 NOTE — TELEPHONE ENCOUNTER
No care due was identified.  [facility] Embedded Care Due Messages. Reference number: [id number].   2/08/2024 9:07:59 AM CST

## 2024-02-08 NOTE — TELEPHONE ENCOUNTER
Refill Routing Note   Medication(s) are not appropriate for processing by [facility] for the following reason(s):        Outside of protocol  Required vitals abnormal    [facility] action(s):  Defer  Route        Medication Therapy Plan: med refused twice 2/7/24; please see refill from 2/6; defer.. route op estradiol      Appointments  past 12m or future 3m with PCP    Date Provider   Last Visit   7/14/2023 Norberto Ac III, MD   Next Visit   Visit date not found Norberto Ac III, MD   ED visits in past 90 days: 0        Note composed:11:14 AM 02/08/2024

## 2024-02-16 DIAGNOSIS — E11.9 TYPE 2 DIABETES MELLITUS WITHOUT COMPLICATION, UNSPECIFIED WHETHER LONG TERM INSULIN USE: ICD-10-CM

## 2024-02-23 ENCOUNTER — OFFICE VISIT (OUTPATIENT)
Dept: FAMILY MEDICINE | Facility: CLINIC | Age: 59
End: 2024-02-23
Payer: COMMERCIAL

## 2024-02-23 VITALS
BODY MASS INDEX: 31.15 KG/M2 | HEART RATE: 78 BPM | HEIGHT: 64 IN | WEIGHT: 182.44 LBS | DIASTOLIC BLOOD PRESSURE: 86 MMHG | SYSTOLIC BLOOD PRESSURE: 138 MMHG | OXYGEN SATURATION: 98 % | TEMPERATURE: 98 F

## 2024-02-23 DIAGNOSIS — F98.8 ATTENTION DEFICIT DISORDER, UNSPECIFIED HYPERACTIVITY PRESENCE: ICD-10-CM

## 2024-02-23 DIAGNOSIS — E78.5 HYPERLIPIDEMIA, UNSPECIFIED HYPERLIPIDEMIA TYPE: ICD-10-CM

## 2024-02-23 DIAGNOSIS — E11.65 TYPE 2 DIABETES MELLITUS WITH HYPERGLYCEMIA, WITH LONG-TERM CURRENT USE OF INSULIN: ICD-10-CM

## 2024-02-23 DIAGNOSIS — E11.69 DYSLIPIDEMIA ASSOCIATED WITH TYPE 2 DIABETES MELLITUS: ICD-10-CM

## 2024-02-23 DIAGNOSIS — E78.5 DYSLIPIDEMIA ASSOCIATED WITH TYPE 2 DIABETES MELLITUS: ICD-10-CM

## 2024-02-23 DIAGNOSIS — M51.37 DDD (DEGENERATIVE DISC DISEASE), LUMBOSACRAL: ICD-10-CM

## 2024-02-23 DIAGNOSIS — K50.919 CROHN'S DISEASE WITH COMPLICATION, UNSPECIFIED GASTROINTESTINAL TRACT LOCATION: ICD-10-CM

## 2024-02-23 DIAGNOSIS — I10 ESSENTIAL HYPERTENSION: Primary | ICD-10-CM

## 2024-02-23 DIAGNOSIS — Z79.4 TYPE 2 DIABETES MELLITUS WITH HYPERGLYCEMIA, WITH LONG-TERM CURRENT USE OF INSULIN: ICD-10-CM

## 2024-02-23 PROCEDURE — 99999 PR PBB SHADOW E&M-EST. PATIENT-LVL IV: CPT | Mod: PBBFAC,COE,,

## 2024-02-23 PROCEDURE — 99214 OFFICE O/P EST MOD 30 MIN: CPT | Mod: S$GLB,,,

## 2024-02-23 RX ORDER — DEXTROAMPHETAMINE SACCHARATE, AMPHETAMINE ASPARTATE, DEXTROAMPHETAMINE SULFATE AND AMPHETAMINE SULFATE 5; 5; 5; 5 MG/1; MG/1; MG/1; MG/1
1 TABLET ORAL DAILY
Qty: 30 TABLET | Refills: 0 | Status: SHIPPED | OUTPATIENT
Start: 2024-03-22

## 2024-02-23 RX ORDER — DEXTROAMPHETAMINE SACCHARATE, AMPHETAMINE ASPARTATE, DEXTROAMPHETAMINE SULFATE AND AMPHETAMINE SULFATE 5; 5; 5; 5 MG/1; MG/1; MG/1; MG/1
1 TABLET ORAL DAILY
Qty: 30 TABLET | Refills: 0 | Status: SHIPPED | OUTPATIENT
Start: 2024-04-22

## 2024-02-23 RX ORDER — DEXTROAMPHETAMINE SACCHARATE, AMPHETAMINE ASPARTATE, DEXTROAMPHETAMINE SULFATE AND AMPHETAMINE SULFATE 5; 5; 5; 5 MG/1; MG/1; MG/1; MG/1
1 TABLET ORAL DAILY
Qty: 30 TABLET | Refills: 0 | Status: SHIPPED | OUTPATIENT
Start: 2024-02-23

## 2024-02-23 NOTE — PROGRESS NOTES
Subjective:       Patient ID: Tanesha Chamberlain is a 58 y.o. female.    Chief Complaint: Follow-up (Ck up rx refills 90 d) and Sinus Problem (Nose very dry)    Tanesha Chamberlain is a 58-year-old female patient who presents to clinic today for medication refills.  History of chronic pain in her lower back.  She is followed by pain management.  Her pain management doctor plans to install spine stimulator.  ADD doing well on Adderall 20 mg daily.  Hyperlipidemia: last lipids: TC:  131, Tri HDL:  64, LDL:  44.2.  He is currently taking atorvastatin and tolerates well.  Type 2 diabetes with her last H A1c of 6.9.  She is currently using Tresiba and Trulicity.  She has been watching her diet and eating less sweets.  Hypertension controlled with hydrochlorothiazide.  She has no chest pain or shortness a breath.  Crohn's disease doing well.  No flares.  Fatty liver.  Today patient states her nose has been irritated in her nostrils have been feeling sore.  She started using saline spray which is helping.        Review of patient's allergies indicates:   Allergen Reactions    Nsaids (non-steroidal anti-inflammatory drug) Other (See Comments)     GI upset  GI upset      Ibuprofen Other (See Comments)     Other reaction(s): crohns flair up  Other reaction(s): crohns flair up     Social Determinants of Health     Tobacco Use: Medium Risk (2024)    Patient History     Smoking Tobacco Use: Former     Smokeless Tobacco Use: Former     Passive Exposure: Not on file   Alcohol Use: Not on file   Financial Resource Strain: Not on file   Food Insecurity: Not on file   Transportation Needs: Not on file   Physical Activity: Not on file   Stress: Not on file   Social Connections: Not on file   Housing Stability: Not on file   Depression: Low Risk  (2024)    Depression     Last PHQ-4: Flowsheet Data: 0      Past Medical History:   Diagnosis Date    ADHD (attention deficit hyperactivity disorder)     Arthritis      "Crohn's disease     Diabetes mellitus, type 2     Hypertension     Hypothyroidism     Lumbar back pain with radiculopathy affecting left lower extremity     SBO (small bowel obstruction)       Past Surgical History:   Procedure Laterality Date    ANTERIOR CRUCIATE LIGAMENT REPAIR Right     APPENDECTOMY      1988    BUNIONECTOMY Bilateral     CHOLECYSTECTOMY      2005    EXCISION OF MASS OF BACK Left 9/21/2022    Procedure: EXCISION, MASS, BACK;  Surgeon: Sae Collins III, MD;  Location: [facility] OR;  Service: General;  Laterality: Left;    HYSTERECTOMY      INJECTION OF JOINT Right 5/9/2023    Procedure: INJECTION, JOINT, RIGHT PIRIFORMIS AND RIGHT GTB;  Surgeon: Loreta Brito MD;  Location: [facility] PAIN MGT;  Service: Pain Management;  Laterality: Right;    MINIMALLY INVASIVE SURGICAL REMOVAL OF INTERVERTEBRAL DISC OF SPINE USING MICROSCOPE Left 05/11/2021    Procedure: Left L4-5 Far Lateral Disectomy, MIS;  Surgeon: Johnny Cruz MD;  Location: [facility] OR [address] [place] [address];  Service: Neurosurgery;  Laterality: Left;    SHOULDER SURGERY Right     SPINE SURGERY      TRANSFORAMINAL EPIDURAL INJECTION OF STEROID Left 03/10/2021    Procedure: INJECTION, STEROID, EPIDURAL, TRANSFORAMINAL APPROACH  L4-5;  Surgeon: Jaylon Tyson MD;  Location: [facility] PAIN MGT;  Service: Pain Management;  Laterality: Left;  consent needed  ([employer] [facility])    TRANSFORAMINAL EPIDURAL INJECTION OF STEROID Left 11/29/2022    Procedure: LUMBAR TRANSFORAMINAL LEFT L4/5 MEDICALLY URGENT;  Surgeon: Loreta Brito MD;  Location: [facility] PAIN MGT;  Service: Pain Management;  Laterality: Left;      Social History     Socioeconomic History    Marital status:          Current Outpatient Medications:     atorvastatin (LIPITOR) 10 MG tablet, Take 1 tablet (10 mg total) by mouth once daily., Disp: 90 tablet, Rfl: 1    BD ULTRA-FINE SHORT PEN NEEDLE 31 gauge x 5/16" Ndle, 1 each by Misc.(Non-Drug; Combo Route) route once daily., Disp: " 100 each, Rfl: 1    blood sugar diagnostic (ONETOUCH ULTRA BLUE TEST STRIP) Strp, Check blood glucose readings twice a day-- before meal and 1 to 2 hours post meal, Disp: 100 strip, Rfl: 0    blood-glucose meter Misc, Check blood glucose readings twice a day-- before meal and 1 to 2 hours post meal, Disp: 1 each, Rfl: 0    dulaglutide (TRULICITY) 1.5 mg/0.5 mL pen injector, Inject 1.5 mg into the skin every 7 days., Disp: 4 pen, Rfl: 5    estradioL (ESTRACE) 2 MG tablet, TAKE 1 TABLET BY MOUTH ONCE DAILY., Disp: 90 tablet, Rfl: 1    fentaNYL (DURAGESIC) 25 mcg/hr, Place 1 patch onto the skin Every 3 (three) days. 50 mcg, Disp: , Rfl:     hydroCHLOROthiazide (HYDRODIURIL) 25 MG tablet, TAKE 1 TABLET BY MOUTH EVERY DAY, Disp: 90 tablet, Rfl: 1    HYDROcodone-acetaminophen (NORCO)  mg per tablet, Take 1-2 tablets by mouth every 8 (eight) hours., Disp: , Rfl:     ketoconazole (NIZORAL) 2 % cream, Apply topically every evening., Disp: 60 g, Rfl: 1    lancets 33 gauge Misc, Check blood glucose readings twice a day-- before meal and 1 to 2 hours post meal, Disp: 100 each, Rfl: 0    tiZANidine (ZANAFLEX) 4 MG tablet, Take 4 mg by mouth 4 (four) times daily as needed., Disp: , Rfl:     TRESIBA FLEXTOUCH U-100 100 unit/mL (3 mL) insulin pen, INJECT 30 UNITS INTO THE SKIN ONCE DAILY. (Patient taking differently: Inject 31 Units into the skin once daily.), Disp: 30 mL, Rfl: 0    [START ON 4/22/2024] dextroamphetamine-amphetamine (ADDERALL) 20 mg tablet, Take 1 tablet by mouth once daily., Disp: 30 tablet, Rfl: 0    [START ON 3/22/2024] dextroamphetamine-amphetamine (ADDERALL) 20 mg tablet, Take 1 tablet by mouth once daily., Disp: 30 tablet, Rfl: 0    dextroamphetamine-amphetamine (ADDERALL) 20 mg tablet, Take 1 tablet by mouth once daily., Disp: 30 tablet, Rfl: 0    Lab Results   Component Value Date    WBC 9.92 06/15/2023    HGB 16.5 (H) 06/15/2023    HCT 48.9 (H) 06/15/2023     06/15/2023    CHOL  131 06/15/2023    TRIG 114 06/15/2023    HDL 64 06/15/2023    ALT 33 06/15/2023    AST 29 06/15/2023     06/15/2023    K 3.8 06/15/2023     06/15/2023    CREATININE 0.7 06/15/2023    BUN 10 06/15/2023    CO2 28 06/15/2023    TSH 1.570 02/23/2023    INR 1.0 05/04/2021    HGBA1C 6.9 (H) 12/13/2023    MICROALBUR 1.6 08/11/2022       Review of Systems   Constitutional:  Negative for chills and fever.   HENT:          Nasal irritation and soreness   Respiratory:  Negative for shortness of breath and wheezing.    Cardiovascular:  Negative for chest pain, palpitations and leg swelling.   Musculoskeletal:  Positive for arthralgias and back pain.   Psychiatric/Behavioral:  Positive for decreased concentration.        Objective:      Physical Exam  Vitals reviewed.   Constitutional:       Appearance: Normal appearance.   HENT:      Head: Normocephalic and atraumatic.      Comments: Small erythematous area noted to left lateral nose     Nose: Nose normal.      Mouth/Throat:      Mouth: Mucous membranes are moist.      Pharynx: Oropharynx is clear. No posterior oropharyngeal erythema.   Cardiovascular:      Rate and Rhythm: Normal rate and regular rhythm.      Pulses: Normal pulses.           Dorsalis pedis pulses are 2+ on the right side and 2+ on the left side.      Heart sounds: Normal heart sounds.   Pulmonary:      Effort: Pulmonary effort is normal.      Breath sounds: Normal breath sounds.   Feet:      Right foot:      Protective Sensation: 8 sites tested.  8 sites sensed.      Skin integrity: Skin integrity normal.      Toenail Condition: Right toenails are normal.      Left foot:      Protective Sensation: 8 sites tested.  8 sites sensed.      Skin integrity: Skin integrity normal.      Toenail Condition: Left toenails are normal.   Skin:     General: Skin is warm and dry.      Capillary Refill: Capillary refill takes less than 2 seconds.   Neurological:      Mental Status: She is alert.   Psychiatric:          Mood and Affect: Mood normal.         Behavior: Behavior normal.         Thought Content: Thought content normal.         Judgment: Judgment normal.       Assessment:       1. Essential hypertension    2. Hyperlipidemia, unspecified hyperlipidemia type    3. DDD (degenerative disc disease), lumbosacral    4. Type 2 diabetes mellitus with hyperglycemia, with long-term current use of insulin    5. Dyslipidemia associated with type 2 diabetes mellitus    6. Crohn's disease with complication, unspecified gastrointestinal tract location    7. Attention deficit disorder, unspecified hyperactivity presence        Plan:       Tanesha was seen today for follow-up and sinus problem.    Diagnoses and all orders for this visit:    Essential hypertension  -     CBC Auto Differential; Future  -     Comprehensive Metabolic Panel; Future    Hyperlipidemia, unspecified hyperlipidemia type    DDD (degenerative disc disease), lumbosacral    Type 2 diabetes mellitus with hyperglycemia, with long-term current use of insulin  -     CBC Auto Differential; Future  -     Comprehensive Metabolic Panel; Future  -     Hemoglobin A1C; Future    Dyslipidemia associated with type 2 diabetes mellitus  -     Lipid Panel; Future    Crohn's disease with complication, unspecified gastrointestinal tract location    Attention deficit disorder, unspecified hyperactivity presence  -     dextroamphetamine-amphetamine (ADDERALL) 20 mg tablet; Take 1 tablet by mouth once daily.  -     dextroamphetamine-amphetamine (ADDERALL) 20 mg tablet; Take 1 tablet by mouth once daily.  -     dextroamphetamine-amphetamine (ADDERALL) 20 mg tablet; Take 1 tablet by mouth once daily.    Hypertension   - controlled.  Continue current medications.    Hyperlipidemia   - check lipid  - continue atorvastatin  - I recommend a heart healthy diet rich in fiber, fresh vegetables and fruit and low in saturated fats (fried foods, red meat, etc.).  I also recommend regular  exercise.    Type 2 diabetes   - check H A1c  - continue current medication    ADD   - continue Adderall, refills x3,  checked  - follow-up in 3 months for refills    Small patch of eczema to left lateral nostril, can use OTC hydrocortisone.  Have fasting labs done.  Patient declines vaccinations  Follow-up in 3 months or sooner if needed.

## 2024-02-27 DIAGNOSIS — Z00.00 ENCOUNTER FOR MEDICARE ANNUAL WELLNESS EXAM: ICD-10-CM

## 2024-02-28 ENCOUNTER — TELEPHONE (OUTPATIENT)
Dept: FAMILY MEDICINE | Facility: CLINIC | Age: 59
End: 2024-02-28
Payer: COMMERCIAL

## 2024-03-08 ENCOUNTER — PATIENT MESSAGE (OUTPATIENT)
Dept: FAMILY MEDICINE | Facility: CLINIC | Age: 59
End: 2024-03-08

## 2024-03-08 ENCOUNTER — OFFICE VISIT (OUTPATIENT)
Dept: FAMILY MEDICINE | Facility: CLINIC | Age: 59
End: 2024-03-08
Payer: COMMERCIAL

## 2024-03-08 ENCOUNTER — HOSPITAL ENCOUNTER (OUTPATIENT)
Dept: RADIOLOGY | Facility: HOSPITAL | Age: 59
Discharge: HOME | End: 2024-03-08
Payer: COMMERCIAL

## 2024-03-08 VITALS
RESPIRATION RATE: 18 BRPM | BODY MASS INDEX: 30.41 KG/M2 | HEART RATE: 78 BPM | HEIGHT: 64 IN | SYSTOLIC BLOOD PRESSURE: 120 MMHG | DIASTOLIC BLOOD PRESSURE: 80 MMHG | WEIGHT: 178.13 LBS | OXYGEN SATURATION: 97 % | TEMPERATURE: 98 F

## 2024-03-08 DIAGNOSIS — E11.65 TYPE 2 DIABETES MELLITUS WITH HYPERGLYCEMIA, WITHOUT LONG-TERM CURRENT USE OF INSULIN: ICD-10-CM

## 2024-03-08 DIAGNOSIS — E04.1 THYROID NODULE: Primary | ICD-10-CM

## 2024-03-08 DIAGNOSIS — E04.1 THYROID NODULE: ICD-10-CM

## 2024-03-08 PROCEDURE — 99214 OFFICE O/P EST MOD 30 MIN: CPT | Mod: S$GLB,,,

## 2024-03-08 PROCEDURE — 99999 PR PBB SHADOW E&M-EST. PATIENT-LVL V: CPT | Mod: PBBFAC,COE,,

## 2024-03-08 PROCEDURE — 76536 US EXAM OF HEAD AND NECK: CPT | Mod: TC,PO

## 2024-03-08 RX ORDER — DULAGLUTIDE 1.5 MG/.5ML
1.5 INJECTION, SOLUTION SUBCUTANEOUS
Qty: 4 PEN | Refills: 5 | Status: SHIPPED | OUTPATIENT
Start: 2024-03-08 | End: 2024-03-08 | Stop reason: DRUGHIGH

## 2024-03-08 NOTE — PROGRESS NOTES
Thyroid ultrasound does show some nodules on your left thyroid however these do not meet criteria for biopsy.  We will continue to monitor.

## 2024-03-08 NOTE — PROGRESS NOTES
Subjective:       Patient ID: Tanesha Chamberlain is a 58 y.o. female.    Chief Complaint: Mass (Left side of throat)    Tanesha Chamberlain D 8-year-old female patient who presents to clinic today with complaints of a lump in her neck.  Patient states a couple of days ago she had a pain in the left side of her throat.  She felt her neck at that time and felt a lump on the left side.  She does have a history of thyroid nodule requiring right-sided thyroidectomy.  Nodule was not cancer but was large enough requiring removal.  Has history of type 2 diabetes and needs her Trulicity refilled.        Review of patient's allergies indicates:   Allergen Reactions    Nsaids (non-steroidal anti-inflammatory drug) Other (See Comments)     GI upset  GI upset      Ibuprofen Other (See Comments)     Other reaction(s): crohns flair up  Other reaction(s): crohns flair up     Social Determinants of Health     Tobacco Use: Medium Risk (3/8/2024)    Patient History     Smoking Tobacco Use: Former     Smokeless Tobacco Use: Former     Passive Exposure: Not on file   Alcohol Use: Not on file   Financial Resource Strain: Not on file   Food Insecurity: Not on file   Transportation Needs: Not on file   Physical Activity: Not on file   Stress: Not on file   Social Connections: Not on file   Housing Stability: Not on file   Depression: Low Risk  (3/8/2024)    Depression     Last PHQ-4: Flowsheet Data: 0      Past Medical History:   Diagnosis Date    ADHD (attention deficit hyperactivity disorder)     Arthritis     Crohn's disease     Diabetes mellitus, type 2     Hypertension     Hypothyroidism     Lumbar back pain with radiculopathy affecting left lower extremity     SBO (small bowel obstruction)       Past Surgical History:   Procedure Laterality Date    ANTERIOR CRUCIATE LIGAMENT REPAIR Right     APPENDECTOMY      1988    BUNIONECTOMY Bilateral     CHOLECYSTECTOMY      2005    EXCISION OF MASS OF BACK Left 9/21/2022     "Procedure: EXCISION, MASS, BACK;  Surgeon: Sae Collins III, MD;  Location: [facility] OR;  Service: General;  Laterality: Left;    HYSTERECTOMY      INJECTION OF JOINT Right 5/9/2023    Procedure: INJECTION, JOINT, RIGHT PIRIFORMIS AND RIGHT GTB;  Surgeon: Loreta Brito MD;  Location: [facility] PAIN MGT;  Service: Pain Management;  Laterality: Right;    MINIMALLY INVASIVE SURGICAL REMOVAL OF INTERVERTEBRAL DISC OF SPINE USING MICROSCOPE Left 05/11/2021    Procedure: Left L4-5 Far Lateral Disectomy, MIS;  Surgeon: Johnny Cruz MD;  Location: [facility] OR [facility] FLR;  Service: Neurosurgery;  Laterality: Left;    SHOULDER SURGERY Right     SPINE SURGERY      TRANSFORAMINAL EPIDURAL INJECTION OF STEROID Left 03/10/2021    Procedure: INJECTION, STEROID, EPIDURAL, TRANSFORAMINAL APPROACH  L4-5;  Surgeon: Jaylon Tyson MD;  Location: [facility] PAIN MGT;  Service: Pain Management;  Laterality: Left;  consent needed  ([employer] [facility])    TRANSFORAMINAL EPIDURAL INJECTION OF STEROID Left 11/29/2022    Procedure: LUMBAR TRANSFORAMINAL LEFT L4/5 MEDICALLY URGENT;  Surgeon: Loreta Brito MD;  Location: [facility] PAIN MGT;  Service: Pain Management;  Laterality: Left;      Social History     Socioeconomic History    Marital status:          Current Outpatient Medications:     atorvastatin (LIPITOR) 10 MG tablet, Take 1 tablet (10 mg total) by mouth once daily., Disp: 90 tablet, Rfl: 1    BD ULTRA-FINE SHORT PEN NEEDLE 31 gauge x 5/16" Ndle, 1 each by Misc.(Non-Drug; Combo Route) route once daily., Disp: 100 each, Rfl: 1    blood sugar diagnostic (ONETOUCH ULTRA BLUE TEST STRIP) Strp, Check blood glucose readings twice a day-- before meal and 1 to 2 hours post meal, Disp: 100 strip, Rfl: 0    blood-glucose meter Misc, Check blood glucose readings twice a day-- before meal and 1 to 2 hours post meal, Disp: 1 each, Rfl: 0    [START ON 4/22/2024] dextroamphetamine-amphetamine (ADDERALL) 20 mg tablet, Take 1 tablet by " mouth once daily., Disp: 30 tablet, Rfl: 0    [START ON 3/22/2024] dextroamphetamine-amphetamine (ADDERALL) 20 mg tablet, Take 1 tablet by mouth once daily., Disp: 30 tablet, Rfl: 0    dextroamphetamine-amphetamine (ADDERALL) 20 mg tablet, Take 1 tablet by mouth once daily., Disp: 30 tablet, Rfl: 0    estradioL (ESTRACE) 2 MG tablet, TAKE 1 TABLET BY MOUTH ONCE DAILY., Disp: 90 tablet, Rfl: 1    fentaNYL (DURAGESIC) 25 mcg/hr, Place 1 patch onto the skin Every 3 (three) days. 50 mcg, Disp: , Rfl:     hydroCHLOROthiazide (HYDRODIURIL) 25 MG tablet, TAKE 1 TABLET BY MOUTH EVERY DAY, Disp: 90 tablet, Rfl: 1    HYDROcodone-acetaminophen (NORCO)  mg per tablet, Take 1-2 tablets by mouth every 8 (eight) hours., Disp: , Rfl:     ketoconazole (NIZORAL) 2 % cream, Apply topically every evening., Disp: 60 g, Rfl: 1    lancets 33 gauge Misc, Check blood glucose readings twice a day-- before meal and 1 to 2 hours post meal, Disp: 100 each, Rfl: 0    tiZANidine (ZANAFLEX) 4 MG tablet, Take 4 mg by mouth 4 (four) times daily as needed., Disp: , Rfl:     TRESIBA FLEXTOUCH U-100 100 unit/mL (3 mL) insulin pen, INJECT 30 UNITS INTO THE SKIN ONCE DAILY. (Patient taking differently: Inject 31 Units into the skin once daily.), Disp: 30 mL, Rfl: 0    dulaglutide (TRULICITY) 1.5 mg/0.5 mL pen injector, Inject 1.5 mg into the skin every 7 days., Disp: 4 pen , Rfl: 5    Lab Results   Component Value Date    WBC 9.92 06/15/2023    HGB 16.5 (H) 06/15/2023    HCT 48.9 (H) 06/15/2023     06/15/2023    CHOL 131 06/15/2023    TRIG 114 06/15/2023    HDL 64 06/15/2023    ALT 33 06/15/2023    AST 29 06/15/2023     06/15/2023    K 3.8 06/15/2023     06/15/2023    CREATININE 0.7 06/15/2023    BUN 10 06/15/2023    CO2 28 06/15/2023    TSH 1.099 03/08/2024    INR 1.0 05/04/2021    HGBA1C 6.9 (H) 12/13/2023    MICROALBUR 1.6 08/11/2022       Review of Systems   Constitutional:  Negative for chills and fever.   HENT:   Positive for sore throat. Negative for nasal congestion and sinus pressure/congestion.    Respiratory: Negative.     Cardiovascular: Negative.        Objective:      Physical Exam  Vitals reviewed.   Constitutional:       Appearance: Normal appearance.   Neck:      Comments: Small lump noted to left neck  Cardiovascular:      Rate and Rhythm: Normal rate and regular rhythm.      Pulses: Normal pulses.      Heart sounds: Normal heart sounds.   Pulmonary:      Effort: Pulmonary effort is normal.      Breath sounds: Normal breath sounds.   Neurological:      Mental Status: She is alert.       Assessment:       1. Thyroid nodule    2. Type 2 diabetes mellitus with hyperglycemia, without long-term current use of insulin        Plan:       Tanesha was seen today for mass.    Diagnoses and all orders for this visit:    Thyroid nodule  -     US Soft Tissue Head Neck; Future  -     TSH; Future    Type 2 diabetes mellitus with hyperglycemia, without long-term current use of insulin  -     dulaglutide (TRULICITY) 1.5 mg/0.5 mL pen injector; Inject 1.5 mg into the skin every 7 days.    Have ultrasound done.  Check TSH.  Trulicity refilled.  Follow-up dependent upon results of labs and ultrasound.

## 2024-04-06 ENCOUNTER — PATIENT MESSAGE (OUTPATIENT)
Dept: FAMILY MEDICINE | Facility: CLINIC | Age: 59
End: 2024-04-06
Payer: COMMERCIAL

## 2024-04-08 DIAGNOSIS — M79.642 LEFT HAND PAIN: Primary | ICD-10-CM

## 2024-04-09 ENCOUNTER — OFFICE VISIT (OUTPATIENT)
Dept: ORTHOPEDICS | Facility: CLINIC | Age: 59
End: 2024-04-09
Payer: COMMERCIAL

## 2024-04-09 ENCOUNTER — HOSPITAL ENCOUNTER (OUTPATIENT)
Dept: RADIOLOGY | Facility: HOSPITAL | Age: 59
Discharge: HOME | End: 2024-04-09
Attending: PHYSICIAN ASSISTANT
Payer: COMMERCIAL

## 2024-04-09 DIAGNOSIS — S69.92XA INJURY OF LEFT HAND, INITIAL ENCOUNTER: ICD-10-CM

## 2024-04-09 DIAGNOSIS — M79.642 LEFT HAND PAIN: ICD-10-CM

## 2024-04-09 DIAGNOSIS — M79.642 LEFT HAND PAIN: Primary | ICD-10-CM

## 2024-04-09 PROCEDURE — 99203 OFFICE O/P NEW LOW 30 MIN: CPT | Mod: S$GLB,,, | Performed by: PHYSICIAN ASSISTANT

## 2024-04-09 PROCEDURE — 99999 PR PBB SHADOW E&M-EST. PATIENT-LVL III: CPT | Mod: PBBFAC,COE,, | Performed by: PHYSICIAN ASSISTANT

## 2024-04-09 PROCEDURE — 73130 X-RAY EXAM OF HAND: CPT | Mod: TC,PO,LT

## 2024-04-09 PROCEDURE — 73130 X-RAY EXAM OF HAND: CPT | Mod: 26,LT,, | Performed by: RADIOLOGY

## 2024-04-09 NOTE — PROGRESS NOTES
4/9/2024    Chief Complaint:  Chief Complaint   Patient presents with    Left Hand - Injury, Pain, Swelling     Hammer hit left hand/Index finger - 074/06/2024       HPI:  Tanesha Chamberlain is a 58 y.o. female, who presents to clinic today for evaluation of her left hand injury.  States approximately 3 days ago she was swinging a hammer while driving states into her garden.  States she missed steak and hit the radial base of her left index finger.  States immediate pain, swelling, and bleeding to the area.  States this prompted her to follow up with urgent care.  States he is up-to-date on her tetanus.  States the wound was cleaned and dressed and she was placed in a splint.  States he was instructed to follow up with our clinic.  Denies any paresthesias.  Denies any other complaints this time.    PMHX:  Past Medical History:   Diagnosis Date    ADHD (attention deficit hyperactivity disorder)     Arthritis     Crohn's disease     Diabetes mellitus, type 2     Hypertension     Hypothyroidism     Lumbar back pain with radiculopathy affecting left lower extremity     SBO (small bowel obstruction)        PSHX:  Past Surgical History:   Procedure Laterality Date    ANTERIOR CRUCIATE LIGAMENT REPAIR Right     APPENDECTOMY      1988    BUNIONECTOMY Bilateral     CHOLECYSTECTOMY      2005    EXCISION OF MASS OF BACK Left 9/21/2022    Procedure: EXCISION, MASS, BACK;  Surgeon: Sae Collins III, MD;  Location: [facility] OR;  Service: General;  Laterality: Left;    HYSTERECTOMY      INJECTION OF JOINT Right 5/9/2023    Procedure: INJECTION, JOINT, RIGHT PIRIFORMIS AND RIGHT GTB;  Surgeon: Loreta Brito MD;  Location: [facility] PAIN T;  Service: Pain Management;  Laterality: Right;    MINIMALLY INVASIVE SURGICAL REMOVAL OF INTERVERTEBRAL DISC OF SPINE USING MICROSCOPE Left 05/11/2021    Procedure: Left L4-5 Far Lateral Disectomy, MIS;  Surgeon: Johnny Cruz MD;  Location: [address];  Service: Neurosurgery;   "Laterality: Left;    SHOULDER SURGERY Right     SPINE SURGERY      TRANSFORAMINAL EPIDURAL INJECTION OF STEROID Left 03/10/2021    Procedure: INJECTION, STEROID, EPIDURAL, TRANSFORAMINAL APPROACH  L4-5;  Surgeon: Jaylon Tyson MD;  Location: [facility] PAIN MGT;  Service: Pain Management;  Laterality: Left;  consent needed  ([employer] [facility])    TRANSFORAMINAL EPIDURAL INJECTION OF STEROID Left 11/29/2022    Procedure: LUMBAR TRANSFORAMINAL LEFT L4/5 MEDICALLY URGENT;  Surgeon: Loreta Brito MD;  Location: [facility] PAIN MGT;  Service: Pain Management;  Laterality: Left;       FMHX:  Family History   Problem Relation Age of Onset    Cirrhosis Neg Hx        SOCHX:  Social History     Tobacco Use    Smoking status: Former     Current packs/day: 0.00     Types: Cigarettes     Quit date: 12/27/2020     Years since quitting: 3.2    Smokeless tobacco: Former    Tobacco comments:     in the 8 th grade   Substance Use Topics    Alcohol use: Yes     Alcohol/week: 4.0 standard drinks of alcohol     Types: 4 Cans of beer per week     Comment: 1 beer , once in 2 weeks       ALLERGIES:  Nsaids (non-steroidal anti-inflammatory drug) and Ibuprofen    CURRENT MEDICATIONS:  Current Outpatient Medications on File Prior to Visit   Medication Sig Dispense Refill    atorvastatin (LIPITOR) 10 MG tablet Take 1 tablet (10 mg total) by mouth once daily. 90 tablet 1    BD ULTRA-FINE SHORT PEN NEEDLE 31 gauge x 5/16" Ndle 1 each by Misc.(Non-Drug; Combo Route) route once daily. 100 each 1    blood sugar diagnostic (ONETOUCH ULTRA BLUE TEST STRIP) Strp Check blood glucose readings twice a day-- before meal and 1 to 2 hours post meal 100 strip 0    blood-glucose meter Misc Check blood glucose readings twice a day-- before meal and 1 to 2 hours post meal 1 each 0    [START ON 4/22/2024] dextroamphetamine-amphetamine (ADDERALL) 20 mg tablet Take 1 tablet by mouth once daily. 30 tablet 0    dextroamphetamine-amphetamine (ADDERALL) 20 mg tablet Take 1 " tablet by mouth once daily. 30 tablet 0    dextroamphetamine-amphetamine (ADDERALL) 20 mg tablet Take 1 tablet by mouth once daily. 30 tablet 0    dulaglutide (TRULICITY) 3 mg/0.5 mL pen injector Inject 3 mg into the skin every 7 days. 4 pen 2    estradioL (ESTRACE) 2 MG tablet TAKE 1 TABLET BY MOUTH ONCE DAILY. 90 tablet 1    fentaNYL (DURAGESIC) 25 mcg/hr Place 1 patch onto the skin Every 3 (three) days. 50 mcg      hydroCHLOROthiazide (HYDRODIURIL) 25 MG tablet TAKE 1 TABLET BY MOUTH EVERY DAY 90 tablet 1    HYDROcodone-acetaminophen (NORCO)  mg per tablet Take 1-2 tablets by mouth every 8 (eight) hours.      ketoconazole (NIZORAL) 2 % cream Apply topically every evening. 60 g 1    lancets 33 gauge Misc Check blood glucose readings twice a day-- before meal and 1 to 2 hours post meal 100 each 0    tiZANidine (ZANAFLEX) 4 MG tablet Take 4 mg by mouth 4 (four) times daily as needed.      TRESIBA FLEXTOUCH U-100 100 unit/mL (3 mL) insulin pen INJECT 30 UNITS INTO THE SKIN ONCE DAILY. (Patient taking differently: Inject 31 Units into the skin once daily.) 30 mL 0     No current facility-administered medications on file prior to visit.       REVIEW OF SYSTEMS:  Review of Systems   Constitutional: Negative.    HENT: Negative.     Eyes: Negative.    Respiratory: Negative.     Cardiovascular: Negative.    Gastrointestinal: Negative.    Genitourinary: Negative.    Musculoskeletal:  Positive for joint pain.   Skin: Negative.    Neurological:  Positive for weakness.   Endo/Heme/Allergies: Negative.    Psychiatric/Behavioral: Negative.       GENERAL PHYSICAL EXAM:   There were no vitals taken for this visit.   GEN: well developed, well nourished, no acute distress   HENT: Normocephalic, atraumatic   EYES: No discharge, conjunctiva normal   NECK: Supple, non-tender   PULM: No wheezing, no respiratory distress   CV: RRR   ABD: Soft, non-tender    ORTHO EXAM:   Examination of the left hand reveals a superficial wound over  the radial base of the left index finger.  Presence of mild edema over the radial aspect of the left hand.  Generalized tenderness to palpation of the left index finger, left middle finger, 2nd metacarpal, 3rd metacarpal, and 1st webspace of the left hand.  No significant tenderness palpation throughout the remainder of the left hand/wrist.  Strength not tested secondary to injury.  Sensation is grossly intact in the radial, ulnar, median nerve distributions.  Capillary refill is 2 seconds.    RADIOLOGY:   X-rays of the left hand were taken today in clinic.  X-rays reviewed by myself.  Imaging showed the presence of soft tissue swelling of the radial aspect of the right hand.  No acute fracture or dislocation no subluxation.  No radiopaque foreign body or mass noted.  No osseous destructive/erosive processes noted.  No other significant bony abnormalities noted.    ASSESSMENT:   Left hand injury, superficial wound of the left index finger    PLAN:  1. I discussed with Tanesha Chamberlain the hand injury pathology and treatment options in detail during today's visit.  After treatment options were discussed, we decided the best course of action this time is to proceed with immobilization via a removable Velcro radial gutter splint of the left hand/fingers.  We discussed importance of wearing the splint at all times only to remove for bathing until seen again in clinic.  We discussed would thoroughly clean her wound in clinic today and dressed it with gauze and Coban wrap.  We did discuss she can place a breathable Band-Aid over it in the future and cleaned it with antibacterial soap and clean running water.  She verbally agreed with the treatment plan    2.  She was placed in a removable Velcro radial gutter splint of the left hand/wrist in clinic today.      3. Her left index finger wound was thoroughly cleaned in clinic today with chlorhexidine.  Her left index finger wound was then dressed with 4 x 4 gauze and Coban  wrap.      4. I would like her follow up in clinic in 10 days for repeat evaluation.  She was instructed to contact clinic for any problems or concerns in the interim.

## 2024-04-10 ENCOUNTER — PATIENT MESSAGE (OUTPATIENT)
Dept: FAMILY MEDICINE | Facility: CLINIC | Age: 59
End: 2024-04-10
Payer: COMMERCIAL

## 2024-04-11 DIAGNOSIS — Z12.31 SCREENING MAMMOGRAM FOR BREAST CANCER: Primary | ICD-10-CM

## 2024-04-15 ENCOUNTER — HOSPITAL ENCOUNTER (OUTPATIENT)
Dept: RADIOLOGY | Facility: CLINIC | Age: 59
Discharge: HOME | End: 2024-04-15
Attending: FAMILY MEDICINE
Payer: COMMERCIAL

## 2024-04-15 DIAGNOSIS — Z12.31 SCREENING MAMMOGRAM FOR BREAST CANCER: ICD-10-CM

## 2024-04-15 PROCEDURE — 77067 SCR MAMMO BI INCL CAD: CPT | Mod: TC,PO

## 2024-04-15 PROCEDURE — 77067 SCR MAMMO BI INCL CAD: CPT | Mod: 26,,, | Performed by: RADIOLOGY

## 2024-04-15 PROCEDURE — 77063 BREAST TOMOSYNTHESIS BI: CPT | Mod: 26,,, | Performed by: RADIOLOGY

## 2024-04-19 NOTE — TELEPHONE ENCOUNTER
Care Due:                  Date            Visit Type   Department     Provider  --------------------------------------------------------------------------------                                EP [facility]  Last Visit: 07-      [facility]  Next Visit: None Scheduled  None         None Found                                                            Last  Test          Frequency    Reason                     Performed    Due Date  --------------------------------------------------------------------------------    CMP.........  12 months..  TRESIBA, atorvastatin,     06-   06-                             hydroCHLOROthiazide......    HBA1C.......  6 months...  TRESIBA..................  12-   06-    Lipid Panel.  12 months..  atorvastatin.............  06-   06-    Health [facility] Embedded Care Due Messages. Reference number: [id number].   4/19/2024 2:02:42 PM CDT

## 2024-04-21 RX ORDER — INSULIN DEGLUDEC 100 U/ML
30 INJECTION, SOLUTION SUBCUTANEOUS DAILY
Qty: 6 ML | Refills: 0 | Status: SHIPPED | OUTPATIENT
Start: 2024-04-21 | End: 2024-05-20

## 2024-04-21 NOTE — TELEPHONE ENCOUNTER
Refill Routing Note   Medication(s) are not appropriate for processing by [facility] for the following reason(s):        Clarification of medication (Rx) details    [facility] action(s):  Defer   Requires labs : Yes      Medication Therapy Plan: LCO 2/23/24 OV, pt using 31 units once daily. pended for provider review; DEFER      Appointments  past 12m or future 3m with PCP    Date Provider   Last Visit   7/14/2023 Norberto Ac III, MD   Next Visit   Visit date not found Norberto Ac III, MD   ED visits in past 90 days: 0        Note composed:11:06 PM 04/20/2024

## 2024-05-03 RX ORDER — DULAGLUTIDE 1.5 MG/.5ML
1.5 INJECTION, SOLUTION SUBCUTANEOUS
Qty: 4 PEN | Refills: 0 | Status: SHIPPED | OUTPATIENT
Start: 2024-05-03 | End: 2024-05-30 | Stop reason: SDUPTHER

## 2024-05-03 NOTE — TELEPHONE ENCOUNTER
09/25/23 1634   Reason for Consult   Reason for Consult Bedside activities;Therapeutic play;Other (comment)  (ongoing support of established pt)   Patient Intervention(s)   Type of Intervention Performed Normalizing and coping   Normalizing and Coping Intervention(s) Developmentally appropriate therapeutic activities  (writer and pt engaged in normative conversation at bedside regarding hospitalization and home life dynamics as well as slime activity to promote positive coping and normalization; writer also provided coloring and sticker activity at bedside)   Support Provided to Family   Support Provided to Family Parent/caregiver(s)   Parent/Caregiver's Name Mother   Parent/Caregiver(s) Intervention Active listening   Anxiety Level   Anxiety Level No distress noted or observed   Evaluation   Patient Behaviors During Interventions Appropriate for age;Calm;Cooperative;Makes eye contact;Verbal;Interactive   Persons Present Family;Staff   Evaluation/Plan of Care Provide ongoing support;Patient/family receptive     Child Life services will remain available to provide support to patient and family throughout admission.     Elvira Ott MS, CCLS  Certified Child Life Specialist  #   Please see the attached refill request.

## 2024-05-05 DIAGNOSIS — E78.5 HYPERLIPIDEMIA, UNSPECIFIED: ICD-10-CM

## 2024-05-06 RX ORDER — ATORVASTATIN CALCIUM 10 MG/1
10 TABLET, FILM COATED ORAL
Qty: 30 TABLET | Refills: 0 | Status: SHIPPED | OUTPATIENT
Start: 2024-05-06 | End: 2024-05-30

## 2024-05-08 ENCOUNTER — OFFICE VISIT (OUTPATIENT)
Dept: DERMATOLOGY | Facility: CLINIC | Age: 59
End: 2024-05-08
Payer: COMMERCIAL

## 2024-05-08 ENCOUNTER — PATIENT MESSAGE (OUTPATIENT)
Dept: DERMATOLOGY | Facility: CLINIC | Age: 59
End: 2024-05-08

## 2024-05-08 DIAGNOSIS — H01.009 BLEPHARITIS, UNSPECIFIED LATERALITY, UNSPECIFIED TYPE: ICD-10-CM

## 2024-05-08 DIAGNOSIS — R21 RASH: Primary | ICD-10-CM

## 2024-05-08 PROCEDURE — 87077 CULTURE AEROBIC IDENTIFY: CPT | Performed by: STUDENT IN AN ORGANIZED HEALTH CARE EDUCATION/TRAINING PROGRAM

## 2024-05-08 PROCEDURE — 99214 OFFICE O/P EST MOD 30 MIN: CPT | Mod: S$GLB,,, | Performed by: STUDENT IN AN ORGANIZED HEALTH CARE EDUCATION/TRAINING PROGRAM

## 2024-05-08 PROCEDURE — 87070 CULTURE OTHR SPECIMN AEROBIC: CPT | Performed by: STUDENT IN AN ORGANIZED HEALTH CARE EDUCATION/TRAINING PROGRAM

## 2024-05-08 PROCEDURE — 87186 SC STD MICRODIL/AGAR DIL: CPT | Performed by: STUDENT IN AN ORGANIZED HEALTH CARE EDUCATION/TRAINING PROGRAM

## 2024-05-08 RX ORDER — KETOCONAZOLE 20 MG/G
CREAM TOPICAL 2 TIMES DAILY
Qty: 60 G | Refills: 1 | Status: SHIPPED | OUTPATIENT
Start: 2024-05-08

## 2024-05-08 RX ORDER — DOXYCYCLINE HYCLATE 100 MG
100 TABLET ORAL 2 TIMES DAILY
Qty: 14 TABLET | Refills: 0 | Status: SHIPPED | OUTPATIENT
Start: 2024-05-08 | End: 2024-05-15

## 2024-05-08 RX ORDER — TRIAMCINOLONE ACETONIDE 0.25 MG/G
CREAM TOPICAL 2 TIMES DAILY
Qty: 80 G | Refills: 1 | Status: SHIPPED | OUTPATIENT
Start: 2024-05-08

## 2024-05-08 RX ORDER — SULFAMETHOXAZOLE AND TRIMETHOPRIM 800; 160 MG/1; MG/1
1 TABLET ORAL 2 TIMES DAILY
Qty: 14 TABLET | Refills: 0 | Status: SHIPPED | OUTPATIENT
Start: 2024-05-08

## 2024-05-08 NOTE — PROGRESS NOTES
Subjective:      Patient ID:  Tanesha Chamberlain is a 58 y.o. female who presents for   Chief Complaint   Patient presents with    Spot     Behind right ear     LOV 04/18/2023    Patient is coming in today for spot behind the right ear x's 1 month. States it started out as bug bite, she picked at it and it is not healing and it is draining. States she is using the Cerave acne control gel. She states that it is itchy.     Derm Hx  Denies Phx of NMSC   Denies Fhx of MM          Review of Systems   Constitutional:  Negative for fever, chills and fatigue.   Respiratory:  Negative for cough and shortness of breath.    Gastrointestinal:  Negative for nausea and vomiting.   Skin:  Positive for activity-related sunscreen use and wears hat. Negative for daily sunscreen use.   Hematologic/Lymphatic: Does not bruise/bleed easily.       Objective:   Physical Exam   Constitutional: She appears well-developed and well-nourished.   Neurological: She is alert and oriented to person, place, and time.   Psychiatric: She has a normal mood and affect.   Skin:   Areas Examined (abnormalities noted in diagram):   Head / Face Inspection Performed            Diagram Legend     Erythematous scaling macule/papule c/w actinic keratosis       Vascular papule c/w angioma      Pigmented verrucoid papule/plaque c/w seborrheic keratosis      Yellow umbilicated papule c/w sebaceous hyperplasia      Irregularly shaped tan macule c/w lentigo     1-2 mm smooth white papules consistent with Milia      Movable subcutaneous cyst with punctum c/w epidermal inclusion cyst      Subcutaneous movable cyst c/w pilar cyst      Firm pink to brown papule c/w dermatofibroma      Pedunculated fleshy papule(s) c/w skin tag(s)      Evenly pigmented macule c/w junctional nevus     Mildly variegated pigmented, slightly irregular-bordered macule c/w mildly atypical nevus      Flesh colored to evenly pigmented papule c/w intradermal nevus       Pink pearly papule/plaque  c/w basal cell carcinoma      Erythematous hyperkeratotic cursted plaque c/w SCC      Surgical scar with no sign of skin cancer recurrence      Open and closed comedones      Inflammatory papules and pustules      Verrucoid papule consistent consistent with wart     Erythematous eczematous patches and plaques     Dystrophic onycholytic nail with subungual debris c/w onychomycosis     Umbilicated papule    Erythematous-base heme-crusted tan verrucoid plaque consistent with inflamed seborrheic keratosis     Erythematous Silvery Scaling Plaque c/w Psoriasis     See annotation      Assessment / Plan:        Rash  -     doxycycline (VIBRA-TABS) 100 MG tablet; Take 1 tablet (100 mg total) by mouth 2 (two) times daily. for 7 days  Dispense: 14 tablet; Refill: 0  -     triamcinolone acetonide 0.025% (KENALOG) 0.025 % cream; Apply topically 2 (two) times daily.  Dispense: 80 g; Refill: 1  -     ketoconazole (NIZORAL) 2 % cream; Apply topically 2 (two) times daily.  Dispense: 60 g; Refill: 1  -     Aerobic culture  Suspect seb derm with secondary infection behind right ear  Mix tac cream with keto cream and apply twice daily  Discussed benefits and risks of doxycyline therapy including but not limited to GI discomfort, esophageal irritation/ulceration, and increased sun sensitivity. Patient was counseled to take medicine with meals and at least 1 hour before lying down.     Blepharitis, unspecified laterality, unspecified type  Mix tea tree oil with baby shampoo and wash eyelids with wet wash cloth           No follow-ups on file.

## 2024-05-11 LAB — BACTERIA SPEC AEROBE CULT: ABNORMAL

## 2024-05-18 ENCOUNTER — PATIENT MESSAGE (OUTPATIENT)
Dept: FAMILY MEDICINE | Facility: CLINIC | Age: 59
End: 2024-05-18
Payer: COMMERCIAL

## 2024-05-20 RX ORDER — INSULIN DEGLUDEC 100 U/ML
30 INJECTION, SOLUTION SUBCUTANEOUS DAILY
Qty: 30 ML | Refills: 0 | Status: SHIPPED | OUTPATIENT
Start: 2024-05-20

## 2024-05-20 NOTE — TELEPHONE ENCOUNTER
Refill Decision Note   Tanesha Chamberlain  is requesting a refill authorization.  Brief Assessment and Rationale for Refill:  Approve     Medication Therapy Plan:        Comments:     Note composed:3:35 PM 05/20/2024

## 2024-05-20 NOTE — TELEPHONE ENCOUNTER
No care due was identified.  Health [facility] Embedded Care Due Messages. Reference number: [id number].   5/20/2024 12:15:02 AM CDT

## 2024-05-27 ENCOUNTER — PATIENT MESSAGE (OUTPATIENT)
Dept: FAMILY MEDICINE | Facility: CLINIC | Age: 59
End: 2024-05-27
Payer: COMMERCIAL

## 2024-05-27 RX ORDER — INSULIN DEGLUDEC 100 U/ML
30 INJECTION, SOLUTION SUBCUTANEOUS DAILY
Qty: 30 ML | Refills: 0 | OUTPATIENT
Start: 2024-05-27

## 2024-05-30 ENCOUNTER — TELEPHONE (OUTPATIENT)
Dept: ADMINISTRATIVE | Facility: CLINIC | Age: 59
End: 2024-05-30
Payer: COMMERCIAL

## 2024-05-30 DIAGNOSIS — E78.5 HYPERLIPIDEMIA, UNSPECIFIED: ICD-10-CM

## 2024-05-30 RX ORDER — DULAGLUTIDE 1.5 MG/.5ML
1.5 INJECTION, SOLUTION SUBCUTANEOUS
Qty: 4 PEN | Refills: 0 | Status: SHIPPED | OUTPATIENT
Start: 2024-05-30 | End: 2025-05-30

## 2024-05-30 RX ORDER — ATORVASTATIN CALCIUM 10 MG/1
10 TABLET, FILM COATED ORAL
Qty: 90 TABLET | Refills: 0 | Status: SHIPPED | OUTPATIENT
Start: 2024-05-30

## 2024-05-30 NOTE — TELEPHONE ENCOUNTER
Called pt; no answer; could not confirm appt or leave message due to line kept ringing; I was calling to confirm pt's in office [facility] appt on 5/31/24.

## 2024-05-30 NOTE — TELEPHONE ENCOUNTER
No care due was identified.  [facility] Embedded Care Due Messages. Reference number: [id number].   5/30/2024 8:29:50 AM CDT

## 2024-05-30 NOTE — TELEPHONE ENCOUNTER
Refill Decision Note   Tanesha Chamberlain  is requesting a refill authorization.  Brief Assessment and Rationale for Refill:  Approve     Medication Therapy Plan:         Comments:     Note composed:7:42 AM 05/30/2024

## 2024-05-30 NOTE — TELEPHONE ENCOUNTER
No care due was identified.  [facility] Embedded Care Due Messages. Reference number: [id number].   5/30/2024 7:30:59 AM CDT

## 2024-06-21 DIAGNOSIS — E11.65 TYPE 2 DIABETES MELLITUS WITH HYPERGLYCEMIA, WITHOUT LONG-TERM CURRENT USE OF INSULIN: ICD-10-CM

## 2024-06-21 RX ORDER — PEN NEEDLE, DIABETIC 31 GX5/16"
1 NEEDLE, DISPOSABLE MISCELLANEOUS DAILY
Qty: 100 EACH | Refills: 3 | Status: SHIPPED | OUTPATIENT
Start: 2024-06-21

## 2024-06-21 NOTE — TELEPHONE ENCOUNTER
Care Due:                  Date            Visit Type   Department     Provider  --------------------------------------------------------------------------------                                [facility]  Last Visit: 07-      [facility]  Next Visit: None Scheduled  None         None Found                                                            Last  Test          Frequency    Reason                     Performed    Due Date  --------------------------------------------------------------------------------    CMP.........  12 months..  TRESIBA, atorvastatin,     06-   06-                             hydroCHLOROthiazide......    HBA1C.......  6 months...  TRESIBA, dulaglutide.....  12-   06-    Lipid Panel.  12 months..  atorvastatin.............  06-   06-    [facility] Embedded Care Due Messages. Reference number: [id number].   6/21/2024 9:36:51 AM CDT

## 2024-06-21 NOTE — TELEPHONE ENCOUNTER
Provider Staff:  Action required for this patient    Requires labs      Please see care gap opportunities below in Care Due Message.    Thanks!  [facility]     Appointments      Date Provider   Last Visit   7/14/2023 Norberto Ac III, MD   Next Visit   Visit date not found Norberto Ac III, MD     Refill Decision Note   Tanesha Chamberlain  is requesting a refill authorization.    Brief Assessment and Rationale for Refill:   Approve       Medication Therapy Plan:         Comments:     Note composed:1:01 PM 06/21/2024

## 2024-06-26 ENCOUNTER — PATIENT MESSAGE (OUTPATIENT)
Dept: FAMILY MEDICINE | Facility: CLINIC | Age: 59
End: 2024-06-26
Payer: COMMERCIAL

## 2024-06-27 RX ORDER — DULAGLUTIDE 3 MG/.5ML
3 INJECTION, SOLUTION SUBCUTANEOUS
Qty: 4 PEN | Refills: 1 | Status: SHIPPED | OUTPATIENT
Start: 2024-06-27 | End: 2025-06-27

## 2024-06-27 RX ORDER — DULAGLUTIDE 1.5 MG/.5ML
1.5 INJECTION, SOLUTION SUBCUTANEOUS
Qty: 4 PEN | Refills: 0 | Status: SHIPPED | OUTPATIENT
Start: 2024-06-27 | End: 2024-06-27 | Stop reason: DRUGHIGH

## 2024-06-27 NOTE — TELEPHONE ENCOUNTER
I don't see where she was ever on 3 mg. Dose may not be appropriate.  Please ask patient why her dose was decreased and when?

## 2024-07-05 ENCOUNTER — TELEPHONE (OUTPATIENT)
Dept: OPHTHALMOLOGY | Facility: CLINIC | Age: 59
End: 2024-07-05
Payer: COMMERCIAL

## 2024-07-05 NOTE — TELEPHONE ENCOUNTER
Called regarding upcoming eye exam-pt needs to update CL as well, has [facility] made aware we are out of network. Pt aware of fees

## 2024-07-09 ENCOUNTER — OFFICE VISIT (OUTPATIENT)
Dept: OPTOMETRY | Facility: CLINIC | Age: 59
End: 2024-07-09
Payer: COMMERCIAL

## 2024-07-09 DIAGNOSIS — D31.31 CHOROIDAL NEVUS, RIGHT EYE: ICD-10-CM

## 2024-07-09 DIAGNOSIS — H25.13 NUCLEAR SCLEROSIS, BILATERAL: ICD-10-CM

## 2024-07-09 DIAGNOSIS — E11.9 DIABETES MELLITUS TYPE 2 WITHOUT RETINOPATHY: Primary | ICD-10-CM

## 2024-07-09 DIAGNOSIS — H52.7 REFRACTIVE ERROR: ICD-10-CM

## 2024-07-09 DIAGNOSIS — Z46.0 CONTACT LENS/GLASSES FITTING: Primary | ICD-10-CM

## 2024-07-09 PROCEDURE — 92015 DETERMINE REFRACTIVE STATE: CPT | Mod: S$GLB,COE,, | Performed by: OPTOMETRIST

## 2024-07-09 PROCEDURE — 99999 PR PBB SHADOW E&M-EST. PATIENT-LVL III: CPT | Mod: PBBFAC,COE,, | Performed by: OPTOMETRIST

## 2024-07-09 PROCEDURE — 2023F DILAT RTA XM W/O RTNOPTHY: CPT | Mod: CPTII,S$GLB,COE, | Performed by: OPTOMETRIST

## 2024-07-09 PROCEDURE — 92004 COMPRE OPH EXAM NEW PT 1/>: CPT | Mod: S$GLB,COE,, | Performed by: OPTOMETRIST

## 2024-07-09 PROCEDURE — 1160F RVW MEDS BY RX/DR IN RCRD: CPT | Mod: CPTII,S$GLB,COE, | Performed by: OPTOMETRIST

## 2024-07-09 PROCEDURE — 92310 CONTACT LENS FITTING OU: CPT | Mod: CSM,S$GLB,COE, | Performed by: OPTOMETRIST

## 2024-07-09 PROCEDURE — 1159F MED LIST DOCD IN RCRD: CPT | Mod: CPTII,S$GLB,COE, | Performed by: OPTOMETRIST

## 2024-07-09 PROCEDURE — 99999 PR PBB SHADOW E&M-EST. PATIENT-LVL II: CPT | Mod: PBBFAC,COE,, | Performed by: OPTOMETRIST

## 2024-07-09 PROCEDURE — 99499 UNLISTED E&M SERVICE: CPT | Mod: COE,,, | Performed by: OPTOMETRIST

## 2024-07-09 RX ORDER — NALOXONE HYDROCHLORIDE 4 MG/.1ML
1 SPRAY NASAL
COMMUNITY
Start: 2024-03-13

## 2024-07-09 NOTE — PROGRESS NOTES
Assessment /Plan     For exam results, see Encounter Report.    Contact lens/glasses fitting      Patient is here for a comprehensive eye exam and contact lens fit. See other exam visit with same encounter date 07/09/2024 for detailed exam information.

## 2024-07-09 NOTE — PROGRESS NOTES
HPI     Diabetic Eye Exam     Additional comments: LDE: 04/30/2021           Comments    59 YO female presents today for an annual diabetic eye exam. Patient   states that she is doing well, notes no problems or complaints. Has worn   ctl in the past and would like to wear them again.     [place] OU QAM     Hemoglobin A1C       Date                     Value               Ref Range             Status                12/13/2023               6.9 (H)             4.5 - 6.2 %           Final                  06/15/2023               7.3 (H)             4.5 - 6.2 %           Final                  04/24/2023               7.9 (H)             4.5 - 6.2 %           Final                      Last edited by Bree Hill on 7/9/2024  8:50 AM.            Assessment /Plan     For exam results, see Encounter Report.    Diabetes mellitus type 2 without retinopathy    Nuclear sclerosis, bilateral    Refractive error    Choroidal nevus, right eye      1. Diabetes mellitus type 2 without retinopathy  Discussed possible ocular affects of uncontrolled blood sugar with patient. Recommended continued strong blood sugar control and continued care with PCP. Monitor yearly.     2. Nuclear sclerosis, bilateral  Mild OU, not VS  Discussed possible ocular affects of cataracts. Acceptable BCVA OU. Discussed treatment options. Surgery not recommended at this time. Monitor yearly.     3. Refractive error  Dispensed updated spectacle Rx. Discussed various spectacle lens options. Discussed adaptation period to new specs.   Demonstrated new spec Rx vs current specs in phoropter with patient satisfaction    Discussed cls options -- previous wearer of daily mf  Notes difficulty with dailies total 1 removal in past  Ordered b&l infuse mf trials, return for dispense     4. Choroidal nevus, right eye  Faint, flat, no overlying pigment superior posterior pole  Attempted fundus photo today -- camera not calibrated correctly  Monitor yearly

## 2024-07-12 ENCOUNTER — OFFICE VISIT (OUTPATIENT)
Dept: OPTOMETRY | Facility: CLINIC | Age: 59
End: 2024-07-12
Payer: COMMERCIAL

## 2024-07-12 DIAGNOSIS — Z97.3 WEARS CONTACT LENSES: Primary | ICD-10-CM

## 2024-07-12 NOTE — PROGRESS NOTES
HPI    57 YO female presents today for a ctl f/u. Had patient insert trials.   Patient notes comfort and good vision at near and intermediate, but   distance is a little blurred.   Last edited by Bree Hill on 7/12/2024  3:05 PM.            Assessment /Plan     For exam results, see Encounter Report.    Wears contact lenses      Good clfu -- happy with comfort and near vision, notes blurred 20/20   Dispensed CLs trials: infuse mf. Daily wear only, dispose of daily.   Discussed proper hand hygiene and wear/care of lenses. Do not sleep/swim/shower in lenses.   Discontinue CL wear ASAP and RTC if any redness or discomfort occurs.   Try x few days, report back with progress

## 2024-07-19 ENCOUNTER — PATIENT MESSAGE (OUTPATIENT)
Dept: OPTOMETRY | Facility: CLINIC | Age: 59
End: 2024-07-19
Payer: COMMERCIAL

## 2024-08-05 ENCOUNTER — PATIENT MESSAGE (OUTPATIENT)
Dept: FAMILY MEDICINE | Facility: CLINIC | Age: 59
End: 2024-08-05
Payer: COMMERCIAL

## 2024-08-06 ENCOUNTER — LAB VISIT (OUTPATIENT)
Dept: LAB | Facility: HOSPITAL | Age: 59
End: 2024-08-06
Payer: COMMERCIAL

## 2024-08-06 DIAGNOSIS — E11.69 DYSLIPIDEMIA ASSOCIATED WITH TYPE 2 DIABETES MELLITUS: ICD-10-CM

## 2024-08-06 DIAGNOSIS — I10 ESSENTIAL HYPERTENSION: ICD-10-CM

## 2024-08-06 DIAGNOSIS — Z79.4 TYPE 2 DIABETES MELLITUS WITH HYPERGLYCEMIA, WITH LONG-TERM CURRENT USE OF INSULIN: ICD-10-CM

## 2024-08-06 DIAGNOSIS — E11.65 TYPE 2 DIABETES MELLITUS WITH HYPERGLYCEMIA, WITH LONG-TERM CURRENT USE OF INSULIN: ICD-10-CM

## 2024-08-06 DIAGNOSIS — E78.5 DYSLIPIDEMIA ASSOCIATED WITH TYPE 2 DIABETES MELLITUS: ICD-10-CM

## 2024-08-06 LAB
ALBUMIN SERPL BCP-MCNC: 4.1 G/DL (ref 3.5–5.2)
ALP SERPL-CCNC: 36 U/L (ref 55–135)
ALT SERPL W/O P-5'-P-CCNC: 25 U/L (ref 10–44)
ANION GAP SERPL CALC-SCNC: 9 MMOL/L (ref 8–16)
AST SERPL-CCNC: 28 U/L (ref 10–40)
BASOPHILS # BLD AUTO: 0.09 K/UL (ref 0–0.2)
BASOPHILS NFR BLD: 0.9 % (ref 0–1.9)
BILIRUB SERPL-MCNC: 0.4 MG/DL (ref 0.1–1)
BUN SERPL-MCNC: 12 MG/DL (ref 6–20)
CALCIUM SERPL-MCNC: 9.3 MG/DL (ref 8.7–10.5)
CHLORIDE SERPL-SCNC: 100 MMOL/L (ref 95–110)
CHOLEST SERPL-MCNC: 119 MG/DL (ref 120–199)
CHOLEST/HDLC SERPL: 2.5 {RATIO} (ref 2–5)
CO2 SERPL-SCNC: 28 MMOL/L (ref 23–29)
CREAT SERPL-MCNC: 0.7 MG/DL (ref 0.5–1.4)
DIFFERENTIAL METHOD BLD: ABNORMAL
EOSINOPHIL # BLD AUTO: 1.3 K/UL (ref 0–0.5)
EOSINOPHIL NFR BLD: 13.4 % (ref 0–8)
ERYTHROCYTE [DISTWIDTH] IN BLOOD BY AUTOMATED COUNT: 12.8 % (ref 11.5–14.5)
EST. GFR  (NO RACE VARIABLE): >60 ML/MIN/1.73 M^2
ESTIMATED AVG GLUCOSE: 160 MG/DL (ref 68–131)
GLUCOSE SERPL-MCNC: 121 MG/DL (ref 70–110)
HBA1C MFR BLD: 7.2 % (ref 4.5–6.2)
HCT VFR BLD AUTO: 45.3 % (ref 37–48.5)
HDLC SERPL-MCNC: 48 MG/DL (ref 40–75)
HDLC SERPL: 40.3 % (ref 20–50)
HGB BLD-MCNC: 15.7 G/DL (ref 12–16)
IMM GRANULOCYTES # BLD AUTO: 0.01 K/UL (ref 0–0.04)
IMM GRANULOCYTES NFR BLD AUTO: 0.1 % (ref 0–0.5)
LDLC SERPL CALC-MCNC: 39.2 MG/DL (ref 63–159)
LYMPHOCYTES # BLD AUTO: 3.2 K/UL (ref 1–4.8)
LYMPHOCYTES NFR BLD: 32.3 % (ref 18–48)
MCH RBC QN AUTO: 29.8 PG (ref 27–31)
MCHC RBC AUTO-ENTMCNC: 34.7 G/DL (ref 32–36)
MCV RBC AUTO: 86 FL (ref 82–98)
MONOCYTES # BLD AUTO: 0.7 K/UL (ref 0.3–1)
MONOCYTES NFR BLD: 6.8 % (ref 4–15)
NEUTROPHILS # BLD AUTO: 4.6 K/UL (ref 1.8–7.7)
NEUTROPHILS NFR BLD: 46.5 % (ref 38–73)
NONHDLC SERPL-MCNC: 71 MG/DL
NRBC BLD-RTO: 0 /100 WBC
PLATELET # BLD AUTO: 393 K/UL (ref 150–450)
PLATELET BLD QL SMEAR: ABNORMAL
PMV BLD AUTO: 8.9 FL (ref 9.2–12.9)
POTASSIUM SERPL-SCNC: 3.5 MMOL/L (ref 3.5–5.1)
PROT SERPL-MCNC: 7.3 G/DL (ref 6–8.4)
RBC # BLD AUTO: 5.26 M/UL (ref 4–5.4)
SODIUM SERPL-SCNC: 137 MMOL/L (ref 136–145)
TRIGL SERPL-MCNC: 159 MG/DL (ref 30–150)
WBC # BLD AUTO: 9.82 K/UL (ref 3.9–12.7)

## 2024-08-06 PROCEDURE — 36415 COLL VENOUS BLD VENIPUNCTURE: CPT

## 2024-08-06 PROCEDURE — 83036 HEMOGLOBIN GLYCOSYLATED A1C: CPT

## 2024-08-06 PROCEDURE — 80053 COMPREHEN METABOLIC PANEL: CPT

## 2024-08-06 PROCEDURE — 85025 COMPLETE CBC W/AUTO DIFF WBC: CPT

## 2024-08-06 PROCEDURE — 80061 LIPID PANEL: CPT

## 2024-08-06 NOTE — PROGRESS NOTES
HA1C is high. The goal is to keep it below 7. Schedule appointment to discuss. Remaining labs look good.

## 2024-08-12 ENCOUNTER — OFFICE VISIT (OUTPATIENT)
Dept: FAMILY MEDICINE | Facility: CLINIC | Age: 59
End: 2024-08-12
Payer: COMMERCIAL

## 2024-08-12 VITALS
SYSTOLIC BLOOD PRESSURE: 124 MMHG | TEMPERATURE: 98 F | HEART RATE: 89 BPM | DIASTOLIC BLOOD PRESSURE: 84 MMHG | HEIGHT: 64 IN | BODY MASS INDEX: 30.48 KG/M2 | WEIGHT: 178.56 LBS | OXYGEN SATURATION: 98 %

## 2024-08-12 DIAGNOSIS — E89.0 H/O PARTIAL THYROIDECTOMY: ICD-10-CM

## 2024-08-12 DIAGNOSIS — R13.10 DYSPHAGIA, UNSPECIFIED TYPE: Primary | ICD-10-CM

## 2024-08-12 DIAGNOSIS — Z79.4 TYPE 2 DIABETES MELLITUS WITH HYPERGLYCEMIA, WITH LONG-TERM CURRENT USE OF INSULIN: ICD-10-CM

## 2024-08-12 DIAGNOSIS — E11.65 TYPE 2 DIABETES MELLITUS WITH HYPERGLYCEMIA, WITH LONG-TERM CURRENT USE OF INSULIN: ICD-10-CM

## 2024-08-12 DIAGNOSIS — E04.1 THYROID NODULE: ICD-10-CM

## 2024-08-12 LAB
ALBUMIN/CREAT UR: 9.1 UG/MG (ref 0–30)
CREAT UR-MCNC: 110 MG/DL (ref 15–325)
MICROALBUMIN UR DL<=1MG/L-MCNC: 10 UG/ML

## 2024-08-12 PROCEDURE — 82570 ASSAY OF URINE CREATININE: CPT

## 2024-08-12 PROCEDURE — 82043 UR ALBUMIN QUANTITATIVE: CPT

## 2024-08-12 PROCEDURE — 99214 OFFICE O/P EST MOD 30 MIN: CPT | Mod: S$GLB,COE,,

## 2024-08-12 PROCEDURE — 99999 PR PBB SHADOW E&M-EST. PATIENT-LVL V: CPT | Mod: PBBFAC,COE,,

## 2024-08-12 RX ORDER — DULAGLUTIDE 3 MG/.5ML
3 INJECTION, SOLUTION SUBCUTANEOUS
Qty: 4 PEN | Refills: 1 | Status: SHIPPED | OUTPATIENT
Start: 2024-08-12 | End: 2025-08-12

## 2024-08-12 NOTE — PROGRESS NOTES
Subjective:       Patient ID: Tanesha Chamberlain is a 58 y.o. female.    Chief Complaint: Follow-up    Vanessa Chamberlain is a 58 y.o. female patient that presents to clinic to discuss recent blood work and to talk about mass in her neck, and difficulty swallowing.  Patient has history of type 2 DM.  Recent HA1C is 7.2.  She states she has been watching her diet.  Avoids excess carbs and sweets.  Her average blood sugar with finger sticks are 118.  She had 2 days recently where her blood sugar was high 190s.  She feels like this HA1C may not be accurate due to her diet.  She states she has had food getting stuck in her chest for years and will occasionally have to throw up to get it out.  This seems to be worsening recently.  She has a history of right thyroidectomy.  Ultrasound of thyroid done 3/8/24 which showed 3 small hypoechoic left thyroid nodules which do not meet consensus sonographic criteria for further evaluation with FNA at this time.           Review of patient's allergies indicates:   Allergen Reactions    Nsaids (non-steroidal anti-inflammatory drug) Other (See Comments)     GI upset  GI upset      Ibuprofen Other (See Comments)     Other reaction(s): crohns flair up  Other reaction(s): crohns flair up     Social Determinants of Health     Tobacco Use: Medium Risk (8/12/2024)    Patient History     Smoking Tobacco Use: Former     Smokeless Tobacco Use: Former     Passive Exposure: Not on file   Alcohol Use: Not At Risk (5/31/2024)    AUDIT-C     Frequency of Alcohol Consumption: Monthly or less     Average Number of Drinks: 1 or 2     Frequency of Binge Drinking: Never   Financial Resource Strain: Low Risk  (5/31/2024)    Overall Financial Resource Strain (CARDIA)     Difficulty of Paying Living Expenses: Not hard at all   Food Insecurity: No Food Insecurity (5/31/2024)    Hunger Vital Sign     Worried About Running Out of Food in the Last Year: Never true     Ran Out of Food in the Last Year: Never true    Transportation Needs: Not on file   Physical Activity: Insufficiently Active (5/31/2024)    Exercise Vital Sign     Days of Exercise per Week: 1 day     Minutes of Exercise per Session: 30 min   Stress: No Stress Concern Present (5/31/2024)    [employer] - Occupational Stress Questionnaire     Feeling of Stress : Not at all   Housing Stability: Unknown (5/31/2024)    Housing Stability Vital Sign     Unable to Pay for Housing in the Last Year: No     Homeless in the Last Year: Not on file   Depression: Low Risk  (8/12/2024)    Depression     Last PHQ-4: Flowsheet Data: 0   Utilities: Not At Risk (5/31/2024)    [facility] Utilities     Threatened with loss of utilities: No   Health Literacy: Adequate Health Literacy (5/31/2024)     Health Literacy     Frequency of need for help with medical instructions: Never   Social Isolation: Not on file      Past Medical History:   Diagnosis Date    ADHD (attention deficit hyperactivity disorder)     Arthritis     Crohn's disease     Diabetes mellitus, type 2     Hypertension     Hypothyroidism     Lumbar back pain with radiculopathy affecting left lower extremity     SBO (small bowel obstruction)       Past Surgical History:   Procedure Laterality Date    ANTERIOR CRUCIATE LIGAMENT REPAIR Right     APPENDECTOMY      1988    BUNIONECTOMY Bilateral     CHOLECYSTECTOMY      2005    EXCISION OF MASS OF BACK Left 09/21/2022    Procedure: EXCISION, MASS, BACK;  Surgeon: Sae Collins III, MD;  Location: [facility] OR;  Service: General;  Laterality: Left;    HYSTERECTOMY      INJECTION OF JOINT Right 05/09/2023    Procedure: INJECTION, JOINT, RIGHT PIRIFORMIS AND RIGHT GTB;  Surgeon: Loreta Brito MD;  Location: [facility] PAIN T;  Service: Pain Management;  Laterality: Right;    MINIMALLY INVASIVE SURGICAL REMOVAL OF INTERVERTEBRAL DISC OF SPINE USING MICROSCOPE Left 05/11/2021    Procedure: Left L4-5 Far Lateral Disectomy, MIS;  Surgeon: Johnny Cruz  "MD;  Location: [facility] OR [address] [place] [address];  Service: Neurosurgery;  Laterality: Left;    OOPHORECTOMY      SHOULDER SURGERY Right     SPINE SURGERY      TRANSFORAMINAL EPIDURAL INJECTION OF STEROID Left 03/10/2021    Procedure: INJECTION, STEROID, EPIDURAL, TRANSFORAMINAL APPROACH  L4-5;  Surgeon: Jaylon Tyson MD;  Location: [facility] PAIN MGT;  Service: Pain Management;  Laterality: Left;  consent needed  ([employer] [facility])    TRANSFORAMINAL EPIDURAL INJECTION OF STEROID Left 11/29/2022    Procedure: LUMBAR TRANSFORAMINAL LEFT L4/5 MEDICALLY URGENT;  Surgeon: Loreta Brito MD;  Location: [facility] PAIN MGT;  Service: Pain Management;  Laterality: Left;      Social History     Socioeconomic History    Marital status:          Current Outpatient Medications:     atorvastatin (LIPITOR) 10 MG tablet, TAKE 1 TABLET BY MOUTH EVERY DAY, Disp: 90 tablet, Rfl: 0    BD ULTRA-FINE SHORT PEN NEEDLE 31 gauge x 5/16" Ndle, Inject 1 Needle into the skin once daily., Disp: 100 each, Rfl: 3    blood sugar diagnostic (ONETOUCH ULTRA BLUE TEST STRIP) Strp, Check blood glucose readings twice a day-- before meal and 1 to 2 hours post meal, Disp: 100 strip, Rfl: 0    blood-glucose meter Misc, Check blood glucose readings twice a day-- before meal and 1 to 2 hours post meal, Disp: 1 each, Rfl: 0    dextroamphetamine-amphetamine (ADDERALL) 20 mg tablet, Take 1 tablet by mouth once daily., Disp: 30 tablet, Rfl: 0    dextroamphetamine-amphetamine (ADDERALL) 20 mg tablet, Take 1 tablet by mouth once daily., Disp: 30 tablet, Rfl: 0    dextroamphetamine-amphetamine (ADDERALL) 20 mg tablet, Take 1 tablet by mouth once daily., Disp: 30 tablet, Rfl: 0    estradioL (ESTRACE) 2 MG tablet, TAKE 1 TABLET BY MOUTH ONCE DAILY., Disp: 90 tablet, Rfl: 1    fentaNYL (DURAGESIC) 25 mcg/hr, Place 1 patch onto the skin Every 3 (three) days. 50 mcg, Disp: , Rfl:     hydroCHLOROthiazide (HYDRODIURIL) 25 MG tablet, TAKE 1 TABLET BY MOUTH EVERY DAY, Disp: 90 tablet, " Rfl: 1    HYDROcodone-acetaminophen (NORCO)  mg per tablet, Take 1-2 tablets by mouth every 8 (eight) hours., Disp: , Rfl:     ketoconazole (NIZORAL) 2 % cream, Apply topically every evening., Disp: 60 g, Rfl: 1    ketoconazole (NIZORAL) 2 % cream, Apply topically 2 (two) times daily., Disp: 60 g, Rfl: 1    lancets 33 gauge Misc, Check blood glucose readings twice a day-- before meal and 1 to 2 hours post meal, Disp: 100 each, Rfl: 0    naloxone (NARCAN) 4 mg/actuation Spry, 1 spray., Disp: , Rfl:     sulfamethoxazole-trimethoprim 800-160mg (BACTRIM DS) 800-160 mg Tab, Take 1 tablet by mouth 2 (two) times daily., Disp: 14 tablet, Rfl: 0    tiZANidine (ZANAFLEX) 4 MG tablet, Take 4 mg by mouth 4 (four) times daily as needed., Disp: , Rfl:     TRESIBA FLEXTOUCH U-100 100 unit/mL (3 mL) insulin pen, INJECT 30 UNITS INTO THE SKIN ONCE DAILY., Disp: 30 mL, Rfl: 0    triamcinolone acetonide 0.025% (KENALOG) 0.025 % cream, Apply topically 2 (two) times daily., Disp: 80 g, Rfl: 1    dulaglutide (TRULICITY) 3 mg/0.5 mL pen injector, Inject 3 mg into the skin every 7 days., Disp: 4 pen , Rfl: 1    Lab Results   Component Value Date    WBC 9.82 08/06/2024    HGB 15.7 08/06/2024    HCT 45.3 08/06/2024     08/06/2024    CHOL 119 (L) 08/06/2024    TRIG 159 (H) 08/06/2024    HDL 48 08/06/2024    ALT 25 08/06/2024    AST 28 08/06/2024     08/06/2024    K 3.5 08/06/2024     08/06/2024    CREATININE 0.7 08/06/2024    BUN 12 08/06/2024    CO2 28 08/06/2024    TSH 1.099 03/08/2024    INR 1.0 05/04/2021    HGBA1C 7.2 (H) 08/06/2024    MICROALBUR 1.6 08/11/2022       Review of Systems   Constitutional: Negative.    HENT:  Positive for trouble swallowing and goiter. Negative for nasal congestion, sinus pressure/congestion and sore throat.    Respiratory: Negative.     Cardiovascular: Negative.    Gastrointestinal: Negative.    Endocrine: Negative.        Objective:      Physical Exam  Vitals reviewed.  "  Constitutional:       Appearance: Normal appearance.   Neck:      Thyroid: Thyromegaly present. No thyroid tenderness.      Vascular: No carotid bruit.      Trachea: Trachea normal.   Cardiovascular:      Rate and Rhythm: Normal rate and regular rhythm.      Pulses: Normal pulses.      Heart sounds: Normal heart sounds.   Pulmonary:      Effort: Pulmonary effort is normal.      Breath sounds: Normal breath sounds.   Abdominal:      General: Bowel sounds are normal.      Palpations: Abdomen is soft.      Tenderness: There is no abdominal tenderness.   Musculoskeletal:         General: Normal range of motion.      Cervical back: Full passive range of motion without pain and normal range of motion.   Lymphadenopathy:      Cervical: No cervical adenopathy.   Skin:     General: Skin is warm and dry.      Capillary Refill: Capillary refill takes less than 2 seconds.   Neurological:      General: No focal deficit present.      Mental Status: She is alert.   Psychiatric:         Mood and Affect: Mood normal.         Behavior: Behavior normal.         Thought Content: Thought content normal.         Judgment: Judgment normal.         Assessment:       1. Dysphagia, unspecified type    2. Type 2 diabetes mellitus with hyperglycemia, with long-term current use of insulin    3. H/O partial thyroidectomy    4. Thyroid nodule        Plan:       Tanesha Bermudez" was seen today for follow-up.    Diagnoses and all orders for this visit:    Dysphagia, unspecified type  -     Ambulatory referral/consult to Gastroenterology; Future  -     Microalbumin/creatinine urine ratio    Type 2 diabetes mellitus with hyperglycemia, with long-term current use of insulin  -     Cancel: Microalbumin/Creatinine Ratio, Urine; Future  -     dulaglutide (TRULICITY) 3 mg/0.5 mL pen injector; Inject 3 mg into the skin every 7 days.  -     Microalbumin/creatinine urine ratio  -     Comprehensive Metabolic Panel; Future  -     Hemoglobin A1C; " Future    H/O partial thyroidectomy    Thyroid nodule    Referral to Gastroenterology to evaluate dysphagia.      Increase Tresiba to 35 units daily.  Patient will send her fasting blood sugars through portal at end of week for possible dose adjustment.  Continue Trulicity 3 mg weekly.  ADA diet.  Microalbumin today.  Repeat HA1C in 3 months. Follow up in 3 months. Have labs prior to visit.

## 2024-08-15 ENCOUNTER — TELEPHONE (OUTPATIENT)
Dept: GASTROENTEROLOGY | Facility: CLINIC | Age: 59
End: 2024-08-15
Payer: COMMERCIAL

## 2024-08-15 PROBLEM — E04.1 THYROID NODULE: Status: ACTIVE | Noted: 2024-08-15

## 2024-08-15 NOTE — TELEPHONE ENCOUNTER
Called patient regarding appt with NP Mary Rodriguez on tomorrow, 8/16/24; pt states she wants to be seen for c/o difficulty swallowing. Appointment confirmed with patient.

## 2024-08-16 ENCOUNTER — PATIENT MESSAGE (OUTPATIENT)
Dept: FAMILY MEDICINE | Facility: CLINIC | Age: 59
End: 2024-08-16
Payer: COMMERCIAL

## 2024-08-16 ENCOUNTER — OFFICE VISIT (OUTPATIENT)
Dept: GASTROENTEROLOGY | Facility: CLINIC | Age: 59
End: 2024-08-16
Payer: COMMERCIAL

## 2024-08-16 VITALS
DIASTOLIC BLOOD PRESSURE: 85 MMHG | SYSTOLIC BLOOD PRESSURE: 173 MMHG | BODY MASS INDEX: 30.6 KG/M2 | WEIGHT: 179.25 LBS | HEIGHT: 64 IN | HEART RATE: 68 BPM

## 2024-08-16 DIAGNOSIS — R12 HEARTBURN: ICD-10-CM

## 2024-08-16 DIAGNOSIS — Z86.010 HISTORY OF COLON POLYPS: ICD-10-CM

## 2024-08-16 DIAGNOSIS — Z87.19 HISTORY OF CROHN'S DISEASE: ICD-10-CM

## 2024-08-16 DIAGNOSIS — R13.10 DYSPHAGIA, UNSPECIFIED TYPE: Primary | ICD-10-CM

## 2024-08-16 PROCEDURE — 99999 PR PBB SHADOW E&M-EST. PATIENT-LVL V: CPT | Mod: PBBFAC,COE,,

## 2024-08-16 NOTE — PROGRESS NOTES
Subjective:       Patient ID: Tanesha Chamberlain is a 58 y.o. female Body mass index is 30.77 kg/m².    Chief Complaint: Dysphagia    This patient is new to me.  Referring Provider: Catalina Roland for dysphagia.  Established patient of Dr. Fraser.         GI Problem  Primary symptoms do not include fever, weight loss, fatigue, abdominal pain, nausea, vomiting, diarrhea, melena, hematemesis, jaundice, hematochezia, dysuria, myalgias, arthralgias or rash.   The illness is also significant for dysphagia (chronic dysphagia, within last year it has worsened occurs intermittently, with meats like tuna or solid foods, feels stuck in throat area, no issues with pills or liquids no hx of EGD with dilation, does have leftsided thyroid nodules). The illness does not include bloating or constipation. Associated symptoms comments: Pt reports was dx with Crohn's disease in the 1980s states was never on any biologics was always treated with steroids reports has not had any issues or symptoms with Crohn's disease in many years, denies any irregular bowel habits has a BM daily reports normal soft stools, Last colonoscopy in 2019 with Dr. Fraser showed hyperplastic colon polyp and a normal biopsy of ileum, pt denies at the time being on steroids or treatment, denies any family history of colon cancer reports her niece has history of Crohn's disease. Significant associated medical issues include inflammatory bowel disease and GERD (reports heartburn in rare occasions like once a month or can go a couple months with no symptoms). Associated medical issues do not include gallstones, liver disease, alcohol abuse, PUD, gastric bypass, bowel resection, irritable bowel syndrome, hemorrhoids or diverticulitis.       Review of Systems   Constitutional:  Negative for fatigue, fever and weight loss.   Gastrointestinal:  Positive for dysphagia (chronic dysphagia, within last year it has worsened occurs intermittently, with meats like tuna or solid  foods, feels stuck in throat area, no issues with pills or liquids no hx of EGD with dilation, does have leftsided thyroid nodules). Negative for abdominal distention, abdominal pain, anal bleeding, bloating, blood in stool, constipation, diarrhea, hematemesis, hematochezia, jaundice, melena, nausea, rectal pain and vomiting.   Genitourinary:  Negative for dysuria.   Musculoskeletal:  Negative for arthralgias and myalgias.   Skin:  Negative for rash.         No LMP recorded. Patient has had a hysterectomy.  Past Medical History:   Diagnosis Date    ADHD (attention deficit hyperactivity disorder)     Arthritis     Colon polyp     Crohn's disease     Diabetes mellitus, type 2     GERD (gastroesophageal reflux disease)     Hypertension     Hypothyroidism     Lumbar back pain with radiculopathy affecting left lower extremity     SBO (small bowel obstruction)      Past Surgical History:   Procedure Laterality Date    ANTERIOR CRUCIATE LIGAMENT REPAIR Right     APPENDECTOMY      1988    BUNIONECTOMY Bilateral     CHOLECYSTECTOMY      2005    EXCISION OF MASS OF BACK Left 09/21/2022    Procedure: EXCISION, MASS, BACK;  Surgeon: Sae Collins III, MD;  Location: [facility] OR;  Service: General;  Laterality: Left;    HYSTERECTOMY      INJECTION OF JOINT Right 05/09/2023    Procedure: INJECTION, JOINT, RIGHT PIRIFORMIS AND RIGHT GTB;  Surgeon: Loreta Brito MD;  Location: [facility] PAIN T;  Service: Pain Management;  Laterality: Right;    MINIMALLY INVASIVE SURGICAL REMOVAL OF INTERVERTEBRAL DISC OF SPINE USING MICROSCOPE Left 05/11/2021    Procedure: Left L4-5 Far Lateral Disectomy, MIS;  Surgeon: Johnny Cruz MD;  Location: [address];  Service: Neurosurgery;  Laterality: Left;    OOPHORECTOMY      SHOULDER SURGERY Right     SPINE SURGERY      TRANSFORAMINAL EPIDURAL INJECTION OF STEROID Left 03/10/2021    Procedure: INJECTION, STEROID, EPIDURAL, TRANSFORAMINAL APPROACH  L4-5;  Surgeon: Jaylon Tyson MD;   Location: [facility] PAIN MGT;  Service: Pain Management;  Laterality: Left;  consent needed  ([facility])    TRANSFORAMINAL EPIDURAL INJECTION OF STEROID Left 11/29/2022    Procedure: LUMBAR TRANSFORAMINAL LEFT L4/5 MEDICALLY URGENT;  Surgeon: Loreta Brito MD;  Location: [facility] PAIN MGT;  Service: Pain Management;  Laterality: Left;    UPPER GASTROINTESTINAL ENDOSCOPY      10 years ago [facility]     Family History   Problem Relation Name Age of Onset    Crohn's disease Other      Cirrhosis Neg Hx      Ulcerative colitis Neg Hx       Social History     Tobacco Use    Smoking status: Former     Current packs/day: 0.00     Types: Cigarettes     Quit date: 12/27/2020     Years since quitting: 3.6    Smokeless tobacco: Former    Tobacco comments:     in the 8 th grade   Substance Use Topics    Alcohol use: Yes     Alcohol/week: 4.0 standard drinks of alcohol     Types: 4 Cans of beer per week     Comment: 1 beer , once in 2 weeks    Drug use: No     Wt Readings from Last 10 Encounters:   08/16/24 81.3 kg (179 lb 3.7 oz)   08/12/24 81 kg (178 lb 9.2 oz)   03/08/24 80.8 kg (178 lb 2.1 oz)   02/23/24 82.7 kg (182 lb 6.9 oz)   07/24/23 80.4 kg (177 lb 5.8 oz)   07/14/23 80.5 kg (177 lb 8 oz)   06/16/23 81.3 kg (179 lb 3.2 oz)   05/16/23 80.8 kg (178 lb 3.2 oz)   05/01/23 80.2 kg (176 lb 11.2 oz)   03/09/23 82.6 kg (182 lb)     Lab Results   Component Value Date    WBC 9.82 08/06/2024    HGB 15.7 08/06/2024    HCT 45.3 08/06/2024    MCV 86 08/06/2024     08/06/2024     CMP  Sodium   Date Value Ref Range Status   08/06/2024 137 136 - 145 mmol/L Final     Potassium   Date Value Ref Range Status   08/06/2024 3.5 3.5 - 5.1 mmol/L Final     Chloride   Date Value Ref Range Status   08/06/2024 100 95 - 110 mmol/L Final     CO2   Date Value Ref Range Status   08/06/2024 28 23 - 29 mmol/L Final     Glucose   Date Value Ref Range Status   08/06/2024 121 (H) 70 - 110 mg/dL Final     BUN   Date Value Ref Range Status   08/06/2024  "12 6 - 20 mg/dL Final     Creatinine   Date Value Ref Range Status   08/06/2024 0.7 0.5 - 1.4 mg/dL Final     Calcium   Date Value Ref Range Status   08/06/2024 9.3 8.7 - 10.5 mg/dL Final     Total Protein   Date Value Ref Range Status   08/06/2024 7.3 6.0 - 8.4 g/dL Final     Albumin   Date Value Ref Range Status   08/06/2024 4.1 3.5 - 5.2 g/dL Final     Total Bilirubin   Date Value Ref Range Status   08/06/2024 0.4 0.1 - 1.0 mg/dL Final     Comment:     For infants and newborns, interpretation of results should be based  on gestational age, weight and in agreement with clinical  observations.    Premature Infant recommended reference ranges:  Up to 24 hours.............<8.0 mg/dL  Up to 48 hours............<12.0 mg/dL  3-5 days..................<15.0 mg/dL  6-29 days.................<15.0 mg/dL       Alkaline Phosphatase   Date Value Ref Range Status   08/06/2024 36 (L) 55 - 135 U/L Final     AST   Date Value Ref Range Status   08/06/2024 28 10 - 40 U/L Final     ALT   Date Value Ref Range Status   08/06/2024 25 10 - 44 U/L Final     Anion Gap   Date Value Ref Range Status   08/06/2024 9 8 - 16 mmol/L Final     eGFR if    Date Value Ref Range Status   08/25/2021 >60.0 >60 mL/min/1.73 m^2 Final     eGFR if non    Date Value Ref Range Status   08/25/2021 >60.0 >60 mL/min/1.73 m^2 Final     Comment:     Calculation used to obtain the estimated glomerular filtration  rate (eGFR) is the CKD-EPI equation.        Lab Results   Component Value Date    LIPASE 29 08/18/2022     No results found for: "LIPASERES"  Lab Results   Component Value Date    TSH 1.099 03/08/2024       Reviewed prior medical records including radiology report of CT abdomen 08/18/2022, ultrasound soft tissue 03/08/2024 & endoscopy history (see surgical history/procedures).    Objective:      Physical Exam  Vitals and nursing note reviewed.   Constitutional:       Appearance: Normal appearance. She is normal weight. "   Cardiovascular:      Rate and Rhythm: Normal rate and regular rhythm.      Heart sounds: Normal heart sounds.   Pulmonary:      Breath sounds: Normal breath sounds.   Abdominal:      General: Bowel sounds are normal.      Palpations: Abdomen is soft.      Tenderness: There is no abdominal tenderness.   Skin:     General: Skin is warm and dry.      Coloration: Skin is not jaundiced.   Neurological:      Mental Status: She is alert and oriented to person, place, and time.   Psychiatric:         Mood and Affect: Mood normal.         Behavior: Behavior normal.         Assessment:       1. Dysphagia, unspecified type    2. Heartburn    3. History of colon polyps    4. History of Crohn's disease        Plan:       Dysphagia, unspecified type  - schedule EGD, discussed procedure with patient and possible esophageal dilation may be performed during procedure if indicated, patient verbalized understanding  - recommend to eat smaller more frequent meals and to eat slowly and advised to eat a soft diet. Take medications one at a time with a full glass of water.  - possible UGI/esophagram/esophageal manometry if symptoms persist  -     Case Request Endoscopy: EGD (ESOPHAGOGASTRODUODENOSCOPY)    Heartburn   -Continue with EGD, symptom occurs rarely based on EGD results consider PPI patient verbalized understanding  -Educated patient on lifestyle modifications to help control/reduce reflux/abdominal pain including: avoid large meals, avoid eating within 2-3 hours of bedtime (avoid late night eating & lying down soon after eating), elevate head of bed if nocturnal symptoms are present, smoking cessation (if current smoker), & weight loss (if overweight).   -Educated to avoid known foods which trigger reflux symptoms & to minimize/avoid high-fat foods, chocolate, caffeine, citrus, alcohol, & tomato products.  -Advised to avoid/limit use of NSAID's, since they can cause GI upset, bleeding, and/or ulcers. If needed, take with food.      History of colon polyps  -     Case Request Endoscopy: COLONOSCOPY  - schedule Colonoscopy, discussed procedure with the patient, including risks and benefits, patient verbalized understanding    History of Crohn's disease   -Has been asymptomatic   -last colonoscopy normal    -pt aware if colonoscopy is positive for Chron's we will refer to [facility]    Follow up if symptoms worsen or fail to improve.      If no improvement in symptoms or symptoms worsen, call/follow-up at clinic or go to ER.       [facility]  [facility], [place]     Dictation software program was used for this note. Please expect some simple typographical  errors in this note.    Encounter includes face to face time and non-face to face time preparing to see the patient (eg, review of tests), obtaining and/or reviewing separately obtained history, documenting clinical information in the electronic or other health record, independently interpreting results (not separately reported) and communicating results to the patient/family/caregiver, or care coordination (not separately reported).

## 2024-08-16 NOTE — PATIENT INSTRUCTIONS
..Instructions for Outpatient Endoscopy    PROCEDURE DATE: 10/18/24  REPORT TO [facility] REGIST[facility] ([address] [place] [address], [place] [address])   Your arrival time will be provided by the ENDO department about 1 week prior to your procedure date. If you do not hear from them, they can be reached at [phone] Mon- Friday 8a-2p.    NO BLOOD THINNERS/NO ASPIRIN (OK to continue Aspirin 81mg) OR MEDICATIONS CONTAINING ASPIRIN, MOTRIN, IBUPROFEN, ALEVE, OR ANY ANTI-INFLAMMATORY MEDICATIONS FOR 7 DAYS PRIOR TO PROCEDURE. TYLENOL IS OK.      Please hold the following diabetic/weight loss medications 1 week prior to procedure:   Dulaglutide (Trulicity)  Exenatide extended release (Bydureon bcise)  Semaglutide (Ozempic, Wegovy)  Tirzepatide (Mounjaro, Zepbound)  Please hold the following medications the day before your procedure:   Exenatide (Byetta)   Liraglutide (Victoza, Saxenda)   Lixisenatide (Adlyxin)   Semaglutide (Rybelsus)     Clear Liquid diet the whole day before your procedure.     Nothing by mouth after midnight or the morning of EGD.    Ok to take morning medications with small sip water by 5:30am (except no Diabetic medications)    SINCE SEDATION IS USED, YOU MUST HAVE SOMEONE TO DRIVE YOU HOME/NO TAXI  Please call [phone] with any questions.             ..MIRALAX PREP FOR COLONOSCOPY (OVER THE COUNTER PREP)    PROCEDURE DATE: 11/1/24  REPORT TO [facility] ([address]  Your arrival time will be provided by the ENDO department about 1 week prior to your procedure date. If you do not hear from them, they can be reached at [phone] Mon- Friday 8a-2p.    NO BLOOD THINNERS/NO ASPIRIN (OK TO TAKE 81mg ASPIRIN) OR MEDICATIONS CONTAINING ASPIRIN, MOTRIN, IBUPROFEN, ALEVE, OR ANY ANTI-INFLAMMATORY MEDICATIONS FOR 7 DAYS PRIOR TO PROCEDURE. TYLENOL IS OK.    Please hold the following diabetic/weight loss medications 1 week prior to procedure:    Dulaglutide (Trulicity)   Exenatide extended release (Bydureon bcise)   Semaglutide (Ozempic, Wegovy)   Tirzepatide (Mounjaro)  Please hold the following medications the day before your procedure:   Exenatide (Byetta)   Liraglutide (Victoza, Saxenda)   Lixisenatide (Adlyxin)   Semaglutide (Rybelsus)     Avoid eating beans, peas, corn, nuts, popcorn, okra, and tomatoes for 5 days prior to your colonoscopy.    **You will need to purchase over the counter  4 Dulcolax laxative tablets - any brand is fine.  2 Liter Bottle or 64 oz. of any clear liquid - see list below (Noncarbonated)  MiraLAX (8.3 oz) bottle of powder    MIX  8 oz bottle MIRALAX PREP WITH 64 oz of clear liquid(noncarbonated) DAY BEFORE PROCEDURE AND REFRIGERATE    THE ENTIRE DAY BEFORE YOUR COLONOSCOPY CLEAR LIQUIDS ONLY (LISTED BELOW) NO FOOD _10/31/24_______________  Coffee (no cream), tea, carbonated beverages, gelatin-plain or fruit flavored, Gatorade, Crystal Light, Popsicles, snowballs, hard candy (avoid anything red, purple or blue). Juices - apple, white grape, or cranberry juice, lemonade, limeade, clear beef or chicken bouillon or broth. Avoid liquids not listed, Alcohol is not permitted.    Take 2 Dulcolax laxative tablets at 10a.  Take 2 more Dulcolax laxative tablets at 12p.  At 5p drink 32oz of MiraLAX prep mix   Follow with 5(8oz) fluids of clear liquid over next 5 hours.  At 10p drink other 32oz of MiraLAX prep mix  Follow with 3(8oz) fluids of clear liquid within 3 hours.     NOTHING BY MOUTH AFTER 4am THE MORNING OF YOUR COLONOSCOPY     Ok to take morning medication by 4am (except no Diabetic medications)    Since sedation is used, you need someone to drive you home, No TAXI   Any Questions, please call [phone]

## 2024-08-23 ENCOUNTER — PATIENT MESSAGE (OUTPATIENT)
Dept: OPTOMETRY | Facility: CLINIC | Age: 59
End: 2024-08-23
Payer: COMMERCIAL

## 2024-08-29 NOTE — TELEPHONE ENCOUNTER
Refill Routing Note   Medication(s) are not appropriate for processing by [facility] for the following reason(s):        Required vitals abnormal    [facility] action(s):  Defer               Appointments  past 12m or future 3m with PCP    Date Provider   Last Visit   7/14/2023 Norberto Ac III, MD   Next Visit   Visit date not found Norberto Ac III, MD   ED visits in past 90 days: 0        Note composed:5:29 PM 08/29/2024

## 2024-08-29 NOTE — TELEPHONE ENCOUNTER
No care due was identified.  [facility] Embedded Care Due Messages. Reference number: [id number].   8/29/2024 12:21:49 AM CDT

## 2024-09-03 RX ORDER — HYDROCHLOROTHIAZIDE 25 MG/1
25 TABLET ORAL
Qty: 90 TABLET | Refills: 0 | Status: SHIPPED | OUTPATIENT
Start: 2024-09-03

## 2024-09-03 RX ORDER — ESTRADIOL 2 MG/1
2 TABLET ORAL
Qty: 90 TABLET | Refills: 0 | Status: SHIPPED | OUTPATIENT
Start: 2024-09-03

## 2024-09-12 ENCOUNTER — OFFICE VISIT (OUTPATIENT)
Dept: OPTOMETRY | Facility: CLINIC | Age: 59
End: 2024-09-12
Payer: COMMERCIAL

## 2024-09-12 ENCOUNTER — PATIENT MESSAGE (OUTPATIENT)
Dept: OPTOMETRY | Facility: CLINIC | Age: 59
End: 2024-09-12

## 2024-09-12 DIAGNOSIS — H52.7 REFRACTIVE ERROR: ICD-10-CM

## 2024-09-12 DIAGNOSIS — H25.13 NUCLEAR SCLEROSIS, BILATERAL: Primary | ICD-10-CM

## 2024-09-12 PROCEDURE — 3061F NEG MICROALBUMINURIA REV: CPT | Mod: CPTII,S$GLB,COE, | Performed by: OPTOMETRIST

## 2024-09-12 PROCEDURE — 99999 PR PBB SHADOW E&M-EST. PATIENT-LVL III: CPT | Mod: PBBFAC,COE,, | Performed by: OPTOMETRIST

## 2024-09-12 PROCEDURE — 92012 INTRM OPH EXAM EST PATIENT: CPT | Mod: S$GLB,COE,, | Performed by: OPTOMETRIST

## 2024-09-12 PROCEDURE — 1159F MED LIST DOCD IN RCRD: CPT | Mod: CPTII,S$GLB,COE, | Performed by: OPTOMETRIST

## 2024-09-12 PROCEDURE — 3051F HG A1C>EQUAL 7.0%<8.0%: CPT | Mod: CPTII,S$GLB,COE, | Performed by: OPTOMETRIST

## 2024-09-12 PROCEDURE — 1160F RVW MEDS BY RX/DR IN RCRD: CPT | Mod: CPTII,S$GLB,COE, | Performed by: OPTOMETRIST

## 2024-09-12 PROCEDURE — 3066F NEPHROPATHY DOC TX: CPT | Mod: CPTII,S$GLB,COE, | Performed by: OPTOMETRIST

## 2024-09-12 NOTE — PROGRESS NOTES
HPI    Pt states she loves her CL, very comfortable and vision great  Wanting some glasses to wear at night when driving     (-)headaches  (-)FOL/floaters  Last edited by Beatriz Orellana on 9/12/2024  2:08 PM.            Assessment /Plan     For exam results, see Encounter Report.    Nuclear sclerosis, bilateral    Refractive error      1. Nuclear sclerosis, bilateral  Mild OU, not VS  Observe     2. Refractive error  Refracted on top of cls for specs prn for distance / increased reading with MF lenses

## 2024-09-14 DIAGNOSIS — E11.65 TYPE 2 DIABETES MELLITUS WITH HYPERGLYCEMIA, WITHOUT LONG-TERM CURRENT USE OF INSULIN: ICD-10-CM

## 2024-09-14 NOTE — TELEPHONE ENCOUNTER
No care due was identified.  [facility] Embedded Care Due Messages. Reference number: [id number].   9/14/2024 6:43:33 AM CDT

## 2024-09-15 RX ORDER — INSULIN DEGLUDEC 100 U/ML
30 INJECTION, SOLUTION SUBCUTANEOUS DAILY
Qty: 30 ML | Refills: 0 | Status: SHIPPED | OUTPATIENT
Start: 2024-09-15

## 2024-09-15 RX ORDER — PEN NEEDLE, DIABETIC 31 GX5/16"
1 NEEDLE, DISPOSABLE MISCELLANEOUS DAILY
Qty: 100 EACH | Refills: 3 | Status: SHIPPED | OUTPATIENT
Start: 2024-09-15

## 2024-09-26 DIAGNOSIS — Z00.00 ENCOUNTER FOR MEDICARE ANNUAL WELLNESS EXAM: ICD-10-CM

## 2024-10-18 ENCOUNTER — ANESTHESIA (OUTPATIENT)
Dept: ENDOSCOPY | Facility: HOSPITAL | Age: 59
End: 2024-10-18
Payer: COMMERCIAL

## 2024-10-18 ENCOUNTER — ANESTHESIA EVENT (OUTPATIENT)
Dept: ENDOSCOPY | Facility: HOSPITAL | Age: 59
End: 2024-10-18
Payer: COMMERCIAL

## 2024-10-18 ENCOUNTER — PATIENT MESSAGE (OUTPATIENT)
Dept: FAMILY MEDICINE | Facility: CLINIC | Age: 59
End: 2024-10-18
Payer: COMMERCIAL

## 2024-10-18 ENCOUNTER — HOSPITAL ENCOUNTER (OUTPATIENT)
Facility: HOSPITAL | Age: 59
Discharge: HOME | End: 2024-10-18
Attending: INTERNAL MEDICINE | Admitting: INTERNAL MEDICINE
Payer: COMMERCIAL

## 2024-10-18 VITALS
TEMPERATURE: 99 F | OXYGEN SATURATION: 97 % | SYSTOLIC BLOOD PRESSURE: 120 MMHG | RESPIRATION RATE: 23 BRPM | HEART RATE: 76 BPM | DIASTOLIC BLOOD PRESSURE: 75 MMHG

## 2024-10-18 DIAGNOSIS — R13.10 DYSPHAGIA: ICD-10-CM

## 2024-10-18 DIAGNOSIS — Z79.4 TYPE 2 DIABETES MELLITUS WITH HYPERGLYCEMIA, WITH LONG-TERM CURRENT USE OF INSULIN: ICD-10-CM

## 2024-10-18 DIAGNOSIS — E11.65 TYPE 2 DIABETES MELLITUS WITH HYPERGLYCEMIA, WITH LONG-TERM CURRENT USE OF INSULIN: ICD-10-CM

## 2024-10-18 PROCEDURE — 37000008 HC ANESTHESIA 1ST 15 MINUTES: Performed by: INTERNAL MEDICINE

## 2024-10-18 PROCEDURE — D9220A PRA ANESTHESIA: Mod: ANES,,, | Performed by: ANESTHESIOLOGY

## 2024-10-18 PROCEDURE — 43249 ESOPH EGD DILATION <30 MM: CPT | Mod: ,,, | Performed by: INTERNAL MEDICINE

## 2024-10-18 PROCEDURE — D9220A PRA ANESTHESIA: Mod: CRNA,,, | Performed by: NURSE ANESTHETIST, CERTIFIED REGISTERED

## 2024-10-18 PROCEDURE — C1726 CATH, BAL DIL, NON-VASCULAR: HCPCS | Performed by: INTERNAL MEDICINE

## 2024-10-18 PROCEDURE — 63600175 PHARM REV CODE 636 W HCPCS: Performed by: NURSE ANESTHETIST, CERTIFIED REGISTERED

## 2024-10-18 PROCEDURE — 27201012 HC FORCEPS, HOT/COLD, DISP: Performed by: INTERNAL MEDICINE

## 2024-10-18 PROCEDURE — 37000009 HC ANESTHESIA EA ADD 15 MINS: Performed by: INTERNAL MEDICINE

## 2024-10-18 PROCEDURE — 43249 ESOPH EGD DILATION <30 MM: CPT | Performed by: INTERNAL MEDICINE

## 2024-10-18 PROCEDURE — 43239 EGD BIOPSY SINGLE/MULTIPLE: CPT | Mod: 59 | Performed by: INTERNAL MEDICINE

## 2024-10-18 PROCEDURE — 43239 EGD BIOPSY SINGLE/MULTIPLE: CPT | Mod: 59,,, | Performed by: INTERNAL MEDICINE

## 2024-10-18 RX ORDER — ASPIRIN 81 MG/1
81 TABLET ORAL DAILY
COMMUNITY

## 2024-10-18 RX ORDER — LIDOCAINE HYDROCHLORIDE 20 MG/ML
INJECTION INTRAVENOUS
Status: DISCONTINUED | OUTPATIENT
Start: 2024-10-18 | End: 2024-10-18

## 2024-10-18 RX ORDER — DULAGLUTIDE 3 MG/.5ML
3 INJECTION, SOLUTION SUBCUTANEOUS
Qty: 4 PEN | Refills: 1 | Status: SHIPPED | OUTPATIENT
Start: 2024-10-18 | End: 2025-10-18

## 2024-10-18 RX ORDER — PROPOFOL 10 MG/ML
VIAL (ML) INTRAVENOUS
Status: DISCONTINUED | OUTPATIENT
Start: 2024-10-18 | End: 2024-10-18

## 2024-10-18 RX ORDER — OMEPRAZOLE 40 MG/1
CAPSULE, DELAYED RELEASE ORAL
Qty: 90 CAPSULE | Refills: 3 | Status: SHIPPED | OUTPATIENT
Start: 2024-10-18 | End: 2025-10-18

## 2024-10-18 RX ORDER — SODIUM CHLORIDE 9 MG/ML
INJECTION, SOLUTION INTRAVENOUS CONTINUOUS
Status: DISCONTINUED | OUTPATIENT
Start: 2024-10-18 | End: 2024-10-18 | Stop reason: HOSPADM

## 2024-10-18 RX ORDER — FAMOTIDINE 10 MG/1
10 TABLET ORAL DAILY
Status: ON HOLD | COMMUNITY
End: 2024-10-18 | Stop reason: HOSPADM

## 2024-10-18 RX ADMIN — PROPOFOL 100 MG: 10 INJECTION, EMULSION INTRAVENOUS at 08:10

## 2024-10-18 RX ADMIN — PROPOFOL 50 MG: 10 INJECTION, EMULSION INTRAVENOUS at 08:10

## 2024-10-18 RX ADMIN — LIDOCAINE HYDROCHLORIDE 100 MG: 20 INJECTION, SOLUTION INTRAVENOUS at 08:10

## 2024-10-18 NOTE — H&P
[facility] Note    CC: Dysphagia    HPI 58 y.o. female presents for evaluation of dysphagia    Past Medical History:   Diagnosis Date    ADHD (attention deficit hyperactivity disorder)     Arthritis     Colon polyp     Crohn's disease     Diabetes mellitus, type 2     GERD (gastroesophageal reflux disease)     Hypertension     Hypothyroidism     Lumbar back pain with radiculopathy affecting left lower extremity     SBO (small bowel obstruction)        Allergies and Medications reviewed     Review of Systems  General ROS: negative for - chills, fever or weight loss  Cardiovascular ROS: no chest pain or dyspnea on exertion  Gastrointestinal ROS: + dysphagia    Physical Examination  BP (!) 144/88 (BP Location: Left arm, Patient Position: Lying)   Pulse 79   Temp 98.2 °F (36.8 °C) (Skin)   Resp 16   SpO2 100%   General appearance: alert, cooperative, no distress  HENT: Normocephalic, atraumatic, neck symmetrical, no nasal discharge, sclera anicteric   Lungs: clear to auscultation bilaterally, symmetric chest wall expansion bilaterally  Heart: regular rate and rhythm without rub; no displacement of the PMI   Abdomen: soft  Extremities: extremities symmetric; no clubbing, cyanosis, or edema        Labs:  Lab Results   Component Value Date    WBC 9.82 08/06/2024    HGB 15.7 08/06/2024    HCT 45.3 08/06/2024    MCV 86 08/06/2024     08/06/2024         Assessment:   58 y.o. female presents for EGD    Plan:  -Proceed to EGD     Marguerite Gray MD  [facility]  [address], [address]  [place] [address]  Office: [phone]  Fax: [phone]

## 2024-10-18 NOTE — PLAN OF CARE
Vss, edward po fluids, denies pain, ambulates easily. IV removed, catheter intact. Discharge instructions provided and states understanding. States ready to go home. Spoke to Dr Gray at the bedside. Discharged from facility with family per wheelchair.

## 2024-10-18 NOTE — [employer] PATIENT INSTRUCTIONS
Discharge Summary/Instructions after an Endoscopic Procedure  Patient Name: Tanesha Walker MRN: [id number]  Patient YOB: 1965 Friday, October 18, 2024  Marguerite Gray MD  Dear patient,  As a result of recent federal legislation (The Federal Cures Act), you may   receive lab or pathology results from your procedure in your [facility]   account before your physician is able to contact you. Your physician or   their representative will relay the results to you with their   recommendations at their soonest availability.  Thank you,  RESTRICTIONS:  During your procedure today, you received medications for sedation.  These   medications may affect your judgment, balance and coordination.  Therefore,   for 24 hours, you have the following restrictions:   - DO NOT drive a car, operate machinery, make legal/financial decisions,   sign important papers or drink alcohol.    ACTIVITY:  Today: no heavy lifting, straining or running due to procedural   sedation/anesthesia.  The following day: return to full activity including work.  DIET:  Eat and drink normally unless instructed otherwise.     TREATMENT FOR COMMON SIDE EFFECTS:  - Mild abdominal pain, nausea, belching, bloating or excessive gas:  rest,   eat lightly and use a heating pad.  - Sore Throat: treat with throat lozenges and/or gargle with warm salt   water.  - Because air was used during the procedure, expelling large amounts of air   from your rectum or belching is normal.  - If a bowel prep was taken, you may not have a bowel movement for 1-3 days.    This is normal.  SYMPTOMS TO WATCH FOR AND REPORT TO YOUR PHYSICIAN:  1. Abdominal pain or bloating, other than gas cramps.  2. Chest pain.  3. Back pain.  4. Signs of infection such as: chills or fever occurring within 24 hours   after the procedure.  5. Rectal bleeding, which would show as bright red, maroon, or black stools.   (A tablespoon of blood from the rectum is not serious,  especially if   hemorrhoids are present.)  6. Vomiting.  7. Weakness or dizziness.  GO DIRECTLY TO THE NEAREST EMERGENCY ROOM IF YOU HAVE ANY OF THE FOLLOWING:      Difficulty breathing              Chills and/or fever over 101 F   Persistent vomiting and/or vomiting blood   Severe abdominal pain   Severe chest pain   Black, tarry stools   Bleeding- more than one tablespoon   Any other symptom or condition that you feel may need urgent attention  Your doctor recommends these additional instructions:  If any biopsies were taken, your doctors clinic will contact you in 1 to 2   weeks with any results.  - Await pathology results.   - Discharge patient to home (with escort).   - Patient has a contact number available for emergencies.  The signs and   symptoms of potential delayed complications were discussed with the   patient.  Return to normal activities tomorrow.  Written discharge   instructions were provided to the patient.   - Resume previous diet.   - Continue present medications.   - Repeat upper endoscopy in 8-10 weeks to check healing.   -PPI BID x 8 weeks then daily  -Esophagram  - Return to my office PRN.  For questions, problems or results please call your physician - Marguerite Gray MD at Work:  [phone].  [facility], EMERGENCY ROOM PHONE NUMBER: [phone]  IF A COMPLICATION OR EMERGENCY SITUATION ARISES AND YOU ARE UNABLE TO REACH   YOUR PHYSICIAN - GO DIRECTLY TO THE EMERGENCY ROOM.  Marguerite Gray MD  10/18/2024 8:59:19 AM  This report has been verified and signed electronically.  Dear patient,  As a result of recent federal legislation (The Federal Cures Act), you may   receive lab or pathology results from your procedure in your [facility]   account before your physician is able to contact you. Your physician or   their representative will relay the results to you with their   recommendations at their soonest availability.  Thank you,  PROVATION

## 2024-11-01 ENCOUNTER — HOSPITAL ENCOUNTER (OUTPATIENT)
Facility: HOSPITAL | Age: 59
Discharge: HOME | End: 2024-11-01
Attending: INTERNAL MEDICINE | Admitting: INTERNAL MEDICINE
Payer: COMMERCIAL

## 2024-11-01 ENCOUNTER — ANESTHESIA (OUTPATIENT)
Dept: ENDOSCOPY | Facility: HOSPITAL | Age: 59
End: 2024-11-01
Payer: COMMERCIAL

## 2024-11-01 ENCOUNTER — ANESTHESIA EVENT (OUTPATIENT)
Dept: ENDOSCOPY | Facility: HOSPITAL | Age: 59
End: 2024-11-01
Payer: COMMERCIAL

## 2024-11-01 VITALS
TEMPERATURE: 98 F | SYSTOLIC BLOOD PRESSURE: 104 MMHG | HEART RATE: 84 BPM | RESPIRATION RATE: 18 BRPM | DIASTOLIC BLOOD PRESSURE: 57 MMHG | OXYGEN SATURATION: 94 %

## 2024-11-01 DIAGNOSIS — Z86.0100 HISTORY OF COLON POLYPS: ICD-10-CM

## 2024-11-01 LAB — POCT GLUCOSE: 120 MG/DL (ref 70–110)

## 2024-11-01 PROCEDURE — G0121 COLON CA SCRN NOT HI RSK IND: HCPCS | Performed by: INTERNAL MEDICINE

## 2024-11-01 PROCEDURE — 37000008 HC ANESTHESIA 1ST 15 MINUTES: Performed by: INTERNAL MEDICINE

## 2024-11-01 PROCEDURE — G0121 COLON CA SCRN NOT HI RSK IND: HCPCS | Mod: ,,, | Performed by: INTERNAL MEDICINE

## 2024-11-01 PROCEDURE — 37000009 HC ANESTHESIA EA ADD 15 MINS: Performed by: INTERNAL MEDICINE

## 2024-11-01 PROCEDURE — 63600175 PHARM REV CODE 636 W HCPCS: Performed by: NURSE ANESTHETIST, CERTIFIED REGISTERED

## 2024-11-01 RX ORDER — LIDOCAINE HYDROCHLORIDE 20 MG/ML
INJECTION, SOLUTION EPIDURAL; INFILTRATION; INTRACAUDAL; PERINEURAL
Status: DISCONTINUED
Start: 2024-11-01 | End: 2024-11-01 | Stop reason: HOSPADM

## 2024-11-01 RX ORDER — PROPOFOL 10 MG/ML
INJECTION, EMULSION INTRAVENOUS
Status: COMPLETED
Start: 2024-11-01 | End: 2024-11-01

## 2024-11-01 RX ORDER — LIDOCAINE HYDROCHLORIDE 20 MG/ML
INJECTION, SOLUTION EPIDURAL; INFILTRATION; INTRACAUDAL; PERINEURAL
Status: DISCONTINUED | OUTPATIENT
Start: 2024-11-01 | End: 2024-11-01

## 2024-11-01 RX ORDER — PROPOFOL 10 MG/ML
VIAL (ML) INTRAVENOUS
Status: DISCONTINUED | OUTPATIENT
Start: 2024-11-01 | End: 2024-11-01

## 2024-11-01 RX ADMIN — PROPOFOL 50 MG: 10 INJECTION, EMULSION INTRAVENOUS at 10:11

## 2024-11-01 RX ADMIN — PROPOFOL 50 MG: 10 INJECTION, EMULSION INTRAVENOUS at 11:11

## 2024-11-01 RX ADMIN — LIDOCAINE HYDROCHLORIDE 100 MG: 20 INJECTION, SOLUTION EPIDURAL; INFILTRATION; INTRACAUDAL; PERINEURAL at 10:11

## 2024-11-01 RX ADMIN — PROPOFOL 100 MG: 10 INJECTION, EMULSION INTRAVENOUS at 10:11

## 2024-11-01 NOTE — PROVATION PATIENT INSTRUCTIONS
Discharge Summary/Instructions after an Endoscopic Procedure  Patient Name: Tanesha Walker MRN: [id number]  Patient YOB: 1965 Friday, November 1, 2024  Marguerite Gray MD  Dear patient,  As a result of recent federal legislation (The Federal Cures Act), you may   receive lab or pathology results from your procedure in your [facility]   account before your physician is able to contact you. Your physician or   their representative will relay the results to you with their   recommendations at their soonest availability.  Thank you,  RESTRICTIONS:  During your procedure today, you received medications for sedation.  These   medications may affect your judgment, balance and coordination.  Therefore,   for 24 hours, you have the following restrictions:   - DO NOT drive a car, operate machinery, make legal/financial decisions,   sign important papers or drink alcohol.    ACTIVITY:  Today: no heavy lifting, straining or running due to procedural   sedation/anesthesia.  The following day: return to full activity including work.  DIET:  Eat and drink normally unless instructed otherwise.     TREATMENT FOR COMMON SIDE EFFECTS:  - Mild abdominal pain, nausea, belching, bloating or excessive gas:  rest,   eat lightly and use a heating pad.  - Sore Throat: treat with throat lozenges and/or gargle with warm salt   water.  - Because air was used during the procedure, expelling large amounts of air   from your rectum or belching is normal.  - If a bowel prep was taken, you may not have a bowel movement for 1-3 days.    This is normal.  SYMPTOMS TO WATCH FOR AND REPORT TO YOUR PHYSICIAN:  1. Abdominal pain or bloating, other than gas cramps.  2. Chest pain.  3. Back pain.  4. Signs of infection such as: chills or fever occurring within 24 hours   after the procedure.  5. Rectal bleeding, which would show as bright red, maroon, or black stools.   (A tablespoon of blood from the rectum is not serious,  especially if   hemorrhoids are present.)  6. Vomiting.  7. Weakness or dizziness.  GO DIRECTLY TO THE NEAREST EMERGENCY ROOM IF YOU HAVE ANY OF THE FOLLOWING:      Difficulty breathing              Chills and/or fever over 101 F   Persistent vomiting and/or vomiting blood   Severe abdominal pain   Severe chest pain   Black, tarry stools   Bleeding- more than one tablespoon   Any other symptom or condition that you feel may need urgent attention  Your doctor recommends these additional instructions:  If any biopsies were taken, your doctors clinic will contact you in 1 to 2   weeks with any results.  - Discharge patient to home (with escort).   - Patient has a contact number available for emergencies.  The signs and   symptoms of potential delayed complications were discussed with the   patient.  Return to normal activities tomorrow.  Written discharge   instructions were provided to the patient.   - Resume previous diet.   - Continue present medications.   - Repeat colonoscopy in 10 years for screening purposes.   - Return to my office PRN.  For questions, problems or results please call your physician - Marguerite Gray MD at Work:  [phone].  [facility], EMERGENCY ROOM PHONE NUMBER: [phone]  IF A COMPLICATION OR EMERGENCY SITUATION ARISES AND YOU ARE UNABLE TO REACH   YOUR PHYSICIAN - GO DIRECTLY TO THE EMERGENCY ROOM.  Marguerite Gray MD  11/1/2024 11:09:33 AM  This report has been verified and signed electronically.  Dear patient,  As a result of recent federal legislation (The Federal Cures Act), you may   receive lab or pathology results from your procedure in your [facility]   account before your physician is able to contact you. Your physician or   their representative will relay the results to you with their   recommendations at their soonest availability.  Thank you,  PROVATION

## 2024-11-01 NOTE — H&P
[facility] Note    CC: History of colon polyps    HPI 58 y.o. female presents for surveillance colonoscopy due to history of polyps, last colonoscopy 5 years ago    Past Medical History:   Diagnosis Date    ADHD (attention deficit hyperactivity disorder)     Arthritis     Colon polyp     Crohn's disease     Diabetes mellitus, type 2     GERD (gastroesophageal reflux disease)     Hypertension     Hypothyroidism     Lumbar back pain with radiculopathy affecting left lower extremity     SBO (small bowel obstruction)        Allergies and Medications reviewed     Review of Systems  General ROS: negative for - chills, fever or weight loss  Cardiovascular ROS: no chest pain or dyspnea on exertion  Gastrointestinal ROS: no vomiting    Physical Examination  There were no vitals taken for this visit.  General appearance: alert, cooperative, no distress  HENT: Normocephalic, atraumatic, neck symmetrical, no nasal discharge, sclera anicteric   Lungs: clear to auscultation bilaterally, symmetric chest wall expansion bilaterally  Heart: regular rate and rhythm without rub; no displacement of the PMI   Abdomen: soft  Extremities: extremities symmetric; no clubbing, cyanosis, or edema        Labs:  Lab Results   Component Value Date    WBC 9.82 08/06/2024    HGB 15.7 08/06/2024    HCT 45.3 08/06/2024    MCV 86 08/06/2024     08/06/2024       Assessment:   58 y.o. female presents for colonoscopy     Plan:  -Proceed to colonoscpoy     Marguerite Gray MD  [facility]  [address], [address]  [place] [address]  Office: [phone]  Fax: [phone]

## 2024-11-04 ENCOUNTER — HOSPITAL ENCOUNTER (OUTPATIENT)
Dept: RADIOLOGY | Facility: HOSPITAL | Age: 59
Discharge: HOME | End: 2024-11-04
Attending: INTERNAL MEDICINE
Payer: COMMERCIAL

## 2024-11-04 DIAGNOSIS — R13.10 DYSPHAGIA: ICD-10-CM

## 2024-11-04 PROCEDURE — 25500020 PHARM REV CODE 255: Performed by: INTERNAL MEDICINE

## 2024-11-04 PROCEDURE — A9698 NON-RAD CONTRAST MATERIALNOC: HCPCS

## 2024-11-04 PROCEDURE — 74220 X-RAY XM ESOPHAGUS 1CNTRST: CPT | Mod: 26,,, | Performed by: RADIOLOGY

## 2024-11-04 PROCEDURE — A9698 NON-RAD CONTRAST MATERIALNOC: HCPCS | Performed by: INTERNAL MEDICINE

## 2024-11-04 PROCEDURE — 74220 X-RAY XM ESOPHAGUS 1CNTRST: CPT | Mod: TC

## 2024-11-04 PROCEDURE — 25500020 PHARM REV CODE 255

## 2024-11-04 RX ADMIN — BARIUM SULFATE 100 ML: 0.6 SUSPENSION ORAL at 09:11

## 2024-11-04 RX ADMIN — BARIUM SULFATE 100 ML: 980 POWDER, FOR SUSPENSION ORAL at 09:11

## 2024-11-21 DIAGNOSIS — E78.5 HYPERLIPIDEMIA, UNSPECIFIED: ICD-10-CM

## 2024-11-21 RX ORDER — ATORVASTATIN CALCIUM 10 MG/1
10 TABLET, FILM COATED ORAL
Qty: 90 TABLET | Refills: 0 | Status: SHIPPED | OUTPATIENT
Start: 2024-11-21

## 2024-11-21 NOTE — TELEPHONE ENCOUNTER
Refill Routing Note   Medication(s) are not appropriate for processing by [facility] for the following reason(s):        Patient not seen by provider within 15 months    [facility] action(s):  Defer   Requires appointment : Yes   Requires labs : Yes             Appointments  past 12m or future 3m with PCP    Date Provider   Last Visit   7/14/2023 Norberto Ac III, MD   Next Visit   Visit date not found Norberto Ac III, MD   ED visits in past 90 days: 0        Note composed:8:52 AM 11/21/2024

## 2024-11-21 NOTE — TELEPHONE ENCOUNTER
Care Due:                  Date            Visit Type   Department     Provider  --------------------------------------------------------------------------------                                EP -                              PRIMARY      [facility]  Last Visit: 07-      [facility]  Next Visit: None Scheduled  None         None Found                                                            Last  Test          Frequency    Reason                     Performed    Due Date  --------------------------------------------------------------------------------    Office Visit  15 months..  TRESIBA, atorvastatin....  07-   10-    HBA1C.......  6 months...  TRESIBA..................  08- 02-    [facility] Embedded Care Due Messages. Reference number: [id number].   11/21/2024 1:39:18 AM CST

## 2024-11-29 RX ORDER — ESTRADIOL 2 MG/1
2 TABLET ORAL
Qty: 90 TABLET | Refills: 0 | Status: SHIPPED | OUTPATIENT
Start: 2024-11-29

## 2024-11-29 RX ORDER — HYDROCHLOROTHIAZIDE 25 MG/1
25 TABLET ORAL
Qty: 90 TABLET | Refills: 0 | Status: SHIPPED | OUTPATIENT
Start: 2024-11-29

## 2024-12-03 DIAGNOSIS — Z79.4 TYPE 2 DIABETES MELLITUS WITH HYPERGLYCEMIA, WITH LONG-TERM CURRENT USE OF INSULIN: ICD-10-CM

## 2024-12-03 DIAGNOSIS — E11.65 TYPE 2 DIABETES MELLITUS WITH HYPERGLYCEMIA, WITH LONG-TERM CURRENT USE OF INSULIN: ICD-10-CM

## 2024-12-03 RX ORDER — DULAGLUTIDE 3 MG/.5ML
3 INJECTION, SOLUTION SUBCUTANEOUS
Qty: 4 PEN | Refills: 1 | OUTPATIENT
Start: 2024-12-03 | End: 2025-12-03

## 2024-12-03 RX ORDER — INSULIN DEGLUDEC 100 U/ML
30 INJECTION, SOLUTION SUBCUTANEOUS DAILY
Qty: 30 ML | Refills: 0 | Status: SHIPPED | OUTPATIENT
Start: 2024-12-03

## 2024-12-03 NOTE — TELEPHONE ENCOUNTER
No care due was identified.  [facility] Embedded Care Due Messages. Reference number: [id number].   12/03/2024 5:25:42 PM CST

## 2024-12-06 DIAGNOSIS — Z79.4 TYPE 2 DIABETES MELLITUS WITH HYPERGLYCEMIA, WITH LONG-TERM CURRENT USE OF INSULIN: ICD-10-CM

## 2024-12-06 DIAGNOSIS — E11.65 TYPE 2 DIABETES MELLITUS WITH HYPERGLYCEMIA, WITH LONG-TERM CURRENT USE OF INSULIN: ICD-10-CM

## 2024-12-06 RX ORDER — DULAGLUTIDE 3 MG/.5ML
3 INJECTION, SOLUTION SUBCUTANEOUS
Qty: 4 PEN | Refills: 1 | Status: CANCELLED | OUTPATIENT
Start: 2024-12-06 | End: 2025-12-06

## 2024-12-06 RX ORDER — DULAGLUTIDE 3 MG/.5ML
3 INJECTION, SOLUTION SUBCUTANEOUS
Qty: 4 PEN | Refills: 1 | OUTPATIENT
Start: 2024-12-06 | End: 2025-12-06

## 2024-12-09 ENCOUNTER — PATIENT MESSAGE (OUTPATIENT)
Dept: FAMILY MEDICINE | Facility: CLINIC | Age: 59
End: 2024-12-09
Payer: COMMERCIAL

## 2024-12-09 ENCOUNTER — TELEPHONE (OUTPATIENT)
Dept: FAMILY MEDICINE | Facility: CLINIC | Age: 59
End: 2024-12-09
Payer: COMMERCIAL

## 2024-12-09 DIAGNOSIS — Z79.4 TYPE 2 DIABETES MELLITUS WITH HYPERGLYCEMIA, WITH LONG-TERM CURRENT USE OF INSULIN: ICD-10-CM

## 2024-12-09 DIAGNOSIS — E11.65 TYPE 2 DIABETES MELLITUS WITH HYPERGLYCEMIA, WITH LONG-TERM CURRENT USE OF INSULIN: ICD-10-CM

## 2024-12-09 RX ORDER — DULAGLUTIDE 3 MG/.5ML
3 INJECTION, SOLUTION SUBCUTANEOUS
Qty: 4 PEN | Refills: 0 | Status: SHIPPED | OUTPATIENT
Start: 2024-12-09 | End: 2024-12-10 | Stop reason: SDUPTHER

## 2024-12-09 NOTE — TELEPHONE ENCOUNTER
----- Message from Yahaira sent at 12/6/2024  8:13 AM CST -----  Regarding: refill  Contact: pt  Type:  RX Refill Request    Who Called:  patient   Refill or New Rx:  refill  RX Name and Strength:  dulaglutide (TRULICITY) 3 mg/0.5 mL pen injector    How is the patient currently taking it? (ex. 1XDay):    Is this a 30 day or 90 day RX:    Preferred Pharmacy with phone number:    [id number] - [place] - [address]  [address] [address]  Phone: [phone] Fax: [phone]      Local or Mail Order:    Ordering Provider:    Chris Call Back Number:  [phone]    Additional Information:  contact once sent thanks

## 2024-12-09 NOTE — TELEPHONE ENCOUNTER
No care due was identified.  [facility] Embedded Care Due Messages. Reference number: [id number].   12/09/2024 11:35:26 AM CST

## 2024-12-10 ENCOUNTER — OFFICE VISIT (OUTPATIENT)
Dept: FAMILY MEDICINE | Facility: CLINIC | Age: 59
End: 2024-12-10
Payer: COMMERCIAL

## 2024-12-10 VITALS
OXYGEN SATURATION: 97 % | DIASTOLIC BLOOD PRESSURE: 86 MMHG | TEMPERATURE: 98 F | HEIGHT: 64 IN | HEART RATE: 74 BPM | WEIGHT: 178.38 LBS | RESPIRATION RATE: 18 BRPM | BODY MASS INDEX: 30.45 KG/M2 | SYSTOLIC BLOOD PRESSURE: 124 MMHG

## 2024-12-10 DIAGNOSIS — M25.562 ACUTE PAIN OF LEFT KNEE: ICD-10-CM

## 2024-12-10 DIAGNOSIS — E11.65 TYPE 2 DIABETES MELLITUS WITH HYPERGLYCEMIA, WITH LONG-TERM CURRENT USE OF INSULIN: Primary | ICD-10-CM

## 2024-12-10 DIAGNOSIS — M51.379 DEGENERATION OF INTERVERTEBRAL DISC OF LUMBOSACRAL REGION, UNSPECIFIED WHETHER PAIN PRESENT: ICD-10-CM

## 2024-12-10 DIAGNOSIS — F98.8 ATTENTION DEFICIT DISORDER, UNSPECIFIED TYPE: ICD-10-CM

## 2024-12-10 DIAGNOSIS — E89.0 H/O PARTIAL THYROIDECTOMY: ICD-10-CM

## 2024-12-10 DIAGNOSIS — E04.1 THYROID NODULE: ICD-10-CM

## 2024-12-10 DIAGNOSIS — I10 ESSENTIAL HYPERTENSION: ICD-10-CM

## 2024-12-10 DIAGNOSIS — E78.5 HYPERLIPIDEMIA, UNSPECIFIED HYPERLIPIDEMIA TYPE: ICD-10-CM

## 2024-12-10 DIAGNOSIS — Z79.4 TYPE 2 DIABETES MELLITUS WITH HYPERGLYCEMIA, WITH LONG-TERM CURRENT USE OF INSULIN: Primary | ICD-10-CM

## 2024-12-10 PROCEDURE — 3061F NEG MICROALBUMINURIA REV: CPT | Mod: CPTII,S$GLB,COE,

## 2024-12-10 PROCEDURE — 3051F HG A1C>EQUAL 7.0%<8.0%: CPT | Mod: CPTII,S$GLB,COE,

## 2024-12-10 PROCEDURE — 1159F MED LIST DOCD IN RCRD: CPT | Mod: CPTII,S$GLB,COE,

## 2024-12-10 PROCEDURE — 1160F RVW MEDS BY RX/DR IN RCRD: CPT | Mod: CPTII,S$GLB,COE,

## 2024-12-10 PROCEDURE — 3066F NEPHROPATHY DOC TX: CPT | Mod: CPTII,S$GLB,COE,

## 2024-12-10 PROCEDURE — 3074F SYST BP LT 130 MM HG: CPT | Mod: CPTII,S$GLB,COE,

## 2024-12-10 PROCEDURE — 3008F BODY MASS INDEX DOCD: CPT | Mod: CPTII,S$GLB,COE,

## 2024-12-10 PROCEDURE — 99999 PR PBB SHADOW E&M-EST. PATIENT-LVL V: CPT | Mod: PBBFAC,COE,,

## 2024-12-10 PROCEDURE — 3079F DIAST BP 80-89 MM HG: CPT | Mod: CPTII,S$GLB,COE,

## 2024-12-10 PROCEDURE — 99214 OFFICE O/P EST MOD 30 MIN: CPT | Mod: S$GLB,COE,,

## 2024-12-10 RX ORDER — FENTANYL 50 UG/1
1 PATCH TRANSDERMAL
COMMUNITY
Start: 2024-11-19

## 2024-12-10 RX ORDER — DULAGLUTIDE 3 MG/.5ML
3 INJECTION, SOLUTION SUBCUTANEOUS
Qty: 4 PEN | Refills: 5 | Status: SHIPPED | OUTPATIENT
Start: 2024-12-10 | End: 2025-12-10

## 2024-12-10 RX ORDER — DULAGLUTIDE 3 MG/.5ML
3 INJECTION, SOLUTION SUBCUTANEOUS
Qty: 4 PEN | Refills: 2 | Status: CANCELLED | OUTPATIENT
Start: 2024-12-10 | End: 2025-12-10

## 2024-12-10 NOTE — PROGRESS NOTES
Subjective:       Patient ID: Tanesha Chamberlain is a 58 y.o. female.    Chief Complaint: Medication Refill    Tanesha Chamberlain is a 58-year-old female patient who presents to clinic today for medication refills.  History of chronic pain in her lower back.  She is followed by pain management.  ADD doing well on Adderall 20 mg daily.  Hyperlipidemia: last lipids: TC:  119, Tri HDL:  48, LDL:  39.2.  She is currently taking atorvastatin and tolerates well.  Type 2 diabetes with her last H A1c of 7.2 which is up from 6.9.  She is currently using Tresiba and Trulicity.  She has been watching her diet and eating less sweets.  Thyroid nodules, ultrasound stable and up to date.  Hypertension controlled with hydrochlorothiazide.  She has no chest pain or shortness a breath.  Crohn's disease doing well.  No flares.  Fatty liver.  Left knee pain and popping.  Wants a referral to Ortho      Review of patient's allergies indicates:   Allergen Reactions    Nsaids (non-steroidal anti-inflammatory drug) Other (See Comments)     GI upset  GI upset      Ibuprofen Other (See Comments)     Other reaction(s): crohns flair up  Other reaction(s): crohns flair up     Social Drivers of Health     Tobacco Use: Medium Risk (12/10/2024)    Patient History     Smoking Tobacco Use: Former     Smokeless Tobacco Use: Former     Passive Exposure: Not on file   Alcohol Use: Not At Risk (2024)    AUDIT-C     Frequency of Alcohol Consumption: Monthly or less     Average Number of Drinks: 1 or 2     Frequency of Binge Drinking: Never   Financial Resource Strain: Low Risk  (2024)    Overall Financial Resource Strain (CARDIA)     Difficulty of Paying Living Expenses: Not hard at all   Food Insecurity: No Food Insecurity (2024)    Hunger Vital Sign     Worried About Running Out of Food in the Last Year: Never true     Ran Out of Food in the Last Year: Never true   Transportation Needs: Not on file   Physical Activity: Insufficiently  Active (5/31/2024)    Exercise Vital Sign     Days of Exercise per Week: 1 day     Minutes of Exercise per Session: 30 min   Stress: No Stress Concern Present (5/31/2024)    [employer] - Occupational Stress Questionnaire     Feeling of Stress : Not at all   Housing Stability: Unknown (5/31/2024)    Housing Stability Vital Sign     Unable to Pay for Housing in the Last Year: No     Homeless in the Last Year: Not on file   Depression: Low Risk  (8/12/2024)    Depression     Last PHQ-4: Flowsheet Data: 0   Utilities: Not At Risk (5/31/2024)    [facility] Utilities     Threatened with loss of utilities: No   Health Literacy: Adequate Health Literacy (5/31/2024)     Health Literacy     Frequency of need for help with medical instructions: Never   Social Isolation: Not on file      Past Medical History:   Diagnosis Date    ADHD (attention deficit hyperactivity disorder)     Arthritis     Colon polyp     Crohn's disease     Diabetes mellitus, type 2     GERD (gastroesophageal reflux disease)     Hypertension     Hypothyroidism     Lumbar back pain with radiculopathy affecting left lower extremity     SBO (small bowel obstruction)       Past Surgical History:   Procedure Laterality Date    ANTERIOR CRUCIATE LIGAMENT REPAIR Right     APPENDECTOMY      1988    BUNIONECTOMY Bilateral     CHOLECYSTECTOMY      2005    COLONOSCOPY N/A 11/1/2024    Procedure: COLONOSCOPY(Instructions sent 10/25);  Surgeon: Marguerite Gray MD;  Location: [facility];  Service: Endoscopy;  Laterality: N/A;    ESOPHAGOGASTRODUODENOSCOPY N/A 10/18/2024    Procedure: EGD (ESOPHAGOGASTRODUODENOSCOPY);  Surgeon: Marguerite Gray MD;  Location: [facility];  Service: Endoscopy;  Laterality: N/A;    EXCISION OF MASS OF BACK Left 09/21/2022    Procedure: EXCISION, MASS, BACK;  Surgeon: Sae Collins III, MD;  Location: [facility] OR;  Service: General;  Laterality: Left;    HYSTERECTOMY      INJECTION OF JOINT Right 05/09/2023  "   Procedure: INJECTION, JOINT, RIGHT PIRIFORMIS AND RIGHT GTB;  Surgeon: Loreta Brito MD;  Location: [facility] PAIN [facility]GT;  Service: Pain Management;  Laterality: Right;    MINIMALLY INVASIVE SURGICAL REMOVAL OF INTERVERTEBRAL DISC OF SPINE USING MICROSCOPE Left 05/11/2021    Procedure: Left L4-5 Far Lateral Disectomy, MIS;  Surgeon: Johnny Cruz MD;  Location: [facility] OR 2ND FLR;  Service: Neurosurgery;  Laterality: Left;    OOPHORECTOMY      SHOULDER SURGERY Right     SPINE SURGERY      TRANSFORAMINAL EPIDURAL INJECTION OF STEROID Left 03/10/2021    Procedure: INJECTION, STEROID, EPIDURAL, TRANSFORAMINAL APPROACH  L4-5;  Surgeon: Jaylon Tyson MD;  Location: [facility];  Service: Pain Management;  Laterality: Left;  consent needed  ([employer])    TRANSFORAMINAL EPIDURAL INJECTION OF STEROID Left 11/29/2022    Procedure: LUMBAR TRANSFORAMINAL LEFT L4/5 MEDICALLY URGENT;  Surgeon: Loreta Brito MD;  Location: [facility];  Service: Pain Management;  Laterality: Left;    UPPER GASTROINTESTINAL ENDOSCOPY      10 years ago [facility]      Social History     Socioeconomic History    Marital status:          Current Outpatient Medications:     aspirin (ECOTRIN) 81 MG EC tablet, Take 81 mg by mouth once daily., Disp: , Rfl:     atorvastatin (LIPITOR) 10 MG tablet, TAKE 1 TABLET BY MOUTH EVERY DAY, Disp: 90 tablet, Rfl: 0    BD ULTRA-FINE SHORT PEN NEEDLE 31 gauge x 5/16" Ndle, Inject 1 Needle into the skin once daily., Disp: 100 each, Rfl: 3    blood sugar diagnostic (ONETOUCH ULTRA BLUE TEST STRIP) Strp, Check blood glucose readings twice a day-- before meal and 1 to 2 hours post meal, Disp: 100 strip, Rfl: 0    blood-glucose meter Misc, Check blood glucose readings twice a day-- before meal and 1 to 2 hours post meal, Disp: 1 each, Rfl: 0    dextroamphetamine-amphetamine (ADDERALL) 20 mg tablet, Take 1 tablet by mouth once daily., Disp: 30 tablet, Rfl: 0    dextroamphetamine-amphetamine (ADDERALL) " 20 mg tablet, Take 1 tablet by mouth once daily., Disp: 30 tablet, Rfl: 0    dextroamphetamine-amphetamine (ADDERALL) 20 mg tablet, Take 1 tablet by mouth once daily., Disp: 30 tablet, Rfl: 0    estradioL (ESTRACE) 2 MG tablet, TAKE 1 TABLET BY MOUTH EVERY DAY, Disp: 90 tablet, Rfl: 0    fentaNYL (DURAGESIC) 50 mcg/hr, Place 1 patch onto the skin every 72 hours., Disp: , Rfl:     hydroCHLOROthiazide (HYDRODIURIL) 25 MG tablet, TAKE 1 TABLET BY MOUTH EVERY DAY, Disp: 90 tablet, Rfl: 0    HYDROcodone-acetaminophen (NORCO)  mg per tablet, Take 1-2 tablets by mouth every 8 (eight) hours., Disp: , Rfl:     ketoconazole (NIZORAL) 2 % cream, Apply topically every evening., Disp: 60 g, Rfl: 1    ketoconazole (NIZORAL) 2 % cream, Apply topically 2 (two) times daily., Disp: 60 g, Rfl: 1    lancets 33 gauge Misc, Check blood glucose readings twice a day-- before meal and 1 to 2 hours post meal, Disp: 100 each, Rfl: 0    naloxone (NARCAN) 4 mg/actuation Spry, 1 spray., Disp: , Rfl:     omeprazole (PRILOSEC) 40 MG capsule, Take 1 capsule (40 mg total) by mouth 2 (two) times daily before meals for 60 days, THEN 1 capsule (40 mg total) every morning., Disp: 90 capsule, Rfl: 3    sulfamethoxazole-trimethoprim 800-160mg (BACTRIM DS) 800-160 mg Tab, Take 1 tablet by mouth 2 (two) times daily., Disp: 14 tablet, Rfl: 0    tiZANidine (ZANAFLEX) 4 MG tablet, Take 4 mg by mouth 4 (four) times daily as needed., Disp: , Rfl:     TRESIBA FLEXTOUCH U-100 100 unit/mL (3 mL) insulin pen, Inject 30 Units into the skin once daily., Disp: 30 mL, Rfl: 0    triamcinolone acetonide 0.025% (KENALOG) 0.025 % cream, Apply topically 2 (two) times daily., Disp: 80 g, Rfl: 1    dulaglutide (TRULICITY) 3 mg/0.5 mL pen injector, Inject 3 mg into the skin every 7 days., Disp: 4 pen , Rfl: 5    Lab Results   Component Value Date    WBC 9.82 08/06/2024    HGB 15.7 08/06/2024    HCT 45.3 08/06/2024     08/06/2024    CHOL 119 (L) 08/06/2024    TRIG  159 (H) 08/06/2024    HDL 48 08/06/2024    ALT 25 08/06/2024    AST 28 08/06/2024     08/06/2024    K 3.5 08/06/2024     08/06/2024    CREATININE 0.7 08/06/2024    BUN 12 08/06/2024    CO2 28 08/06/2024    TSH 1.099 03/08/2024    INR 1.0 05/04/2021    HGBA1C 7.2 (H) 08/06/2024    MICROALBUR 1.6 08/11/2022       Review of Systems   Constitutional: Negative.    Respiratory: Negative.     Cardiovascular: Negative.    Genitourinary: Negative.    Musculoskeletal:  Positive for arthralgias.   Neurological: Negative.    Psychiatric/Behavioral: Negative.         Objective:      Physical Exam  Vitals reviewed.   Constitutional:       Appearance: Normal appearance.   HENT:      Right Ear: Tympanic membrane normal.      Left Ear: Tympanic membrane normal.      Nose: Nose normal.      Mouth/Throat:      Mouth: Mucous membranes are moist.      Pharynx: Oropharynx is clear.   Eyes:      Conjunctiva/sclera: Conjunctivae normal.   Neck:      Thyroid: Thyromegaly present. No thyroid tenderness.      Vascular: No carotid bruit.      Trachea: Trachea normal.   Cardiovascular:      Rate and Rhythm: Normal rate and regular rhythm.      Pulses: Normal pulses.      Heart sounds: Normal heart sounds.   Pulmonary:      Effort: Pulmonary effort is normal.      Breath sounds: Normal breath sounds.   Abdominal:      General: Bowel sounds are normal.      Palpations: Abdomen is soft.      Tenderness: There is no abdominal tenderness.   Musculoskeletal:         General: Normal range of motion.      Cervical back: Full passive range of motion without pain and normal range of motion.   Lymphadenopathy:      Cervical: No cervical adenopathy.   Skin:     General: Skin is warm and dry.      Capillary Refill: Capillary refill takes less than 2 seconds.   Neurological:      General: No focal deficit present.      Mental Status: She is alert.   Psychiatric:         Mood and Affect: Mood normal.         Behavior: Behavior normal.          "Thought Content: Thought content normal.         Judgment: Judgment normal.         Assessment:       1. Type 2 diabetes mellitus with hyperglycemia, with long-term current use of insulin    2. Attention deficit disorder, unspecified type    3. Degeneration of intervertebral disc of lumbosacral region, unspecified whether pain present    4. Essential hypertension    5. Hyperlipidemia, unspecified hyperlipidemia type    6. H/O partial thyroidectomy    7. Thyroid nodule    8. Acute pain of left knee        Plan:       Tanesha Bermudez" was seen today for medication refill.    Diagnoses and all orders for this visit:    Type 2 diabetes mellitus with hyperglycemia, with long-term current use of insulin  -     dulaglutide (TRULICITY) 3 mg/0.5 mL pen injector; Inject 3 mg into the skin every 7 days.    Attention deficit disorder, unspecified type    Degeneration of intervertebral disc of lumbosacral region, unspecified whether pain present    Essential hypertension    Hyperlipidemia, unspecified hyperlipidemia type    H/O partial thyroidectomy    Thyroid nodule    Acute pain of left knee  -     Ambulatory referral/consult to Orthopedics; Future    Other orders  The following orders have not been finalized:  -     Cancel: dulaglutide (TRULICITY) 3 mg/0.5 mL pen injector    Hypertension   - controlled  - Continue current medications.    Hyperlipidemia   - continue atorvastatin  - I recommend a heart healthy diet rich in fiber, fresh vegetables and fruit and low in saturated fats (fried foods, red meat, etc.).  I also recommend regular exercise.    Type 2 diabetes   - check H A1c  - ADA diet  - continue current medication    ADD   - continue Adderall    Thyroid nodules  - ultrasound due in March    Have labs done previously ordered  Referral to Orthopedics for knee pain  Due for covid booster.   Follow-up in 3 months or sooner if needed.         "

## 2024-12-13 DIAGNOSIS — M25.562 LEFT KNEE PAIN, UNSPECIFIED CHRONICITY: Primary | ICD-10-CM

## 2024-12-20 ENCOUNTER — PATIENT MESSAGE (OUTPATIENT)
Dept: FAMILY MEDICINE | Facility: CLINIC | Age: 59
End: 2024-12-20
Payer: COMMERCIAL

## 2024-12-20 DIAGNOSIS — M96.1 POSTLAMINECTOMY SYNDROME, NOT ELSEWHERE CLASSIFIED: Primary | ICD-10-CM

## 2024-12-21 ENCOUNTER — HOSPITAL ENCOUNTER (OUTPATIENT)
Dept: RADIOLOGY | Facility: HOSPITAL | Age: 59
Discharge: HOME | End: 2024-12-21
Attending: NURSE PRACTITIONER
Payer: COMMERCIAL

## 2024-12-21 DIAGNOSIS — M96.1 POSTLAMINECTOMY SYNDROME, NOT ELSEWHERE CLASSIFIED: ICD-10-CM

## 2024-12-21 PROCEDURE — 72100 X-RAY EXAM L-S SPINE 2/3 VWS: CPT | Mod: TC

## 2024-12-21 PROCEDURE — 72100 X-RAY EXAM L-S SPINE 2/3 VWS: CPT | Mod: 26,,, | Performed by: RADIOLOGY

## 2024-12-23 ENCOUNTER — HOSPITAL ENCOUNTER (OUTPATIENT)
Dept: RADIOLOGY | Facility: HOSPITAL | Age: 59
Discharge: HOME | End: 2024-12-23
Attending: ORTHOPAEDIC SURGERY
Payer: COMMERCIAL

## 2024-12-23 ENCOUNTER — OFFICE VISIT (OUTPATIENT)
Dept: ORTHOPEDICS | Facility: CLINIC | Age: 59
End: 2024-12-23
Payer: COMMERCIAL

## 2024-12-23 DIAGNOSIS — M25.562 LEFT KNEE PAIN, UNSPECIFIED CHRONICITY: ICD-10-CM

## 2024-12-23 DIAGNOSIS — M25.562 ACUTE PAIN OF LEFT KNEE: ICD-10-CM

## 2024-12-23 DIAGNOSIS — S83.242A ACUTE MEDIAL MENISCAL TEAR, LEFT, INITIAL ENCOUNTER: Primary | ICD-10-CM

## 2024-12-23 PROCEDURE — 73564 X-RAY EXAM KNEE 4 OR MORE: CPT | Mod: TC,PO,LT

## 2024-12-23 PROCEDURE — 99204 OFFICE O/P NEW MOD 45 MIN: CPT | Mod: S$GLB,,, | Performed by: ORTHOPAEDIC SURGERY

## 2024-12-23 PROCEDURE — 99999 PR PBB SHADOW E&M-EST. PATIENT-LVL II: CPT | Mod: PBBFAC,COE,, | Performed by: ORTHOPAEDIC SURGERY

## 2024-12-23 NOTE — PROGRESS NOTES
Past Medical History:   Diagnosis Date    ADHD (attention deficit hyperactivity disorder)     Arthritis     Colon polyp     Crohn's disease     Diabetes mellitus, type 2     GERD (gastroesophageal reflux disease)     Hypertension     Hypothyroidism     Lumbar back pain with radiculopathy affecting left lower extremity     SBO (small bowel obstruction)        Past Surgical History:   Procedure Laterality Date    ANTERIOR CRUCIATE LIGAMENT REPAIR Right     APPENDECTOMY      1988    BUNIONECTOMY Bilateral     CHOLECYSTECTOMY      2005    COLONOSCOPY N/A 11/1/2024    Procedure: COLONOSCOPY(Instructions sent 10/25);  Surgeon: Marguerite Gray MD;  Location: [facility];  Service: Endoscopy;  Laterality: N/A;    ESOPHAGOGASTRODUODENOSCOPY N/A 10/18/2024    Procedure: EGD (ESOPHAGOGASTRODUODENOSCOPY);  Surgeon: Marguerite Gray MD;  Location: [facility];  Service: Endoscopy;  Laterality: N/A;    EXCISION OF MASS OF BACK Left 09/21/2022    Procedure: EXCISION, MASS, BACK;  Surgeon: Sae Collins III, MD;  Location: [facility] OR;  Service: General;  Laterality: Left;    HYSTERECTOMY      INJECTION OF JOINT Right 05/09/2023    Procedure: INJECTION, JOINT, RIGHT PIRIFORMIS AND RIGHT GTB;  Surgeon: Loreta Brito MD;  Location: [facility] PAIN T;  Service: Pain Management;  Laterality: Right;    MINIMALLY INVASIVE SURGICAL REMOVAL OF INTERVERTEBRAL DISC OF SPINE USING MICROSCOPE Left 05/11/2021    Procedure: Left L4-5 Far Lateral Disectomy, MIS;  Surgeon: Johnny Cruz MD;  Location: [address];  Service: Neurosurgery;  Laterality: Left;    OOPHORECTOMY      SHOULDER SURGERY Right     SPINE SURGERY      TRANSFORAMINAL EPIDURAL INJECTION OF STEROID Left 03/10/2021    Procedure: INJECTION, STEROID, EPIDURAL, TRANSFORAMINAL APPROACH  L4-5;  Surgeon: Jaylon Tyson MD;  Location: [facility] PAIN MGT;  Service: Pain Management;  Laterality: Left;  consent needed  ([facility])    TRANSFORAMINAL EPIDURAL INJECTION OF STEROID  "Left 11/29/2022    Procedure: LUMBAR TRANSFORAMINAL LEFT L4/5 MEDICALLY URGENT;  Surgeon: Loreta Brito MD;  Location: [facility];  Service: Pain Management;  Laterality: Left;    UPPER GASTROINTESTINAL ENDOSCOPY      10 years ago [facility]       Current Outpatient Medications   Medication Sig    aspirin (ECOTRIN) 81 MG EC tablet Take 81 mg by mouth once daily.    atorvastatin (LIPITOR) 10 MG tablet TAKE 1 TABLET BY MOUTH EVERY DAY    BD ULTRA-FINE SHORT PEN NEEDLE 31 gauge x 5/16" Ndle Inject 1 Needle into the skin once daily.    blood sugar diagnostic (ONETOUCH ULTRA BLUE TEST STRIP) Strp Check blood glucose readings twice a day-- before meal and 1 to 2 hours post meal    blood-glucose meter Misc Check blood glucose readings twice a day-- before meal and 1 to 2 hours post meal    dextroamphetamine-amphetamine (ADDERALL) 20 mg tablet Take 1 tablet by mouth once daily.    dextroamphetamine-amphetamine (ADDERALL) 20 mg tablet Take 1 tablet by mouth once daily.    dextroamphetamine-amphetamine (ADDERALL) 20 mg tablet Take 1 tablet by mouth once daily.    dulaglutide (TRULICITY) 3 mg/0.5 mL pen injector Inject 3 mg into the skin every 7 days.    estradioL (ESTRACE) 2 MG tablet TAKE 1 TABLET BY MOUTH EVERY DAY    fentaNYL (DURAGESIC) 50 mcg/hr Place 1 patch onto the skin every 72 hours.    hydroCHLOROthiazide (HYDRODIURIL) 25 MG tablet TAKE 1 TABLET BY MOUTH EVERY DAY    HYDROcodone-acetaminophen (NORCO)  mg per tablet Take 1-2 tablets by mouth every 8 (eight) hours.    ketoconazole (NIZORAL) 2 % cream Apply topically every evening.    ketoconazole (NIZORAL) 2 % cream Apply topically 2 (two) times daily.    lancets 33 gauge Misc Check blood glucose readings twice a day-- before meal and 1 to 2 hours post meal    naloxone (NARCAN) 4 mg/actuation Spry 1 spray.    omeprazole (PRILOSEC) 40 MG capsule Take 1 capsule (40 mg total) by mouth 2 (two) times daily before meals for 60 days, THEN 1 capsule (40 mg " total) every morning.    sulfamethoxazole-trimethoprim 800-160mg (BACTRIM DS) 800-160 mg Tab Take 1 tablet by mouth 2 (two) times daily.    tiZANidine (ZANAFLEX) 4 MG tablet Take 4 mg by mouth 4 (four) times daily as needed.    TRESIBA FLEXTOUCH U-100 100 unit/mL (3 mL) insulin pen Inject 30 Units into the skin once daily.    triamcinolone acetonide 0.025% (KENALOG) 0.025 % cream Apply topically 2 (two) times daily.     No current facility-administered medications for this visit.       Review of patient's allergies indicates:   Allergen Reactions    Nsaids (non-steroidal anti-inflammatory drug) Other (See Comments)     GI upset  GI upset      Ibuprofen Other (See Comments)     Other reaction(s): crohns flair up  Other reaction(s): crohns flair up       Family History   Problem Relation Name Age of Onset    Crohn's disease Other      Cirrhosis Neg Hx      Ulcerative colitis Neg Hx         Social History     Socioeconomic History    Marital status:    Occupational History    Occupation: Disabled     Comment: Previous occupation:    Tobacco Use    Smoking status: Former     Current packs/day: 0.00     Types: Cigarettes     Quit date: 12/27/2020     Years since quitting: 3.9    Smokeless tobacco: Former    Tobacco comments:     in the 8 th grade   Substance and Sexual Activity    Alcohol use: Yes     Alcohol/week: 4.0 standard drinks of alcohol     Types: 4 Cans of beer per week     Comment: 1 beer , once in 2 weeks    Drug use: No    Sexual activity: Yes     Partners: Female     Comment: Never had pregnant      Social Drivers of Health     Financial Resource Strain: Low Risk  (5/31/2024)    Overall Financial Resource Strain (CARDIA)     Difficulty of Paying Living Expenses: Not hard at all   Food Insecurity: No Food Insecurity (5/31/2024)    Hunger Vital Sign     Worried About Running Out of Food in the Last Year: Never true     Ran Out of Food in the Last Year: Never true   Physical  Activity: Insufficiently Active (5/31/2024)    Exercise Vital Sign     Days of Exercise per Week: 1 day     Minutes of Exercise per Session: 30 min   Stress: No Stress Concern Present (5/31/2024)    [employer] [place] [employer] - Occupational Stress Questionnaire     Feeling of Stress : Not at all   Housing Stability: Unknown (5/31/2024)    Housing Stability Vital Sign     Unable to Pay for Housing in the Last Year: No       Chief Complaint: No chief complaint on file.      History of present illness:  58-year-old female seen for left knee instability.  Patient feels like her patella subluxes in her knee is very unstable particularly going down stairs.  Feels like it is going to give out and cause her to fall.  She does get occasional swelling.  No previous treatment for this knee.  Did have a right allograft ACL reconstruction on the other side done in 2003.    Prior treatment:  None        Review of Systems:    Constitution: Negative for chills, fever, and sweats.  Negative for unexplained weight loss.    HENT:  Negative for headaches and blurry vision.    Cardiovascular:Negative for chest pain or irregular heart beat. Negative for hypertension.    Respiratory:  Negative for cough and shortness of breath.    Gastrointestinal: Negative for abdominal pain, heartburn, melena, nausea, and vomitting.    Genitourinary:  Negative bladder incontinence and dysuria.    Musculoskeletal:  See HPI    Neurological: Negative for numbness.    Psychiatric/Behavioral: Negative for depression.  The patient is not nervous/anxious.      Endocrine: Negative for polyuria    Hematologic/Lymphatic: Negative for bleeding problem.  Does not bruise/bleed easily.    Skin: Negative for poor would healing and rash      Physical Examination:    Vital Signs:  There were no vitals filed for this visit.    There is no height or weight on file to calculate BMI.    This a well-developed, well nourished patient in no acute distress.  They  are alert and oriented and cooperative to examination.  Pt. walks without an antalgic gait.      Examination of the left knee shows no rashes or erythema. There are no masses ecchymosis or effusion. Patient has full range of motion from 0-130°. Patient is nontender to palpation over lateral joint line and moderately tender to palpation over the medial joint line. Patient has a - Lachman exam, - anterior drawer exam, and - posterior drawer exam.  Positive medial Apley exam. Knee is stable to varus and valgus stress. 5 out of 5 motor strength. Palpable distal pulses. Intact light touch sensation. Negative Patellofemoral crepitus.  Patient does have about 3 quadrant glide of her patella laterally      X-rays:  X-rays left knee are ordered and review which show some mild arthritic changes bilaterally particularly around the patellofemoral joint..  Signs of prior right ACL reconstruction.         Assessment:  Left medial meniscal tear    Plan:  I reviewed the findings with her today.  Recommended an MRI to evaluate her medial meniscus.  Patient has pain along her compartment with instability.  Follow up after the MRI is completed.            All previous pertinent notes including ER visits, physical therapy visits, other orthopedic visits as well as other care for the same musculoskeletal problem were reviewed.  All pertinent lab values and previous imaging was reviewed pertinent to the current visit.    This note was created using [employer] voice recognition software that occasionally misinterpreted phrases or words.    Consult note is delivered via Epic messaging service.

## 2024-12-26 ENCOUNTER — HOSPITAL ENCOUNTER (OUTPATIENT)
Dept: RADIOLOGY | Facility: HOSPITAL | Age: 59
Discharge: HOME | End: 2024-12-26
Attending: ORTHOPAEDIC SURGERY
Payer: COMMERCIAL

## 2024-12-26 DIAGNOSIS — S83.242A ACUTE MEDIAL MENISCAL TEAR, LEFT, INITIAL ENCOUNTER: ICD-10-CM

## 2024-12-26 PROCEDURE — 73721 MRI JNT OF LWR EXTRE W/O DYE: CPT | Mod: 26,LT,, | Performed by: RADIOLOGY

## 2024-12-26 PROCEDURE — 73721 MRI JNT OF LWR EXTRE W/O DYE: CPT | Mod: TC,PO,LT

## 2024-12-28 DIAGNOSIS — E11.65 TYPE 2 DIABETES MELLITUS WITH HYPERGLYCEMIA, WITHOUT LONG-TERM CURRENT USE OF INSULIN: ICD-10-CM

## 2024-12-28 RX ORDER — PEN NEEDLE, DIABETIC 31 GX5/16"
1 NEEDLE, DISPOSABLE MISCELLANEOUS DAILY
Qty: 100 EACH | Refills: 3 | Status: SHIPPED | OUTPATIENT
Start: 2024-12-28

## 2024-12-28 NOTE — TELEPHONE ENCOUNTER
No care due was identified.  [facility] Embedded Care Due Messages. Reference number: [id number].   12/28/2024 8:51:09 AM CST

## 2024-12-30 DIAGNOSIS — M96.1 POSTLAMINECTOMY SYNDROME, NOT ELSEWHERE CLASSIFIED: Primary | ICD-10-CM

## 2024-12-31 DIAGNOSIS — M25.561 RIGHT KNEE PAIN, UNSPECIFIED CHRONICITY: Primary | ICD-10-CM

## 2025-01-02 ENCOUNTER — OFFICE VISIT (OUTPATIENT)
Dept: ORTHOPEDICS | Facility: CLINIC | Age: 60
End: 2025-01-02
Payer: COMMERCIAL

## 2025-01-02 ENCOUNTER — HOSPITAL ENCOUNTER (OUTPATIENT)
Dept: RADIOLOGY | Facility: HOSPITAL | Age: 60
Discharge: HOME | End: 2025-01-02
Attending: ORTHOPAEDIC SURGERY
Payer: COMMERCIAL

## 2025-01-02 VITALS — BODY MASS INDEX: 30.61 KG/M2 | HEIGHT: 64 IN | RESPIRATION RATE: 17 BRPM

## 2025-01-02 DIAGNOSIS — M25.361 PATELLAR INSTABILITY OF BOTH KNEES: Primary | ICD-10-CM

## 2025-01-02 DIAGNOSIS — M25.561 RIGHT KNEE PAIN, UNSPECIFIED CHRONICITY: ICD-10-CM

## 2025-01-02 DIAGNOSIS — M25.362 PATELLAR INSTABILITY OF BOTH KNEES: Primary | ICD-10-CM

## 2025-01-02 DIAGNOSIS — S83.242D OTHER TEAR OF MEDIAL MENISCUS OF LEFT KNEE AS CURRENT INJURY, SUBSEQUENT ENCOUNTER: ICD-10-CM

## 2025-01-02 PROCEDURE — 3008F BODY MASS INDEX DOCD: CPT | Mod: CPTII,S$GLB,COE, | Performed by: ORTHOPAEDIC SURGERY

## 2025-01-02 PROCEDURE — 1159F MED LIST DOCD IN RCRD: CPT | Mod: CPTII,S$GLB,COE, | Performed by: ORTHOPAEDIC SURGERY

## 2025-01-02 PROCEDURE — 73564 X-RAY EXAM KNEE 4 OR MORE: CPT | Mod: TC,PO,RT

## 2025-01-02 PROCEDURE — 99214 OFFICE O/P EST MOD 30 MIN: CPT | Mod: S$GLB,COE,, | Performed by: ORTHOPAEDIC SURGERY

## 2025-01-02 PROCEDURE — 3072F LOW RISK FOR RETINOPATHY: CPT | Mod: CPTII,S$GLB,COE, | Performed by: ORTHOPAEDIC SURGERY

## 2025-01-02 PROCEDURE — 99999 PR PBB SHADOW E&M-EST. PATIENT-LVL III: CPT | Mod: PBBFAC,COE,, | Performed by: ORTHOPAEDIC SURGERY

## 2025-01-02 PROCEDURE — 1160F RVW MEDS BY RX/DR IN RCRD: CPT | Mod: CPTII,S$GLB,COE, | Performed by: ORTHOPAEDIC SURGERY

## 2025-01-02 NOTE — PROGRESS NOTES
Past Medical History:   Diagnosis Date    ADHD (attention deficit hyperactivity disorder)     Arthritis     Colon polyp     Crohn's disease     Diabetes mellitus, type 2     GERD (gastroesophageal reflux disease)     Hypertension     Hypothyroidism     Lumbar back pain with radiculopathy affecting left lower extremity     SBO (small bowel obstruction)        Past Surgical History:   Procedure Laterality Date    ANTERIOR CRUCIATE LIGAMENT REPAIR Right     APPENDECTOMY      1988    BUNIONECTOMY Bilateral     CHOLECYSTECTOMY      2005    COLONOSCOPY N/A 11/1/2024    Procedure: COLONOSCOPY(Instructions sent 10/25);  Surgeon: Marguerite Gray MD;  Location: [facility];  Service: Endoscopy;  Laterality: N/A;    ESOPHAGOGASTRODUODENOSCOPY N/A 10/18/2024    Procedure: EGD (ESOPHAGOGASTRODUODENOSCOPY);  Surgeon: Marguerite Gray MD;  Location: [facility];  Service: Endoscopy;  Laterality: N/A;    EXCISION OF MASS OF BACK Left 09/21/2022    Procedure: EXCISION, MASS, BACK;  Surgeon: Sae Collins III, MD;  Location: [facility] OR;  Service: General;  Laterality: Left;    HYSTERECTOMY      INJECTION OF JOINT Right 05/09/2023    Procedure: INJECTION, JOINT, RIGHT PIRIFORMIS AND RIGHT GTB;  Surgeon: Loreta Brito MD;  Location: [facility] PAIN T;  Service: Pain Management;  Laterality: Right;    MINIMALLY INVASIVE SURGICAL REMOVAL OF INTERVERTEBRAL DISC OF SPINE USING MICROSCOPE Left 05/11/2021    Procedure: Left L4-5 Far Lateral Disectomy, MIS;  Surgeon: Johnny Cruz MD;  Location: [address];  Service: Neurosurgery;  Laterality: Left;    OOPHORECTOMY      SHOULDER SURGERY Right     SPINE SURGERY      TRANSFORAMINAL EPIDURAL INJECTION OF STEROID Left 03/10/2021    Procedure: INJECTION, STEROID, EPIDURAL, TRANSFORAMINAL APPROACH  L4-5;  Surgeon: Jaylon Tyson MD;  Location: [facility] PAIN MGT;  Service: Pain Management;  Laterality: Left;  consent needed  ([employer] [facility])    TRANSFORAMINAL EPIDURAL INJECTION OF STEROID  "Left 11/29/2022    Procedure: LUMBAR TRANSFORAMINAL LEFT L4/5 MEDICALLY URGENT;  Surgeon: Loreta Brito MD;  Location: [facility];  Service: Pain Management;  Laterality: Left;    UPPER GASTROINTESTINAL ENDOSCOPY      10 years ago [facility]       Current Outpatient Medications   Medication Sig    aspirin (ECOTRIN) 81 MG EC tablet Take 81 mg by mouth once daily.    atorvastatin (LIPITOR) 10 MG tablet TAKE 1 TABLET BY MOUTH EVERY DAY    BD ULTRA-FINE SHORT PEN NEEDLE 31 gauge x 5/16" Ndle Inject 1 Needle into the skin once daily.    blood sugar diagnostic (ONETOUCH ULTRA BLUE TEST STRIP) Strp Check blood glucose readings twice a day-- before meal and 1 to 2 hours post meal    blood-glucose meter Misc Check blood glucose readings twice a day-- before meal and 1 to 2 hours post meal    dextroamphetamine-amphetamine (ADDERALL) 20 mg tablet Take 1 tablet by mouth once daily.    dextroamphetamine-amphetamine (ADDERALL) 20 mg tablet Take 1 tablet by mouth once daily.    dextroamphetamine-amphetamine (ADDERALL) 20 mg tablet Take 1 tablet by mouth once daily.    dulaglutide (TRULICITY) 3 mg/0.5 mL pen injector Inject 3 mg into the skin every 7 days.    estradioL (ESTRACE) 2 MG tablet TAKE 1 TABLET BY MOUTH EVERY DAY    fentaNYL (DURAGESIC) 50 mcg/hr Place 1 patch onto the skin every 72 hours.    hydroCHLOROthiazide (HYDRODIURIL) 25 MG tablet TAKE 1 TABLET BY MOUTH EVERY DAY    HYDROcodone-acetaminophen (NORCO)  mg per tablet Take 1-2 tablets by mouth every 8 (eight) hours.    ketoconazole (NIZORAL) 2 % cream Apply topically every evening.    ketoconazole (NIZORAL) 2 % cream Apply topically 2 (two) times daily.    lancets 33 gauge Misc Check blood glucose readings twice a day-- before meal and 1 to 2 hours post meal    naloxone (NARCAN) 4 mg/actuation Spry 1 spray.    omeprazole (PRILOSEC) 40 MG capsule Take 1 capsule (40 mg total) by mouth 2 (two) times daily before meals for 60 days, THEN 1 capsule (40 mg " total) every morning.    sulfamethoxazole-trimethoprim 800-160mg (BACTRIM DS) 800-160 mg Tab Take 1 tablet by mouth 2 (two) times daily.    tiZANidine (ZANAFLEX) 4 MG tablet Take 4 mg by mouth 4 (four) times daily as needed.    TRESIBA FLEXTOUCH U-100 100 unit/mL (3 mL) insulin pen Inject 30 Units into the skin once daily.    triamcinolone acetonide 0.025% (KENALOG) 0.025 % cream Apply topically 2 (two) times daily.     No current facility-administered medications for this visit.       Review of patient's allergies indicates:   Allergen Reactions    Nsaids (non-steroidal anti-inflammatory drug) Other (See Comments)     GI upset  GI upset      Ibuprofen Other (See Comments)     Other reaction(s): crohns flair up  Other reaction(s): crohns flair up       Family History   Problem Relation Name Age of Onset    Crohn's disease Other      Cirrhosis Neg Hx      Ulcerative colitis Neg Hx         Social History     Socioeconomic History    Marital status:    Occupational History    Occupation: Disabled     Comment: Previous occupation:    Tobacco Use    Smoking status: Former     Current packs/day: 0.00     Types: Cigarettes     Quit date: 2020     Years since quittin.0    Smokeless tobacco: Former    Tobacco comments:     in the 8 th grade   Substance and Sexual Activity    Alcohol use: Yes     Alcohol/week: 4.0 standard drinks of alcohol     Types: 4 Cans of beer per week     Comment: 1 beer , once in 2 weeks    Drug use: No    Sexual activity: Yes     Partners: Female     Comment: Never had pregnant      Social Drivers of Health     Financial Resource Strain: Low Risk  (2024)    Overall Financial Resource Strain (CARDIA)     Difficulty of Paying Living Expenses: Not hard at all   Food Insecurity: No Food Insecurity (2024)    Hunger Vital Sign     Worried About Running Out of Food in the Last Year: Never true     Ran Out of Food in the Last Year: Never true   Physical  Activity: Insufficiently Active (5/31/2024)    Exercise Vital Sign     Days of Exercise per Week: 1 day     Minutes of Exercise per Session: 30 min   Stress: No Stress Concern Present (5/31/2024)    [employer] [place] [employer] - Occupational Stress Questionnaire     Feeling of Stress : Not at all   Housing Stability: Unknown (5/31/2024)    Housing Stability Vital Sign     Unable to Pay for Housing in the Last Year: No       Chief Complaint:   Chief Complaint   Patient presents with    Left Knee - Pain    Right Knee - Pain       History of present illness:  59-year-old female seen for left knee instability.  Patient feels like her patella subluxes and her knee is very unstable particularly going down stairs.  Feels like it is going to give out and cause her to fall.    Did have a right allograft ACL reconstruction on the other side done in 2003.  Patient was coming in for MRI follow up of the left knee which did show more of a degenerative medial meniscal tear.  Patient states the right patella dislocated about a week ago.  Was very painful and swollen.  Slowly getting better.    Prior treatment:  None        Review of Systems:    Constitution: Negative for chills, fever, and sweats.  Negative for unexplained weight loss.    HENT:  Negative for headaches and blurry vision.    Cardiovascular:Negative for chest pain or irregular heart beat. Negative for hypertension.    Respiratory:  Negative for cough and shortness of breath.    Gastrointestinal: Negative for abdominal pain, heartburn, melena, nausea, and vomitting.    Genitourinary:  Negative bladder incontinence and dysuria.    Musculoskeletal:  See HPI    Neurological: Negative for numbness.    Psychiatric/Behavioral: Negative for depression.  The patient is not nervous/anxious.      Endocrine: Negative for polyuria    Hematologic/Lymphatic: Negative for bleeding problem.  Does not bruise/bleed easily.    Skin: Negative for poor would healing and  rash      Physical Examination:    Vital Signs:    Vitals:    01/02/25 0946   Resp: 17       Body mass index is 30.61 kg/m².    This a well-developed, well nourished patient in no acute distress.  They are alert and oriented and cooperative to examination.  Pt. walks without an antalgic gait.      Examination of the left knee shows no rashes or erythema. There are no masses ecchymosis or effusion. Patient has full range of motion from 0-130°. Patient is nontender to palpation over lateral joint line and moderately tender to palpation over the medial joint line. Patient has a - Lachman exam, - anterior drawer exam, and - posterior drawer exam.  Positive medial Apley exam. Knee is stable to varus and valgus stress. 5 out of 5 motor strength. Palpable distal pulses. Intact light touch sensation. Negative Patellofemoral crepitus.  Patient does have about 3 quadrant glide of her patella laterally    Examination of the right knee shows prior surgical scars.  No significant effusion.  Very tender around the whole front of the knee.  Patient guards and protects so has difficult to assess for patellar instability at this point.      X-rays:  X-rays of the right knee are ordered and review which show some mild arthritic changes bilaterally particularly around the patellofemoral joint..  Signs of prior right ACL reconstruction.    Is MRI of the left knee is reviewed and interpreted:1. Horizontal cleavage tear of the posterior horn and body of the medial meniscus.  2. Mild tricompartmental chondromalacia.     Assessment:  Left medial meniscal tear  Possible right patellar dislocation  Bilateral patellar instability    Plan:  I reviewed the findings with her today.  Recommended formal physical therapy for both knees.  Do not see anything that would require urgent surgery at this point.  We will get her a Shields brace on the right as well.  Follow up in 6 weeks.            All previous pertinent notes including ER visits,  physical therapy visits, other orthopedic visits as well as other care for the same musculoskeletal problem were reviewed.  All pertinent lab values and previous imaging was reviewed pertinent to the current visit.    This note was created using [employer] voice recognition software that occasionally misinterpreted phrases or words.    Consult note is delivered via Epic messaging service.

## 2025-01-09 ENCOUNTER — HOSPITAL ENCOUNTER (OUTPATIENT)
Dept: RADIOLOGY | Facility: HOSPITAL | Age: 60
Discharge: HOME | End: 2025-01-09
Attending: NURSE PRACTITIONER
Payer: COMMERCIAL

## 2025-01-09 DIAGNOSIS — M96.1 POSTLAMINECTOMY SYNDROME, NOT ELSEWHERE CLASSIFIED: ICD-10-CM

## 2025-01-09 PROCEDURE — 72148 MRI LUMBAR SPINE W/O DYE: CPT | Mod: 26,,, | Performed by: RADIOLOGY

## 2025-01-09 PROCEDURE — 72148 MRI LUMBAR SPINE W/O DYE: CPT | Mod: TC,PO

## 2025-01-27 ENCOUNTER — HOSPITAL ENCOUNTER (INPATIENT)
Facility: HOSPITAL | Age: 60
LOS: 4 days | Discharge: HOME | DRG: 387 | End: 2025-01-31
Attending: EMERGENCY MEDICINE | Admitting: INTERNAL MEDICINE
Payer: COMMERCIAL

## 2025-01-27 DIAGNOSIS — Z01.89 ENCOUNTER FOR IMAGING STUDY TO CONFIRM NASOGASTRIC (NG) TUBE PLACEMENT: ICD-10-CM

## 2025-01-27 DIAGNOSIS — R07.9 CHEST PAIN: ICD-10-CM

## 2025-01-27 DIAGNOSIS — K52.9 COLITIS: ICD-10-CM

## 2025-01-27 DIAGNOSIS — E11.65 TYPE 2 DIABETES MELLITUS WITH HYPERGLYCEMIA, WITH LONG-TERM CURRENT USE OF INSULIN: ICD-10-CM

## 2025-01-27 DIAGNOSIS — Z79.4 TYPE 2 DIABETES MELLITUS WITH HYPERGLYCEMIA, WITH LONG-TERM CURRENT USE OF INSULIN: ICD-10-CM

## 2025-01-27 DIAGNOSIS — K56.609 SMALL BOWEL OBSTRUCTION: Primary | ICD-10-CM

## 2025-01-27 PROBLEM — E66.3 OVER WEIGHT: Status: RESOLVED | Noted: 2021-03-08 | Resolved: 2025-01-27

## 2025-01-27 LAB
ALBUMIN SERPL BCP-MCNC: 4.8 G/DL (ref 3.5–5.2)
ALP SERPL-CCNC: 42 U/L (ref 55–135)
ALT SERPL W/O P-5'-P-CCNC: 57 U/L (ref 10–44)
ANION GAP SERPL CALC-SCNC: 14 MMOL/L (ref 8–16)
AST SERPL-CCNC: 78 U/L (ref 10–40)
BACTERIA #/AREA URNS HPF: ABNORMAL /HPF
BASOPHILS # BLD AUTO: 0.06 K/UL (ref 0–0.2)
BASOPHILS NFR BLD: 0.3 % (ref 0–1.9)
BILIRUB SERPL-MCNC: 0.6 MG/DL (ref 0.1–1)
BILIRUB UR QL STRIP: NEGATIVE
BUN SERPL-MCNC: 10 MG/DL (ref 6–20)
CALCIUM SERPL-MCNC: 9.8 MG/DL (ref 8.7–10.5)
CHLORIDE SERPL-SCNC: 98 MMOL/L (ref 95–110)
CLARITY UR: CLEAR
CO2 SERPL-SCNC: 23 MMOL/L (ref 23–29)
COLOR UR: YELLOW
CREAT SERPL-MCNC: 0.9 MG/DL (ref 0.5–1.4)
DIFFERENTIAL METHOD BLD: ABNORMAL
EOSINOPHIL # BLD AUTO: 0 K/UL (ref 0–0.5)
EOSINOPHIL NFR BLD: 0.1 % (ref 0–8)
ERYTHROCYTE [DISTWIDTH] IN BLOOD BY AUTOMATED COUNT: 12.9 % (ref 11.5–14.5)
EST. GFR  (NO RACE VARIABLE): >60 ML/MIN/1.73 M^2
GLUCOSE SERPL-MCNC: 250 MG/DL (ref 70–110)
GLUCOSE UR QL STRIP: ABNORMAL
HCT VFR BLD AUTO: 53 % (ref 37–48.5)
HGB BLD-MCNC: 18.1 G/DL (ref 12–16)
HGB UR QL STRIP: NEGATIVE
HYALINE CASTS #/AREA URNS LPF: 7 /LPF
IMM GRANULOCYTES # BLD AUTO: 0.13 K/UL (ref 0–0.04)
IMM GRANULOCYTES NFR BLD AUTO: 0.6 % (ref 0–0.5)
KETONES UR QL STRIP: ABNORMAL
LEUKOCYTE ESTERASE UR QL STRIP: NEGATIVE
LIPASE SERPL-CCNC: 26 U/L (ref 4–60)
LYMPHOCYTES # BLD AUTO: 2 K/UL (ref 1–4.8)
LYMPHOCYTES NFR BLD: 9.8 % (ref 18–48)
MCH RBC QN AUTO: 29.8 PG (ref 27–31)
MCHC RBC AUTO-ENTMCNC: 34.2 G/DL (ref 32–36)
MCV RBC AUTO: 87 FL (ref 82–98)
MICROSCOPIC COMMENT: ABNORMAL
MONOCYTES # BLD AUTO: 0.7 K/UL (ref 0.3–1)
MONOCYTES NFR BLD: 3.4 % (ref 4–15)
NEUTROPHILS # BLD AUTO: 17.4 K/UL (ref 1.8–7.7)
NEUTROPHILS NFR BLD: 85.8 % (ref 38–73)
NITRITE UR QL STRIP: NEGATIVE
NRBC BLD-RTO: 0 /100 WBC
PH UR STRIP: 7 [PH] (ref 5–8)
PLATELET # BLD AUTO: 434 K/UL (ref 150–450)
PMV BLD AUTO: 9.3 FL (ref 9.2–12.9)
POCT GLUCOSE: 174 MG/DL (ref 70–110)
POTASSIUM SERPL-SCNC: 3.8 MMOL/L (ref 3.5–5.1)
PROT SERPL-MCNC: 8.3 G/DL (ref 6–8.4)
PROT UR QL STRIP: ABNORMAL
RBC # BLD AUTO: 6.07 M/UL (ref 4–5.4)
RBC #/AREA URNS HPF: 6 /HPF (ref 0–4)
SODIUM SERPL-SCNC: 135 MMOL/L (ref 136–145)
SP GR UR STRIP: 1.02 (ref 1–1.03)
SQUAMOUS #/AREA URNS HPF: 1 /HPF
URN SPEC COLLECT METH UR: ABNORMAL
UROBILINOGEN UR STRIP-ACNC: ABNORMAL EU/DL
WBC # BLD AUTO: 20.26 K/UL (ref 3.9–12.7)
WBC #/AREA URNS HPF: 2 /HPF (ref 0–5)

## 2025-01-27 PROCEDURE — 96361 HYDRATE IV INFUSION ADD-ON: CPT

## 2025-01-27 PROCEDURE — 12000002 HC ACUTE/MED SURGE SEMI-PRIVATE ROOM

## 2025-01-27 PROCEDURE — 85025 COMPLETE CBC W/AUTO DIFF WBC: CPT | Performed by: NURSE PRACTITIONER

## 2025-01-27 PROCEDURE — 25000003 PHARM REV CODE 250: Performed by: NURSE PRACTITIONER

## 2025-01-27 PROCEDURE — 81001 URINALYSIS AUTO W/SCOPE: CPT | Performed by: NURSE PRACTITIONER

## 2025-01-27 PROCEDURE — 0D9670Z DRAINAGE OF STOMACH WITH DRAINAGE DEVICE, VIA NATURAL OR ARTIFICIAL OPENING: ICD-10-PCS | Performed by: EMERGENCY MEDICINE

## 2025-01-27 PROCEDURE — 25500020 PHARM REV CODE 255: Performed by: EMERGENCY MEDICINE

## 2025-01-27 PROCEDURE — 25000003 PHARM REV CODE 250: Performed by: EMERGENCY MEDICINE

## 2025-01-27 PROCEDURE — 63600175 PHARM REV CODE 636 W HCPCS: Performed by: EMERGENCY MEDICINE

## 2025-01-27 PROCEDURE — 96376 TX/PRO/DX INJ SAME DRUG ADON: CPT

## 2025-01-27 PROCEDURE — 63600175 PHARM REV CODE 636 W HCPCS: Performed by: INTERNAL MEDICINE

## 2025-01-27 PROCEDURE — 25000003 PHARM REV CODE 250: Performed by: INTERNAL MEDICINE

## 2025-01-27 PROCEDURE — 63600175 PHARM REV CODE 636 W HCPCS: Performed by: NURSE PRACTITIONER

## 2025-01-27 PROCEDURE — 96375 TX/PRO/DX INJ NEW DRUG ADDON: CPT

## 2025-01-27 PROCEDURE — 83690 ASSAY OF LIPASE: CPT | Performed by: NURSE PRACTITIONER

## 2025-01-27 PROCEDURE — 96365 THER/PROPH/DIAG IV INF INIT: CPT

## 2025-01-27 PROCEDURE — 99285 EMERGENCY DEPT VISIT HI MDM: CPT | Mod: 25

## 2025-01-27 PROCEDURE — 80053 COMPREHEN METABOLIC PANEL: CPT | Performed by: NURSE PRACTITIONER

## 2025-01-27 RX ORDER — ONDANSETRON HYDROCHLORIDE 2 MG/ML
4 INJECTION, SOLUTION INTRAVENOUS
Status: COMPLETED | OUTPATIENT
Start: 2025-01-27 | End: 2025-01-27

## 2025-01-27 RX ORDER — HYDROMORPHONE HYDROCHLORIDE 1 MG/ML
0.5 INJECTION, SOLUTION INTRAMUSCULAR; INTRAVENOUS; SUBCUTANEOUS EVERY 6 HOURS PRN
Status: DISCONTINUED | OUTPATIENT
Start: 2025-01-27 | End: 2025-01-28

## 2025-01-27 RX ORDER — PANTOPRAZOLE SODIUM 40 MG/10ML
40 INJECTION, POWDER, LYOPHILIZED, FOR SOLUTION INTRAVENOUS DAILY
Status: DISCONTINUED | OUTPATIENT
Start: 2025-01-28 | End: 2025-01-31 | Stop reason: HOSPADM

## 2025-01-27 RX ORDER — TALC
6 POWDER (GRAM) TOPICAL NIGHTLY PRN
Status: DISCONTINUED | OUTPATIENT
Start: 2025-01-27 | End: 2025-01-31 | Stop reason: HOSPADM

## 2025-01-27 RX ORDER — HYDROMORPHONE HYDROCHLORIDE 1 MG/ML
1 INJECTION, SOLUTION INTRAMUSCULAR; INTRAVENOUS; SUBCUTANEOUS EVERY 6 HOURS PRN
Status: DISCONTINUED | OUTPATIENT
Start: 2025-01-27 | End: 2025-01-31 | Stop reason: HOSPADM

## 2025-01-27 RX ORDER — ACETAMINOPHEN 325 MG/1
650 TABLET ORAL EVERY 8 HOURS PRN
Status: DISCONTINUED | OUTPATIENT
Start: 2025-01-27 | End: 2025-01-31 | Stop reason: HOSPADM

## 2025-01-27 RX ORDER — IBUPROFEN 200 MG
16 TABLET ORAL
Status: DISCONTINUED | OUTPATIENT
Start: 2025-01-27 | End: 2025-01-31 | Stop reason: HOSPADM

## 2025-01-27 RX ORDER — FENTANYL 75 UG/H
1 PATCH TRANSDERMAL
Status: DISCONTINUED | OUTPATIENT
Start: 2025-01-27 | End: 2025-01-27

## 2025-01-27 RX ORDER — ALUMINUM HYDROXIDE, MAGNESIUM HYDROXIDE, AND SIMETHICONE 1200; 120; 1200 MG/30ML; MG/30ML; MG/30ML
30 SUSPENSION ORAL 4 TIMES DAILY PRN
Status: DISCONTINUED | OUTPATIENT
Start: 2025-01-27 | End: 2025-01-31 | Stop reason: HOSPADM

## 2025-01-27 RX ORDER — SODIUM,POTASSIUM PHOSPHATES 280-250MG
2 POWDER IN PACKET (EA) ORAL
Status: DISCONTINUED | OUTPATIENT
Start: 2025-01-27 | End: 2025-01-31 | Stop reason: HOSPADM

## 2025-01-27 RX ORDER — ACETAMINOPHEN 325 MG/1
650 TABLET ORAL EVERY 4 HOURS PRN
Status: DISCONTINUED | OUTPATIENT
Start: 2025-01-27 | End: 2025-01-31 | Stop reason: HOSPADM

## 2025-01-27 RX ORDER — NALOXONE HCL 0.4 MG/ML
0.02 VIAL (ML) INJECTION
Status: DISCONTINUED | OUTPATIENT
Start: 2025-01-27 | End: 2025-01-31 | Stop reason: HOSPADM

## 2025-01-27 RX ORDER — MUPIROCIN 20 MG/G
OINTMENT TOPICAL 2 TIMES DAILY
Status: DISCONTINUED | OUTPATIENT
Start: 2025-01-27 | End: 2025-01-31 | Stop reason: HOSPADM

## 2025-01-27 RX ORDER — TIZANIDINE 4 MG/1
4 TABLET ORAL 4 TIMES DAILY PRN
Status: DISCONTINUED | OUTPATIENT
Start: 2025-01-27 | End: 2025-01-31 | Stop reason: HOSPADM

## 2025-01-27 RX ORDER — FENTANYL 75 UG/H
1 PATCH TRANSDERMAL
COMMUNITY
Start: 2025-01-20

## 2025-01-27 RX ORDER — SODIUM CHLORIDE 9 MG/ML
1000 INJECTION, SOLUTION INTRAVENOUS
Status: COMPLETED | OUTPATIENT
Start: 2025-01-27 | End: 2025-01-27

## 2025-01-27 RX ORDER — ONDANSETRON HYDROCHLORIDE 2 MG/ML
4 INJECTION, SOLUTION INTRAVENOUS EVERY 6 HOURS PRN
Status: DISCONTINUED | OUTPATIENT
Start: 2025-01-27 | End: 2025-01-31 | Stop reason: HOSPADM

## 2025-01-27 RX ORDER — SODIUM CHLORIDE 9 MG/ML
INJECTION, SOLUTION INTRAVENOUS CONTINUOUS
Status: DISCONTINUED | OUTPATIENT
Start: 2025-01-27 | End: 2025-01-31

## 2025-01-27 RX ORDER — LANOLIN ALCOHOL/MO/W.PET/CERES
800 CREAM (GRAM) TOPICAL
Status: DISCONTINUED | OUTPATIENT
Start: 2025-01-27 | End: 2025-01-31 | Stop reason: HOSPADM

## 2025-01-27 RX ORDER — HYDROCODONE BITARTRATE AND ACETAMINOPHEN 10; 325 MG/1; MG/1
1 TABLET ORAL EVERY 8 HOURS PRN
Status: DISCONTINUED | OUTPATIENT
Start: 2025-01-27 | End: 2025-01-27

## 2025-01-27 RX ORDER — MORPHINE SULFATE 4 MG/ML
4 INJECTION, SOLUTION INTRAMUSCULAR; INTRAVENOUS
Status: COMPLETED | OUTPATIENT
Start: 2025-01-27 | End: 2025-01-27

## 2025-01-27 RX ORDER — INSULIN GLARGINE 100 [IU]/ML
30 INJECTION, SOLUTION SUBCUTANEOUS NIGHTLY
Status: DISCONTINUED | OUTPATIENT
Start: 2025-01-27 | End: 2025-01-27

## 2025-01-27 RX ORDER — ATORVASTATIN CALCIUM 10 MG/1
10 TABLET, FILM COATED ORAL DAILY
Status: DISCONTINUED | OUTPATIENT
Start: 2025-01-28 | End: 2025-01-27

## 2025-01-27 RX ORDER — SODIUM CHLORIDE 0.9 % (FLUSH) 0.9 %
2 SYRINGE (ML) INJECTION EVERY 12 HOURS PRN
Status: DISCONTINUED | OUTPATIENT
Start: 2025-01-27 | End: 2025-01-31 | Stop reason: HOSPADM

## 2025-01-27 RX ORDER — IBUPROFEN 200 MG
24 TABLET ORAL
Status: DISCONTINUED | OUTPATIENT
Start: 2025-01-27 | End: 2025-01-31 | Stop reason: HOSPADM

## 2025-01-27 RX ORDER — GLUCAGON 1 MG
1 KIT INJECTION
Status: DISCONTINUED | OUTPATIENT
Start: 2025-01-27 | End: 2025-01-31 | Stop reason: HOSPADM

## 2025-01-27 RX ORDER — ASPIRIN 81 MG/1
81 TABLET ORAL DAILY
Status: DISCONTINUED | OUTPATIENT
Start: 2025-01-28 | End: 2025-01-27

## 2025-01-27 RX ORDER — INSULIN ASPART 100 [IU]/ML
0-10 INJECTION, SOLUTION INTRAVENOUS; SUBCUTANEOUS
Status: DISCONTINUED | OUTPATIENT
Start: 2025-01-27 | End: 2025-01-31 | Stop reason: HOSPADM

## 2025-01-27 RX ORDER — HYDRALAZINE HYDROCHLORIDE 20 MG/ML
10 INJECTION INTRAMUSCULAR; INTRAVENOUS EVERY 4 HOURS PRN
Status: DISCONTINUED | OUTPATIENT
Start: 2025-01-27 | End: 2025-01-31 | Stop reason: HOSPADM

## 2025-01-27 RX ORDER — ENOXAPARIN SODIUM 100 MG/ML
40 INJECTION SUBCUTANEOUS
Status: DISCONTINUED | OUTPATIENT
Start: 2025-01-27 | End: 2025-01-31 | Stop reason: HOSPADM

## 2025-01-27 RX ADMIN — INSULIN GLARGINE 30 UNITS: 100 INJECTION, SOLUTION SUBCUTANEOUS at 08:01

## 2025-01-27 RX ADMIN — SODIUM CHLORIDE: 9 INJECTION, SOLUTION INTRAVENOUS at 07:01

## 2025-01-27 RX ADMIN — SODIUM CHLORIDE 1000 ML: 9 INJECTION, SOLUTION INTRAVENOUS at 03:01

## 2025-01-27 RX ADMIN — MORPHINE SULFATE 4 MG: 4 INJECTION, SOLUTION INTRAMUSCULAR; INTRAVENOUS at 05:01

## 2025-01-27 RX ADMIN — PIPERACILLIN SODIUM AND TAZOBACTAM SODIUM 4.5 G: 4; .5 INJECTION, POWDER, LYOPHILIZED, FOR SOLUTION INTRAVENOUS at 05:01

## 2025-01-27 RX ADMIN — ONDANSETRON 4 MG: 2 INJECTION INTRAMUSCULAR; INTRAVENOUS at 05:01

## 2025-01-27 RX ADMIN — ONDANSETRON 4 MG: 2 INJECTION INTRAMUSCULAR; INTRAVENOUS at 11:01

## 2025-01-27 RX ADMIN — MORPHINE SULFATE 4 MG: 4 INJECTION, SOLUTION INTRAMUSCULAR; INTRAVENOUS at 03:01

## 2025-01-27 RX ADMIN — ENOXAPARIN SODIUM 40 MG: 40 INJECTION SUBCUTANEOUS at 11:01

## 2025-01-27 RX ADMIN — ONDANSETRON 4 MG: 2 INJECTION INTRAMUSCULAR; INTRAVENOUS at 03:01

## 2025-01-27 RX ADMIN — MUPIROCIN 1 G: 20 OINTMENT TOPICAL at 08:01

## 2025-01-27 RX ADMIN — IOHEXOL 100 ML: 350 INJECTION, SOLUTION INTRAVENOUS at 04:01

## 2025-01-27 RX ADMIN — FENTANYL 1 PATCH: 75 PATCH TRANSDERMAL at 08:01

## 2025-01-28 LAB
ALBUMIN SERPL BCP-MCNC: 3.9 G/DL (ref 3.5–5.2)
ALP SERPL-CCNC: 29 U/L (ref 55–135)
ALT SERPL W/O P-5'-P-CCNC: 39 U/L (ref 10–44)
ANION GAP SERPL CALC-SCNC: 5 MMOL/L (ref 8–16)
AST SERPL-CCNC: 35 U/L (ref 10–40)
BASOPHILS # BLD AUTO: 0.03 K/UL (ref 0–0.2)
BASOPHILS NFR BLD: 0.3 % (ref 0–1.9)
BILIRUB SERPL-MCNC: 0.7 MG/DL (ref 0.1–1)
BUN SERPL-MCNC: 11 MG/DL (ref 6–20)
CALCIUM SERPL-MCNC: 8.5 MG/DL (ref 8.7–10.5)
CHLORIDE SERPL-SCNC: 105 MMOL/L (ref 95–110)
CO2 SERPL-SCNC: 29 MMOL/L (ref 23–29)
CREAT SERPL-MCNC: 0.9 MG/DL (ref 0.5–1.4)
DIFFERENTIAL METHOD BLD: ABNORMAL
EOSINOPHIL # BLD AUTO: 0 K/UL (ref 0–0.5)
EOSINOPHIL NFR BLD: 0 % (ref 0–8)
ERYTHROCYTE [DISTWIDTH] IN BLOOD BY AUTOMATED COUNT: 13.2 % (ref 11.5–14.5)
EST. GFR  (NO RACE VARIABLE): >60 ML/MIN/1.73 M^2
GLUCOSE SERPL-MCNC: 147 MG/DL (ref 70–110)
HCT VFR BLD AUTO: 43.2 % (ref 37–48.5)
HGB BLD-MCNC: 14.7 G/DL (ref 12–16)
IMM GRANULOCYTES # BLD AUTO: 0.03 K/UL (ref 0–0.04)
IMM GRANULOCYTES NFR BLD AUTO: 0.3 % (ref 0–0.5)
LYMPHOCYTES # BLD AUTO: 2.1 K/UL (ref 1–4.8)
LYMPHOCYTES NFR BLD: 18.9 % (ref 18–48)
MAGNESIUM SERPL-MCNC: 1.8 MG/DL (ref 1.6–2.6)
MCH RBC QN AUTO: 30.1 PG (ref 27–31)
MCHC RBC AUTO-ENTMCNC: 34 G/DL (ref 32–36)
MCV RBC AUTO: 88 FL (ref 82–98)
MONOCYTES # BLD AUTO: 0.8 K/UL (ref 0.3–1)
MONOCYTES NFR BLD: 6.9 % (ref 4–15)
NEUTROPHILS # BLD AUTO: 8.2 K/UL (ref 1.8–7.7)
NEUTROPHILS NFR BLD: 73.6 % (ref 38–73)
NRBC BLD-RTO: 0 /100 WBC
PLATELET # BLD AUTO: 359 K/UL (ref 150–450)
PMV BLD AUTO: 9.3 FL (ref 9.2–12.9)
POCT GLUCOSE: 101 MG/DL (ref 70–110)
POCT GLUCOSE: 111 MG/DL (ref 70–110)
POTASSIUM SERPL-SCNC: 4.3 MMOL/L (ref 3.5–5.1)
PROT SERPL-MCNC: 6.4 G/DL (ref 6–8.4)
RBC # BLD AUTO: 4.89 M/UL (ref 4–5.4)
SODIUM SERPL-SCNC: 139 MMOL/L (ref 136–145)
WBC # BLD AUTO: 11.19 K/UL (ref 3.9–12.7)

## 2025-01-28 PROCEDURE — 27000207 HC ISOLATION

## 2025-01-28 PROCEDURE — 99222 1ST HOSP IP/OBS MODERATE 55: CPT | Mod: ,,, | Performed by: SURGERY

## 2025-01-28 PROCEDURE — 83735 ASSAY OF MAGNESIUM: CPT | Performed by: NURSE PRACTITIONER

## 2025-01-28 PROCEDURE — 25000003 PHARM REV CODE 250: Performed by: NURSE PRACTITIONER

## 2025-01-28 PROCEDURE — 63600175 PHARM REV CODE 636 W HCPCS: Performed by: NURSE PRACTITIONER

## 2025-01-28 PROCEDURE — 25000003 PHARM REV CODE 250

## 2025-01-28 PROCEDURE — 25000003 PHARM REV CODE 250: Performed by: SURGERY

## 2025-01-28 PROCEDURE — 11000001 HC ACUTE MED/SURG PRIVATE ROOM

## 2025-01-28 PROCEDURE — 36415 COLL VENOUS BLD VENIPUNCTURE: CPT | Performed by: NURSE PRACTITIONER

## 2025-01-28 PROCEDURE — 25000003 PHARM REV CODE 250: Performed by: INTERNAL MEDICINE

## 2025-01-28 PROCEDURE — 80053 COMPREHEN METABOLIC PANEL: CPT | Performed by: NURSE PRACTITIONER

## 2025-01-28 PROCEDURE — 63600175 PHARM REV CODE 636 W HCPCS: Performed by: INTERNAL MEDICINE

## 2025-01-28 PROCEDURE — 85025 COMPLETE CBC W/AUTO DIFF WBC: CPT | Performed by: NURSE PRACTITIONER

## 2025-01-28 RX ORDER — FENTANYL 75 UG/H
1 PATCH TRANSDERMAL
Status: DISCONTINUED | OUTPATIENT
Start: 2025-01-28 | End: 2025-01-31 | Stop reason: HOSPADM

## 2025-01-28 RX ADMIN — PIPERACILLIN SODIUM AND TAZOBACTAM SODIUM 4.5 G: 4; .5 INJECTION, POWDER, LYOPHILIZED, FOR SOLUTION INTRAVENOUS at 06:01

## 2025-01-28 RX ADMIN — SODIUM CHLORIDE: 9 INJECTION, SOLUTION INTRAVENOUS at 04:01

## 2025-01-28 RX ADMIN — SODIUM CHLORIDE: 9 INJECTION, SOLUTION INTRAVENOUS at 02:01

## 2025-01-28 RX ADMIN — MUPIROCIN 1 G: 20 OINTMENT TOPICAL at 08:01

## 2025-01-28 RX ADMIN — TIZANIDINE 4 MG: 4 TABLET ORAL at 08:01

## 2025-01-28 RX ADMIN — ONDANSETRON 4 MG: 2 INJECTION INTRAMUSCULAR; INTRAVENOUS at 04:01

## 2025-01-28 RX ADMIN — HYDROMORPHONE HYDROCHLORIDE 1 MG: 1 INJECTION, SOLUTION INTRAMUSCULAR; INTRAVENOUS; SUBCUTANEOUS at 01:01

## 2025-01-28 RX ADMIN — PIPERACILLIN SODIUM AND TAZOBACTAM SODIUM 4.5 G: 4; .5 INJECTION, POWDER, LYOPHILIZED, FOR SOLUTION INTRAVENOUS at 02:01

## 2025-01-28 RX ADMIN — OXYCODONE HYDROCHLORIDE 15 MG: 5 TABLET ORAL at 08:01

## 2025-01-28 RX ADMIN — HYDROMORPHONE HYDROCHLORIDE 1 MG: 1 INJECTION, SOLUTION INTRAMUSCULAR; INTRAVENOUS; SUBCUTANEOUS at 10:01

## 2025-01-28 RX ADMIN — HYDROMORPHONE HYDROCHLORIDE 0.5 MG: 1 INJECTION, SOLUTION INTRAMUSCULAR; INTRAVENOUS; SUBCUTANEOUS at 04:01

## 2025-01-28 RX ADMIN — ONDANSETRON 4 MG: 2 INJECTION INTRAMUSCULAR; INTRAVENOUS at 01:01

## 2025-01-28 RX ADMIN — HYDROMORPHONE HYDROCHLORIDE 1 MG: 1 INJECTION, SOLUTION INTRAMUSCULAR; INTRAVENOUS; SUBCUTANEOUS at 09:01

## 2025-01-28 RX ADMIN — FENTANYL 1 PATCH: 75 PATCH TRANSDERMAL at 03:01

## 2025-01-28 RX ADMIN — SODIUM CHLORIDE: 9 INJECTION, SOLUTION INTRAVENOUS at 01:01

## 2025-01-28 RX ADMIN — PANTOPRAZOLE SODIUM 40 MG: 40 INJECTION, POWDER, FOR SOLUTION INTRAVENOUS at 06:01

## 2025-01-28 RX ADMIN — ENOXAPARIN SODIUM 40 MG: 40 INJECTION SUBCUTANEOUS at 10:01

## 2025-01-28 RX ADMIN — ONDANSETRON 4 MG: 2 INJECTION INTRAMUSCULAR; INTRAVENOUS at 08:01

## 2025-01-28 RX ADMIN — MUPIROCIN 1 G: 20 OINTMENT TOPICAL at 09:01

## 2025-01-28 RX ADMIN — HYDROMORPHONE HYDROCHLORIDE 1 MG: 1 INJECTION, SOLUTION INTRAMUSCULAR; INTRAVENOUS; SUBCUTANEOUS at 04:01

## 2025-01-28 RX ADMIN — HYDROMORPHONE HYDROCHLORIDE 1 MG: 1 INJECTION, SOLUTION INTRAMUSCULAR; INTRAVENOUS; SUBCUTANEOUS at 02:01

## 2025-01-28 RX ADMIN — PIPERACILLIN SODIUM AND TAZOBACTAM SODIUM 4.5 G: 4; .5 INJECTION, POWDER, LYOPHILIZED, FOR SOLUTION INTRAVENOUS at 01:01

## 2025-01-28 RX ADMIN — ONDANSETRON 4 MG: 2 INJECTION INTRAMUSCULAR; INTRAVENOUS at 09:01

## 2025-01-28 NOTE — ASSESSMENT & PLAN NOTE
Patient's FSGs are controlled on current medication regimen.  Last A1c reviewed-   Lab Results   Component Value Date    HGBA1C 7.2 (H) 08/06/2024     Most recent fingerstick glucose reviewed-   Recent Labs   Lab 01/27/25 2054   POCTGLUCOSE 174*     Current correctional scale  Medium  Maintain anti-hyperglycemic dose as follows-   Antihyperglycemics (From admission, onward)    Start     Stop Route Frequency Ordered    01/27/25 1908  insulin aspart U-100 pen 0-10 Units         -- SubQ Before meals & nightly PRN 01/27/25 1812        Hold Oral hypoglycemics while patient is in the hospital.

## 2025-01-28 NOTE — SUBJECTIVE & OBJECTIVE
Past Medical History:   Diagnosis Date    ADHD (attention deficit hyperactivity disorder)     Arthritis     Colon polyp     Crohn's disease     Diabetes mellitus, type 2     GERD (gastroesophageal reflux disease)     Hypertension     Hypothyroidism     Lumbar back pain with radiculopathy affecting left lower extremity     SBO (small bowel obstruction)        Past Surgical History:   Procedure Laterality Date    ANTERIOR CRUCIATE LIGAMENT REPAIR Right     APPENDECTOMY      1988    BUNIONECTOMY Bilateral     CHOLECYSTECTOMY      2005    COLONOSCOPY N/A 11/1/2024    Procedure: COLONOSCOPY(Instructions sent 10/25);  Surgeon: Marguerite Gray MD;  Location: [facility];  Service: Endoscopy;  Laterality: N/A;    ESOPHAGOGASTRODUODENOSCOPY N/A 10/18/2024    Procedure: EGD (ESOPHAGOGASTRODUODENOSCOPY);  Surgeon: Marguerite Gray MD;  Location: [facility];  Service: Endoscopy;  Laterality: N/A;    EXCISION OF MASS OF BACK Left 09/21/2022    Procedure: EXCISION, MASS, BACK;  Surgeon: Sae Collins III, MD;  Location: [facility] OR;  Service: General;  Laterality: Left;    HYSTERECTOMY      INJECTION OF JOINT Right 05/09/2023    Procedure: INJECTION, JOINT, RIGHT PIRIFORMIS AND RIGHT GTB;  Surgeon: Loreta Brito MD;  Location: [facility] PAIN T;  Service: Pain Management;  Laterality: Right;    MINIMALLY INVASIVE SURGICAL REMOVAL OF INTERVERTEBRAL DISC OF SPINE USING MICROSCOPE Left 05/11/2021    Procedure: Left L4-5 Far Lateral Disectomy, MIS;  Surgeon: Johnny Cruz MD;  Location: [address];  Service: Neurosurgery;  Laterality: Left;    OOPHORECTOMY      SHOULDER SURGERY Right     SPINE SURGERY      TRANSFORAMINAL EPIDURAL INJECTION OF STEROID Left 03/10/2021    Procedure: INJECTION, STEROID, EPIDURAL, TRANSFORAMINAL APPROACH  L4-5;  Surgeon: Jaylon Tyson MD;  Location: [facility] PAIN MGT;  Service: Pain Management;  Laterality: Left;  consent needed  ([employer] [facility])    TRANSFORAMINAL EPIDURAL INJECTION OF STEROID  Left 11/29/2022    Procedure: LUMBAR TRANSFORAMINAL LEFT L4/5 MEDICALLY URGENT;  Surgeon: Loreta Brito MD;  Location: [facility];  Service: Pain Management;  Laterality: Left;    UPPER GASTROINTESTINAL ENDOSCOPY      10 years ago desiree ellington       Review of patient's allergies indicates:   Allergen Reactions    Nsaids (non-steroidal anti-inflammatory drug) Other (See Comments)     GI upset  GI upset      Ibuprofen Other (See Comments)     Other reaction(s): crohns flair up  Other reaction(s): crohns flair up       No current facility-administered medications on file prior to encounter.     Current Outpatient Medications on File Prior to Encounter   Medication Sig    aspirin (ECOTRIN) 81 MG EC tablet Take 81 mg by mouth once daily.    atorvastatin (LIPITOR) 10 MG tablet TAKE 1 TABLET BY MOUTH EVERY DAY (Patient taking differently: Take 10 mg by mouth once daily.)    dulaglutide (TRULICITY) 3 mg/0.5 mL pen injector Inject 3 mg into the skin every 7 days. (Patient taking differently: Inject 3 mg into the skin every 7 days. Thursdays)    estradioL (ESTRACE) 2 MG tablet TAKE 1 TABLET BY MOUTH EVERY DAY (Patient taking differently: Take 2 mg by mouth once daily.)    fentaNYL (DURAGESIC) 75 mcg/hr Place 1 patch onto the skin every 72 hours.    hydroCHLOROthiazide (HYDRODIURIL) 25 MG tablet TAKE 1 TABLET BY MOUTH EVERY DAY (Patient taking differently: Take 25 mg by mouth once daily.)    HYDROcodone-acetaminophen (NORCO)  mg per tablet Take 1-2 tablets by mouth every 8 (eight) hours.    omeprazole (PRILOSEC) 40 MG capsule Take 1 capsule (40 mg total) by mouth 2 (two) times daily before meals for 60 days, THEN 1 capsule (40 mg total) every morning.    tiZANidine (ZANAFLEX) 4 MG tablet Take 4 mg by mouth 4 (four) times daily as needed (Muscle Spasms).    TRESIBA FLEXTOUCH U-100 100 unit/mL (3 mL) insulin pen Inject 30 Units into the skin once daily. (Patient taking differently: Inject 30 Units into the skin every  "evening.)    BD ULTRA-FINE SHORT PEN NEEDLE 31 gauge x 5/16" Ndle Inject 1 Needle into the skin once daily.    blood sugar diagnostic (ONETOUCH ULTRA BLUE TEST STRIP) Strp Check blood glucose readings twice a day-- before meal and 1 to 2 hours post meal    blood-glucose meter Misc Check blood glucose readings twice a day-- before meal and 1 to 2 hours post meal    lancets 33 gauge Misc Check blood glucose readings twice a day-- before meal and 1 to 2 hours post meal    naloxone (NARCAN) 4 mg/actuation Spry 1 spray.    [DISCONTINUED] dextroamphetamine-amphetamine (ADDERALL) 20 mg tablet Take 1 tablet by mouth once daily.    [DISCONTINUED] dextroamphetamine-amphetamine (ADDERALL) 20 mg tablet Take 1 tablet by mouth once daily.    [DISCONTINUED] dextroamphetamine-amphetamine (ADDERALL) 20 mg tablet Take 1 tablet by mouth once daily.    [DISCONTINUED] fentaNYL (DURAGESIC) 50 mcg/hr Place 1 patch onto the skin every 72 hours.    [DISCONTINUED] ketoconazole (NIZORAL) 2 % cream Apply topically every evening.    [DISCONTINUED] ketoconazole (NIZORAL) 2 % cream Apply topically 2 (two) times daily. (Patient not taking: Reported on 2025)    [DISCONTINUED] sulfamethoxazole-trimethoprim 800-160mg (BACTRIM DS) 800-160 mg Tab Take 1 tablet by mouth 2 (two) times daily.    [DISCONTINUED] triamcinolone acetonide 0.025% (KENALOG) 0.025 % cream Apply topically 2 (two) times daily. (Patient not taking: Reported on 2025)     Family History       Problem Relation (Age of Onset)    Crohn's disease Other          Tobacco Use    Smoking status: Former     Current packs/day: 0.00     Types: Cigarettes     Quit date: 2020     Years since quittin.0    Smokeless tobacco: Former    Tobacco comments:     in the 8 th grade   Substance and Sexual Activity    Alcohol use: Yes     Alcohol/week: 4.0 standard drinks of alcohol     Types: 4 Cans of beer per week     Comment: 1 beer , once in 2 weeks    Drug use: No    Sexual activity: " Yes     Partners: Female     Comment: Never had pregnant      Review of Systems   Gastrointestinal:  Positive for abdominal pain, nausea and vomiting.     Objective:     Vital Signs (Most Recent):  Temp: 98.5 °F (36.9 °C) (01/27/25 1408)  Pulse: (!) 123 (01/27/25 1808)  Resp: 16 (01/27/25 1754)  BP: (!) 158/85 (01/27/25 1808)  SpO2: 97 % (01/27/25 1808) Vital Signs (24h Range):  Temp:  [98.5 °F (36.9 °C)] 98.5 °F (36.9 °C)  Pulse:  [] 123  Resp:  [16-18] 16  SpO2:  [96 %-97 %] 97 %  BP: (142-160)/() 158/85     Weight: 80.7 kg (178 lb)  Body mass index is 30.55 kg/m².     Physical Exam  Vitals reviewed.   Constitutional:       General: She is awake. She is in acute distress.      Appearance: Normal appearance. She is ill-appearing.      Comments: NG tube present   HENT:      Head: Normocephalic and atraumatic.      Mouth/Throat:      Mouth: Mucous membranes are moist.      Pharynx: Oropharynx is clear.   Eyes:      Extraocular Movements: Extraocular movements intact.      Pupils: Pupils are equal, round, and reactive to light.   Cardiovascular:      Rate and Rhythm: Normal rate and regular rhythm.      Pulses: Normal pulses.      Heart sounds: Normal heart sounds.   Pulmonary:      Effort: Pulmonary effort is normal.      Breath sounds: Normal breath sounds.   Abdominal:      General: Bowel sounds are decreased.      Palpations: Abdomen is soft.      Tenderness: There is abdominal tenderness. There is guarding.   Musculoskeletal:         General: No swelling. Normal range of motion.      Cervical back: Normal range of motion and neck supple.   Skin:     General: Skin is warm and dry.      Capillary Refill: Capillary refill takes less than 2 seconds.   Neurological:      General: No focal deficit present.      Mental Status: She is alert and oriented to person, place, and time. Mental status is at baseline.   Psychiatric:         Attention and Perception: Attention normal.         Mood and Affect: Affect  is tearful (r/t pain).         Behavior: Behavior normal. Behavior is cooperative.         Cognition and Memory: Cognition normal.         Judgment: Judgment normal.          CRANIAL NERVES     CN III, IV, VI   Pupils are equal, round, and reactive to light.     Significant Labs: All pertinent labs within the past 24 hours have been reviewed.  Recent Lab Results         01/27/25  1449   01/27/25  1440        Albumin   4.8       ALP   42       ALT   57       Anion Gap   14       Appearance, UA Clear         AST   78       Bacteria, UA Occasional         Baso #   0.06       Basophil %   0.3       Bilirubin (UA) Negative         BILIRUBIN TOTAL   0.6  Comment: For infants and newborns, interpretation of results should be based  on gestational age, weight and in agreement with clinical  observations.    Premature Infant recommended reference ranges:  Up to 24 hours.............<8.0 mg/dL  Up to 48 hours............<12.0 mg/dL  3-5 days..................<15.0 mg/dL  6-29 days.................<15.0 mg/dL         BUN   10       Calcium   9.8       Chloride   98       CO2   23       Color, UA Yellow         Creatinine   0.9       Differential Method   Automated       eGFR   >60.0       Eos #   0.0       Eos %   0.1       Glucose   250       Glucose, UA 1+         Gran # (ANC)   17.4       Gran %   85.8       Hematocrit   53.0       Hemoglobin   18.1       Hyaline Casts, UA 7         Immature Grans (Abs)   0.13  Comment: Mild elevation in immature granulocytes is non specific and   can be seen in a variety of conditions including stress response,   acute inflammation, trauma and pregnancy. Correlation with other   laboratory and clinical findings is essential.         Immature Granulocytes   0.6       Ketones, UA Trace         Leukocyte Esterase, UA Negative         Lipase   26       Lymph #   2.0       Lymph %   9.8       MCH   29.8       MCHC   34.2       MCV   87       Microscopic Comment SEE COMMENT  Comment: Other  formed elements not mentioned in the report are not   present in the microscopic examination.            Mono #   0.7       Mono %   3.4       MPV   9.3       NITRITE UA Negative         nRBC   0       Blood, UA Negative         pH, UA 7.0         Platelet Count   434       Potassium   3.8       PROTEIN TOTAL   8.3       Protein, UA 3+  Comment: Recommend a 24 hour urine protein or a urine   protein/creatinine ratio if globulin induced proteinuria is  clinically suspected.           RBC   6.07       RBC, UA 6         RDW   12.9       Sodium   135       Spec Grav UA 1.025         Specimen UA Urine, Clean Catch         Squam Epithel, UA 1         UROBILINOGEN UA 2.0-3.0         WBC, UA 2         WBC   20.26               Significant Imaging: I have reviewed all pertinent imaging results/findings within the past 24 hours.    X-Ray Chest AP Portable  Narrative: EXAMINATION:  XR CHEST AP PORTABLE    CLINICAL HISTORY:  Encounter for other specified special examinations    TECHNIQUE:  Single frontal view of the chest was performed.    COMPARISON:  09/19/2022    FINDINGS:  There has been interval placement of esophagogastric tube extending below the diaphragm with tip and distal side port projecting over the stomach within the left abdomen.  Cardiac silhouette is not significantly enlarged.  No large confluent airspace consolidation appreciated throughout the lungs.  No significant volume of pleural fluid or pneumothorax identified.  Osseous structures appear intact.  Impression: As above.    Electronically signed by: Franck Paez MD  Date:    01/27/2025  Time:    18:36  CT Abdomen Pelvis With IV Contrast NO Oral Contrast  Narrative: CMS MANDATED QUALITY DATA - CT RADIATION - 436    All CT scans at this facility utilize dose modulation, iterative reconstruction, and/or weight based dosing when appropriate to reduce radiation dose to as low as reasonably achievable.    CLINICAL HISTORY:  ([id number])60 y/o  (1965)  F    Abdominal abscess/infection suspected;Bowel obstruction suspected;    TECHNIQUE:  (A#[id number], exam time 1/27/2025 16:42)    CT ABDOMEN PELVIS WITH IV CONTRAST [id number]    Axial CT images of the abdomen and pelvis were obtained from the dome of the diaphragm to the proximal thighs.    COMPARISON:  CT from 08/18/2022.    FINDINGS:  Lower Thorax:    The lung bases are clear. The heart is normal in size.    CT Abdomen:    Liver: Relative diffuse low-attenuation liver consistent with hepatic steatosis.  The liver is enlarged measuring 19.5 cm sagittal right lobe.    Gallbladder: The gallbladder is surgically absent.    Biliary Tree: No intra or extrahepatic ductal dilation.    Spleen: Normal.    Pancreas: The pancreas is normal.    Adrenal Glands: Normal    Kidneys: The kidneys are normal in imaging appearance without hydronephrosis or hydroureter.    Vasculature: Scattered calcified mural plaques are seen in the distal abdominal aorta and iliacs with no aneurysm.    Lymph nodes: No abdominal lymphadenopathy is seen.    Intraperitoneal structures: Trace abdominopelvic ascites.    Bowel: Moderate long segment circumferential colonic wall thickening with mucosal hyperemia and submucosal edema and a targetoid pattern in the distal ileum, with mild mesenteric edema and trace fluid compatible with moderate enteritis, there is fecalization of stool with relative transition point in the mid abdomen (image 149) suggesting fecal stasis and possibly fibrostenotic disease, upstream to this point there are prominent gas and fluid-filled loops of small bowel measuring up to 3.5 cm in diameter (image 93) to the level of the duodenum.  There is fatty infiltration at the terminal ileum (image 159) suggesting chronic inflammation.  The appendix is surgically absent.  There is a relative paucity of stool and gas in the colon.    Abdominal wall: The abdominal wall and musculature are normal.    Musculoskeletal: No acute osseous  abnormality is identified.    CT Pelvis:    Bladder: Normal.    Reproductive Organs: The uterus is not visualized/surgically absent.    Pelvic Lymph nodes: No pelvic lymphadenopathy is identified.  Impression: 1.  Low-grade mechanical small bowel obstruction with a transition point at the level of the ileum.    2.  Moderate acute on chronic long segment inflammatory small-bowel changes involving the distal ileum as above suggestive of enteritis (while possibly infectious/inflammatory, the pattern and distribution is most suggestive of Crohn's disease), consider correlation with the symptoms, history and attention on follow-up imaging.    3.  Trace ascites.    4.  Numerous additional, and incidental findings as above.    RESULT NOTIFICATION: These observations were discussed by the dictating physician, by phone with, and acknowledged by Sandro Blanco at 16:51 on 1/27/2025.    Electronically signed by: Hernan Lima  Date:    01/27/2025  Time:    16:52

## 2025-01-28 NOTE — PLAN OF CARE
Problem: Adult Inpatient Plan of Care  Goal: Plan of Care Review  1/28/2025 1655 by Juliana Noriega RN  Outcome: Progressing  1/28/2025 1654 by Juliana Noriega RN  Outcome: Progressing  Goal: Patient-Specific Goal (Individualized)  1/28/2025 1655 by Juliana Noriega RN  Outcome: Progressing  1/28/2025 1654 by Juliana Noriega RN  Outcome: Progressing  Goal: Optimal Comfort and Wellbeing  1/28/2025 1655 by Juliana Noriega RN  Outcome: Progressing  1/28/2025 1654 by Juliana Noriega RN  Outcome: Progressing     Problem: Diabetes Comorbidity  Goal: Blood Glucose Level Within Targeted Range  1/28/2025 1655 by Juliana Noriega RN  Outcome: Progressing  1/28/2025 1654 by Juliana Noriega RN  Outcome: Progressing     Problem: Pain Acute  Goal: Optimal Pain Control and Function  Outcome: Progressing   POC has been reviewed with pt.  Blood glucose remained within limits without insulin coverage.  Pt's pain has been controlled with fentanyl patch and IV Dilaudid.  Pt is on bowel rest for SBO.  Pt continues on IV fluids and will remain NPO at this time.  Pt has NG tube to R nare hooked up to low intermittent suction.  No surgery needed at this time.  GI consult put in.  No possible discharge at this time.

## 2025-01-28 NOTE — ASSESSMENT & PLAN NOTE
Nutrition consulted. Most recent weight and BMI monitored-   Wt Readings from Last 1 Encounters:   01/27/25 80.7 kg (178 lb)   Body mass index is 30.55 kg/m²..     Encourage maximal PO intake when clinically appropriate. Diet supplementation ordered per nutrition approval. Will encourage PO and monitor closely for weight changes.

## 2025-01-28 NOTE — ASSESSMENT & PLAN NOTE
"Patient's FSGs are controlled on current medication regimen.  Last A1c reviewed-   Lab Results   Component Value Date    HGBA1C 7.2 (H) 08/06/2024     Most recent fingerstick glucose reviewed- No results for input(s): "POCTGLUCOSE" in the last 24 hours.  Current correctional scale  Medium  Maintain anti-hyperglycemic dose as follows-   Antihyperglycemics (From admission, onward)      Start     Stop Route Frequency Ordered    01/27/25 2100  insulin glargine U-100 (Lantus) pen 30 Units         -- SubQ Nightly 01/27/25 1812    01/27/25 1908  insulin aspart U-100 pen 0-10 Units         -- SubQ Before meals & nightly PRN 01/27/25 1812          Hold Oral hypoglycemics while patient is in the hospital.  "

## 2025-01-28 NOTE — H&P
[facility] - Emergency Dept  Hospital Medicine  History & Physical    Patient Name: Tanesha Chamberlain  MRN: [id number]  Patient Class: IP- Inpatient  Admission Date: 1/27/2025  Attending Physician: Edy Guadarrama MD   Primary Care Provider: Norberto Ac III, MD         Patient information was obtained from patient, caregiver / friend, and ER records.     Subjective:     Principal Problem:SBO (small bowel obstruction)    Chief Complaint:   Chief Complaint   Patient presents with    Abdominal Pain     Abd pain since last night. Given 100mcg fentanyl and 8mg zofran ems.         HPI: 59-year-old female with pMHx of Crohn's, HTN, hypothyroidism, T2 DM, and GERD who presented to the ED with complaints of abdominal pain that began last night.  Patient is with a history of appendectomy and cholecystectomy with multiple adhesions she believed that abdominal pain was related to past surgeries and/or her Crohn's.  EMS gave patient EN route 100 of fentanyl and 8 mg of Zofran.  ED MD spoke with surgery to come and evaluate patient.  Patient be NPO.  Patient with a long term history of opioid intake.  May need to consider palliative for pain control prior to DC.    Triage vitals with pulse 98, /104, temp 98.5°, SpO2 97% on room air On evaluation in the ED blood work with WBC 20 point 2, hemoglobin 18.1, hematocrit 53, sodium 135, glucose 250, alk phos 42, AST 78, ALT 57.  CTA abdomen and pelvis with IV contrast with low-grade mechanical small-bowel obstruction with a transition point at the level of the ileum.  Moderate acute on chronic long segment inflammatory small bowel changes involving distal ileum as above suggestive of enteritis consider correlation with the symptomology.  Trace ascites and numerous incidental findings.    Past Medical History:   Diagnosis Date    ADHD (attention deficit hyperactivity disorder)     Arthritis     Colon polyp     Crohn's disease     Diabetes mellitus, type 2     GERD  (gastroesophageal reflux disease)     Hypertension     Hypothyroidism     Lumbar back pain with radiculopathy affecting left lower extremity     SBO (small bowel obstruction)        Past Surgical History:   Procedure Laterality Date    ANTERIOR CRUCIATE LIGAMENT REPAIR Right     APPENDECTOMY      1988    BUNIONECTOMY Bilateral     CHOLECYSTECTOMY      2005    COLONOSCOPY N/A 11/1/2024    Procedure: COLONOSCOPY(Instructions sent 10/25);  Surgeon: Marguerite Gray MD;  Location: [facility];  Service: Endoscopy;  Laterality: N/A;    ESOPHAGOGASTRODUODENOSCOPY N/A 10/18/2024    Procedure: EGD (ESOPHAGOGASTRODUODENOSCOPY);  Surgeon: Marugerite Gray MD;  Location: [facility];  Service: Endoscopy;  Laterality: N/A;    EXCISION OF MASS OF BACK Left 09/21/2022    Procedure: EXCISION, MASS, BACK;  Surgeon: Sae Collins III, MD;  Location: [facility];  Service: General;  Laterality: Left;    HYSTERECTOMY      INJECTION OF JOINT Right 05/09/2023    Procedure: INJECTION, JOINT, RIGHT PIRIFORMIS AND RIGHT GTB;  Surgeon: Loreta Brito MD;  Location: [facility];  Service: Pain Management;  Laterality: Right;    MINIMALLY INVASIVE SURGICAL REMOVAL OF INTERVERTEBRAL DISC OF SPINE USING MICROSCOPE Left 05/11/2021    Procedure: Left L4-5 Far Lateral Disectomy, MIS;  Surgeon: Johnny Cruz MD;  Location: [address];  Service: Neurosurgery;  Laterality: Left;    OOPHORECTOMY      SHOULDER SURGERY Right     SPINE SURGERY      TRANSFORAMINAL EPIDURAL INJECTION OF STEROID Left 03/10/2021    Procedure: INJECTION, STEROID, EPIDURAL, TRANSFORAMINAL APPROACH  L4-5;  Surgeon: Jaylon Tyson MD;  Location: [facility];  Service: Pain Management;  Laterality: Left;  consent needed  ([employer] [facility])    TRANSFORAMINAL EPIDURAL INJECTION OF STEROID Left 11/29/2022    Procedure: LUMBAR TRANSFORAMINAL LEFT L4/5 MEDICALLY URGENT;  Surgeon: Loreta Brito MD;  Location: [facility];  Service: Pain Management;  Laterality:  "Left;    UPPER GASTROINTESTINAL ENDOSCOPY      10 years ago desiree ellington       Review of patient's allergies indicates:   Allergen Reactions    Nsaids (non-steroidal anti-inflammatory drug) Other (See Comments)     GI upset  GI upset      Ibuprofen Other (See Comments)     Other reaction(s): crohns flair up  Other reaction(s): crohns flair up       No current facility-administered medications on file prior to encounter.     Current Outpatient Medications on File Prior to Encounter   Medication Sig    aspirin (ECOTRIN) 81 MG EC tablet Take 81 mg by mouth once daily.    atorvastatin (LIPITOR) 10 MG tablet TAKE 1 TABLET BY MOUTH EVERY DAY (Patient taking differently: Take 10 mg by mouth once daily.)    dulaglutide (TRULICITY) 3 mg/0.5 mL pen injector Inject 3 mg into the skin every 7 days. (Patient taking differently: Inject 3 mg into the skin every 7 days. Thursdays)    estradioL (ESTRACE) 2 MG tablet TAKE 1 TABLET BY MOUTH EVERY DAY (Patient taking differently: Take 2 mg by mouth once daily.)    fentaNYL (DURAGESIC) 75 mcg/hr Place 1 patch onto the skin every 72 hours.    hydroCHLOROthiazide (HYDRODIURIL) 25 MG tablet TAKE 1 TABLET BY MOUTH EVERY DAY (Patient taking differently: Take 25 mg by mouth once daily.)    HYDROcodone-acetaminophen (NORCO)  mg per tablet Take 1-2 tablets by mouth every 8 (eight) hours.    omeprazole (PRILOSEC) 40 MG capsule Take 1 capsule (40 mg total) by mouth 2 (two) times daily before meals for 60 days, THEN 1 capsule (40 mg total) every morning.    tiZANidine (ZANAFLEX) 4 MG tablet Take 4 mg by mouth 4 (four) times daily as needed (Muscle Spasms).    TRESIBA FLEXTOUCH U-100 100 unit/mL (3 mL) insulin pen Inject 30 Units into the skin once daily. (Patient taking differently: Inject 30 Units into the skin every evening.)    BD ULTRA-FINE SHORT PEN NEEDLE 31 gauge x 5/16" Ndle Inject 1 Needle into the skin once daily.    blood sugar diagnostic (ONETOUCH ULTRA BLUE TEST STRIP) Strp Check " blood glucose readings twice a day-- before meal and 1 to 2 hours post meal    blood-glucose meter Misc Check blood glucose readings twice a day-- before meal and 1 to 2 hours post meal    lancets 33 gauge Misc Check blood glucose readings twice a day-- before meal and 1 to 2 hours post meal    naloxone (NARCAN) 4 mg/actuation Spry 1 spray.    [DISCONTINUED] dextroamphetamine-amphetamine (ADDERALL) 20 mg tablet Take 1 tablet by mouth once daily.    [DISCONTINUED] dextroamphetamine-amphetamine (ADDERALL) 20 mg tablet Take 1 tablet by mouth once daily.    [DISCONTINUED] dextroamphetamine-amphetamine (ADDERALL) 20 mg tablet Take 1 tablet by mouth once daily.    [DISCONTINUED] fentaNYL (DURAGESIC) 50 mcg/hr Place 1 patch onto the skin every 72 hours.    [DISCONTINUED] ketoconazole (NIZORAL) 2 % cream Apply topically every evening.    [DISCONTINUED] ketoconazole (NIZORAL) 2 % cream Apply topically 2 (two) times daily. (Patient not taking: Reported on 2025)    [DISCONTINUED] sulfamethoxazole-trimethoprim 800-160mg (BACTRIM DS) 800-160 mg Tab Take 1 tablet by mouth 2 (two) times daily.    [DISCONTINUED] triamcinolone acetonide 0.025% (KENALOG) 0.025 % cream Apply topically 2 (two) times daily. (Patient not taking: Reported on 2025)     Family History       Problem Relation (Age of Onset)    Crohn's disease Other          Tobacco Use    Smoking status: Former     Current packs/day: 0.00     Types: Cigarettes     Quit date: 2020     Years since quittin.0    Smokeless tobacco: Former    Tobacco comments:     in the 8 th grade   Substance and Sexual Activity    Alcohol use: Yes     Alcohol/week: 4.0 standard drinks of alcohol     Types: 4 Cans of beer per week     Comment: 1 beer , once in 2 weeks    Drug use: No    Sexual activity: Yes     Partners: Female     Comment: Never had pregnant      Review of Systems   Gastrointestinal:  Positive for abdominal pain, nausea and vomiting.     Objective:     Vital  Signs (Most Recent):  Temp: 98.5 °F (36.9 °C) (01/27/25 1408)  Pulse: (!) 123 (01/27/25 1808)  Resp: 16 (01/27/25 1754)  BP: (!) 158/85 (01/27/25 1808)  SpO2: 97 % (01/27/25 1808) Vital Signs (24h Range):  Temp:  [98.5 °F (36.9 °C)] 98.5 °F (36.9 °C)  Pulse:  [] 123  Resp:  [16-18] 16  SpO2:  [96 %-97 %] 97 %  BP: (142-160)/() 158/85     Weight: 80.7 kg (178 lb)  Body mass index is 30.55 kg/m².     Physical Exam  Vitals reviewed.   Constitutional:       General: She is awake. She is in acute distress.      Appearance: Normal appearance. She is ill-appearing.      Comments: NG tube present   HENT:      Head: Normocephalic and atraumatic.      Mouth/Throat:      Mouth: Mucous membranes are moist.      Pharynx: Oropharynx is clear.   Eyes:      Extraocular Movements: Extraocular movements intact.      Pupils: Pupils are equal, round, and reactive to light.   Cardiovascular:      Rate and Rhythm: Normal rate and regular rhythm.      Pulses: Normal pulses.      Heart sounds: Normal heart sounds.   Pulmonary:      Effort: Pulmonary effort is normal.      Breath sounds: Normal breath sounds.   Abdominal:      General: Bowel sounds are decreased.      Palpations: Abdomen is soft.      Tenderness: There is abdominal tenderness. There is guarding.   Musculoskeletal:         General: No swelling. Normal range of motion.      Cervical back: Normal range of motion and neck supple.   Skin:     General: Skin is warm and dry.      Capillary Refill: Capillary refill takes less than 2 seconds.   Neurological:      General: No focal deficit present.      Mental Status: She is alert and oriented to person, place, and time. Mental status is at baseline.   Psychiatric:         Attention and Perception: Attention normal.         Mood and Affect: Affect is tearful (r/t pain).         Behavior: Behavior normal. Behavior is cooperative.         Cognition and Memory: Cognition normal.         Judgment: Judgment normal.           CRANIAL NERVES     CN III, IV, VI   Pupils are equal, round, and reactive to light.     Significant Labs: All pertinent labs within the past 24 hours have been reviewed.  Recent Lab Results         01/27/25  1449   01/27/25  1440        Albumin   4.8       ALP   42       ALT   57       Anion Gap   14       Appearance, UA Clear         AST   78       Bacteria, UA Occasional         Baso #   0.06       Basophil %   0.3       Bilirubin (UA) Negative         BILIRUBIN TOTAL   0.6  Comment: For infants and newborns, interpretation of results should be based  on gestational age, weight and in agreement with clinical  observations.    Premature Infant recommended reference ranges:  Up to 24 hours.............<8.0 mg/dL  Up to 48 hours............<12.0 mg/dL  3-5 days..................<15.0 mg/dL  6-29 days.................<15.0 mg/dL         BUN   10       Calcium   9.8       Chloride   98       CO2   23       Color, UA Yellow         Creatinine   0.9       Differential Method   Automated       eGFR   >60.0       Eos #   0.0       Eos %   0.1       Glucose   250       Glucose, UA 1+         Gran # (ANC)   17.4       Gran %   85.8       Hematocrit   53.0       Hemoglobin   18.1       Hyaline Casts, UA 7         Immature Grans (Abs)   0.13  Comment: Mild elevation in immature granulocytes is non specific and   can be seen in a variety of conditions including stress response,   acute inflammation, trauma and pregnancy. Correlation with other   laboratory and clinical findings is essential.         Immature Granulocytes   0.6       Ketones, UA Trace         Leukocyte Esterase, UA Negative         Lipase   26       Lymph #   2.0       Lymph %   9.8       MCH   29.8       MCHC   34.2       MCV   87       Microscopic Comment SEE COMMENT  Comment: Other formed elements not mentioned in the report are not   present in the microscopic examination.            Mono #   0.7       Mono %   3.4       MPV   9.3       NITRITE UA  Negative         nRBC   0       Blood, UA Negative         pH, UA 7.0         Platelet Count   434       Potassium   3.8       PROTEIN TOTAL   8.3       Protein, UA 3+  Comment: Recommend a 24 hour urine protein or a urine   protein/creatinine ratio if globulin induced proteinuria is  clinically suspected.           RBC   6.07       RBC, UA 6         RDW   12.9       Sodium   135       Spec Grav UA 1.025         Specimen UA Urine, Clean Catch         Squam Epithel, UA 1         UROBILINOGEN UA 2.0-3.0         WBC, UA 2         WBC   20.26               Significant Imaging: I have reviewed all pertinent imaging results/findings within the past 24 hours.    X-Ray Chest AP Portable  Narrative: EXAMINATION:  XR CHEST AP PORTABLE    CLINICAL HISTORY:  Encounter for other specified special examinations    TECHNIQUE:  Single frontal view of the chest was performed.    COMPARISON:  09/19/2022    FINDINGS:  There has been interval placement of esophagogastric tube extending below the diaphragm with tip and distal side port projecting over the stomach within the left abdomen.  Cardiac silhouette is not significantly enlarged.  No large confluent airspace consolidation appreciated throughout the lungs.  No significant volume of pleural fluid or pneumothorax identified.  Osseous structures appear intact.  Impression: As above.    Electronically signed by: Franck Paez MD  Date:    01/27/2025  Time:    18:36  CT Abdomen Pelvis With IV Contrast NO Oral Contrast  Narrative: CMS MANDATED QUALITY DATA - CT RADIATION - 436    All CT scans at this facility utilize dose modulation, iterative reconstruction, and/or weight based dosing when appropriate to reduce radiation dose to as low as reasonably achievable.    CLINICAL HISTORY:  ([id number])58 y/o  (1965) F    Abdominal abscess/infection suspected;Bowel obstruction suspected;    TECHNIQUE:  (A#[id number], exam time 1/27/2025 16:42)    CT ABDOMEN PELVIS WITH IV CONTRAST  [id number]    Axial CT images of the abdomen and pelvis were obtained from the dome of the diaphragm to the proximal thighs.    COMPARISON:  CT from 08/18/2022.    FINDINGS:  Lower Thorax:    The lung bases are clear. The heart is normal in size.    CT Abdomen:    Liver: Relative diffuse low-attenuation liver consistent with hepatic steatosis.  The liver is enlarged measuring 19.5 cm sagittal right lobe.    Gallbladder: The gallbladder is surgically absent.    Biliary Tree: No intra or extrahepatic ductal dilation.    Spleen: Normal.    Pancreas: The pancreas is normal.    Adrenal Glands: Normal    Kidneys: The kidneys are normal in imaging appearance without hydronephrosis or hydroureter.    Vasculature: Scattered calcified mural plaques are seen in the distal abdominal aorta and iliacs with no aneurysm.    Lymph nodes: No abdominal lymphadenopathy is seen.    Intraperitoneal structures: Trace abdominopelvic ascites.    Bowel: Moderate long segment circumferential colonic wall thickening with mucosal hyperemia and submucosal edema and a targetoid pattern in the distal ileum, with mild mesenteric edema and trace fluid compatible with moderate enteritis, there is fecalization of stool with relative transition point in the mid abdomen (image 149) suggesting fecal stasis and possibly fibrostenotic disease, upstream to this point there are prominent gas and fluid-filled loops of small bowel measuring up to 3.5 cm in diameter (image 93) to the level of the duodenum.  There is fatty infiltration at the terminal ileum (image 159) suggesting chronic inflammation.  The appendix is surgically absent.  There is a relative paucity of stool and gas in the colon.    Abdominal wall: The abdominal wall and musculature are normal.    Musculoskeletal: No acute osseous abnormality is identified.    CT Pelvis:    Bladder: Normal.    Reproductive Organs: The uterus is not visualized/surgically absent.    Pelvic Lymph nodes: No pelvic  lymphadenopathy is identified.  Impression: 1.  Low-grade mechanical small bowel obstruction with a transition point at the level of the ileum.    2.  Moderate acute on chronic long segment inflammatory small-bowel changes involving the distal ileum as above suggestive of enteritis (while possibly infectious/inflammatory, the pattern and distribution is most suggestive of Crohn's disease), consider correlation with the symptoms, history and attention on follow-up imaging.    3.  Trace ascites.    4.  Numerous additional, and incidental findings as above.    RESULT NOTIFICATION: These observations were discussed by the dictating physician, by phone with, and acknowledged by Sandro Blanco at 16:51 on 1/27/2025.    Electronically signed by: Hernan Lima  Date:    01/27/2025  Time:    16:52      Assessment/Plan:     * SBO (small bowel obstruction)  NG tube to LIWS  NPO  Surgery to evaluate  Resume patient's fentanyl patch  Start Dilaudid p.r.n. pain control  Patient may need palliative consult for pain control      Dyslipidemia associated with type 2 diabetes mellitus  Resume patient's atorvastatin when taking p.o.      BMI 30.0-30.9,adult  Nutrition consulted. Most recent weight and BMI monitored-   Wt Readings from Last 1 Encounters:   01/27/25 80.7 kg (178 lb)   Body mass index is 30.55 kg/m².. Encourage maximal PO intake. Diet supplementation ordered per nutrition approval. Will encourage PO and monitor closely for weight changes.      Crohn's disease  Noted   NPO      Chronic, continuous use of opioids  Noted  Patient on home fentanyl patch and takes p.r.n. 10 mg 1-2 tablets q.6 hours  Patient may likely need palliative for pain control on DC      Type 2 diabetes mellitus with hyperglycemia  Patient's FSGs are controlled on current medication regimen.  Last A1c reviewed-   Lab Results   Component Value Date    HGBA1C 7.2 (H) 08/06/2024     Most recent fingerstick glucose reviewed- No results for input(s):  ""POCTGLUCOSE" in the last 24 hours.  Current correctional scale  Medium  Maintain anti-hyperglycemic dose as follows-   Antihyperglycemics (From admission, onward)      Start     Stop Route Frequency Ordered    01/27/25 2100  insulin glargine U-100 (Lantus) pen 30 Units         -- SubQ Nightly 01/27/25 1812 01/27/25 1908  insulin aspart U-100 pen 0-10 Units         -- SubQ Before meals & nightly PRN 01/27/25 1812          Hold Oral hypoglycemics while patient is in the hospital.    Essential hypertension  Patient's blood pressure range in the last 24 hours was: BP  Min: 142/89  Max: 160/104.The patient's inpatient anti-hypertensive regimen is listed below:  Current Antihypertensives  hydrALAZINE injection 10 mg, Every 4 hours PRN, Intravenous    Plan  - BP is uncontrolled, will adjust as follows:  Labile blood pressure with patient being in pain attempt to manage pain using hydralazine as needed        VTE Risk Mitigation (From admission, onward)           Ordered     Reason for No Pharmacological VTE Prophylaxis  Once        Question Answer Comment   Reasons: Patient is Ambulatory    Reasons: Risk of Bleeding        01/27/25 1812     IP VTE HIGH RISK PATIENT  Once         01/27/25 1812     Place sequential compression device  Until discontinued         01/27/25 1812                         Opal Arias NP  Department of Hospital Medicine  [facility] - Emergency Dept          "

## 2025-01-28 NOTE — CONSULTS
GENERAL SURGERY  INPATIENT CONSULT    REASON FOR CONSULT: Small-bowel obstruction, Crohn's flare    HPI: Tanesha Chamberlain is a 59 y.o. female with a history of type 2 diabetes, hypertension, hypothyroidism, GERD and Crohn's disease who presented with acute onset of abdominal pain. Patient is on long-term opioid medication for chronic neck pain, used a 75 mg fentanyl patch and Norco 10 -325 mg q.6 hours at baseline, new onset pain was difficult to control.  In the emergency room she had a white count of 20 K. CT abdomen pelvis was obtained showing low-grade mechanical small-bowel obstruction with a transition point at the ileum and concerns for acute on chronic inflammatory changes along the distal ileum and suggestive of Crohn's disease. Admitted to Hospital Medicine and started on antibiotics.  General surgery has been consulted for evaluation. Has previously undergone appendectomy and cholecystectomy. Last endoscopy was in November 2024 at which time diverticulosis was identified however no other abnormality was noted to include no abnormalities noted in the ileum. During previous flares many years ago she required steroids but she has never been on any long-term maintenance therapy with biologics. Denies blood in her stool. Denies any chronic abdominal pain    I have reviewed the patient's chart including prior progress notes, procedures and testing.     ROS:   Review of Systems    PROBLEM LIST:  Patient Active Problem List   Diagnosis    Essential hypertension    Type 2 diabetes mellitus with hyperglycemia    Former smoker    Hormone replacement therapy (HRT)    Chronic, continuous use of opioids    Crohn's disease    History of MRSA infection    History of fatty infiltration of liver    H/O partial thyroidectomy    Chronic pain    Elevated liver enzymes    Wears contact lenses    Lumbar disc herniation with radiculopathy    Mass on back    DDD (degenerative disc disease), lumbosacral    BMI 30.0-30.9,adult     Hyperlipidemia    Attention deficit disorder    Dyslipidemia associated with type 2 diabetes mellitus    Thyroid nodule    SBO (small bowel obstruction)         HISTORY  Past Medical History:   Diagnosis Date    ADHD (attention deficit hyperactivity disorder)     Arthritis     Colon polyp     Crohn's disease     Diabetes mellitus, type 2     GERD (gastroesophageal reflux disease)     Hypertension     Hypothyroidism     Lumbar back pain with radiculopathy affecting left lower extremity     SBO (small bowel obstruction)        Past Surgical History:   Procedure Laterality Date    ANTERIOR CRUCIATE LIGAMENT REPAIR Right     APPENDECTOMY      1988    BUNIONECTOMY Bilateral     CHOLECYSTECTOMY      2005    COLONOSCOPY N/A 11/1/2024    Procedure: COLONOSCOPY(Instructions sent 10/25);  Surgeon: Marguerite Gray MD;  Location: [facility];  Service: Endoscopy;  Laterality: N/A;    ESOPHAGOGASTRODUODENOSCOPY N/A 10/18/2024    Procedure: EGD (ESOPHAGOGASTRODUODENOSCOPY);  Surgeon: Marguerite Gray MD;  Location: [facility];  Service: Endoscopy;  Laterality: N/A;    EXCISION OF MASS OF BACK Left 09/21/2022    Procedure: EXCISION, MASS, BACK;  Surgeon: Sae Collins III, MD;  Location: [facility] OR;  Service: General;  Laterality: Left;    HYSTERECTOMY      INJECTION OF JOINT Right 05/09/2023    Procedure: INJECTION, JOINT, RIGHT PIRIFORMIS AND RIGHT GTB;  Surgeon: Loreta Brito MD;  Location: [facility] PAIN [facility]GT;  Service: Pain Management;  Laterality: Right;    MINIMALLY INVASIVE SURGICAL REMOVAL OF INTERVERTEBRAL DISC OF SPINE USING MICROSCOPE Left 05/11/2021    Procedure: Left L4-5 Far Lateral Disectomy, MIS;  Surgeon: Johnny Cruz MD;  Location: [address];  Service: Neurosurgery;  Laterality: Left;    OOPHORECTOMY      SHOULDER SURGERY Right     SPINE SURGERY      TRANSFORAMINAL EPIDURAL INJECTION OF STEROID Left 03/10/2021    Procedure: INJECTION, STEROID, EPIDURAL, TRANSFORAMINAL APPROACH  L4-5;  Surgeon: Jaylon  KOBY Tyson MD;  Location: [facility] PAIN [facility]GT;  Service: Pain Management;  Laterality: Left;  consent needed  ([facility])    TRANSFORAMINAL EPIDURAL INJECTION OF STEROID Left 2022    Procedure: LUMBAR TRANSFORAMINAL LEFT L4/5 MEDICALLY URGENT;  Surgeon: Loreta Brito MD;  Location: [facility] PAIN T;  Service: Pain Management;  Laterality: Left;    UPPER GASTROINTESTINAL ENDOSCOPY      10 years ago [facility]       Social History     Tobacco Use    Smoking status: Former     Current packs/day: 0.00     Types: Cigarettes     Quit date: 2020     Years since quittin.0    Smokeless tobacco: Former    Tobacco comments:     in the 8 th grade   Substance Use Topics    Alcohol use: Yes     Alcohol/week: 4.0 standard drinks of alcohol     Types: 4 Cans of beer per week     Comment: 1 beer , once in 2 weeks    Drug use: No       Family History   Problem Relation Name Age of Onset    Crohn's disease Other      Cirrhosis Neg Hx      Ulcerative colitis Neg Hx           MEDS:  No current facility-administered medications on file prior to encounter.     Current Outpatient Medications on File Prior to Encounter   Medication Sig Dispense Refill    aspirin (ECOTRIN) 81 MG EC tablet Take 81 mg by mouth once daily.      atorvastatin (LIPITOR) 10 MG tablet TAKE 1 TABLET BY MOUTH EVERY DAY (Patient taking differently: Take 10 mg by mouth once daily.) 90 tablet 0    dulaglutide (TRULICITY) 3 mg/0.5 mL pen injector Inject 3 mg into the skin every 7 days. (Patient taking differently: Inject 3 mg into the skin every 7 days. ) 4 pen 5    estradioL (ESTRACE) 2 MG tablet TAKE 1 TABLET BY MOUTH EVERY DAY (Patient taking differently: Take 2 mg by mouth once daily.) 90 tablet 0    fentaNYL (DURAGESIC) 75 mcg/hr Place 1 patch onto the skin every 72 hours.      hydroCHLOROthiazide (HYDRODIURIL) 25 MG tablet TAKE 1 TABLET BY MOUTH EVERY DAY (Patient taking differently: Take 25 mg by mouth once daily.) 90 tablet 0     "HYDROcodone-acetaminophen (NORCO)  mg per tablet Take 1-2 tablets by mouth every 8 (eight) hours.      omeprazole (PRILOSEC) 40 MG capsule Take 1 capsule (40 mg total) by mouth 2 (two) times daily before meals for 60 days, THEN 1 capsule (40 mg total) every morning. 90 capsule 3    tiZANidine (ZANAFLEX) 4 MG tablet Take 4 mg by mouth 4 (four) times daily as needed (Muscle Spasms).      TRESIBA FLEXTOUCH U-100 100 unit/mL (3 mL) insulin pen Inject 30 Units into the skin once daily. (Patient taking differently: Inject 30 Units into the skin every evening.) 30 mL 0    BD ULTRA-FINE SHORT PEN NEEDLE 31 gauge x 5/16" Ndle Inject 1 Needle into the skin once daily. 100 each 3    blood sugar diagnostic (ONETOUCH ULTRA BLUE TEST STRIP) Strp Check blood glucose readings twice a day-- before meal and 1 to 2 hours post meal 100 strip 0    blood-glucose meter Misc Check blood glucose readings twice a day-- before meal and 1 to 2 hours post meal 1 each 0    lancets 33 gauge Misc Check blood glucose readings twice a day-- before meal and 1 to 2 hours post meal 100 each 0    naloxone (NARCAN) 4 mg/actuation Spry 1 spray.         ALLERGIES:  Review of patient's allergies indicates:   Allergen Reactions    Nsaids (non-steroidal anti-inflammatory drug) Other (See Comments)     GI upset  GI upset      Ibuprofen Other (See Comments)     Other reaction(s): crohns flair up  Other reaction(s): crohns flair up         VITALS:  Temp:  [98.5 °F (36.9 °C)] 98.5 °F (36.9 °C)  Pulse:  [] 86  Resp:  [13-19] 15  SpO2:  [91 %-97 %] 95 %  BP: (109-160)/() 136/79    I/O last 3 completed shifts:  In: 1100 [I.V.:1000; IV Piggyback:100]  Out: -       PHYSICAL EXAM:  Physical Exam  Vitals reviewed.   Constitutional:       General: She is not in acute distress.     Appearance: Normal appearance. She is well-developed.   HENT:      Head: Normocephalic and atraumatic.      Nose: Nose normal.   Eyes:      General: No scleral icterus.     " Conjunctiva/sclera: Conjunctivae normal.   Neck:      Trachea: No tracheal tenderness or tracheal deviation.   Cardiovascular:      Rate and Rhythm: Normal rate and regular rhythm.      Pulses: Normal pulses.   Pulmonary:      Effort: Pulmonary effort is normal. No accessory muscle usage or respiratory distress.      Breath sounds: Normal breath sounds.   Abdominal:      General: There is no distension.      Palpations: Abdomen is soft.      Tenderness: There is abdominal tenderness. There is no guarding or rebound.   Musculoskeletal:         General: No swelling or tenderness. Normal range of motion.      Cervical back: Normal range of motion and neck supple. No rigidity.   Skin:     General: Skin is warm and dry.      Coloration: Skin is not jaundiced.      Findings: No erythema.   Neurological:      General: No focal deficit present.      Mental Status: She is alert and oriented to person, place, and time.      Motor: No weakness or abnormal muscle tone.   Psychiatric:         Mood and Affect: Mood normal.         Behavior: Behavior normal.         Thought Content: Thought content normal.         Judgment: Judgment normal.           LABS:  Lab Results   Component Value Date    WBC 11.19 01/28/2025    RBC 4.89 01/28/2025    HGB 14.7 01/28/2025    HCT 43.2 01/28/2025     01/28/2025     Lab Results   Component Value Date     (H) 01/28/2025     01/28/2025    K 4.3 01/28/2025     01/28/2025    CO2 29 01/28/2025    BUN 11 01/28/2025    CREATININE 0.9 01/28/2025    CALCIUM 8.5 (L) 01/28/2025     Lab Results   Component Value Date    ALT 39 01/28/2025    AST 35 01/28/2025    ALKPHOS 29 (L) 01/28/2025    BILITOT 0.7 01/28/2025     Lab Results   Component Value Date    MG 1.8 01/28/2025    PHOS 3.3 02/28/2011       STUDIES:  Images and reports were personally reviewed.    C scope 11/01/2024  Findings:       Multiple small-mouthed diverticula were found in the sigmoid colon        and descending  colon.        The exam was otherwise without abnormality on direct and        retroflexion views.   Impression:            - Diverticulosis in the sigmoid colon and in the                          descending colon.                          - The examined portion of the ileum was normal.                          - The examination was otherwise normal on direct                          and retroflexion views.                          - No specimens collected.         CT abdomen pelvis 01/28/2025  CT Abdomen:     Liver: Relative diffuse low-attenuation liver consistent with hepatic steatosis.  The liver is enlarged measuring 19.5 cm sagittal right lobe.     Gallbladder: The gallbladder is surgically absent.     Biliary Tree: No intra or extrahepatic ductal dilation.     Spleen: Normal.     Pancreas: The pancreas is normal.     Adrenal Glands: Normal     Kidneys: The kidneys are normal in imaging appearance without hydronephrosis or hydroureter.     Vasculature: Scattered calcified mural plaques are seen in the distal abdominal aorta and iliacs with no aneurysm.     Lymph nodes: No abdominal lymphadenopathy is seen.     Intraperitoneal structures: Trace abdominopelvic ascites.     Bowel: Moderate long segment circumferential colonic wall thickening with mucosal hyperemia and submucosal edema and a targetoid pattern in the distal ileum, with mild mesenteric edema and trace fluid compatible with moderate enteritis, there is fecalization of stool with relative transition point in the mid abdomen (image 149) suggesting fecal stasis and possibly fibrostenotic disease, upstream to this point there are prominent gas and fluid-filled loops of small bowel measuring up to 3.5 cm in diameter (image 93) to the level of the duodenum.  There is fatty infiltration at the terminal ileum (image 159) suggesting chronic inflammation.  The appendix is surgically absent.  There is a relative paucity of stool and gas in the colon.      Abdominal wall: The abdominal wall and musculature are normal.     Musculoskeletal: No acute osseous abnormality is identified.     CT Pelvis:     Bladder: Normal.     Reproductive Organs: The uterus is not visualized/surgically absent.     Pelvic Lymph nodes: No pelvic lymphadenopathy is identified.     Impression:     1.  Low-grade mechanical small bowel obstruction with a transition point at the level of the ileum.     2.  Moderate acute on chronic long segment inflammatory small-bowel changes involving the distal ileum as above suggestive of enteritis (while possibly infectious/inflammatory, the pattern and distribution is most suggestive of Crohn's disease), consider correlation with the symptoms, history and attention on follow-up imaging.     3.  Trace ascites.     4.  Numerous additional, and incidental findings as above      ASSESSMENT & PLAN:  59 y.o. female with suspected Crohn's flare with a associated obstruction  -history and imaging findings suggestive of acute flare of Crohn's disease at the terminal ileum  -recommend GI consult and consideration for medical management with steroids  -if flare improves with steroids would need outpatient follow up for long-term maintenance therapy  -if symptoms fail to improve with steroids may need surgery which would likely involve ileocecectomy  -continue NG tube for now  -pain control maybe somewhat difficult in his patient with chronic pain medication, will consider restarting basically fentanyl patch and add p.r.n. p.o. and IV narcotics as needed

## 2025-01-28 NOTE — CARE UPDATE
Patient is seen and examined at the floor, vitals stable, awake alert, note reviewed, continue NPO, bowel rest, NG tube with intermittent suction, IV hydration.  Follow up General surgery.

## 2025-01-28 NOTE — ASSESSMENT & PLAN NOTE
NG tube to LIWS  NPO  Surgery to evaluate  Resume patient's fentanyl patch  Start Dilaudid p.r.n. pain control  Patient may need palliative consult for pain control

## 2025-01-28 NOTE — SUBJECTIVE & OBJECTIVE
Interval History:   Patient seen and examined.  Patient in no acute distress.  NG tube output 600 cc.  Patient denies flatulence.  General surgery and GI consult pending.  Review of Systems   Constitutional:  Negative for chills and fever.   Cardiovascular:  Negative for chest pain.   Gastrointestinal:  Positive for abdominal pain and nausea. Negative for vomiting.   Musculoskeletal:  Positive for back pain (Chronic).   Neurological: Negative.      Objective:     Vital Signs (Most Recent):  Temp: 98.5 °F (36.9 °C) (01/27/25 1408)  Pulse: 76 (01/28/25 1034)  Resp: 17 (01/28/25 1320)  BP: 126/64 (01/28/25 1034)  SpO2: 96 % (01/28/25 0934) Vital Signs (24h Range):  Temp:  [98.5 °F (36.9 °C)] 98.5 °F (36.9 °C)  Pulse:  [] 76  Resp:  [11-19] 17  SpO2:  [91 %-97 %] 96 %  BP: (109-160)/() 126/64     Weight: 80.7 kg (178 lb)  Body mass index is 30.55 kg/m².    Intake/Output Summary (Last 24 hours) at 1/28/2025 1355  Last data filed at 1/27/2025 2037  Gross per 24 hour   Intake 1100 ml   Output --   Net 1100 ml         Physical Exam  Vitals and nursing note reviewed. Exam conducted with a chaperone present.   Constitutional:       General: She is not in acute distress.     Appearance: Normal appearance.   Cardiovascular:      Rate and Rhythm: Normal rate and regular rhythm.      Pulses: Normal pulses.      Heart sounds: Normal heart sounds.   Pulmonary:      Effort: Pulmonary effort is normal.      Breath sounds: Normal breath sounds.   Abdominal:      General: Bowel sounds are normal. There is no distension.      Palpations: Abdomen is soft.      Tenderness: There is abdominal tenderness. There is no guarding or rebound.   Musculoskeletal:      Right lower leg: No edema.      Left lower leg: No edema.   Skin:     General: Skin is dry.   Neurological:      Mental Status: She is alert and oriented to person, place, and time.             Significant Labs: All pertinent labs within the past 24 hours have been  reviewed.  CBC:   Recent Labs   Lab 01/27/25  1440 01/28/25  0609   WBC 20.26* 11.19   HGB 18.1* 14.7   HCT 53.0* 43.2    359     CMP:   Recent Labs   Lab 01/27/25  1440 01/28/25  0609   * 139   K 3.8 4.3   CL 98 105   CO2 23 29   * 147*   BUN 10 11   CREATININE 0.9 0.9   CALCIUM 9.8 8.5*   PROT 8.3 6.4   ALBUMIN 4.8 3.9   BILITOT 0.6 0.7   ALKPHOS 42* 29*   AST 78* 35   ALT 57* 39   ANIONGAP 14 5*     Magnesium:   Recent Labs   Lab 01/28/25  0609   MG 1.8     POCT Glucose:   Recent Labs   Lab 01/27/25 2054   POCTGLUCOSE 174*       Significant Imaging: I have reviewed all pertinent imaging results/findings within the past 24 hours.  Imaging Results              X-Ray KUB (Final result)  Result time 01/28/25 07:41:55      Final result by Shawn Mora MD (01/28/25 07:41:55)                   Impression:      1. Nonobstructive bowel gas pattern.  2. Nasogastric tube tip is at the distal stomach.      Electronically signed by: Shawn Mora  Date:    01/28/2025  Time:    07:41               Narrative:    EXAMINATION:  XR KUB    CLINICAL HISTORY:  Small-bowel obstruction    COMPARISON:  CT, January 27, 2025    FINDINGS:  Supine view the abdomen demonstrates nasogastric tube in place with tip at the distal stomach.  No pathologic bowel distention is identified.  There is no evidence of mass or suspicious calcification.  Contrast filled urinary bladder is noted.    Postoperative changes are noted at L5-S1.                                       X-Ray Chest AP Portable (Final result)  Result time 01/27/25 18:36:24      Final result by Franck Paez MD (01/27/25 18:36:24)                   Impression:      As above.      Electronically signed by: Franck Paez MD  Date:    01/27/2025  Time:    18:36               Narrative:    EXAMINATION:  XR CHEST AP PORTABLE    CLINICAL HISTORY:  Encounter for other specified special examinations    TECHNIQUE:  Single frontal view of the chest was  performed.    COMPARISON:  09/19/2022    FINDINGS:  There has been interval placement of esophagogastric tube extending below the diaphragm with tip and distal side port projecting over the stomach within the left abdomen.  Cardiac silhouette is not significantly enlarged.  No large confluent airspace consolidation appreciated throughout the lungs.  No significant volume of pleural fluid or pneumothorax identified.  Osseous structures appear intact.                                       CT Abdomen Pelvis With IV Contrast NO Oral Contrast (Final result)  Result time 01/27/25 16:52:57      Final result by Hernan Lima MD (01/27/25 16:52:57)                   Impression:      1.  Low-grade mechanical small bowel obstruction with a transition point at the level of the ileum.    2.  Moderate acute on chronic long segment inflammatory small-bowel changes involving the distal ileum as above suggestive of enteritis (while possibly infectious/inflammatory, the pattern and distribution is most suggestive of Crohn's disease), consider correlation with the symptoms, history and attention on follow-up imaging.    3.  Trace ascites.    4.  Numerous additional, and incidental findings as above.    RESULT NOTIFICATION: These observations were discussed by the dictating physician, by phone with, and acknowledged by Sandro Blanco at 16:51 on 1/27/2025.      Electronically signed by: Hernan Lima  Date:    01/27/2025  Time:    16:52               Narrative:      CMS MANDATED QUALITY DATA - CT RADIATION - 436    All CT scans at this facility utilize dose modulation, iterative reconstruction, and/or weight based dosing when appropriate to reduce radiation dose to as low as reasonably achievable.    CLINICAL HISTORY:  ([id number])60 y/o  (1965) F    Abdominal abscess/infection suspected;Bowel obstruction suspected;    TECHNIQUE:  (A#[id number], exam time 1/27/2025 16:42)    CT ABDOMEN PELVIS WITH IV CONTRAST  [id number]    Axial CT images of the abdomen and pelvis were obtained from the dome of the diaphragm to the proximal thighs.    COMPARISON:  CT from 08/18/2022.    FINDINGS:  Lower Thorax:    The lung bases are clear. The heart is normal in size.    CT Abdomen:    Liver: Relative diffuse low-attenuation liver consistent with hepatic steatosis.  The liver is enlarged measuring 19.5 cm sagittal right lobe.    Gallbladder: The gallbladder is surgically absent.    Biliary Tree: No intra or extrahepatic ductal dilation.    Spleen: Normal.    Pancreas: The pancreas is normal.    Adrenal Glands: Normal    Kidneys: The kidneys are normal in imaging appearance without hydronephrosis or hydroureter.    Vasculature: Scattered calcified mural plaques are seen in the distal abdominal aorta and iliacs with no aneurysm.    Lymph nodes: No abdominal lymphadenopathy is seen.    Intraperitoneal structures: Trace abdominopelvic ascites.    Bowel: Moderate long segment circumferential colonic wall thickening with mucosal hyperemia and submucosal edema and a targetoid pattern in the distal ileum, with mild mesenteric edema and trace fluid compatible with moderate enteritis, there is fecalization of stool with relative transition point in the mid abdomen (image 149) suggesting fecal stasis and possibly fibrostenotic disease, upstream to this point there are prominent gas and fluid-filled loops of small bowel measuring up to 3.5 cm in diameter (image 93) to the level of the duodenum.  There is fatty infiltration at the terminal ileum (image 159) suggesting chronic inflammation.  The appendix is surgically absent.  There is a relative paucity of stool and gas in the colon.    Abdominal wall: The abdominal wall and musculature are normal.    Musculoskeletal: No acute osseous abnormality is identified.    CT Pelvis:    Bladder: Normal.    Reproductive Organs: The uterus is not visualized/surgically absent.    Pelvic Lymph nodes: No pelvic  lymphadenopathy is identified.

## 2025-01-28 NOTE — ASSESSMENT & PLAN NOTE
Noted  Patient on home fentanyl patch and takes p.r.n. 10 mg 1-2 tablets q.6 hours  Patient may likely need palliative for pain control on DC

## 2025-01-28 NOTE — ASSESSMENT & PLAN NOTE
Patient's blood pressure range in the last 24 hours was: BP  Min: 142/89  Max: 160/104.The patient's inpatient anti-hypertensive regimen is listed below:  Current Antihypertensives  hydrALAZINE injection 10 mg, Every 4 hours PRN, Intravenous    Plan  - BP is uncontrolled, will adjust as follows:  Labile blood pressure with patient being in pain attempt to manage pain using hydralazine as needed

## 2025-01-28 NOTE — PLAN OF CARE
[facility] - Emergency Dept  Initial Discharge Assessment       Primary Care Provider: Norberto Ac III, MD    Admission Diagnosis: Small bowel obstruction [K56.609]    Admission Date: 1/27/2025  Expected Discharge Date: TBD     met with the patient and her spouse, Alannah, at  the bedside to complete the initial discharge assessment plan. Demographics, PCP, and other information on the face sheet verified by the patient as being correct.  The patient reported she does not have an Advance Directive. She lives in a 2 story home (her bedroom is on the first floor) with Alannah. She reported still driving, being independent in all ADLs, and not using any DME.  Pharmacy: [facility] [address]. PCP: Norberto Ac MD- last saw 1 month ago. She denies coumadin, HHC, and HD. Alannah will transport home at discharge. The anticipated DC Disposition is home. CM will continue to follow and assess DC needs throughout this hospitalization.      Transition of Care Barriers: None    Payor: [place] [employer] BLUE [facility] / Plan: BCBS ALL OUT OF STATE / Product Type: PPO /     Extended Emergency Contact Information  Primary Emergency Contact: Alannah Chamberlain  Address: [address], [place] [id number] [place] [address]  Home Phone: [phone]  Mobile Phone: [phone]  Relation: Spouse  Preferred language: English   needed? No    Discharge Plan A: Home  Discharge Plan B: Home with family      [id number] - [place] - [address]  [address]  [facility] [address]  Phone: [phone] Fax: [phone]      Initial Assessment (most recent)       Adult Discharge Assessment - 01/28/25 0853          Discharge Assessment    Assessment Type Discharge Planning Assessment     Confirmed/corrected address, phone number and insurance Yes     Confirmed Demographics Correct on Facesheet     Source of Information patient     When was your last doctors appointment? 12/26/24     Does  patient/caregiver understand observation status Yes     Communicated RAJENDRA with patient/caregiver Date not available/Unable to determine     Reason For Admission SBO     Facility Arrived From: Home     Do you expect to return to your current living situation? Yes     Do you have help at home or someone to help you manage your care at home? Yes     Who are your caregiver(s) and their phone number(s)? Alannah (spouse) [phone]     Prior to hospitilization cognitive status: Alert/Oriented     Current cognitive status: Alert/Oriented     Walking or Climbing Stairs Difficulty no     Dressing/Bathing Difficulty no     Home Accessibility wheelchair accessible     Home Layout Able to live on 1st floor     Equipment Currently Used at Home none     Readmission within 30 days? No     Patient currently being followed by outpatient case management? No     Do you currently have service(s) that help you manage your care at home? No     Do you take prescription medications? Yes     Do you have prescription coverage? Yes     Do you have any problems affording any of your prescribed medications? No     Who is going to help you get home at discharge? Alannah     How do you get to doctors appointments? car, drives self     Are you on dialysis? No     Do you take coumadin? No     Discharge Plan A Home     Discharge Plan B Home with family     DME Needed Upon Discharge  none     Discharge Plan discussed with: Patient     Transition of Care Barriers None

## 2025-01-28 NOTE — ASSESSMENT & PLAN NOTE
Patient's blood pressure range in the last 24 hours was: BP  Min: 109/69  Max: 160/72.The patient's inpatient anti-hypertensive regimen is listed below:  Current Antihypertensives  hydrALAZINE injection 10 mg, Every 4 hours PRN, Intravenous    Plan  - BP is uncontrolled, will adjust as follows:  Labile blood pressure with patient being in pain attempt to manage pain using hydralazine as needed

## 2025-01-28 NOTE — ASSESSMENT & PLAN NOTE
Nutrition consulted. Most recent weight and BMI monitored-   Wt Readings from Last 1 Encounters:   01/27/25 80.7 kg (178 lb)   Body mass index is 30.55 kg/m².. Encourage maximal PO intake. Diet supplementation ordered per nutrition approval. Will encourage PO and monitor closely for weight changes.

## 2025-01-28 NOTE — PROGRESS NOTES
[facility] Medicine  Progress Note    Patient Name: Tanesha Chamberlain  MRN: [id number]  Patient Class: IP- Inpatient   Admission Date: 1/27/2025  Length of Stay: 1 days  Attending Physician: Edy Guadarrama MD  Primary Care Provider: Norberto Ac III, MD        Subjective     Principal Problem:SBO (small bowel obstruction)        HPI:  59-year-old female with pMHx of Crohn's, HTN, hypothyroidism, T2 DM, and GERD who presented to the ED with complaints of abdominal pain that began last night.  Patient is with a history of appendectomy and cholecystectomy with multiple adhesions she believed that abdominal pain was related to past surgeries and/or her Crohn's.  EMS gave patient EN route 100 of fentanyl and 8 mg of Zofran.  ED MD spoke with surgery to come and evaluate patient.  Patient be NPO.  Patient with a long term history of opioid intake.  May need to consider palliative for pain control prior to DC.    Triage vitals with pulse 98, /104, temp 98.5°, SpO2 97% on room air On evaluation in the ED blood work with WBC 20 point 2, hemoglobin 18.1, hematocrit 53, sodium 135, glucose 250, alk phos 42, AST 78, ALT 57.  CTA abdomen and pelvis with IV contrast with low-grade mechanical small-bowel obstruction with a transition point at the level of the ileum.  Moderate acute on chronic long segment inflammatory small bowel changes involving distal ileum as above suggestive of enteritis consider correlation with the symptomology.  Trace ascites and numerous incidental findings.    Overview/Hospital Course:  Tanesha Chamberlain was closely monitored while in the hospital.  Patient was admitted to Hospital Medicine for small-bowel obstruction. CTA abdomen and pelvis with IV contrast with low-grade mechanical small-bowel obstruction with a transition point at the level of the ileum. Moderate acute on chronic long segment inflammatory small bowel changes involving distal ileum as above suggestive  of enteritis consider correlation with the symptomology. Trace ascites and numerous incidental findings.  He NG tube was placed for gastric decompression.  There is suspicion for Crohn's flare-up.  General surgery and GI consulted.  Patient follows outpatient with pain management for chronic back pain.     Interval History:   Patient seen and examined.  Patient in no acute distress.  NG tube output 600 cc.  Patient denies flatulence.  General surgery and GI consult pending.  Review of Systems   Constitutional:  Negative for chills and fever.   Cardiovascular:  Negative for chest pain.   Gastrointestinal:  Positive for abdominal pain and nausea. Negative for vomiting.   Musculoskeletal:  Positive for back pain (Chronic).   Neurological: Negative.      Objective:     Vital Signs (Most Recent):  Temp: 98.5 °F (36.9 °C) (01/27/25 1408)  Pulse: 76 (01/28/25 1034)  Resp: 17 (01/28/25 1320)  BP: 126/64 (01/28/25 1034)  SpO2: 96 % (01/28/25 0934) Vital Signs (24h Range):  Temp:  [98.5 °F (36.9 °C)] 98.5 °F (36.9 °C)  Pulse:  [] 76  Resp:  [11-19] 17  SpO2:  [91 %-97 %] 96 %  BP: (109-160)/() 126/64     Weight: 80.7 kg (178 lb)  Body mass index is 30.55 kg/m².    Intake/Output Summary (Last 24 hours) at 1/28/2025 1355  Last data filed at 1/27/2025 2037  Gross per 24 hour   Intake 1100 ml   Output --   Net 1100 ml         Physical Exam  Vitals and nursing note reviewed. Exam conducted with a chaperone present.   Constitutional:       General: She is not in acute distress.     Appearance: Normal appearance.   Cardiovascular:      Rate and Rhythm: Normal rate and regular rhythm.      Pulses: Normal pulses.      Heart sounds: Normal heart sounds.   Pulmonary:      Effort: Pulmonary effort is normal.      Breath sounds: Normal breath sounds.   Abdominal:      General: Bowel sounds are normal. There is no distension.      Palpations: Abdomen is soft.      Tenderness: There is abdominal tenderness. There is no guarding  or rebound.   Musculoskeletal:      Right lower leg: No edema.      Left lower leg: No edema.   Skin:     General: Skin is dry.   Neurological:      Mental Status: She is alert and oriented to person, place, and time.             Significant Labs: All pertinent labs within the past 24 hours have been reviewed.  CBC:   Recent Labs   Lab 01/27/25  1440 01/28/25  0609   WBC 20.26* 11.19   HGB 18.1* 14.7   HCT 53.0* 43.2    359     CMP:   Recent Labs   Lab 01/27/25  1440 01/28/25  0609   * 139   K 3.8 4.3   CL 98 105   CO2 23 29   * 147*   BUN 10 11   CREATININE 0.9 0.9   CALCIUM 9.8 8.5*   PROT 8.3 6.4   ALBUMIN 4.8 3.9   BILITOT 0.6 0.7   ALKPHOS 42* 29*   AST 78* 35   ALT 57* 39   ANIONGAP 14 5*     Magnesium:   Recent Labs   Lab 01/28/25  0609   MG 1.8     POCT Glucose:   Recent Labs   Lab 01/27/25 2054   POCTGLUCOSE 174*       Significant Imaging: I have reviewed all pertinent imaging results/findings within the past 24 hours.  Imaging Results              X-Ray KUB (Final result)  Result time 01/28/25 07:41:55      Final result by Shawn Mora MD (01/28/25 07:41:55)                   Impression:      1. Nonobstructive bowel gas pattern.  2. Nasogastric tube tip is at the distal stomach.      Electronically signed by: Shawn Mora  Date:    01/28/2025  Time:    07:41               Narrative:    EXAMINATION:  XR KUB    CLINICAL HISTORY:  Small-bowel obstruction    COMPARISON:  CT, January 27, 2025    FINDINGS:  Supine view the abdomen demonstrates nasogastric tube in place with tip at the distal stomach.  No pathologic bowel distention is identified.  There is no evidence of mass or suspicious calcification.  Contrast filled urinary bladder is noted.    Postoperative changes are noted at L5-S1.                                       X-Ray Chest AP Portable (Final result)  Result time 01/27/25 18:36:24      Final result by Franck Paez MD (01/27/25 18:36:24)                    Impression:      As above.      Electronically signed by: Franck Paez MD  Date:    01/27/2025  Time:    18:36               Narrative:    EXAMINATION:  XR CHEST AP PORTABLE    CLINICAL HISTORY:  Encounter for other specified special examinations    TECHNIQUE:  Single frontal view of the chest was performed.    COMPARISON:  09/19/2022    FINDINGS:  There has been interval placement of esophagogastric tube extending below the diaphragm with tip and distal side port projecting over the stomach within the left abdomen.  Cardiac silhouette is not significantly enlarged.  No large confluent airspace consolidation appreciated throughout the lungs.  No significant volume of pleural fluid or pneumothorax identified.  Osseous structures appear intact.                                       CT Abdomen Pelvis With IV Contrast NO Oral Contrast (Final result)  Result time 01/27/25 16:52:57      Final result by Hernan Lima MD (01/27/25 16:52:57)                   Impression:      1.  Low-grade mechanical small bowel obstruction with a transition point at the level of the ileum.    2.  Moderate acute on chronic long segment inflammatory small-bowel changes involving the distal ileum as above suggestive of enteritis (while possibly infectious/inflammatory, the pattern and distribution is most suggestive of Crohn's disease), consider correlation with the symptoms, history and attention on follow-up imaging.    3.  Trace ascites.    4.  Numerous additional, and incidental findings as above.    RESULT NOTIFICATION: These observations were discussed by the dictating physician, by phone with, and acknowledged by Sandro Blanco at 16:51 on 1/27/2025.      Electronically signed by: Hernan Lima  Date:    01/27/2025  Time:    16:52               Narrative:      CMS MANDATED QUALITY DATA - CT RADIATION - 436    All CT scans at this facility utilize dose modulation, iterative reconstruction, and/or weight based dosing when  appropriate to reduce radiation dose to as low as reasonably achievable.    CLINICAL HISTORY:  ([id number])60 y/o  (1965) F    Abdominal abscess/infection suspected;Bowel obstruction suspected;    TECHNIQUE:  (A#[id number], exam time 1/27/2025 16:42)    CT ABDOMEN PELVIS WITH IV CONTRAST [id number]    Axial CT images of the abdomen and pelvis were obtained from the dome of the diaphragm to the proximal thighs.    COMPARISON:  CT from 08/18/2022.    FINDINGS:  Lower Thorax:    The lung bases are clear. The heart is normal in size.    CT Abdomen:    Liver: Relative diffuse low-attenuation liver consistent with hepatic steatosis.  The liver is enlarged measuring 19.5 cm sagittal right lobe.    Gallbladder: The gallbladder is surgically absent.    Biliary Tree: No intra or extrahepatic ductal dilation.    Spleen: Normal.    Pancreas: The pancreas is normal.    Adrenal Glands: Normal    Kidneys: The kidneys are normal in imaging appearance without hydronephrosis or hydroureter.    Vasculature: Scattered calcified mural plaques are seen in the distal abdominal aorta and iliacs with no aneurysm.    Lymph nodes: No abdominal lymphadenopathy is seen.    Intraperitoneal structures: Trace abdominopelvic ascites.    Bowel: Moderate long segment circumferential colonic wall thickening with mucosal hyperemia and submucosal edema and a targetoid pattern in the distal ileum, with mild mesenteric edema and trace fluid compatible with moderate enteritis, there is fecalization of stool with relative transition point in the mid abdomen (image 149) suggesting fecal stasis and possibly fibrostenotic disease, upstream to this point there are prominent gas and fluid-filled loops of small bowel measuring up to 3.5 cm in diameter (image 93) to the level of the duodenum.  There is fatty infiltration at the terminal ileum (image 159) suggesting chronic inflammation.  The appendix is surgically absent.  There is a relative paucity of stool and  gas in the colon.    Abdominal wall: The abdominal wall and musculature are normal.    Musculoskeletal: No acute osseous abnormality is identified.    CT Pelvis:    Bladder: Normal.    Reproductive Organs: The uterus is not visualized/surgically absent.    Pelvic Lymph nodes: No pelvic lymphadenopathy is identified.                                       Assessment and Plan     * SBO (small bowel obstruction)  NG tube to LIWS  NPO  Surgery to evaluate  Resume patient's fentanyl patch  Start Dilaudid p.r.n. pain control  Patient may need palliative consult for pain control      Dyslipidemia associated with type 2 diabetes mellitus  Chronic.  Noted.  Resume patient's atorvastatin when able to tolerate p.o.      BMI 30.0-30.9,adult  Nutrition consulted. Most recent weight and BMI monitored-   Wt Readings from Last 1 Encounters:   01/27/25 80.7 kg (178 lb)   Body mass index is 30.55 kg/m²..     Encourage maximal PO intake when clinically appropriate. Diet supplementation ordered per nutrition approval. Will encourage PO and monitor closely for weight changes.      DDD (degenerative disc disease), lumbosacral  Chronic.  Noted.      Chronic pain  Follows outpatient with pain management per patient  P.r.n. analgesics  Resume fentanyl patch      Crohn's disease  Noted   NPO  GI consulted for possible Crohn's flare-up      Chronic, continuous use of opioids  Noted  Patient on home fentanyl patch and takes p.r.n. 10 mg 1-2 tablets q.6 hours  Patient may likely need palliative for pain control on DC      Type 2 diabetes mellitus with hyperglycemia  Patient's FSGs are controlled on current medication regimen.  Last A1c reviewed-   Lab Results   Component Value Date    HGBA1C 7.2 (H) 08/06/2024     Most recent fingerstick glucose reviewed-   Recent Labs   Lab 01/27/25 2054   POCTGLUCOSE 174*     Current correctional scale  Medium  Maintain anti-hyperglycemic dose as follows-   Antihyperglycemics (From admission, onward)       Start     Stop Route Frequency Ordered    01/27/25 1908  insulin aspart U-100 pen 0-10 Units         -- SubQ Before meals & nightly PRN 01/27/25 1812          Hold Oral hypoglycemics while patient is in the hospital.    Essential hypertension  Patient's blood pressure range in the last 24 hours was: BP  Min: 109/69  Max: 160/72.The patient's inpatient anti-hypertensive regimen is listed below:  Current Antihypertensives  hydrALAZINE injection 10 mg, Every 4 hours PRN, Intravenous    Plan  - BP is uncontrolled, will adjust as follows:  Labile blood pressure with patient being in pain attempt to manage pain using hydralazine as needed        VTE Risk Mitigation (From admission, onward)           Ordered     enoxaparin injection 40 mg  Every 24 hours (non-standard times)         01/27/25 2150     Reason for No Pharmacological VTE Prophylaxis  Once        Question Answer Comment   Reasons: Patient is Ambulatory    Reasons: Risk of Bleeding        01/27/25 1812     IP VTE HIGH RISK PATIENT  Once         01/27/25 1812     Place sequential compression device  Until discontinued         01/27/25 1812                    Discharge Planning   RAJENDRA: 1/31/2025     Code Status: Full Code   Medical Readiness for Discharge Date:   Discharge Plan A: Home                        Ellis Corrales NP  Department of Hospital Medicine   [facility]/Surg

## 2025-01-28 NOTE — ED NOTES
Comfort measures done for pt & family. Room cleaned. Containers emptied. Informed of wait for in pt room assignment.

## 2025-01-28 NOTE — ED PROVIDER NOTES
Encounter Date: 1/27/2025       History     Chief Complaint   Patient presents with    Abdominal Pain     Abd pain since last night. Given 100mcg fentanyl and 8mg zofran ems.      Patient presents complaining of abdominal pain and discomfort that started last night.  Patient has a history of appendectomy, Crohn's disease, cholecystectomy.  Abdominal pain came on abruptly with associated nausea and vomiting.  At the worst symptoms are moderate.          Review of patient's allergies indicates:   Allergen Reactions    Nsaids (non-steroidal anti-inflammatory drug) Other (See Comments)     GI upset  GI upset      Ibuprofen Other (See Comments)     Other reaction(s): crohns flair up  Other reaction(s): crohns flair up     Past Medical History:   Diagnosis Date    ADHD (attention deficit hyperactivity disorder)     Arthritis     Colon polyp     Crohn's disease     Diabetes mellitus, type 2     GERD (gastroesophageal reflux disease)     Hypertension     Hypothyroidism     Lumbar back pain with radiculopathy affecting left lower extremity     SBO (small bowel obstruction)      Past Surgical History:   Procedure Laterality Date    ANTERIOR CRUCIATE LIGAMENT REPAIR Right     APPENDECTOMY      1988    BUNIONECTOMY Bilateral     CHOLECYSTECTOMY      2005    COLONOSCOPY N/A 11/1/2024    Procedure: COLONOSCOPY(Instructions sent 10/25);  Surgeon: Marguerite Gray MD;  Location: [facility];  Service: Endoscopy;  Laterality: N/A;    ESOPHAGOGASTRODUODENOSCOPY N/A 10/18/2024    Procedure: EGD (ESOPHAGOGASTRODUODENOSCOPY);  Surgeon: Marguerite Gray MD;  Location: [facility];  Service: Endoscopy;  Laterality: N/A;    EXCISION OF MASS OF BACK Left 09/21/2022    Procedure: EXCISION, MASS, BACK;  Surgeon: Sae Collins III, MD;  Location: [facility] OR;  Service: General;  Laterality: Left;    HYSTERECTOMY      INJECTION OF JOINT Right 05/09/2023    Procedure: INJECTION, JOINT, RIGHT PIRIFORMIS AND RIGHT GTB;  Surgeon: Loreta ENNIS  MD Daryl;  Location: [facility];  Service: Pain Management;  Laterality: Right;    MINIMALLY INVASIVE SURGICAL REMOVAL OF INTERVERTEBRAL DISC OF SPINE USING MICROSCOPE Left 2021    Procedure: Left L4-5 Far Lateral Disectomy, MIS;  Surgeon: Johnny Cruz MD;  Location: [facility] OR [address] [place] [address];  Service: Neurosurgery;  Laterality: Left;    OOPHORECTOMY      SHOULDER SURGERY Right     SPINE SURGERY      TRANSFORAMINAL EPIDURAL INJECTION OF STEROID Left 03/10/2021    Procedure: INJECTION, STEROID, EPIDURAL, TRANSFORAMINAL APPROACH  L4-5;  Surgeon: Jaylon Tyson MD;  Location: [facility];  Service: Pain Management;  Laterality: Left;  consent needed  ([facility])    TRANSFORAMINAL EPIDURAL INJECTION OF STEROID Left 2022    Procedure: LUMBAR TRANSFORAMINAL LEFT L4/5 MEDICALLY URGENT;  Surgeon: Loreta Brito MD;  Location: [facility];  Service: Pain Management;  Laterality: Left;    UPPER GASTROINTESTINAL ENDOSCOPY      10 years ago [facility]     Family History   Problem Relation Name Age of Onset    Crohn's disease Other      Cirrhosis Neg Hx      Ulcerative colitis Neg Hx       Social History     Tobacco Use    Smoking status: Former     Current packs/day: 0.00     Types: Cigarettes     Quit date: 2020     Years since quittin.0    Smokeless tobacco: Former    Tobacco comments:     in the 8 th grade   Substance Use Topics    Alcohol use: Yes     Alcohol/week: 4.0 standard drinks of alcohol     Types: 4 Cans of beer per week     Comment: 1 beer , once in 2 weeks    Drug use: No     Review of Systems   All other systems reviewed and are negative.      Physical Exam     Initial Vitals [25 1408]   BP Pulse Resp Temp SpO2   (!) 160/104 98 18 98.5 °F (36.9 °C) 97 %      MAP       --         Physical Exam    Nursing note and vitals reviewed.  Constitutional: She appears well-developed and well-nourished.   Patient actively vomiting when I walk in the room    HENT:   Head: Normocephalic  and atraumatic.   Eyes: EOM are normal.   Neck: Neck supple.   Normal range of motion.  Cardiovascular:  Intact distal pulses.           Pulmonary/Chest: No respiratory distress.   Abdominal: Abdomen is soft. She exhibits distension.   Musculoskeletal:      Cervical back: Normal range of motion and neck supple.     Neurological: She is alert and oriented to person, place, and time.   Skin: Skin is warm and dry. Capillary refill takes less than 2 seconds.   Psychiatric: She has a normal mood and affect. Her behavior is normal. Judgment and thought content normal.         ED Course   Procedures  Labs Reviewed   CBC W/ AUTO DIFFERENTIAL - Abnormal       Result Value    WBC 20.26 (*)     RBC 6.07 (*)     Hemoglobin 18.1 (*)     Hematocrit 53.0 (*)     MCV 87      MCH 29.8      MCHC 34.2      RDW 12.9      Platelets 434      MPV 9.3      Immature Granulocytes 0.6 (*)     Gran # (ANC) 17.4 (*)     Immature Grans (Abs) 0.13 (*)     Lymph # 2.0      Mono # 0.7      Eos # 0.0      Baso # 0.06      nRBC 0      Gran % 85.8 (*)     Lymph % 9.8 (*)     Mono % 3.4 (*)     Eosinophil % 0.1      Basophil % 0.3      Differential Method Automated      Narrative:     Release to patient->Immediate   COMPREHENSIVE METABOLIC PANEL - Abnormal    Sodium 135 (*)     Potassium 3.8      Chloride 98      CO2 23      Glucose 250 (*)     BUN 10      Creatinine 0.9      Calcium 9.8      Total Protein 8.3      Albumin 4.8      Total Bilirubin 0.6      Alkaline Phosphatase 42 (*)     AST 78 (*)     ALT 57 (*)     eGFR >60.0      Anion Gap 14      Narrative:     Release to patient->Immediate   URINALYSIS, REFLEX TO URINE CULTURE - Abnormal    Specimen UA Urine, Clean Catch      Color, UA Yellow      Appearance, UA Clear      pH, UA 7.0      Specific Gravity, UA 1.025      Protein, UA 3+ (*)     Glucose, UA 1+ (*)     Ketones, UA Trace (*)     Bilirubin (UA) Negative      Occult Blood UA Negative      Nitrite, UA Negative      Urobilinogen, UA  2.0-3.0 (*)     Leukocytes, UA Negative      Narrative:     In and Out Cath as needed it patient unable to void  Specimen Source->Urine   URINALYSIS MICROSCOPIC - Abnormal    RBC, UA 6 (*)     WBC, UA 2      Bacteria Occasional      Squam Epithel, UA 1      Hyaline Casts, UA 7 (*)     Microscopic Comment SEE COMMENT      Narrative:     In and Out Cath as needed it patient unable to void  Specimen Source->Urine   LIPASE    Lipase 26      Narrative:     Release to patient->Immediate   HEPATITIS C ANTIBODY   HIV 1 / 2 ANTIBODY   POCT GLUCOSE MONITORING CONTINUOUS          Imaging Results              X-Ray Chest AP Portable (In process)                      CT Abdomen Pelvis With IV Contrast NO Oral Contrast (Final result)  Result time 01/27/25 16:52:57      Final result by Hernan Lima MD (01/27/25 16:52:57)                   Impression:      1.  Low-grade mechanical small bowel obstruction with a transition point at the level of the ileum.    2.  Moderate acute on chronic long segment inflammatory small-bowel changes involving the distal ileum as above suggestive of enteritis (while possibly infectious/inflammatory, the pattern and distribution is most suggestive of Crohn's disease), consider correlation with the symptoms, history and attention on follow-up imaging.    3.  Trace ascites.    4.  Numerous additional, and incidental findings as above.    RESULT NOTIFICATION: These observations were discussed by the dictating physician, by phone with, and acknowledged by Sandro Blanco at 16:51 on 1/27/2025.      Electronically signed by: Hernan Lima  Date:    01/27/2025  Time:    16:52               Narrative:      CMS MANDATED QUALITY DATA - CT RADIATION - 436    All CT scans at this facility utilize dose modulation, iterative reconstruction, and/or weight based dosing when appropriate to reduce radiation dose to as low as reasonably achievable.    CLINICAL HISTORY:  ([id number])58 y/o  (1965)  F    Abdominal abscess/infection suspected;Bowel obstruction suspected;    TECHNIQUE:  (A#[id number], exam time 1/27/2025 16:42)    CT ABDOMEN PELVIS WITH IV CONTRAST [id number]    Axial CT images of the abdomen and pelvis were obtained from the dome of the diaphragm to the proximal thighs.    COMPARISON:  CT from 08/18/2022.    FINDINGS:  Lower Thorax:    The lung bases are clear. The heart is normal in size.    CT Abdomen:    Liver: Relative diffuse low-attenuation liver consistent with hepatic steatosis.  The liver is enlarged measuring 19.5 cm sagittal right lobe.    Gallbladder: The gallbladder is surgically absent.    Biliary Tree: No intra or extrahepatic ductal dilation.    Spleen: Normal.    Pancreas: The pancreas is normal.    Adrenal Glands: Normal    Kidneys: The kidneys are normal in imaging appearance without hydronephrosis or hydroureter.    Vasculature: Scattered calcified mural plaques are seen in the distal abdominal aorta and iliacs with no aneurysm.    Lymph nodes: No abdominal lymphadenopathy is seen.    Intraperitoneal structures: Trace abdominopelvic ascites.    Bowel: Moderate long segment circumferential colonic wall thickening with mucosal hyperemia and submucosal edema and a targetoid pattern in the distal ileum, with mild mesenteric edema and trace fluid compatible with moderate enteritis, there is fecalization of stool with relative transition point in the mid abdomen (image 149) suggesting fecal stasis and possibly fibrostenotic disease, upstream to this point there are prominent gas and fluid-filled loops of small bowel measuring up to 3.5 cm in diameter (image 93) to the level of the duodenum.  There is fatty infiltration at the terminal ileum (image 159) suggesting chronic inflammation.  The appendix is surgically absent.  There is a relative paucity of stool and gas in the colon.    Abdominal wall: The abdominal wall and musculature are normal.    Musculoskeletal: No acute osseous  abnormality is identified.    CT Pelvis:    Bladder: Normal.    Reproductive Organs: The uterus is not visualized/surgically absent.    Pelvic Lymph nodes: No pelvic lymphadenopathy is identified.                                       Medications   aspirin EC tablet 81 mg (has no administration in time range)   atorvastatin tablet 10 mg (has no administration in time range)   fentaNYL 75 mcg/hr 1 patch (has no administration in time range)   HYDROcodone-acetaminophen  mg per tablet 1 tablet (has no administration in time range)   insulin glargine U-100 (Lantus) pen 30 Units (has no administration in time range)   tiZANidine tablet 4 mg (has no administration in time range)   sodium chloride 0.9% flush 2 mL (has no administration in time range)   melatonin tablet 6 mg (has no administration in time range)   ondansetron injection 4 mg (has no administration in time range)   acetaminophen tablet 650 mg (has no administration in time range)   aluminum-magnesium hydroxide-simethicone 200-200-20 mg/5 mL suspension 30 mL (has no administration in time range)   acetaminophen tablet 650 mg (has no administration in time range)   naloxone 0.4 mg/mL injection 0.02 mg (has no administration in time range)   potassium bicarbonate disintegrating tablet 50 mEq (has no administration in time range)   potassium bicarbonate disintegrating tablet 35 mEq (has no administration in time range)   potassium bicarbonate disintegrating tablet 60 mEq (has no administration in time range)   magnesium oxide tablet 800 mg (has no administration in time range)   magnesium oxide tablet 800 mg (has no administration in time range)   potassium, sodium phosphates 280-160-250 mg packet 2 packet (has no administration in time range)   potassium, sodium phosphates 280-160-250 mg packet 2 packet (has no administration in time range)   potassium, sodium phosphates 280-160-250 mg packet 2 packet (has no administration in time range)   glucose  chewable tablet 16 g (has no administration in time range)   glucose chewable tablet 24 g (has no administration in time range)   dextrose 50% injection 12.5 g (has no administration in time range)   dextrose 50% injection 25 g (has no administration in time range)   glucagon (human recombinant) injection 1 mg (has no administration in time range)   0.9% NaCl infusion (has no administration in time range)   insulin aspart U-100 pen 0-10 Units (has no administration in time range)   ondansetron injection 4 mg (4 mg Intravenous Given 1/27/25 1532)   morphine injection 4 mg (4 mg Intravenous Given 1/27/25 1533)   0.9% NaCl infusion (0 mLs Intravenous Stopped 1/27/25 1749)   iohexoL (OMNIPAQUE 350) injection 100 mL (100 mLs Intravenous Given 1/27/25 1636)   piperacillin-tazobactam (ZOSYN) 4.5 g in D5W 100 mL IVPB (MB+) (4.5 g Intravenous New Bag 1/27/25 1755)   morphine injection 4 mg (4 mg Intravenous Given 1/27/25 1754)   ondansetron injection 4 mg (4 mg Intravenous Given 1/27/25 1754)     Medical Decision Making  Patient appears uncomfortable and is vomiting when I walk in the room    Considerations include small bowel obstruction, acute kidney injury, dehydration, electrolyte abnormalities, colitis    MDM    Patient presents for emergent evaluation of acute abdominal pain that poses a possible threat to life and/or bodily function.    In the ED patient found to have acute small bowel obstruction with colitis.     Amount and/or complexity of data reviewed  I ordered labs and personally reviewed them.  Labs significant for elevated white blood cell count 20,000.      I ordered CT scan and personally reviewed it and reviewed the radiologist interpretation.  CT significant for small bowel obstruction is present with associated colitis.      I did review prior medical records.    Social determinants of health - none    Admission MDM and Risk  I discussed the patient presentation labs, ekg, X-rays, CT findings with the  consultant(s) Dr. Aviles who agrees with NG tube and we will see the patient   Patient was managed in the ED with IV Zosyn, morphine, Zofran, fluids.  NG tube   The response to treatment was improved.    Shared decision-making with the patient regarding diagnosis, treatment, and plan was well received.    Patient required emergent consultation to hospitalist for admission.    Amount and/or Complexity of Data Reviewed  Radiology: ordered.    Risk  Prescription drug management.  Decision regarding hospitalization.                                      Clinical Impression:  Final diagnoses:  [K56.609] Small bowel obstruction (Primary)  [K52.9] Colitis  [Z01.89] Encounter for imaging study to confirm nasogastric (NG) tube placement          ED Disposition Condition    Admit Stable                Sandro Blanco MD  01/27/25 3547

## 2025-01-28 NOTE — HPI
59-year-old female with pMHx of Crohn's, HTN, hypothyroidism, T2 DM, and GERD who presented to the ED with complaints of abdominal pain that began last night.  Patient is with a history of appendectomy and cholecystectomy with multiple adhesions she believed that abdominal pain was related to past surgeries and/or her Crohn's.  EMS gave patient EN route 100 of fentanyl and 8 mg of Zofran.  ED MD spoke with surgery to come and evaluate patient.  Patient be NPO.  Patient with a long term history of opioid intake.  May need to consider palliative for pain control prior to DC.    Triage vitals with pulse 98, /104, temp 98.5°, SpO2 97% on room air On evaluation in the ED blood work with WBC 20 point 2, hemoglobin 18.1, hematocrit 53, sodium 135, glucose 250, alk phos 42, AST 78, ALT 57.  CTA abdomen and pelvis with IV contrast with low-grade mechanical small-bowel obstruction with a transition point at the level of the ileum.  Moderate acute on chronic long segment inflammatory small bowel changes involving distal ileum as above suggestive of enteritis consider correlation with the symptomology.  Trace ascites and numerous incidental findings.

## 2025-01-28 NOTE — HOSPITAL COURSE
Tanesha Chamberlain was closely monitored while in the hospital.  Patient was admitted to Hospital Medicine for small-bowel obstruction. CTA abdomen and pelvis with IV contrast with low-grade mechanical small-bowel obstruction with a transition point at the level of the ileum. Moderate acute on chronic long segment inflammatory small bowel changes involving distal ileum as above suggestive of enteritis consider correlation with the symptomology. Trace ascites and numerous incidental findings.  He NG tube was placed for gastric decompression.  There is suspicion for Crohn's flare-up.  General surgery and GI consulted.  Patient follows outpatient with pain management for chronic back pain.  GI did not recommend steroids. Surgery clamped NGT and ordered clear liquid diet. Patient tolerated this, had a bowel movement and passed flatus.  She tolerated an advance in her diet, and was discharged home in stable condition.

## 2025-01-29 LAB
ALBUMIN SERPL BCP-MCNC: 3.2 G/DL (ref 3.5–5.2)
ALP SERPL-CCNC: 31 U/L (ref 40–150)
ALT SERPL W/O P-5'-P-CCNC: 37 U/L (ref 10–44)
ANION GAP SERPL CALC-SCNC: 9 MMOL/L (ref 8–16)
AST SERPL-CCNC: 32 U/L (ref 10–40)
BASOPHILS # BLD AUTO: 0.07 K/UL (ref 0–0.2)
BASOPHILS NFR BLD: 0.6 % (ref 0–1.9)
BILIRUB SERPL-MCNC: 0.5 MG/DL (ref 0.1–1)
BUN SERPL-MCNC: 11 MG/DL (ref 6–20)
CALCIUM SERPL-MCNC: 8.3 MG/DL (ref 8.7–10.5)
CHLORIDE SERPL-SCNC: 110 MMOL/L (ref 95–110)
CO2 SERPL-SCNC: 24 MMOL/L (ref 23–29)
CREAT SERPL-MCNC: 0.8 MG/DL (ref 0.5–1.4)
CRP SERPL-MCNC: 6.4 MG/L (ref 0–8.2)
DIFFERENTIAL METHOD BLD: NORMAL
EOSINOPHIL # BLD AUTO: 0.1 K/UL (ref 0–0.5)
EOSINOPHIL NFR BLD: 1.1 % (ref 0–8)
ERYTHROCYTE [DISTWIDTH] IN BLOOD BY AUTOMATED COUNT: 13 % (ref 11.5–14.5)
ERYTHROCYTE [SEDIMENTATION RATE] IN BLOOD BY WESTERGREN METHOD: 8 MM/HR (ref 0–20)
EST. GFR  (NO RACE VARIABLE): >60 ML/MIN/1.73 M^2
GLUCOSE SERPL-MCNC: 109 MG/DL (ref 70–110)
HCT VFR BLD AUTO: 39 % (ref 37–48.5)
HGB BLD-MCNC: 13.2 G/DL (ref 12–16)
IMM GRANULOCYTES # BLD AUTO: 0.03 K/UL (ref 0–0.04)
IMM GRANULOCYTES NFR BLD AUTO: 0.3 % (ref 0–0.5)
LYMPHOCYTES # BLD AUTO: 3.4 K/UL (ref 1–4.8)
LYMPHOCYTES NFR BLD: 30.2 % (ref 18–48)
MAGNESIUM SERPL-MCNC: 1.9 MG/DL (ref 1.6–2.6)
MCH RBC QN AUTO: 30.1 PG (ref 27–31)
MCHC RBC AUTO-ENTMCNC: 33.8 G/DL (ref 32–36)
MCV RBC AUTO: 89 FL (ref 82–98)
MONOCYTES # BLD AUTO: 0.8 K/UL (ref 0.3–1)
MONOCYTES NFR BLD: 6.7 % (ref 4–15)
NEUTROPHILS # BLD AUTO: 6.9 K/UL (ref 1.8–7.7)
NEUTROPHILS NFR BLD: 61.1 % (ref 38–73)
NRBC BLD-RTO: 0 /100 WBC
PLATELET # BLD AUTO: 291 K/UL (ref 150–450)
PMV BLD AUTO: 9.7 FL (ref 9.2–12.9)
POCT GLUCOSE: 108 MG/DL (ref 70–110)
POCT GLUCOSE: 78 MG/DL (ref 70–110)
POCT GLUCOSE: 87 MG/DL (ref 70–110)
POTASSIUM SERPL-SCNC: 3.8 MMOL/L (ref 3.5–5.1)
PROT SERPL-MCNC: 6.2 G/DL (ref 6–8.4)
RBC # BLD AUTO: 4.38 M/UL (ref 4–5.4)
SODIUM SERPL-SCNC: 143 MMOL/L (ref 136–145)
WBC # BLD AUTO: 11.21 K/UL (ref 3.9–12.7)

## 2025-01-29 PROCEDURE — 25000003 PHARM REV CODE 250: Performed by: NURSE PRACTITIONER

## 2025-01-29 PROCEDURE — 36415 COLL VENOUS BLD VENIPUNCTURE: CPT | Performed by: NURSE PRACTITIONER

## 2025-01-29 PROCEDURE — 63600175 PHARM REV CODE 636 W HCPCS: Performed by: INTERNAL MEDICINE

## 2025-01-29 PROCEDURE — 85651 RBC SED RATE NONAUTOMATED: CPT | Performed by: HOSPITALIST

## 2025-01-29 PROCEDURE — 11000001 HC ACUTE MED/SURG PRIVATE ROOM

## 2025-01-29 PROCEDURE — 99222 1ST HOSP IP/OBS MODERATE 55: CPT | Mod: ,,, | Performed by: INTERNAL MEDICINE

## 2025-01-29 PROCEDURE — 99231 SBSQ HOSP IP/OBS SF/LOW 25: CPT | Mod: ,,, | Performed by: SURGERY

## 2025-01-29 PROCEDURE — 63600175 PHARM REV CODE 636 W HCPCS: Performed by: NURSE PRACTITIONER

## 2025-01-29 PROCEDURE — 86140 C-REACTIVE PROTEIN: CPT | Performed by: HOSPITALIST

## 2025-01-29 PROCEDURE — 83735 ASSAY OF MAGNESIUM: CPT | Performed by: NURSE PRACTITIONER

## 2025-01-29 PROCEDURE — 85025 COMPLETE CBC W/AUTO DIFF WBC: CPT | Performed by: NURSE PRACTITIONER

## 2025-01-29 PROCEDURE — 25000003 PHARM REV CODE 250: Performed by: SURGERY

## 2025-01-29 PROCEDURE — 80053 COMPREHEN METABOLIC PANEL: CPT | Performed by: NURSE PRACTITIONER

## 2025-01-29 PROCEDURE — 25000003 PHARM REV CODE 250: Performed by: INTERNAL MEDICINE

## 2025-01-29 RX ADMIN — MUPIROCIN 1 G: 20 OINTMENT TOPICAL at 08:01

## 2025-01-29 RX ADMIN — ENOXAPARIN SODIUM 40 MG: 40 INJECTION SUBCUTANEOUS at 10:01

## 2025-01-29 RX ADMIN — OXYCODONE HYDROCHLORIDE 15 MG: 5 TABLET ORAL at 09:01

## 2025-01-29 RX ADMIN — HYDROMORPHONE HYDROCHLORIDE 1 MG: 1 INJECTION, SOLUTION INTRAMUSCULAR; INTRAVENOUS; SUBCUTANEOUS at 06:01

## 2025-01-29 RX ADMIN — PIPERACILLIN SODIUM AND TAZOBACTAM SODIUM 4.5 G: 4; .5 INJECTION, POWDER, LYOPHILIZED, FOR SOLUTION INTRAVENOUS at 06:01

## 2025-01-29 RX ADMIN — PIPERACILLIN SODIUM AND TAZOBACTAM SODIUM 4.5 G: 4; .5 INJECTION, POWDER, LYOPHILIZED, FOR SOLUTION INTRAVENOUS at 02:01

## 2025-01-29 RX ADMIN — ONDANSETRON 4 MG: 2 INJECTION INTRAMUSCULAR; INTRAVENOUS at 06:01

## 2025-01-29 RX ADMIN — SODIUM CHLORIDE: 9 INJECTION, SOLUTION INTRAVENOUS at 10:01

## 2025-01-29 RX ADMIN — TIZANIDINE 4 MG: 4 TABLET ORAL at 09:01

## 2025-01-29 RX ADMIN — ONDANSETRON 4 MG: 2 INJECTION INTRAMUSCULAR; INTRAVENOUS at 09:01

## 2025-01-29 RX ADMIN — PIPERACILLIN SODIUM AND TAZOBACTAM SODIUM 4.5 G: 4; .5 INJECTION, POWDER, LYOPHILIZED, FOR SOLUTION INTRAVENOUS at 10:01

## 2025-01-29 RX ADMIN — PANTOPRAZOLE SODIUM 40 MG: 40 INJECTION, POWDER, FOR SOLUTION INTRAVENOUS at 06:01

## 2025-01-29 RX ADMIN — ONDANSETRON 4 MG: 2 INJECTION INTRAMUSCULAR; INTRAVENOUS at 03:01

## 2025-01-29 RX ADMIN — SODIUM CHLORIDE: 9 INJECTION, SOLUTION INTRAVENOUS at 11:01

## 2025-01-29 RX ADMIN — HYDROMORPHONE HYDROCHLORIDE 1 MG: 1 INJECTION, SOLUTION INTRAMUSCULAR; INTRAVENOUS; SUBCUTANEOUS at 12:01

## 2025-01-29 RX ADMIN — SODIUM CHLORIDE: 9 INJECTION, SOLUTION INTRAVENOUS at 12:01

## 2025-01-29 RX ADMIN — OXYCODONE HYDROCHLORIDE 15 MG: 5 TABLET ORAL at 03:01

## 2025-01-29 NOTE — PROGRESS NOTES
Received call from [facility] Transitional Nurse, Yamileth, [phone].  She assigned to pt for assistance with DC needs.

## 2025-01-29 NOTE — PLAN OF CARE
Problem: Adult Inpatient Plan of Care  Goal: Plan of Care Review  Outcome: Progressing  Goal: Patient-Specific Goal (Individualized)  Outcome: Progressing  Goal: Absence of Hospital-Acquired Illness or Injury  Outcome: Progressing  Goal: Optimal Comfort and Wellbeing  Outcome: Progressing  Goal: Readiness for Transition of Care  Outcome: Progressing     Problem: Diabetes Comorbidity  Goal: Blood Glucose Level Within Targeted Range  Outcome: Progressing     Problem: Infection  Goal: Absence of Infection Signs and Symptoms  Outcome: Progressing     Problem: Pain Acute  Goal: Optimal Pain Control and Function  Outcome: Progressing   Plan of care reviewed with patient. Patient verbalized understanding. All fall precautions maintained. Bed in lowest position, call light within reach, slip resistant socks maintained. Bed alarm on.

## 2025-01-29 NOTE — SUBJECTIVE & OBJECTIVE
Interval History:   Patient seen and examined.  Patient in no acute distress.  NG tube in place.  Patient denies flatulence.  General surgery and GI consult pending.  Review of Systems   Constitutional:  Negative for chills and fever.   Cardiovascular:  Negative for chest pain.   Gastrointestinal:  Positive for abdominal pain and nausea. Negative for vomiting.   Musculoskeletal:  Positive for back pain (Chronic).   Neurological: Negative.      Objective:     Vital Signs (Most Recent):  Temp: 98.8 °F (37.1 °C) (01/29/25 1109)  Pulse: 65 (01/29/25 1109)  Resp: 17 (01/29/25 1233)  BP: 137/75 (01/29/25 1109)  SpO2: 95 % (01/29/25 1109) Vital Signs (24h Range):  Temp:  [96.1 °F (35.6 °C)-98.8 °F (37.1 °C)] 98.8 °F (37.1 °C)  Pulse:  [65-95] 65  Resp:  [12-19] 17  SpO2:  [95 %-97 %] 95 %  BP: (135-172)/(74-87) 137/75     Weight: 81 kg (178 lb 9.2 oz)  Body mass index is 30.65 kg/m².    Intake/Output Summary (Last 24 hours) at 1/29/2025 1353  Last data filed at 1/29/2025 0002  Gross per 24 hour   Intake 0 ml   Output 300 ml   Net -300 ml         Physical Exam  Vitals and nursing note reviewed. Exam conducted with a chaperone present.   Constitutional:       General: She is not in acute distress.     Appearance: Normal appearance.   Cardiovascular:      Rate and Rhythm: Normal rate and regular rhythm.      Pulses: Normal pulses.      Heart sounds: Normal heart sounds.   Pulmonary:      Effort: Pulmonary effort is normal.      Breath sounds: Normal breath sounds.   Abdominal:      General: Bowel sounds are normal. There is no distension.      Palpations: Abdomen is soft.      Tenderness: There is abdominal tenderness. There is no guarding or rebound.   Musculoskeletal:      Right lower leg: No edema.      Left lower leg: No edema.   Skin:     General: Skin is dry.   Neurological:      Mental Status: She is alert and oriented to person, place, and time.             Significant Labs: All pertinent labs within the past 24 hours  have been reviewed.  CBC:   Recent Labs   Lab 01/27/25  1440 01/28/25  0609 01/29/25  0420   WBC 20.26* 11.19 11.21   HGB 18.1* 14.7 13.2   HCT 53.0* 43.2 39.0    359 291     CMP:   Recent Labs   Lab 01/27/25  1440 01/28/25  0609 01/29/25  0420   * 139 143   K 3.8 4.3 3.8   CL 98 105 110   CO2 23 29 24   * 147* 109   BUN 10 11 11   CREATININE 0.9 0.9 0.8   CALCIUM 9.8 8.5* 8.3*   PROT 8.3 6.4 6.2   ALBUMIN 4.8 3.9 3.2*   BILITOT 0.6 0.7 0.5   ALKPHOS 42* 29* 31*   AST 78* 35 32   ALT 57* 39 37   ANIONGAP 14 5* 9     Magnesium:   Recent Labs   Lab 01/28/25  0609 01/29/25  0420   MG 1.8 1.9     POCT Glucose:   Recent Labs   Lab 01/28/25  2329 01/29/25  0741 01/29/25  1106   POCTGLUCOSE 101 87 108       Significant Imaging: I have reviewed all pertinent imaging results/findings within the past 24 hours.  Imaging Results              X-Ray KUB (Final result)  Result time 01/28/25 07:41:55      Final result by Shawn Mora MD (01/28/25 07:41:55)                   Impression:      1. Nonobstructive bowel gas pattern.  2. Nasogastric tube tip is at the distal stomach.      Electronically signed by: Shawn Mora  Date:    01/28/2025  Time:    07:41               Narrative:    EXAMINATION:  XR KUB    CLINICAL HISTORY:  Small-bowel obstruction    COMPARISON:  CT, January 27, 2025    FINDINGS:  Supine view the abdomen demonstrates nasogastric tube in place with tip at the distal stomach.  No pathologic bowel distention is identified.  There is no evidence of mass or suspicious calcification.  Contrast filled urinary bladder is noted.    Postoperative changes are noted at L5-S1.                                       X-Ray Chest AP Portable (Final result)  Result time 01/27/25 18:36:24      Final result by Franck Paez MD (01/27/25 18:36:24)                   Impression:      As above.      Electronically signed by: Franck Paez MD  Date:    01/27/2025  Time:    18:36                Narrative:    EXAMINATION:  XR CHEST AP PORTABLE    CLINICAL HISTORY:  Encounter for other specified special examinations    TECHNIQUE:  Single frontal view of the chest was performed.    COMPARISON:  09/19/2022    FINDINGS:  There has been interval placement of esophagogastric tube extending below the diaphragm with tip and distal side port projecting over the stomach within the left abdomen.  Cardiac silhouette is not significantly enlarged.  No large confluent airspace consolidation appreciated throughout the lungs.  No significant volume of pleural fluid or pneumothorax identified.  Osseous structures appear intact.                                       CT Abdomen Pelvis With IV Contrast NO Oral Contrast (Final result)  Result time 01/27/25 16:52:57      Final result by Hernan Lima MD (01/27/25 16:52:57)                   Impression:      1.  Low-grade mechanical small bowel obstruction with a transition point at the level of the ileum.    2.  Moderate acute on chronic long segment inflammatory small-bowel changes involving the distal ileum as above suggestive of enteritis (while possibly infectious/inflammatory, the pattern and distribution is most suggestive of Crohn's disease), consider correlation with the symptoms, history and attention on follow-up imaging.    3.  Trace ascites.    4.  Numerous additional, and incidental findings as above.    RESULT NOTIFICATION: These observations were discussed by the dictating physician, by phone with, and acknowledged by Sandro Blanco at 16:51 on 1/27/2025.      Electronically signed by: Hernan Lima  Date:    01/27/2025  Time:    16:52               Narrative:      CMS MANDATED QUALITY DATA - CT RADIATION - 436    All CT scans at this facility utilize dose modulation, iterative reconstruction, and/or weight based dosing when appropriate to reduce radiation dose to as low as reasonably achievable.    CLINICAL HISTORY:  ([id number])58 y/o  (1965)  F    Abdominal abscess/infection suspected;Bowel obstruction suspected;    TECHNIQUE:  (A#[id number], exam time 1/27/2025 16:42)    CT ABDOMEN PELVIS WITH IV CONTRAST [id number]    Axial CT images of the abdomen and pelvis were obtained from the dome of the diaphragm to the proximal thighs.    COMPARISON:  CT from 08/18/2022.    FINDINGS:  Lower Thorax:    The lung bases are clear. The heart is normal in size.    CT Abdomen:    Liver: Relative diffuse low-attenuation liver consistent with hepatic steatosis.  The liver is enlarged measuring 19.5 cm sagittal right lobe.    Gallbladder: The gallbladder is surgically absent.    Biliary Tree: No intra or extrahepatic ductal dilation.    Spleen: Normal.    Pancreas: The pancreas is normal.    Adrenal Glands: Normal    Kidneys: The kidneys are normal in imaging appearance without hydronephrosis or hydroureter.    Vasculature: Scattered calcified mural plaques are seen in the distal abdominal aorta and iliacs with no aneurysm.    Lymph nodes: No abdominal lymphadenopathy is seen.    Intraperitoneal structures: Trace abdominopelvic ascites.    Bowel: Moderate long segment circumferential colonic wall thickening with mucosal hyperemia and submucosal edema and a targetoid pattern in the distal ileum, with mild mesenteric edema and trace fluid compatible with moderate enteritis, there is fecalization of stool with relative transition point in the mid abdomen (image 149) suggesting fecal stasis and possibly fibrostenotic disease, upstream to this point there are prominent gas and fluid-filled loops of small bowel measuring up to 3.5 cm in diameter (image 93) to the level of the duodenum.  There is fatty infiltration at the terminal ileum (image 159) suggesting chronic inflammation.  The appendix is surgically absent.  There is a relative paucity of stool and gas in the colon.    Abdominal wall: The abdominal wall and musculature are normal.    Musculoskeletal: No acute osseous  abnormality is identified.    CT Pelvis:    Bladder: Normal.    Reproductive Organs: The uterus is not visualized/surgically absent.    Pelvic Lymph nodes: No pelvic lymphadenopathy is identified.

## 2025-01-29 NOTE — ASSESSMENT & PLAN NOTE
NG tube to MORALES  NPO  Surgery consulted  Resume patient's fentanyl patch   Dilaudid p.r.n. pain control  l

## 2025-01-29 NOTE — PLAN OF CARE
Problem: Adult Inpatient Plan of Care  Goal: Plan of Care Review  Outcome: Progressing  Goal: Patient-Specific Goal (Individualized)  Outcome: Progressing  Goal: Optimal Comfort and Wellbeing  Outcome: Progressing     Problem: Diabetes Comorbidity  Goal: Blood Glucose Level Within Targeted Range  Outcome: Progressing     Problem: Pain Acute  Goal: Optimal Pain Control and Function  Outcome: Progressing   POC has been reviewed with pt and spouse.  Pt was seen by GI Dr. Saxena and gave a verbal order to clamp pt's NG tube and if tolerates through the night can have NG tube removed tomorrow and started on a clear liquid diet.  If pt can't tolerate then may be hooked back up to low intermittent suction.  Pt's pain has been controlled with current pain regimen.  No falls during this shift.  BS heard in all 4 quadrants and pt is passing flatus.  Blood glucose has remained within normal limits without insulin coverage.  No replacements needed today.  No possible discharge at this time.

## 2025-01-29 NOTE — CONSULTS
[facility]     CC: Abdominal pain    HPI 59 y.o. female with pMHx of Crohn's, HTN, hypothyroidism, T2 DM, and GERD who presented to the ED with complaints of abdominal pain that began last night.  Patient is with a history of appendectomy and cholecystectomy with multiple adhesions she believed that abdominal pain was related to past surgeries and/or her Crohn's.  EMS gave patient EN route 100 of fentanyl and 8 mg of Zofran.  ED MD spoke with surgery to come and evaluate patient.  Patient be NPO.  Patient with a long term history of opioid intake.  May need to consider palliative for pain control prior to DC.     Triage vitals with pulse 98, /104, temp 98.5°, SpO2 97% on room air On evaluation in the ED blood work with WBC 20 point 2, hemoglobin 18.1, hematocrit 53, sodium 135, glucose 250, alk phos 42, AST 78, ALT 57.  CTA abdomen and pelvis with IV contrast with low-grade mechanical small-bowel obstruction with a transition point at the level of the ileum.  Moderate acute on chronic long segment inflammatory small bowel changes involving distal ileum as above suggestive of enteritis consider correlation with the symptomology.  Trace ascites and numerous incidental findings.     Overview/Hospital Course:  Tanesha Chamberlain was closely monitored while in the hospital.  Patient was admitted to Hospital Medicine for small-bowel obstruction. CTA abdomen and pelvis with IV contrast with low-grade mechanical small-bowel obstruction with a transition point at the level of the ileum. Moderate acute on chronic long segment inflammatory small bowel changes involving distal ileum as above suggestive of enteritis consider correlation with the symptomology. Trace ascites and numerous incidental findings.  He NG tube was placed for gastric decompression.  There is suspicion for Crohn's flare-up.  General surgery and GI consulted.  Patient follows outpatient with pain management for chronic back pain.       Interval History:   Patient seen and examined.  Patient in no acute distress.  NG tube output 600 cc.  Patient denies flatulence.  General surgery and GI consult pending.    FURTHER HISTORY:  Above obtained from independent review of records from admitting provider as well as from direct discussion with nursing who states patient feeling better.  In addition, on my interview, I note the following:  Patient admitted with abdominal pain, onset two days ago, associated with N/V, with no alleviating/exacerbating factors.  Patient had CT which showed possible obstruction.  CT was reviewed.  NGT output has been clear and has been about 400 cc in the last 24 hours.  Patient feeling better and it is slowing.  Last colonoscopy in 11/2024 was unremarkable.  There is documented history of UC but no evidence of this on recent scope.  Patient has not been on any IBD therapy      Past Medical History:   Diagnosis Date    ADHD (attention deficit hyperactivity disorder)     Arthritis     Colon polyp     Crohn's disease     Diabetes mellitus, type 2     GERD (gastroesophageal reflux disease)     Hypertension     Hypothyroidism     Lumbar back pain with radiculopathy affecting left lower extremity     SBO (small bowel obstruction)        Past Surgical History:   Procedure Laterality Date    ANTERIOR CRUCIATE LIGAMENT REPAIR Right     APPENDECTOMY      1988    BUNIONECTOMY Bilateral     CHOLECYSTECTOMY      2005    COLONOSCOPY N/A 11/1/2024    Procedure: COLONOSCOPY(Instructions sent 10/25);  Surgeon: Marguerite Gray MD;  Location: [facility];  Service: Endoscopy;  Laterality: N/A;    ESOPHAGOGASTRODUODENOSCOPY N/A 10/18/2024    Procedure: EGD (ESOPHAGOGASTRODUODENOSCOPY);  Surgeon: Marguerite Gray MD;  Location: [facility];  Service: Endoscopy;  Laterality: N/A;    EXCISION OF MASS OF BACK Left 09/21/2022    Procedure: EXCISION, MASS, BACK;  Surgeon: Sae Collins III, MD;  Location: [facility] OR;  Service: General;   Laterality: Left;    HYSTERECTOMY      INJECTION OF JOINT Right 2023    Procedure: INJECTION, JOINT, RIGHT PIRIFORMIS AND RIGHT GTB;  Surgeon: Loreta Brito MD;  Location: [facility];  Service: Pain Management;  Laterality: Right;    MINIMALLY INVASIVE SURGICAL REMOVAL OF INTERVERTEBRAL DISC OF SPINE USING MICROSCOPE Left 2021    Procedure: Left L4-5 Far Lateral Disectomy, MIS;  Surgeon: Johnny Cruz MD;  Location: [facility] OR [address] [place] [address];  Service: Neurosurgery;  Laterality: Left;    OOPHORECTOMY      SHOULDER SURGERY Right     SPINE SURGERY      TRANSFORAMINAL EPIDURAL INJECTION OF STEROID Left 03/10/2021    Procedure: INJECTION, STEROID, EPIDURAL, TRANSFORAMINAL APPROACH  L4-5;  Surgeon: Jaylon Tyson MD;  Location: [facility];  Service: Pain Management;  Laterality: Left;  consent needed  ([employer] [facility])    TRANSFORAMINAL EPIDURAL INJECTION OF STEROID Left 2022    Procedure: LUMBAR TRANSFORAMINAL LEFT L4/5 MEDICALLY URGENT;  Surgeon: Loreta Brito MD;  Location: [facility];  Service: Pain Management;  Laterality: Left;    UPPER GASTROINTESTINAL ENDOSCOPY      10 years ago [facility]       Social History     Tobacco Use    Smoking status: Former     Current packs/day: 0.00     Types: Cigarettes     Quit date: 2020     Years since quittin.0    Smokeless tobacco: Former    Tobacco comments:     in the 8 th grade   Substance Use Topics    Alcohol use: Yes     Alcohol/week: 4.0 standard drinks of alcohol     Types: 4 Cans of beer per week     Comment: 1 beer , once in 2 weeks    Drug use: No       Family History   Problem Relation Name Age of Onset    Crohn's disease Other      Cirrhosis Neg Hx      Ulcerative colitis Neg Hx         Review of Systems  General ROS: negative for - chills, fever or weight loss  Psychological ROS: negative for - hallucination, depression or suicidal ideation  Ophthalmic ROS: negative for - blurry vision, photophobia or eye pain  ENT ROS:  "negative for - epistaxis, sore throat or rhinorrhea  Respiratory ROS: no cough, shortness of breath, or wheezing  Cardiovascular ROS: no chest pain or dyspnea on exertion  Gastrointestinal ROS: as above  Genito-Urinary ROS: no dysuria, trouble voiding, or hematuria  Musculoskeletal ROS: negative for - arthralgia, myalgia, weakness  Neurological ROS: no syncope or seizures; no ataxia  Dermatological ROS: negative for pruritis, rash and jaundice    Physical Examination  /75 (BP Location: Right arm, Patient Position: Lying)   Pulse 65   Temp 98.8 °F (37.1 °C) (Oral)   Resp 17   Ht 5' 4" (1.626 m)   Wt 81 kg (178 lb 9.2 oz)   SpO2 95%   BMI 30.65 kg/m²   General appearance: alert, cooperative, no distress  HENT: Normocephalic, atraumatic, neck symmetrical, no nasal discharge   Eyes: conjunctivae/corneas clear, PERRL, EOM's intact, sclera anicteric  Lungs: clear to auscultation bilaterally, no dullness to percussion bilaterally, symmetric expansion, breathing unlabored  Heart: regular rate and rhythm without rub; no displacement of the PMI   Abdomen: bowel sounds active  Extremities: extremities symmetric; no clubbing, cyanosis, or edema  Integument: Skin color, texture, turgor normal; no rashes; hair distrubution normal, no jaundice  Neurologic: Alert and oriented X 3, no focal sensory or motor neurologic deficits  Psychiatric: no pressured speech; normal affect; no evidence of impaired cognition, no anxiety/depression     Labs:  Lab Results   Component Value Date    WBC 11.21 01/29/2025    HGB 13.2 01/29/2025    HCT 39.0 01/29/2025    MCV 89 01/29/2025     01/29/2025         CMP  Sodium   Date Value Ref Range Status   01/29/2025 143 136 - 145 mmol/L Final     Potassium   Date Value Ref Range Status   01/29/2025 3.8 3.5 - 5.1 mmol/L Final     Chloride   Date Value Ref Range Status   01/29/2025 110 95 - 110 mmol/L Final     CO2   Date Value Ref Range Status   01/29/2025 24 23 - 29 mmol/L Final "     Glucose   Date Value Ref Range Status   01/29/2025 109 70 - 110 mg/dL Final     BUN   Date Value Ref Range Status   01/29/2025 11 6 - 20 mg/dL Final     Creatinine   Date Value Ref Range Status   01/29/2025 0.8 0.5 - 1.4 mg/dL Final     Calcium   Date Value Ref Range Status   01/29/2025 8.3 (L) 8.7 - 10.5 mg/dL Final     Total Protein   Date Value Ref Range Status   01/29/2025 6.2 6.0 - 8.4 g/dL Final     Albumin   Date Value Ref Range Status   01/29/2025 3.2 (L) 3.5 - 5.2 g/dL Final     Total Bilirubin   Date Value Ref Range Status   01/29/2025 0.5 0.1 - 1.0 mg/dL Final     Comment:     For infants and newborns, interpretation of results should be based  on gestational age, weight and in agreement with clinical  observations.    Premature Infant recommended reference ranges:  Up to 24 hours.............<8.0 mg/dL  Up to 48 hours............<12.0 mg/dL  3-5 days..................<15.0 mg/dL  6-29 days.................<15.0 mg/dL       Alkaline Phosphatase   Date Value Ref Range Status   01/29/2025 31 (L) 40 - 150 U/L Final     AST   Date Value Ref Range Status   01/29/2025 32 10 - 40 U/L Final     ALT   Date Value Ref Range Status   01/29/2025 37 10 - 44 U/L Final     Anion Gap   Date Value Ref Range Status   01/29/2025 9 8 - 16 mmol/L Final     eGFR   Date Value Ref Range Status   01/29/2025 >60 >60 mL/min/1.73 m^2 Final           Imaging:  CT scan was independently visualized and reviewed by me and showed findings as above.    I have personally reviewed these images    Case discussed as above    Assessment:   Abnormal CT  Abdominal pain  History of IBD  Abdominal pain  N/V    Plan:  Clamp NGT  If no further symptoms, can pull tube in AM  Start clears if patient tolerates above and advance slowly.  Outpatient colonoscopy with Dr. Carona after discharge.  Will follow.    Ruben Velasquez MD  [facility]  [address], [address]  [place] [address]  Office: [phone]  Fax: [phone]

## 2025-01-30 ENCOUNTER — TELEPHONE (OUTPATIENT)
Dept: GASTROENTEROLOGY | Facility: CLINIC | Age: 60
End: 2025-01-30
Payer: COMMERCIAL

## 2025-01-30 DIAGNOSIS — Z00.00 ENCOUNTER FOR MEDICARE ANNUAL WELLNESS EXAM: ICD-10-CM

## 2025-01-30 LAB
ALBUMIN SERPL BCP-MCNC: 3.2 G/DL (ref 3.5–5.2)
ALP SERPL-CCNC: 29 U/L (ref 40–150)
ALT SERPL W/O P-5'-P-CCNC: 31 U/L (ref 10–44)
ANION GAP SERPL CALC-SCNC: 9 MMOL/L (ref 8–16)
AST SERPL-CCNC: 31 U/L (ref 10–40)
BASOPHILS # BLD AUTO: 0.07 K/UL (ref 0–0.2)
BASOPHILS NFR BLD: 0.8 % (ref 0–1.9)
BILIRUB SERPL-MCNC: 0.5 MG/DL (ref 0.1–1)
BUN SERPL-MCNC: 8 MG/DL (ref 6–20)
CALCIUM SERPL-MCNC: 7.9 MG/DL (ref 8.7–10.5)
CHLORIDE SERPL-SCNC: 108 MMOL/L (ref 95–110)
CO2 SERPL-SCNC: 22 MMOL/L (ref 23–29)
CREAT SERPL-MCNC: 0.8 MG/DL (ref 0.5–1.4)
DIFFERENTIAL METHOD BLD: NORMAL
EOSINOPHIL # BLD AUTO: 0 K/UL (ref 0–0.5)
EOSINOPHIL NFR BLD: 0.1 % (ref 0–8)
ERYTHROCYTE [DISTWIDTH] IN BLOOD BY AUTOMATED COUNT: 12.7 % (ref 11.5–14.5)
EST. GFR  (NO RACE VARIABLE): >60 ML/MIN/1.73 M^2
GLUCOSE SERPL-MCNC: 76 MG/DL (ref 70–110)
HCT VFR BLD AUTO: 38.2 % (ref 37–48.5)
HGB BLD-MCNC: 12.8 G/DL (ref 12–16)
IMM GRANULOCYTES # BLD AUTO: 0.02 K/UL (ref 0–0.04)
IMM GRANULOCYTES NFR BLD AUTO: 0.2 % (ref 0–0.5)
LYMPHOCYTES # BLD AUTO: 3.6 K/UL (ref 1–4.8)
LYMPHOCYTES NFR BLD: 39 % (ref 18–48)
MAGNESIUM SERPL-MCNC: 1.8 MG/DL (ref 1.6–2.6)
MCH RBC QN AUTO: 29.4 PG (ref 27–31)
MCHC RBC AUTO-ENTMCNC: 33.5 G/DL (ref 32–36)
MCV RBC AUTO: 88 FL (ref 82–98)
MONOCYTES # BLD AUTO: 0.7 K/UL (ref 0.3–1)
MONOCYTES NFR BLD: 7.4 % (ref 4–15)
NEUTROPHILS # BLD AUTO: 4.9 K/UL (ref 1.8–7.7)
NEUTROPHILS NFR BLD: 52.5 % (ref 38–73)
NRBC BLD-RTO: 0 /100 WBC
PLATELET # BLD AUTO: 271 K/UL (ref 150–450)
PMV BLD AUTO: 9.3 FL (ref 9.2–12.9)
POCT GLUCOSE: 72 MG/DL (ref 70–110)
POCT GLUCOSE: 82 MG/DL (ref 70–110)
POCT GLUCOSE: 89 MG/DL (ref 70–110)
POCT GLUCOSE: 92 MG/DL (ref 70–110)
POTASSIUM SERPL-SCNC: 3.2 MMOL/L (ref 3.5–5.1)
PROT SERPL-MCNC: 6.1 G/DL (ref 6–8.4)
RBC # BLD AUTO: 4.35 M/UL (ref 4–5.4)
SODIUM SERPL-SCNC: 139 MMOL/L (ref 136–145)
WBC # BLD AUTO: 9.28 K/UL (ref 3.9–12.7)

## 2025-01-30 PROCEDURE — 99232 SBSQ HOSP IP/OBS MODERATE 35: CPT | Mod: ,,, | Performed by: INTERNAL MEDICINE

## 2025-01-30 PROCEDURE — 25000003 PHARM REV CODE 250: Performed by: NURSE PRACTITIONER

## 2025-01-30 PROCEDURE — 11000001 HC ACUTE MED/SURG PRIVATE ROOM

## 2025-01-30 PROCEDURE — 80053 COMPREHEN METABOLIC PANEL: CPT | Performed by: NURSE PRACTITIONER

## 2025-01-30 PROCEDURE — 25000003 PHARM REV CODE 250: Performed by: SURGERY

## 2025-01-30 PROCEDURE — 63600175 PHARM REV CODE 636 W HCPCS: Performed by: NURSE PRACTITIONER

## 2025-01-30 PROCEDURE — 63600175 PHARM REV CODE 636 W HCPCS: Performed by: INTERNAL MEDICINE

## 2025-01-30 PROCEDURE — 83735 ASSAY OF MAGNESIUM: CPT | Performed by: NURSE PRACTITIONER

## 2025-01-30 PROCEDURE — 36415 COLL VENOUS BLD VENIPUNCTURE: CPT | Performed by: NURSE PRACTITIONER

## 2025-01-30 PROCEDURE — 25000003 PHARM REV CODE 250: Performed by: INTERNAL MEDICINE

## 2025-01-30 PROCEDURE — 85025 COMPLETE CBC W/AUTO DIFF WBC: CPT | Performed by: NURSE PRACTITIONER

## 2025-01-30 RX ADMIN — ONDANSETRON 4 MG: 2 INJECTION INTRAMUSCULAR; INTRAVENOUS at 04:01

## 2025-01-30 RX ADMIN — TIZANIDINE 4 MG: 4 TABLET ORAL at 10:01

## 2025-01-30 RX ADMIN — ACETAMINOPHEN 650 MG: 325 TABLET ORAL at 10:01

## 2025-01-30 RX ADMIN — HYDROMORPHONE HYDROCHLORIDE 1 MG: 1 INJECTION, SOLUTION INTRAMUSCULAR; INTRAVENOUS; SUBCUTANEOUS at 08:01

## 2025-01-30 RX ADMIN — TIZANIDINE 4 MG: 4 TABLET ORAL at 04:01

## 2025-01-30 RX ADMIN — PIPERACILLIN SODIUM AND TAZOBACTAM SODIUM 4.5 G: 4; .5 INJECTION, POWDER, LYOPHILIZED, FOR SOLUTION INTRAVENOUS at 02:01

## 2025-01-30 RX ADMIN — HYDROMORPHONE HYDROCHLORIDE 1 MG: 1 INJECTION, SOLUTION INTRAMUSCULAR; INTRAVENOUS; SUBCUTANEOUS at 06:01

## 2025-01-30 RX ADMIN — OXYCODONE HYDROCHLORIDE 15 MG: 5 TABLET ORAL at 05:01

## 2025-01-30 RX ADMIN — POTASSIUM BICARBONATE 35 MEQ: 391 TABLET, EFFERVESCENT ORAL at 09:01

## 2025-01-30 RX ADMIN — PIPERACILLIN SODIUM AND TAZOBACTAM SODIUM 4.5 G: 4; .5 INJECTION, POWDER, LYOPHILIZED, FOR SOLUTION INTRAVENOUS at 05:01

## 2025-01-30 RX ADMIN — SODIUM CHLORIDE: 9 INJECTION, SOLUTION INTRAVENOUS at 09:01

## 2025-01-30 RX ADMIN — Medication 800 MG: at 05:01

## 2025-01-30 RX ADMIN — PIPERACILLIN SODIUM AND TAZOBACTAM SODIUM 4.5 G: 4; .5 INJECTION, POWDER, LYOPHILIZED, FOR SOLUTION INTRAVENOUS at 10:01

## 2025-01-30 RX ADMIN — OXYCODONE HYDROCHLORIDE 15 MG: 5 TABLET ORAL at 10:01

## 2025-01-30 RX ADMIN — HYDRALAZINE HYDROCHLORIDE 10 MG: 20 INJECTION, SOLUTION INTRAMUSCULAR; INTRAVENOUS at 06:01

## 2025-01-30 RX ADMIN — MUPIROCIN 1 G: 20 OINTMENT TOPICAL at 09:01

## 2025-01-30 RX ADMIN — PANTOPRAZOLE SODIUM 40 MG: 40 INJECTION, POWDER, FOR SOLUTION INTRAVENOUS at 06:01

## 2025-01-30 RX ADMIN — OXYCODONE HYDROCHLORIDE 15 MG: 5 TABLET ORAL at 04:01

## 2025-01-30 RX ADMIN — Medication 800 MG: at 11:01

## 2025-01-30 RX ADMIN — MUPIROCIN 1 G: 20 OINTMENT TOPICAL at 10:01

## 2025-01-30 RX ADMIN — ENOXAPARIN SODIUM 40 MG: 40 INJECTION SUBCUTANEOUS at 10:01

## 2025-01-30 RX ADMIN — HYDROMORPHONE HYDROCHLORIDE 1 MG: 1 INJECTION, SOLUTION INTRAMUSCULAR; INTRAVENOUS; SUBCUTANEOUS at 12:01

## 2025-01-30 RX ADMIN — ONDANSETRON 4 MG: 2 INJECTION INTRAMUSCULAR; INTRAVENOUS at 10:01

## 2025-01-30 RX ADMIN — POTASSIUM BICARBONATE 35 MEQ: 391 TABLET, EFFERVESCENT ORAL at 01:01

## 2025-01-30 NOTE — NURSING
Assumed care of patient from [facility] RN at this time.    Clinic Care Coordination Contact    Community Health Worker Follow Up    Care Gaps:     Health Maintenance Due   Topic Date Due     MEDICARE ANNUAL WELLNESS VISIT  04/24/2019     ASTHMA ACTION PLAN  09/14/2021     ASTHMA CONTROL TEST  02/12/2022     PHQ-9  02/12/2022       Care Gaps Last addressed on 2/22/22    Goals:   Goals Addressed as of 2/22/2022 at 1:01 PM                    Today    1/18/22       Patient Stated       1. Medical (pt-stated)   90%  90%    Added 3/17/21 by Katy Calderon LSW      Goal Statement: I will continue to attend my appointments, follow my plan of care, and access care as needed to manage my pain and medical needs over the next 3 months.   Date Goal set: 3/17/21 extended 1/12/2022  Barriers: Complex cares, lots of appts  Strengths: Connected to care, motivated   Date to Achieve By: 12/1/21 extended 4/12/2022  Patient expressed understanding of goal: Yes    Action steps to achieve this goal:  1. I will continue to attend my appointments as needed and recommended.   2. I will follow my plan of care as created by my PCP and specialty teams.   3. I will work with care coordination to understand my care plan, schedule appts as needed, and keep track of my appts as needed.          Discussed:    Patient stated she had diarrhea for 3 weeks.  She was really sick and weak.  She stayed in bed. She didn't leave her home during this period.  Patient stated she was drinking a lot of water.    Patient stated she lost 18 lbs during the 3 week period.      Patient stated she saw her GI doctor and had her blood drawn.    Patient stated she stopped taking her Thyroid medication, per her PCP her Thyroid is fine.    Patient stated she is seeing her PCP on 2/24/22.    Patient stated she is leaving on Friday, 2/25/22 to visit her sister in [place] with a friend.  Patient stated she is looking forward to this trip.    Patient stated she plans to see her psychologist and psychiatrist next month.    Patient  stated Feb 6 was her son's birthday, and it was a difficult day for her.    Intervention and Education during outreach: CHW used Empathy and Active listening to understand the patients barriers and struggles.  CHW encouraged patient to contact CC if there are any other changes or resources needed.  Patient acknowledged understanding.      Plan: CHW will outreach in one month.    CAMRON Hester  Clinic Care Coordination  [facility]: [facility] [place], [place], [place], and [facility]  Phone: [phone]

## 2025-01-30 NOTE — SUBJECTIVE & OBJECTIVE
Interval History:   Patient seen and examined.  Patient in no acute distress.  NG tube in place but clamped.   Patient has had flatulence and bowel movement. CLD ordered by surgery.   Review of Systems   Constitutional:  Negative for chills and fever.   Cardiovascular:  Negative for chest pain.   Gastrointestinal:  Positive for abdominal pain and nausea. Negative for vomiting.   Musculoskeletal:  Positive for back pain (Chronic).   Neurological: Negative.      Objective:     Vital Signs (Most Recent):  Temp: 97.4 °F (36.3 °C) (01/30/25 1142)  Pulse: 63 (01/30/25 1142)  Resp: 16 (01/30/25 1142)  BP: (!) 166/91 (01/30/25 1142)  SpO2: 95 % (01/30/25 1142) Vital Signs (24h Range):  Temp:  [97.4 °F (36.3 °C)-98.6 °F (37 °C)] 97.4 °F (36.3 °C)  Pulse:  [63-86] 63  Resp:  [15-19] 16  SpO2:  [94 %-98 %] 95 %  BP: (142-173)/(72-91) 166/91     Weight: 81 kg (178 lb 9.2 oz)  Body mass index is 30.65 kg/m².    Intake/Output Summary (Last 24 hours) at 1/30/2025 1231  Last data filed at 1/30/2025 0645  Gross per 24 hour   Intake 7392.25 ml   Output 1500 ml   Net 5892.25 ml         Physical Exam  Vitals and nursing note reviewed. Exam conducted with a chaperone present.   Constitutional:       General: She is not in acute distress.     Appearance: Normal appearance.   Cardiovascular:      Rate and Rhythm: Normal rate and regular rhythm.      Pulses: Normal pulses.      Heart sounds: Normal heart sounds.   Pulmonary:      Effort: Pulmonary effort is normal.      Breath sounds: Normal breath sounds.   Abdominal:      General: Bowel sounds are normal. There is no distension.      Palpations: Abdomen is soft.      Tenderness: There is abdominal tenderness. There is no guarding or rebound.   Musculoskeletal:      Right lower leg: No edema.      Left lower leg: No edema.   Skin:     General: Skin is dry.   Neurological:      Mental Status: She is alert and oriented to person, place, and time.             Significant Labs: All pertinent  labs within the past 24 hours have been reviewed.  CBC:   Recent Labs   Lab 01/29/25  0420 01/30/25  0410   WBC 11.21 9.28   HGB 13.2 12.8   HCT 39.0 38.2    271     CMP:   Recent Labs   Lab 01/29/25  0420 01/30/25  0410    139   K 3.8 3.2*    108   CO2 24 22*    76   BUN 11 8   CREATININE 0.8 0.8   CALCIUM 8.3* 7.9*   PROT 6.2 6.1   ALBUMIN 3.2* 3.2*   BILITOT 0.5 0.5   ALKPHOS 31* 29*   AST 32 31   ALT 37 31   ANIONGAP 9 9     Magnesium:   Recent Labs   Lab 01/29/25  0420 01/30/25  0410   MG 1.9 1.8     POCT Glucose:   Recent Labs   Lab 01/30/25  0036 01/30/25  0645 01/30/25  1142   POCTGLUCOSE 82 72 92       Significant Imaging: I have reviewed all pertinent imaging results/findings within the past 24 hours.  Imaging Results              X-Ray KUB (Final result)  Result time 01/28/25 07:41:55      Final result by Shawn Mora MD (01/28/25 07:41:55)                   Impression:      1. Nonobstructive bowel gas pattern.  2. Nasogastric tube tip is at the distal stomach.      Electronically signed by: Shawn Mora  Date:    01/28/2025  Time:    07:41               Narrative:    EXAMINATION:  XR KUB    CLINICAL HISTORY:  Small-bowel obstruction    COMPARISON:  CT, January 27, 2025    FINDINGS:  Supine view the abdomen demonstrates nasogastric tube in place with tip at the distal stomach.  No pathologic bowel distention is identified.  There is no evidence of mass or suspicious calcification.  Contrast filled urinary bladder is noted.    Postoperative changes are noted at L5-S1.                                       X-Ray Chest AP Portable (Final result)  Result time 01/27/25 18:36:24      Final result by Fracnk Paez MD (01/27/25 18:36:24)                   Impression:      As above.      Electronically signed by: Franck Paez MD  Date:    01/27/2025  Time:    18:36               Narrative:    EXAMINATION:  XR CHEST AP PORTABLE    CLINICAL HISTORY:  Encounter for other  specified special examinations    TECHNIQUE:  Single frontal view of the chest was performed.    COMPARISON:  09/19/2022    FINDINGS:  There has been interval placement of esophagogastric tube extending below the diaphragm with tip and distal side port projecting over the stomach within the left abdomen.  Cardiac silhouette is not significantly enlarged.  No large confluent airspace consolidation appreciated throughout the lungs.  No significant volume of pleural fluid or pneumothorax identified.  Osseous structures appear intact.                                       CT Abdomen Pelvis With IV Contrast NO Oral Contrast (Final result)  Result time 01/27/25 16:52:57      Final result by Hernan Lima MD (01/27/25 16:52:57)                   Impression:      1.  Low-grade mechanical small bowel obstruction with a transition point at the level of the ileum.    2.  Moderate acute on chronic long segment inflammatory small-bowel changes involving the distal ileum as above suggestive of enteritis (while possibly infectious/inflammatory, the pattern and distribution is most suggestive of Crohn's disease), consider correlation with the symptoms, history and attention on follow-up imaging.    3.  Trace ascites.    4.  Numerous additional, and incidental findings as above.    RESULT NOTIFICATION: These observations were discussed by the dictating physician, by phone with, and acknowledged by Sandro Blanco at 16:51 on 1/27/2025.      Electronically signed by: Hernan Lima  Date:    01/27/2025  Time:    16:52               Narrative:      CMS MANDATED QUALITY DATA - CT RADIATION - 436    All CT scans at this facility utilize dose modulation, iterative reconstruction, and/or weight based dosing when appropriate to reduce radiation dose to as low as reasonably achievable.    CLINICAL HISTORY:  ([id number])58 y/o  (1965) F    Abdominal abscess/infection suspected;Bowel obstruction  suspected;    TECHNIQUE:  (A#[id number], exam time 1/27/2025 16:42)    CT ABDOMEN PELVIS WITH IV CONTRAST [id number]    Axial CT images of the abdomen and pelvis were obtained from the dome of the diaphragm to the proximal thighs.    COMPARISON:  CT from 08/18/2022.    FINDINGS:  Lower Thorax:    The lung bases are clear. The heart is normal in size.    CT Abdomen:    Liver: Relative diffuse low-attenuation liver consistent with hepatic steatosis.  The liver is enlarged measuring 19.5 cm sagittal right lobe.    Gallbladder: The gallbladder is surgically absent.    Biliary Tree: No intra or extrahepatic ductal dilation.    Spleen: Normal.    Pancreas: The pancreas is normal.    Adrenal Glands: Normal    Kidneys: The kidneys are normal in imaging appearance without hydronephrosis or hydroureter.    Vasculature: Scattered calcified mural plaques are seen in the distal abdominal aorta and iliacs with no aneurysm.    Lymph nodes: No abdominal lymphadenopathy is seen.    Intraperitoneal structures: Trace abdominopelvic ascites.    Bowel: Moderate long segment circumferential colonic wall thickening with mucosal hyperemia and submucosal edema and a targetoid pattern in the distal ileum, with mild mesenteric edema and trace fluid compatible with moderate enteritis, there is fecalization of stool with relative transition point in the mid abdomen (image 149) suggesting fecal stasis and possibly fibrostenotic disease, upstream to this point there are prominent gas and fluid-filled loops of small bowel measuring up to 3.5 cm in diameter (image 93) to the level of the duodenum.  There is fatty infiltration at the terminal ileum (image 159) suggesting chronic inflammation.  The appendix is surgically absent.  There is a relative paucity of stool and gas in the colon.    Abdominal wall: The abdominal wall and musculature are normal.    Musculoskeletal: No acute osseous abnormality is identified.    CT Pelvis:    Bladder:  Normal.    Reproductive Organs: The uterus is not visualized/surgically absent.    Pelvic Lymph nodes: No pelvic lymphadenopathy is identified.

## 2025-01-30 NOTE — TELEPHONE ENCOUNTER
----- Message from Ruben Saxena MD sent at 1/30/2025  2:53 PM CST -----  Needs outpatient colon with Marina in 4-6 weeks.

## 2025-01-30 NOTE — PROGRESS NOTES
General Surgery Progress Note    Admit Date: 1/27/2025  S/P: * No surgery found *    Post-operative Day:      Hospital Day: 3    SUBJECTIVE:   Has had some flatus and significant improvement of abd pain. NGT clamped on my exam.    OBJECTIVE:     Vital Signs (Most Recent)  Temp:  [96.1 °F (35.6 °C)-98.8 °F (37.1 °C)] 97.9 °F (36.6 °C)  Pulse:  [65-94] 86  Resp:  [12-18] 18  SpO2:  [95 %-97 %] 96 %  BP: (135-172)/(72-82) 172/77    I&Os:  I/O last 3 completed shifts:  In: 5851.3 [I.V.:5355.8; IV Piggyback:495.5]  Out: 700 [Urine:700]    Physical Exam:  Gen: NAD, AAOx3  HEENT: Anicteric sclera NGT in place  Pulm: unlabored, symmetrical   Abd: soft, mild TTP, no edema    Laboratory:  CBC:   Recent Labs   Lab 01/29/25  0420   WBC 11.21   RBC 4.38   HGB 13.2   HCT 39.0      MCV 89   MCH 30.1   MCHC 33.8     BMP:   Recent Labs   Lab 01/29/25  0420         K 3.8      CO2 24   BUN 11   CREATININE 0.8   CALCIUM 8.3*     Labs within the past 24 hours have been reviewed.    ASSESSMENT/PLAN:     Patient Active Problem List    Diagnosis Date Noted    SBO (small bowel obstruction) 01/27/2025    Thyroid nodule 08/15/2024    Dyslipidemia associated with type 2 diabetes mellitus 02/23/2024    Attention deficit disorder 05/20/2023    Hyperlipidemia 05/17/2023    BMI 30.0-30.9,adult 02/13/2023    DDD (degenerative disc disease), lumbosacral 11/29/2022    Mass on back 09/21/2022    Lumbar disc herniation with radiculopathy 05/11/2021    Wears contact lenses 05/06/2021    Elevated liver enzymes 03/19/2021    Chronic pain 03/10/2021    Former smoker 03/08/2021    Hormone replacement therapy (HRT) 03/08/2021    Chronic, continuous use of opioids 03/08/2021    Crohn's disease 03/08/2021    History of MRSA infection 03/08/2021    History of fatty infiltration of liver 03/08/2021    H/O partial thyroidectomy 03/08/2021    Essential hypertension 10/15/2020    Type 2 diabetes mellitus with hyperglycemia 03/04/2020          59 y.o. female with sbo possibly from Chrohn's flare  -has had improvement  -keep ngt clamped  -if she continues to pass flatus or have BM she be given cld and ngt can be removed if tolerated  -GI on board, no plans for steroids at this time, will f/u as putpatient

## 2025-01-30 NOTE — PLAN OF CARE
Plan of care reviewed with patient, verbalized understanding. IV intact and patent with IVF infusing as ordered, no s/s infection or infiltration noted to site. ABX given as ordered. Meds given per MAR. Ambulated to bathroom with standby assist. Pain and nausea managed with PRN medication. NG tube in place and clamped. Patient noted to pass a good amount of gas during night. IS at bedside and encouraged use. NAD noted. Purposeful q2hr rounding done. Safety maintained with side rails up x3, bed wheels locked, bed in lowest position, bed alarm set, slip resistant socks maintained, and call light with in reach of patient. Patient educated to call for assistance when needed, verbalized understanding. Patient remains free from falls. No further needs expressed at this time. Will continue to monitor.     Problem: Adult Inpatient Plan of Care  Goal: Plan of Care Review  Outcome: Progressing  Goal: Patient-Specific Goal (Individualized)  Outcome: Progressing  Goal: Absence of Hospital-Acquired Illness or Injury  Outcome: Progressing  Goal: Optimal Comfort and Wellbeing  Outcome: Progressing  Goal: Readiness for Transition of Care  Outcome: Progressing     Problem: Diabetes Comorbidity  Goal: Blood Glucose Level Within Targeted Range  Outcome: Progressing     Problem: Infection  Goal: Absence of Infection Signs and Symptoms  Outcome: Progressing     Problem: Pain Acute  Goal: Optimal Pain Control and Function  Outcome: Progressing

## 2025-01-30 NOTE — PROGRESS NOTES
[facility]     CC: Abdominal pain    HPI 59 y.o. female with pMHx of Crohn's, HTN, hypothyroidism, T2 DM, and GERD who presented to the ED with complaints of abdominal pain that began last night.  Patient is with a history of appendectomy and cholecystectomy with multiple adhesions she believed that abdominal pain was related to past surgeries and/or her Crohn's.  EMS gave patient EN route 100 of fentanyl and 8 mg of Zofran.  ED MD spoke with surgery to come and evaluate patient.  Patient be NPO.  Patient with a long term history of opioid intake.  May need to consider palliative for pain control prior to DC.     Triage vitals with pulse 98, /104, temp 98.5°, SpO2 97% on room air On evaluation in the ED blood work with WBC 20 point 2, hemoglobin 18.1, hematocrit 53, sodium 135, glucose 250, alk phos 42, AST 78, ALT 57.  CTA abdomen and pelvis with IV contrast with low-grade mechanical small-bowel obstruction with a transition point at the level of the ileum.  Moderate acute on chronic long segment inflammatory small bowel changes involving distal ileum as above suggestive of enteritis consider correlation with the symptomology.  Trace ascites and numerous incidental findings.     Overview/Hospital Course:  Tanesha Chamberlain was closely monitored while in the hospital.  Patient was admitted to Hospital Medicine for small-bowel obstruction. CTA abdomen and pelvis with IV contrast with low-grade mechanical small-bowel obstruction with a transition point at the level of the ileum. Moderate acute on chronic long segment inflammatory small bowel changes involving distal ileum as above suggestive of enteritis consider correlation with the symptomology. Trace ascites and numerous incidental findings.  He NG tube was placed for gastric decompression.  There is suspicion for Crohn's flare-up.  General surgery and GI consulted.  Patient follows outpatient with pain management for chronic back pain.       Interval History:   Patient seen and examined.  Patient in no acute distress.  NG tube output 600 cc.  Patient denies flatulence.  General surgery and GI consult pending.    FURTHER HISTORY:  Above obtained from independent review of records from admitting provider as well as from direct discussion with nursing who states patient feeling better.  In addition, on my interview, I note the following:  Patient admitted with abdominal pain, onset two days ago, associated with N/V, with no alleviating/exacerbating factors.  Patient had CT which showed possible obstruction.  CT was reviewed.  NGT output has been clear and has been about 400 cc in the last 24 hours.  Patient feeling better and it is slowing.  Last colonoscopy in 11/2024 was unremarkable.  There is documented history of UC but no evidence of this on recent scope.  Patient has not been on any IBD therapy    INTERVAL HISTORY:  NGT clamped and patient feeling better.  Tolerating liquids PO.  No new events.      Past Medical History:   Diagnosis Date    ADHD (attention deficit hyperactivity disorder)     Arthritis     Colon polyp     Crohn's disease     Diabetes mellitus, type 2     GERD (gastroesophageal reflux disease)     Hypertension     Hypothyroidism     Lumbar back pain with radiculopathy affecting left lower extremity     SBO (small bowel obstruction)        Past Surgical History:   Procedure Laterality Date    ANTERIOR CRUCIATE LIGAMENT REPAIR Right     APPENDECTOMY      1988    BUNIONECTOMY Bilateral     CHOLECYSTECTOMY      2005    COLONOSCOPY N/A 11/1/2024    Procedure: COLONOSCOPY(Instructions sent 10/25);  Surgeon: Marguerite Gray MD;  Location: [facility];  Service: Endoscopy;  Laterality: N/A;    ESOPHAGOGASTRODUODENOSCOPY N/A 10/18/2024    Procedure: EGD (ESOPHAGOGASTRODUODENOSCOPY);  Surgeon: Marguerite Gray MD;  Location: [facility];  Service: Endoscopy;  Laterality: N/A;    EXCISION OF MASS OF BACK Left 09/21/2022    Procedure:  EXCISION, MASS, BACK;  Surgeon: Sae Collins III, MD;  Location: [facility] OR;  Service: General;  Laterality: Left;    HYSTERECTOMY      INJECTION OF JOINT Right 2023    Procedure: INJECTION, JOINT, RIGHT PIRIFORMIS AND RIGHT GTB;  Surgeon: Loreta Brito MD;  Location: [facility] PAIN T;  Service: Pain Management;  Laterality: Right;    MINIMALLY INVASIVE SURGICAL REMOVAL OF INTERVERTEBRAL DISC OF SPINE USING MICROSCOPE Left 2021    Procedure: Left L4-5 Far Lateral Disectomy, MIS;  Surgeon: Johnny Cruz MD;  [address];  Service: Neurosurgery;  Laterality: Left;    OOPHORECTOMY      SHOULDER SURGERY Right     SPINE SURGERY      TRANSFORAMINAL EPIDURAL INJECTION OF STEROID Left 03/10/2021    Procedure: INJECTION, STEROID, EPIDURAL, TRANSFORAMINAL APPROACH  L4-5;  Surgeon: Jaylon Tyson MD;  Location: [facility];  Service: Pain Management;  Laterality: Left;  consent needed  ([employer])    TRANSFORAMINAL EPIDURAL INJECTION OF STEROID Left 2022    Procedure: LUMBAR TRANSFORAMINAL LEFT L4/5 MEDICALLY URGENT;  Surgeon: Loreta Brito MD;  Location: [facility];  Service: Pain Management;  Laterality: Left;    UPPER GASTROINTESTINAL ENDOSCOPY      10 years ago [facility]       Social History     Tobacco Use    Smoking status: Former     Current packs/day: 0.00     Types: Cigarettes     Quit date: 2020     Years since quittin.0    Smokeless tobacco: Former    Tobacco comments:     in the 8 th grade   Substance Use Topics    Alcohol use: Yes     Alcohol/week: 4.0 standard drinks of alcohol     Types: 4 Cans of beer per week     Comment: 1 beer , once in 2 weeks    Drug use: No       Family History   Problem Relation Name Age of Onset    Crohn's disease Other      Cirrhosis Neg Hx      Ulcerative colitis Neg Hx         Review of Systems  General ROS: negative for - chills, fever or weight loss  Psychological ROS: negative for - hallucination, depression or  "suicidal ideation  Ophthalmic ROS: negative for - blurry vision, photophobia or eye pain  ENT ROS: negative for - epistaxis, sore throat or rhinorrhea  Respiratory ROS: no cough, shortness of breath, or wheezing  Cardiovascular ROS: no chest pain or dyspnea on exertion  Gastrointestinal ROS: as above  Genito-Urinary ROS: no dysuria, trouble voiding, or hematuria  Musculoskeletal ROS: negative for - arthralgia, myalgia, weakness  Neurological ROS: no syncope or seizures; no ataxia  Dermatological ROS: negative for pruritis, rash and jaundice    Physical Examination  BP (!) 166/91 (Patient Position: Lying)   Pulse 63   Temp 97.4 °F (36.3 °C) (Oral)   Resp 16   Ht 5' 4" (1.626 m)   Wt 81 kg (178 lb 9.2 oz)   SpO2 95%   BMI 30.65 kg/m²   General appearance: alert, cooperative, no distress  HENT: Normocephalic, atraumatic, neck symmetrical, no nasal discharge   Eyes: conjunctivae/corneas clear, PERRL, EOM's intact, sclera anicteric  Lungs: clear to auscultation bilaterally, no dullness to percussion bilaterally, symmetric expansion, breathing unlabored  Heart: regular rate and rhythm without rub; no displacement of the PMI   Abdomen: bowel sounds active  Extremities: extremities symmetric; no clubbing, cyanosis, or edema  Integument: Skin color, texture, turgor normal; no rashes; hair distrubution normal, no jaundice  Neurologic: Alert and oriented X 3, no focal sensory or motor neurologic deficits  Psychiatric: no pressured speech; normal affect; no evidence of impaired cognition, no anxiety/depression     Labs:  Lab Results   Component Value Date    WBC 9.28 01/30/2025    HGB 12.8 01/30/2025    HCT 38.2 01/30/2025    MCV 88 01/30/2025     01/30/2025         CMP  Sodium   Date Value Ref Range Status   01/30/2025 139 136 - 145 mmol/L Final     Potassium   Date Value Ref Range Status   01/30/2025 3.2 (L) 3.5 - 5.1 mmol/L Final     Chloride   Date Value Ref Range Status   01/30/2025 108 95 - 110 mmol/L Final "     CO2   Date Value Ref Range Status   01/30/2025 22 (L) 23 - 29 mmol/L Final     Glucose   Date Value Ref Range Status   01/30/2025 76 70 - 110 mg/dL Final     BUN   Date Value Ref Range Status   01/30/2025 8 6 - 20 mg/dL Final     Creatinine   Date Value Ref Range Status   01/30/2025 0.8 0.5 - 1.4 mg/dL Final     Calcium   Date Value Ref Range Status   01/30/2025 7.9 (L) 8.7 - 10.5 mg/dL Final     Total Protein   Date Value Ref Range Status   01/30/2025 6.1 6.0 - 8.4 g/dL Final     Albumin   Date Value Ref Range Status   01/30/2025 3.2 (L) 3.5 - 5.2 g/dL Final     Total Bilirubin   Date Value Ref Range Status   01/30/2025 0.5 0.1 - 1.0 mg/dL Final     Comment:     For infants and newborns, interpretation of results should be based  on gestational age, weight and in agreement with clinical  observations.    Premature Infant recommended reference ranges:  Up to 24 hours.............<8.0 mg/dL  Up to 48 hours............<12.0 mg/dL  3-5 days..................<15.0 mg/dL  6-29 days.................<15.0 mg/dL       Alkaline Phosphatase   Date Value Ref Range Status   01/30/2025 29 (L) 40 - 150 U/L Final     AST   Date Value Ref Range Status   01/30/2025 31 10 - 40 U/L Final     ALT   Date Value Ref Range Status   01/30/2025 31 10 - 44 U/L Final     Anion Gap   Date Value Ref Range Status   01/30/2025 9 8 - 16 mmol/L Final     eGFR   Date Value Ref Range Status   01/30/2025 >60 >60 mL/min/1.73 m^2 Final           Imaging:  CT scan was independently visualized and reviewed by me and showed findings as above.    I have personally reviewed these images    Case discussed as above    Assessment:   Abnormal CT  Abdominal pain  History of IBD  Abdominal pain  N/V    Plan:  Pull NGT  Advance diet carefully per primary team.  Outpatient colonoscopy with Dr. Gray after discharge.  GI to sign off.    Ruben Velasquez MD  [facility]  [address], [address]  [place] [address]  Office: [phone]  Fax:  [phone]

## 2025-01-30 NOTE — ASSESSMENT & PLAN NOTE
NG clamped  Clear liquid diet ordered by surgery  Surgery following  Resumed patient's fentanyl patch   Dilaudid p.r.n. pain control  l

## 2025-01-30 NOTE — PROGRESS NOTES
[facility] [facility]  Progress Note    Patient Name: Tanesha Chamberlain  MRN: [id number]  Patient Class: IP- Inpatient   Admission Date: 1/27/2025  Length of Stay: 3 days  Attending Physician: Clary Grande MD  Primary Care Provider: Norberto Ac III, MD        Subjective     Principal Problem:SBO (small bowel obstruction)        HPI:  59-year-old female with pMHx of Crohn's, HTN, hypothyroidism, T2 DM, and GERD who presented to the ED with complaints of abdominal pain that began last night.  Patient is with a history of appendectomy and cholecystectomy with multiple adhesions she believed that abdominal pain was related to past surgeries and/or her Crohn's.  EMS gave patient EN route 100 of fentanyl and 8 mg of Zofran.  ED MD spoke with surgery to come and evaluate patient.  Patient be NPO.  Patient with a long term history of opioid intake.  May need to consider palliative for pain control prior to DC.    Triage vitals with pulse 98, /104, temp 98.5°, SpO2 97% on room air On evaluation in the ED blood work with WBC 20 point 2, hemoglobin 18.1, hematocrit 53, sodium 135, glucose 250, alk phos 42, AST 78, ALT 57.  CTA abdomen and pelvis with IV contrast with low-grade mechanical small-bowel obstruction with a transition point at the level of the ileum.  Moderate acute on chronic long segment inflammatory small bowel changes involving distal ileum as above suggestive of enteritis consider correlation with the symptomology.  Trace ascites and numerous incidental findings.    Overview/Hospital Course:  Tanesha Chamberlain was closely monitored while in the hospital.  Patient was admitted to Hospital Medicine for small-bowel obstruction. CTA abdomen and pelvis with IV contrast with low-grade mechanical small-bowel obstruction with a transition point at the level of the ileum. Moderate acute on chronic long segment inflammatory small bowel changes involving distal ileum as above  suggestive of enteritis consider correlation with the symptomology. Trace ascites and numerous incidental findings.  He NG tube was placed for gastric decompression.  There is suspicion for Crohn's flare-up.  General surgery and GI consulted.  Patient follows outpatient with pain management for chronic back pain.  GI did not recommend steroids. Surgery clamped NGT and ordered clear liquid diet.    Interval History:   Patient seen and examined.  Patient in no acute distress.  NG tube in place but clamped.   Patient has had flatulence and bowel movement. CLD ordered by surgery.   Review of Systems   Constitutional:  Negative for chills and fever.   Cardiovascular:  Negative for chest pain.   Gastrointestinal:  Positive for abdominal pain and nausea. Negative for vomiting.   Musculoskeletal:  Positive for back pain (Chronic).   Neurological: Negative.      Objective:     Vital Signs (Most Recent):  Temp: 97.4 °F (36.3 °C) (01/30/25 1142)  Pulse: 63 (01/30/25 1142)  Resp: 16 (01/30/25 1142)  BP: (!) 166/91 (01/30/25 1142)  SpO2: 95 % (01/30/25 1142) Vital Signs (24h Range):  Temp:  [97.4 °F (36.3 °C)-98.6 °F (37 °C)] 97.4 °F (36.3 °C)  Pulse:  [63-86] 63  Resp:  [15-19] 16  SpO2:  [94 %-98 %] 95 %  BP: (142-173)/(72-91) 166/91     Weight: 81 kg (178 lb 9.2 oz)  Body mass index is 30.65 kg/m².    Intake/Output Summary (Last 24 hours) at 1/30/2025 1231  Last data filed at 1/30/2025 0645  Gross per 24 hour   Intake 7392.25 ml   Output 1500 ml   Net 5892.25 ml         Physical Exam  Vitals and nursing note reviewed. Exam conducted with a chaperone present.   Constitutional:       General: She is not in acute distress.     Appearance: Normal appearance.   Cardiovascular:      Rate and Rhythm: Normal rate and regular rhythm.      Pulses: Normal pulses.      Heart sounds: Normal heart sounds.   Pulmonary:      Effort: Pulmonary effort is normal.      Breath sounds: Normal breath sounds.   Abdominal:      General: Bowel sounds  are normal. There is no distension.      Palpations: Abdomen is soft.      Tenderness: There is abdominal tenderness. There is no guarding or rebound.   Musculoskeletal:      Right lower leg: No edema.      Left lower leg: No edema.   Skin:     General: Skin is dry.   Neurological:      Mental Status: She is alert and oriented to person, place, and time.             Significant Labs: All pertinent labs within the past 24 hours have been reviewed.  CBC:   Recent Labs   Lab 01/29/25  0420 01/30/25  0410   WBC 11.21 9.28   HGB 13.2 12.8   HCT 39.0 38.2    271     CMP:   Recent Labs   Lab 01/29/25  0420 01/30/25  0410    139   K 3.8 3.2*    108   CO2 24 22*    76   BUN 11 8   CREATININE 0.8 0.8   CALCIUM 8.3* 7.9*   PROT 6.2 6.1   ALBUMIN 3.2* 3.2*   BILITOT 0.5 0.5   ALKPHOS 31* 29*   AST 32 31   ALT 37 31   ANIONGAP 9 9     Magnesium:   Recent Labs   Lab 01/29/25  0420 01/30/25  0410   MG 1.9 1.8     POCT Glucose:   Recent Labs   Lab 01/30/25  0036 01/30/25  0645 01/30/25  1142   POCTGLUCOSE 82 72 92       Significant Imaging: I have reviewed all pertinent imaging results/findings within the past 24 hours.  Imaging Results              X-Ray KUB (Final result)  Result time 01/28/25 07:41:55      Final result by Shawn Mora MD (01/28/25 07:41:55)                   Impression:      1. Nonobstructive bowel gas pattern.  2. Nasogastric tube tip is at the distal stomach.      Electronically signed by: Shawn Mora  Date:    01/28/2025  Time:    07:41               Narrative:    EXAMINATION:  XR KUB    CLINICAL HISTORY:  Small-bowel obstruction    COMPARISON:  CT, January 27, 2025    FINDINGS:  Supine view the abdomen demonstrates nasogastric tube in place with tip at the distal stomach.  No pathologic bowel distention is identified.  There is no evidence of mass or suspicious calcification.  Contrast filled urinary bladder is noted.    Postoperative changes are noted at L5-S1.                                        X-Ray Chest AP Portable (Final result)  Result time 01/27/25 18:36:24      Final result by Franck Paez MD (01/27/25 18:36:24)                   Impression:      As above.      Electronically signed by: Franck Paez MD  Date:    01/27/2025  Time:    18:36               Narrative:    EXAMINATION:  XR CHEST AP PORTABLE    CLINICAL HISTORY:  Encounter for other specified special examinations    TECHNIQUE:  Single frontal view of the chest was performed.    COMPARISON:  09/19/2022    FINDINGS:  There has been interval placement of esophagogastric tube extending below the diaphragm with tip and distal side port projecting over the stomach within the left abdomen.  Cardiac silhouette is not significantly enlarged.  No large confluent airspace consolidation appreciated throughout the lungs.  No significant volume of pleural fluid or pneumothorax identified.  Osseous structures appear intact.                                       CT Abdomen Pelvis With IV Contrast NO Oral Contrast (Final result)  Result time 01/27/25 16:52:57      Final result by Hernan Lima MD (01/27/25 16:52:57)                   Impression:      1.  Low-grade mechanical small bowel obstruction with a transition point at the level of the ileum.    2.  Moderate acute on chronic long segment inflammatory small-bowel changes involving the distal ileum as above suggestive of enteritis (while possibly infectious/inflammatory, the pattern and distribution is most suggestive of Crohn's disease), consider correlation with the symptoms, history and attention on follow-up imaging.    3.  Trace ascites.    4.  Numerous additional, and incidental findings as above.    RESULT NOTIFICATION: These observations were discussed by the dictating physician, by phone with, and acknowledged by Sandro Blanco at 16:51 on 1/27/2025.      Electronically signed by: Hernan Lima  Date:    01/27/2025  Time:    16:52                Narrative:      CMS MANDATED QUALITY DATA - CT RADIATION - 436    All CT scans at this facility utilize dose modulation, iterative reconstruction, and/or weight based dosing when appropriate to reduce radiation dose to as low as reasonably achievable.    CLINICAL HISTORY:  ([id number])58 y/o  (1965) F    Abdominal abscess/infection suspected;Bowel obstruction suspected;    TECHNIQUE:  (A#[id number], exam time 1/27/2025 16:42)    CT ABDOMEN PELVIS WITH IV CONTRAST [id number]    Axial CT images of the abdomen and pelvis were obtained from the dome of the diaphragm to the proximal thighs.    COMPARISON:  CT from 08/18/2022.    FINDINGS:  Lower Thorax:    The lung bases are clear. The heart is normal in size.    CT Abdomen:    Liver: Relative diffuse low-attenuation liver consistent with hepatic steatosis.  The liver is enlarged measuring 19.5 cm sagittal right lobe.    Gallbladder: The gallbladder is surgically absent.    Biliary Tree: No intra or extrahepatic ductal dilation.    Spleen: Normal.    Pancreas: The pancreas is normal.    Adrenal Glands: Normal    Kidneys: The kidneys are normal in imaging appearance without hydronephrosis or hydroureter.    Vasculature: Scattered calcified mural plaques are seen in the distal abdominal aorta and iliacs with no aneurysm.    Lymph nodes: No abdominal lymphadenopathy is seen.    Intraperitoneal structures: Trace abdominopelvic ascites.    Bowel: Moderate long segment circumferential colonic wall thickening with mucosal hyperemia and submucosal edema and a targetoid pattern in the distal ileum, with mild mesenteric edema and trace fluid compatible with moderate enteritis, there is fecalization of stool with relative transition point in the mid abdomen (image 149) suggesting fecal stasis and possibly fibrostenotic disease, upstream to this point there are prominent gas and fluid-filled loops of small bowel measuring up to 3.5 cm in diameter (image 93) to the level of the  duodenum.  There is fatty infiltration at the terminal ileum (image 159) suggesting chronic inflammation.  The appendix is surgically absent.  There is a relative paucity of stool and gas in the colon.    Abdominal wall: The abdominal wall and musculature are normal.    Musculoskeletal: No acute osseous abnormality is identified.    CT Pelvis:    Bladder: Normal.    Reproductive Organs: The uterus is not visualized/surgically absent.    Pelvic Lymph nodes: No pelvic lymphadenopathy is identified.                                       Assessment and Plan     * SBO (small bowel obstruction)  NG clamped  Clear liquid diet ordered by surgery  Surgery following  Resumed patient's fentanyl patch   Dilaudid p.r.n. pain control  l      Dyslipidemia associated with type 2 diabetes mellitus  Chronic.  Noted.  Resume patient's atorvastatin when able to tolerate p.o.      BMI 30.0-30.9,adult  Nutrition consulted. Most recent weight and BMI monitored-   Wt Readings from Last 1 Encounters:   01/27/25 80.7 kg (178 lb)   Body mass index is 30.55 kg/m²..     Encourage maximal PO intake when clinically appropriate. Diet supplementation ordered per nutrition approval. Will encourage PO and monitor closely for weight changes.      DDD (degenerative disc disease), lumbosacral  Chronic.  Noted.      Chronic pain  Follows outpatient with pain management per patient  P.r.n. analgesics  Continue fentanyl patch      Crohn's disease  GI does not recommend steroids.      Chronic, continuous use of opioids  Noted  Patient on home fentanyl patch and takes p.r.n. oral meds        Type 2 diabetes mellitus with hyperglycemia  Patient's FSGs are controlled on current medication regimen.  Last A1c reviewed-   Lab Results   Component Value Date    HGBA1C 7.2 (H) 08/06/2024     Most recent fingerstick glucose reviewed-   Recent Labs   Lab 01/27/25 2054   POCTGLUCOSE 174*     Current correctional scale  Medium  Maintain anti-hyperglycemic dose as  follows-   Antihyperglycemics (From admission, onward)      Start     Stop Route Frequency Ordered    01/27/25 1908  insulin aspart U-100 pen 0-10 Units         -- SubQ Before meals & nightly PRN 01/27/25 1812          Hold Oral hypoglycemics while patient is in the hospital.    Essential hypertension  Patient's blood pressure range in the last 24 hours was: BP  Min: 109/69  Max: 160/72.The patient's inpatient anti-hypertensive regimen is listed below:  Current Antihypertensives  hydrALAZINE injection 10 mg, Every 4 hours PRN, Intravenous    Plan  - BP is uncontrolled, will adjust as follows:  Labile blood pressure with patient being in pain attempt to manage pain using hydralazine as needed        VTE Risk Mitigation (From admission, onward)           Ordered     enoxaparin injection 40 mg  Every 24 hours (non-standard times)         01/27/25 2150     Reason for No Pharmacological VTE Prophylaxis  Once        Question Answer Comment   Reasons: Patient is Ambulatory    Reasons: Risk of Bleeding        01/27/25 1812     IP VTE HIGH RISK PATIENT  Once         01/27/25 1812     Place sequential compression device  Until discontinued         01/27/25 1812                    Discharge Planning   RAJENDRA: 2/1/2025     Code Status: Full Code   Medical Readiness for Discharge Date:   Discharge Plan A: Home                        Clary Grande MD, MD  Department of Hospital Medicine   [facility]/Surg

## 2025-01-31 VITALS
DIASTOLIC BLOOD PRESSURE: 88 MMHG | TEMPERATURE: 98 F | HEIGHT: 64 IN | WEIGHT: 178.56 LBS | SYSTOLIC BLOOD PRESSURE: 168 MMHG | RESPIRATION RATE: 18 BRPM | HEART RATE: 72 BPM | OXYGEN SATURATION: 97 % | BODY MASS INDEX: 30.48 KG/M2

## 2025-01-31 LAB
ALBUMIN SERPL BCP-MCNC: 3.3 G/DL (ref 3.5–5.2)
ALP SERPL-CCNC: 29 U/L (ref 40–150)
ALT SERPL W/O P-5'-P-CCNC: 30 U/L (ref 10–44)
ANION GAP SERPL CALC-SCNC: 9 MMOL/L (ref 8–16)
AST SERPL-CCNC: 28 U/L (ref 10–40)
BASOPHILS # BLD AUTO: 0.05 K/UL (ref 0–0.2)
BASOPHILS NFR BLD: 0.6 % (ref 0–1.9)
BILIRUB SERPL-MCNC: 0.5 MG/DL (ref 0.1–1)
BUN SERPL-MCNC: 6 MG/DL (ref 6–20)
CALCIUM SERPL-MCNC: 8 MG/DL (ref 8.7–10.5)
CHLORIDE SERPL-SCNC: 107 MMOL/L (ref 95–110)
CO2 SERPL-SCNC: 25 MMOL/L (ref 23–29)
CREAT SERPL-MCNC: 0.7 MG/DL (ref 0.5–1.4)
DIFFERENTIAL METHOD BLD: ABNORMAL
EOSINOPHIL # BLD AUTO: 0.2 K/UL (ref 0–0.5)
EOSINOPHIL NFR BLD: 2.4 % (ref 0–8)
ERYTHROCYTE [DISTWIDTH] IN BLOOD BY AUTOMATED COUNT: 12.6 % (ref 11.5–14.5)
EST. GFR  (NO RACE VARIABLE): >60 ML/MIN/1.73 M^2
GLUCOSE SERPL-MCNC: 112 MG/DL (ref 70–110)
HCT VFR BLD AUTO: 38.7 % (ref 37–48.5)
HGB BLD-MCNC: 13.3 G/DL (ref 12–16)
IMM GRANULOCYTES # BLD AUTO: 0.03 K/UL (ref 0–0.04)
IMM GRANULOCYTES NFR BLD AUTO: 0.4 % (ref 0–0.5)
LYMPHOCYTES # BLD AUTO: 2.6 K/UL (ref 1–4.8)
LYMPHOCYTES NFR BLD: 33.1 % (ref 18–48)
MAGNESIUM SERPL-MCNC: 1.8 MG/DL (ref 1.6–2.6)
MCH RBC QN AUTO: 29.4 PG (ref 27–31)
MCHC RBC AUTO-ENTMCNC: 34.4 G/DL (ref 32–36)
MCV RBC AUTO: 86 FL (ref 82–98)
MONOCYTES # BLD AUTO: 0.7 K/UL (ref 0.3–1)
MONOCYTES NFR BLD: 8.9 % (ref 4–15)
NEUTROPHILS # BLD AUTO: 4.4 K/UL (ref 1.8–7.7)
NEUTROPHILS NFR BLD: 54.6 % (ref 38–73)
NRBC BLD-RTO: 0 /100 WBC
PLATELET # BLD AUTO: 295 K/UL (ref 150–450)
PMV BLD AUTO: 9.1 FL (ref 9.2–12.9)
POTASSIUM SERPL-SCNC: 3.1 MMOL/L (ref 3.5–5.1)
PROT SERPL-MCNC: 6.3 G/DL (ref 6–8.4)
RBC # BLD AUTO: 4.52 M/UL (ref 4–5.4)
SODIUM SERPL-SCNC: 141 MMOL/L (ref 136–145)
WBC # BLD AUTO: 7.98 K/UL (ref 3.9–12.7)

## 2025-01-31 PROCEDURE — 80053 COMPREHEN METABOLIC PANEL: CPT | Performed by: NURSE PRACTITIONER

## 2025-01-31 PROCEDURE — 63600175 PHARM REV CODE 636 W HCPCS: Performed by: INTERNAL MEDICINE

## 2025-01-31 PROCEDURE — 83735 ASSAY OF MAGNESIUM: CPT | Performed by: NURSE PRACTITIONER

## 2025-01-31 PROCEDURE — 25000003 PHARM REV CODE 250: Performed by: NURSE PRACTITIONER

## 2025-01-31 PROCEDURE — 36415 COLL VENOUS BLD VENIPUNCTURE: CPT | Performed by: NURSE PRACTITIONER

## 2025-01-31 PROCEDURE — 25000003 PHARM REV CODE 250: Performed by: INTERNAL MEDICINE

## 2025-01-31 PROCEDURE — 63600175 PHARM REV CODE 636 W HCPCS: Performed by: NURSE PRACTITIONER

## 2025-01-31 PROCEDURE — 25000003 PHARM REV CODE 250: Performed by: SURGERY

## 2025-01-31 PROCEDURE — 85025 COMPLETE CBC W/AUTO DIFF WBC: CPT | Performed by: NURSE PRACTITIONER

## 2025-01-31 PROCEDURE — 99231 SBSQ HOSP IP/OBS SF/LOW 25: CPT | Mod: ,,, | Performed by: SURGERY

## 2025-01-31 RX ORDER — AMOXICILLIN AND CLAVULANATE POTASSIUM 875; 125 MG/1; MG/1
1 TABLET, FILM COATED ORAL EVERY 12 HOURS
Qty: 10 TABLET | Refills: 0 | Status: SHIPPED | OUTPATIENT
Start: 2025-01-31 | End: 2025-02-05

## 2025-01-31 RX ADMIN — TIZANIDINE 4 MG: 4 TABLET ORAL at 12:01

## 2025-01-31 RX ADMIN — ACETAMINOPHEN 650 MG: 325 TABLET ORAL at 06:01

## 2025-01-31 RX ADMIN — MUPIROCIN 1 G: 20 OINTMENT TOPICAL at 08:01

## 2025-01-31 RX ADMIN — SODIUM CHLORIDE: 9 INJECTION, SOLUTION INTRAVENOUS at 06:01

## 2025-01-31 RX ADMIN — POTASSIUM BICARBONATE 35 MEQ: 391 TABLET, EFFERVESCENT ORAL at 06:01

## 2025-01-31 RX ADMIN — OXYCODONE HYDROCHLORIDE 15 MG: 5 TABLET ORAL at 12:01

## 2025-01-31 RX ADMIN — PIPERACILLIN SODIUM AND TAZOBACTAM SODIUM 4.5 G: 4; .5 INJECTION, POWDER, LYOPHILIZED, FOR SOLUTION INTRAVENOUS at 02:01

## 2025-01-31 RX ADMIN — Medication 800 MG: at 06:01

## 2025-01-31 RX ADMIN — OXYCODONE HYDROCHLORIDE 15 MG: 5 TABLET ORAL at 08:01

## 2025-01-31 RX ADMIN — PIPERACILLIN SODIUM AND TAZOBACTAM SODIUM 4.5 G: 4; .5 INJECTION, POWDER, LYOPHILIZED, FOR SOLUTION INTRAVENOUS at 10:01

## 2025-01-31 RX ADMIN — PANTOPRAZOLE SODIUM 40 MG: 40 INJECTION, POWDER, FOR SOLUTION INTRAVENOUS at 06:01

## 2025-01-31 NOTE — PLAN OF CARE
Pt clear for DC from case management standpoint. Discharging to home.    PCP requests hospital follow ups with his office.  Follow up appointment scheduled and added to AVS.    [employer] message sent to Dr. Gray's office to contact pt to schedule follow up appointment.  Office information added to AVS.     01/31/25 1121   Final Note   Assessment Type Final Discharge Note   Anticipated Discharge Disposition Home   Hospital Resources/Appts/Education Provided Appointments scheduled and added to AVS

## 2025-01-31 NOTE — PLAN OF CARE
Problem: Adult Inpatient Plan of Care  Goal: Plan of Care Review  Outcome: Progressing  Goal: Patient-Specific Goal (Individualized)  Outcome: Progressing  Goal: Absence of Hospital-Acquired Illness or Injury  Outcome: Progressing  Goal: Optimal Comfort and Wellbeing  Outcome: Progressing  Goal: Readiness for Transition of Care  Outcome: Progressing     Problem: Diabetes Comorbidity  Goal: Blood Glucose Level Within Targeted Range  Outcome: Progressing     Problem: Infection  Goal: Absence of Infection Signs and Symptoms  Outcome: Progressing     Problem: Pain Acute  Goal: Optimal Pain Control and Function  Outcome: Progressing     POC/Meds reviewed with pt. Pt verbalized complete understanding. Two hour pt rounding maintained throughout shift. All fall precautions maintained. Repositions self. Slip resistant socks maintained throughout shift while out of bed. Bed in lowest position, locked, and call light in reach. Side rails up x2. No complaints at this time. Will continue to monitor.

## 2025-01-31 NOTE — NURSING
NG removed. Pt is tolerating well. Reports feeling so much better. No nausea. Multiple bowel movements. Passing gas.

## 2025-01-31 NOTE — PROGRESS NOTES
General Surgery Progress Note    Admit Date: 1/27/2025  S/P: * No surgery found *    Post-operative Day:      Hospital Day: 5    SUBJECTIVE:   Patient had bowel function.  NG tube was removed yesterday and she tolerated clear liquids and then full liquid diet.  Continues to pass flatus and have bowel function. Reports abdominal pain significantly improved    OBJECTIVE:     Vital Signs (Most Recent)  Temp:  [97.4 °F (36.3 °C)-98.5 °F (36.9 °C)] 98 °F (36.7 °C)  Pulse:  [63-98] 73  Resp:  [14-18] 18  SpO2:  [95 %-99 %] 97 %  BP: (134-191)/(68-94) 134/68    I&Os:  I/O last 3 completed shifts:  In: 8112.3 [P.O.:720; I.V.:6642.2; NG/GT:60; IV Piggyback:690.1]  Out: 4900 [Urine:4900]    Physical Exam:  Gen: NAD, AAOx3  HEENT: Anicteric sclera  Pulm: unlabored, symmetrical   Abd: soft, no tenderness palpation, no distention    Laboratory:  CBC:   Recent Labs   Lab 01/31/25  0414   WBC 7.98   RBC 4.52   HGB 13.3   HCT 38.7      MCV 86   MCH 29.4   MCHC 34.4     BMP:   Recent Labs   Lab 01/31/25  0414   *      K 3.1*      CO2 25   BUN 6   CREATININE 0.7   CALCIUM 8.0*     Labs within the past 24 hours have been reviewed.    ASSESSMENT/PLAN:     Patient Active Problem List    Diagnosis Date Noted    SBO (small bowel obstruction) 01/27/2025    Thyroid nodule 08/15/2024    Dyslipidemia associated with type 2 diabetes mellitus 02/23/2024    Attention deficit disorder 05/20/2023    Hyperlipidemia 05/17/2023    BMI 30.0-30.9,adult 02/13/2023    DDD (degenerative disc disease), lumbosacral 11/29/2022    Mass on back 09/21/2022    Lumbar disc herniation with radiculopathy 05/11/2021    Wears contact lenses 05/06/2021    Elevated liver enzymes 03/19/2021    Chronic pain 03/10/2021    Former smoker 03/08/2021    Hormone replacement therapy (HRT) 03/08/2021    Chronic, continuous use of opioids 03/08/2021    Crohn's disease 03/08/2021    History of MRSA infection 03/08/2021    History of fatty infiltration of  liver 03/08/2021    H/O partial thyroidectomy 03/08/2021    Essential hypertension 10/15/2020    Type 2 diabetes mellitus with hyperglycemia 03/04/2020         59 y.o. female with sbo possibly from Chrohn's flare  -obstruction appears to have resolved  -diabetic diet ordered for breakfast, if tolerated okay for discharge home from surgical standpoint  -the patient is planning to follow up with GI for long-term management of Crohn's

## 2025-01-31 NOTE — DISCHARGE SUMMARY
[facility] Medicine  Discharge Summary      Patient Name: Tanesha Chamberlain  MRN: [id number]  [facility]: [id number]  Patient Class: IP- Inpatient  Admission Date: 1/27/2025  Hospital Length of Stay: 4 days  Discharge Date and Time:  01/31/2025 10:47 AM  Attending Physician: Clary Grande MD   Discharging Provider: Clary Grande MD, MD  Primary Care Provider: Norberto Ac III, MD    Primary Care Team: Networked reference to record PCT     HPI:   59-year-old female with pMHx of Crohn's, HTN, hypothyroidism, T2 DM, and GERD who presented to the ED with complaints of abdominal pain that began last night.  Patient is with a history of appendectomy and cholecystectomy with multiple adhesions she believed that abdominal pain was related to past surgeries and/or her Crohn's.  EMS gave patient EN route 100 of fentanyl and 8 mg of Zofran.  ED MD spoke with surgery to come and evaluate patient.  Patient be NPO.  Patient with a long term history of opioid intake.  May need to consider palliative for pain control prior to DC.    Triage vitals with pulse 98, /104, temp 98.5°, SpO2 97% on room air On evaluation in the ED blood work with WBC 20 point 2, hemoglobin 18.1, hematocrit 53, sodium 135, glucose 250, alk phos 42, AST 78, ALT 57.  CTA abdomen and pelvis with IV contrast with low-grade mechanical small-bowel obstruction with a transition point at the level of the ileum.  Moderate acute on chronic long segment inflammatory small bowel changes involving distal ileum as above suggestive of enteritis consider correlation with the symptomology.  Trace ascites and numerous incidental findings.    * No surgery found *      Hospital Course:   Tanesha Chamberlain was closely monitored while in the hospital.  Patient was admitted to Hospital Medicine for small-bowel obstruction. CTA abdomen and pelvis with IV contrast with low-grade mechanical small-bowel obstruction with a transition point at  the level of the ileum. Moderate acute on chronic long segment inflammatory small bowel changes involving distal ileum as above suggestive of enteritis consider correlation with the symptomology. Trace ascites and numerous incidental findings.  He NG tube was placed for gastric decompression.  There is suspicion for Crohn's flare-up.  General surgery and GI consulted.  Patient follows outpatient with pain management for chronic back pain.  GI did not recommend steroids. Surgery clamped NGT and ordered clear liquid diet. Patient tolerated this, had a bowel movement and passed flatus.  She tolerated an advance in her diet, and was discharged home in stable condition.     Goals of Care Treatment Preferences:  Code Status: Full Code      SDOH Screening:  The patient declined to be screened for utility difficulties, food insecurity, transport difficulties, housing insecurity, and interpersonal safety, so no concerns could be identified this admission.     Consults:   Consults (From admission, onward)          Status Ordering Provider     Inpatient consult to Gastroenterology  Once        Provider:  Ruben Saxena MD    Completed MARTINA JOHNSON     Inpatient consult to General surgery  Once        Provider:  Toño Aviles Jr., MD    Completed EUGENE MARIEE A            * SBO (small bowel obstruction)  Resolved        Dyslipidemia associated with type 2 diabetes mellitus  Home med      BMI 30.0-30.9,adult  Nutrition consulted. Most recent weight and BMI monitored-   Wt Readings from Last 1 Encounters:   01/27/25 80.7 kg (178 lb)   Body mass index is 30.55 kg/m²..     Encourage maximal PO intake when clinically appropriate. Diet supplementation ordered per nutrition approval. Will encourage PO and monitor closely for weight changes.      DDD (degenerative disc disease), lumbosacral  Chronic.  Noted.      Chronic pain  Follows outpatient with pain management per patient  P.r.n. analgesics  Continue fentanyl  patch      Crohn's disease  F/U outpatient      Chronic, continuous use of opioids  Noted  Patient on home fentanyl patch and takes p.r.n. oral meds        Type 2 diabetes mellitus with hyperglycemia  Home meds    Essential hypertension  Home meds        Final Active Diagnoses:    Diagnosis Date Noted POA    PRINCIPAL PROBLEM:  SBO (small bowel obstruction) [K56.609] 01/27/2025 Yes    Dyslipidemia associated with type 2 diabetes mellitus [E11.69, E78.5] 02/23/2024 Yes    BMI 30.0-30.9,adult [Z68.30] 02/13/2023 Not Applicable    DDD (degenerative disc disease), lumbosacral [M51.379] 11/29/2022 Yes    Chronic pain [G89.29] 03/10/2021 Yes    Crohn's disease [K50.90] 03/08/2021 Yes    Chronic, continuous use of opioids [F11.90] 03/08/2021 Yes    Essential hypertension [I10] 10/15/2020 Yes    Type 2 diabetes mellitus with hyperglycemia [E11.65] 03/04/2020 Yes      Problems Resolved During this Admission:       Discharged Condition: stable    Disposition: Home or Self Care    Follow Up:    Patient Instructions:      Diet diabetic     Activity as tolerated       Significant Diagnostic Studies: N/A    Pending Diagnostic Studies:       Procedure Component Value Units Date/Time    HIV 1/2 Ag/Ab (4th Gen) [[id number]]     Order Status: Sent Lab Status: No result     Specimen: Blood     Hepatitis C Antibody [[id number]]     Order Status: Sent Lab Status: No result     Specimen: Blood            Medications:  Reconciled Home Medications:      Medication List        START taking these medications      amoxicillin-clavulanate 875-125mg 875-125 mg per tablet  Commonly known as: AUGMENTIN  Take 1 tablet by mouth every 12 (twelve) hours. for 5 days            CHANGE how you take these medications      TRESIBA FLEXTOUCH U-100 100 unit/mL (3 mL) insulin pen  Generic drug: insulin degludec  Inject 30 Units into the skin once daily.  What changed: when to take this            CONTINUE taking these medications      aspirin 81 MG EC  "tablet  Commonly known as: ECOTRIN  Take 81 mg by mouth once daily.     atorvastatin 10 MG tablet  Commonly known as: LIPITOR  TAKE 1 TABLET BY MOUTH EVERY DAY     BD ULTRA-FINE SHORT PEN NEEDLE 31 gauge x 5/16" Ndle  Generic drug: pen needle, diabetic  Inject 1 Needle into the skin once daily.     estradioL 2 MG tablet  Commonly known as: ESTRACE  TAKE 1 TABLET BY MOUTH EVERY DAY     fentaNYL 75 mcg/hr  Commonly known as: DURAGESIC  Place 1 patch onto the skin every 72 hours.     hydroCHLOROthiazide 25 MG tablet  Commonly known as: HYDRODIURIL  TAKE 1 TABLET BY MOUTH EVERY DAY     HYDROcodone-acetaminophen  mg per tablet  Commonly known as: NORCO  Take 1-2 tablets by mouth every 8 (eight) hours.     naloxone 4 mg/actuation Spry  Commonly known as: NARCAN  1 spray.     omeprazole 40 MG capsule  Commonly known as: PRILOSEC  Take 1 capsule (40 mg total) by mouth 2 (two) times daily before meals for 60 days, THEN 1 capsule (40 mg total) every morning.  Start taking on: October 18, 2024     ONETOUCH DELICA PLUS LANCET 33 gauge Misc  Generic drug: lancets  Check blood glucose readings twice a day-- before meal and 1 to 2 hours post meal     ONETOUCH ULTRA BLUE TEST STRIP Strp  Generic drug: blood sugar diagnostic  Check blood glucose readings twice a day-- before meal and 1 to 2 hours post meal     ONETOUCH ULTRA2 METER Misc  Generic drug: blood-glucose meter  Check blood glucose readings twice a day-- before meal and 1 to 2 hours post meal     tiZANidine 4 MG tablet  Commonly known as: ZANAFLEX  Take 4 mg by mouth 4 (four) times daily as needed (Muscle Spasms).     TRULICITY 3 mg/0.5 mL pen injector  Generic drug: dulaglutide  Inject 3 mg into the skin every 7 days.              Indwelling Lines/Drains at time of discharge:   Lines/Drains/Airways       None                   Time spent on the discharge of patient: 35 minutes         Clary Grande MD, MD  Department of [facility] Medicine  [facility] - " Med/Surg

## 2025-01-31 NOTE — DISCHARGE INSTRUCTIONS
.Our goal at [facility] is to always give you outstanding care and exceptional service. You may receive a survey by mail, text or e-mail in the next 7-10 days from Rick Coley and our leadership team asking about the care you received with us. The survey should only take 5-10 minutes to complete and is very important to us.     Your feedback provides us with a way to recognize our staff who work tirelessly to provide the best care! Also, your responses help us learn how to improve when your experience was below our aspiration of excellence. We WILL use your feedback to continue making improvements to help us provide the highest quality care. We keep your personal information and feedback confidential. We appreciate your time completing this survey and can't wait to hear from you!!!     We want you to leave us today feeling like you can DEFINITELY recommend us to others! We look forward to your continued care with us! Thanks so much for choosing [facility] for your healthcare needs!

## 2025-01-31 NOTE — PLAN OF CARE
Problem: Adult Inpatient Plan of Care  Goal: Plan of Care Review  Outcome: Met  Goal: Patient-Specific Goal (Individualized)  Outcome: Met  Goal: Absence of Hospital-Acquired Illness or Injury  Outcome: Met  Goal: Optimal Comfort and Wellbeing  Outcome: Met  Goal: Readiness for Transition of Care  Outcome: Met     Problem: Diabetes Comorbidity  Goal: Blood Glucose Level Within Targeted Range  Outcome: Met     Problem: Infection  Goal: Absence of Infection Signs and Symptoms  Outcome: Met     Problem: Pain Acute  Goal: Optimal Pain Control and Function  Outcome: Met   Discharge instructions given to patient. Patient verbalized complete understanding. IV discontinued with catheter intact, pressure applied to site and secured with tape. Patient tolerated well.

## 2025-01-31 NOTE — PLAN OF CARE
Problem: Adult Inpatient Plan of Care  Goal: Plan of Care Review  Outcome: Progressing  Goal: Patient-Specific Goal (Individualized)  Outcome: Progressing  Goal: Absence of Hospital-Acquired Illness or Injury  Outcome: Progressing  Goal: Optimal Comfort and Wellbeing  Outcome: Progressing  Goal: Readiness for Transition of Care  Outcome: Progressing     Problem: Infection  Goal: Absence of Infection Signs and Symptoms  Outcome: Progressing     Problem: Pain Acute  Goal: Optimal Pain Control and Function  Outcome: Progressing     POC reviewed with patient and pt's wife. Pt. Tolerating clear liquids. Advanced to full liquids. Tolerating well. Pt had five formed stools and reports feeling much better. Magnesium and potassium replaced. NGT removed. Pain well controlled on current regimen. Hydralazine given for elevated BP with good results. Blood glucose optimal. No falls this shift. Possible discharge tomorrow.

## 2025-02-03 ENCOUNTER — PATIENT OUTREACH (OUTPATIENT)
Dept: ADMINISTRATIVE | Facility: CLINIC | Age: 60
End: 2025-02-03
Payer: COMMERCIAL

## 2025-02-03 ENCOUNTER — TELEPHONE (OUTPATIENT)
Dept: GASTROENTEROLOGY | Facility: CLINIC | Age: 60
End: 2025-02-03
Payer: COMMERCIAL

## 2025-02-03 NOTE — PROGRESS NOTES
C3 nurse spoke with Tanesha Chamberlain  for a TCC post hospital discharge follow up call. The patient has a scheduled HOSFU appointment with Norberto Ac III, MD on 02/10/2025 @ 1919.    Message sent to PCP staff.

## 2025-02-03 NOTE — PROGRESS NOTES
C3 nurse spoke with Tanesha Chamberlain  for a TCC post hospital discharge follow up call. The patient has a scheduled HOSFU appointment with Norberto Ac III, MD on 02/10/2025 @ 7739.    Message sent to PCP staff.   Admission

## 2025-02-03 NOTE — TELEPHONE ENCOUNTER
Call placed to Ms. Chamberlain, spoke with Ms. Alannah pt spouse. She stated that Ms. Mendez would like to be seen in office prior to scheduling any procedures. Offered her 2/19 at 1:30p. He accepted. No further issues noted.

## 2025-02-03 NOTE — TELEPHONE ENCOUNTER
----- Message from Zeenat sent at 2/3/2025  3:54 PM CST -----  Type:  Sooner Appointment Request    Caller is requesting a sooner appointment.  Caller declined first available appointment listed below.  Caller will not accept being placed on the waitlist and is requesting a message be sent to doctor.    Name of Caller:  pt    When is the first available appointment?  April    Symptoms:  Pt discharged from hospital on 1/31/25. Needs a follow up and to schedule a colonoscopy & endoscopy.    Would the patient rather a call back or a response via [facility]? Call back  Best Call Back Number:  [phone]      Additional Information:   Please call back to advise, thank you!

## 2025-02-10 ENCOUNTER — OFFICE VISIT (OUTPATIENT)
Dept: FAMILY MEDICINE | Facility: CLINIC | Age: 60
End: 2025-02-10
Payer: COMMERCIAL

## 2025-02-10 VITALS
TEMPERATURE: 98 F | BODY MASS INDEX: 29.62 KG/M2 | WEIGHT: 173.5 LBS | SYSTOLIC BLOOD PRESSURE: 124 MMHG | OXYGEN SATURATION: 98 % | DIASTOLIC BLOOD PRESSURE: 78 MMHG | HEIGHT: 64 IN | HEART RATE: 77 BPM

## 2025-02-10 DIAGNOSIS — Z90.49 S/P APPENDECTOMY: ICD-10-CM

## 2025-02-10 DIAGNOSIS — E78.5 HYPERLIPIDEMIA, UNSPECIFIED HYPERLIPIDEMIA TYPE: ICD-10-CM

## 2025-02-10 DIAGNOSIS — Z79.82 ASPIRIN LONG-TERM USE: ICD-10-CM

## 2025-02-10 DIAGNOSIS — F11.90 CHRONIC, CONTINUOUS USE OF OPIOIDS: ICD-10-CM

## 2025-02-10 DIAGNOSIS — K56.609 SBO (SMALL BOWEL OBSTRUCTION): ICD-10-CM

## 2025-02-10 DIAGNOSIS — G89.29 OTHER CHRONIC PAIN: ICD-10-CM

## 2025-02-10 DIAGNOSIS — Z12.31 SCREENING MAMMOGRAM FOR BREAST CANCER: ICD-10-CM

## 2025-02-10 DIAGNOSIS — Z87.891 FORMER SMOKER: ICD-10-CM

## 2025-02-10 DIAGNOSIS — Z90.49 S/P CHOLECYSTECTOMY: ICD-10-CM

## 2025-02-10 DIAGNOSIS — G47.00 INSOMNIA, UNSPECIFIED TYPE: ICD-10-CM

## 2025-02-10 DIAGNOSIS — K50.919 CROHN'S DISEASE WITH COMPLICATION, UNSPECIFIED GASTROINTESTINAL TRACT LOCATION: Primary | ICD-10-CM

## 2025-02-10 DIAGNOSIS — E11.65 TYPE 2 DIABETES MELLITUS WITH HYPERGLYCEMIA, WITHOUT LONG-TERM CURRENT USE OF INSULIN: ICD-10-CM

## 2025-02-10 PROCEDURE — 99999 PR PBB SHADOW E&M-EST. PATIENT-LVL III: CPT | Mod: PBBFAC,COE,, | Performed by: FAMILY MEDICINE

## 2025-02-10 PROCEDURE — 99214 OFFICE O/P EST MOD 30 MIN: CPT | Mod: S$GLB,,, | Performed by: FAMILY MEDICINE

## 2025-02-10 RX ORDER — TRAZODONE HYDROCHLORIDE 50 MG/1
50 TABLET ORAL NIGHTLY PRN
Qty: 30 TABLET | Refills: 2 | Status: SHIPPED | OUTPATIENT
Start: 2025-02-10

## 2025-02-10 RX ORDER — TRAZODONE HYDROCHLORIDE 50 MG/1
50 TABLET ORAL NIGHTLY PRN
COMMUNITY
End: 2025-02-10 | Stop reason: SDUPTHER

## 2025-02-10 NOTE — PROGRESS NOTES
SCRIBE #1 NOTE: I, Justin Doran, am scribing for, and in the presence of,  Norberto Ac III, MD. I have scribed the entire note.     Subjective:       Patient ID: Tanesha Chamberlain is a 59 y.o. female.    Chief Complaint: Hospital Follow Up    Ms. Chamberlain is here for a hospital follow up after her last appointment on December 10, 2024 with JUAN Arnold. She was admitted on January 27, 2025 and discharged on January 31, 2025 for a small mechanical bowel obstruction in the ileum. States she had apparently passed out and had to call EMS. She vomited a lot. She had a NGT and was placed on a clear liquid diet which helped to clear the obstruction. This was thought to be a flare-up of Crohn's disease after not having a flare up in 10 years. She was not given any medication, and states there was no scope done. States she had no bleeding. Reports she is still nauseous and has been for a while. Sees Dr. Gray on February 19, 2025 and will have another colonoscopy. Her last colonoscopy was November 2024. Reports she is having some issue with falling asleep and can usually stay asleep when she is asleep. She slept for 2 hours last night. This has been happening since her 20's. She does not nap during the day. She tried Ambien 30 years ago, but she does not remember how it affected her. S/P cholecystectomy. S/P appendectomy. Former smoker, quit many years ago. BMI of 29.8. Cardiovascular good. No chest pain. No palpitations. Blood pressure is 124/78. Hyperlipidemia. Type 2 diabetes mellitus on insulin. On Trulicity 3mg and Tresiba 37 units. A1C was 7.2 in August 2024. Her blood sugar has been averaging 115 but today was 153. Using aspirin. Chronic pain. Chronic opioid use. CBC is okay. Diabetic foot exam is being done today.     Review of Systems   Constitutional:  Negative for chills and fever.   HENT:  Negative for congestion and sore throat.    Eyes:  Negative for visual disturbance.   Respiratory:  Negative for chest  tightness and shortness of breath.    Cardiovascular:  Negative for chest pain.   Gastrointestinal:  Positive for nausea.   Endocrine: Negative for polydipsia and polyuria.   Genitourinary:  Negative for dysuria and flank pain.   Musculoskeletal:  Negative for back pain, neck pain and neck stiffness.   Skin:  Negative for rash.   Neurological:  Negative for weakness.   Hematological:  Does not bruise/bleed easily.   Psychiatric/Behavioral:  Positive for sleep disturbance. Negative for behavioral problems.    All other systems reviewed and are negative.      Objective:      Physical examination: Vital signs noted. No acute distress. No carotid bruit. Regular heart rate and rhythm. Lungs clear to auscultation bilaterally. Abdomen bowel sounds positive soft and nontender. Extremities without edema. 2+ pedal pulses. No calluses, breaks in the skin, crackling, or dryness. 5/5 light touch to each foot. Vibratory senses intact. Posterior tibial 2+ bilaterally.      Assessment:       1. Crohn's disease with complication, unspecified gastrointestinal tract location    2. SBO (small bowel obstruction)    3. Hyperlipidemia, unspecified hyperlipidemia type    4. Other chronic pain    5. Chronic, continuous use of opioids    6. Insomnia, unspecified type    7. Type 2 diabetes mellitus with hyperglycemia, without long-term current use of insulin    8. Aspirin long-term use    9. S/P cholecystectomy    10. S/P appendectomy    11. BMI 29.0-29.9,adult    12. Screening mammogram for breast cancer        Plan:       Crohn's disease with complication, unspecified gastrointestinal tract location    SBO (small bowel obstruction)    Hyperlipidemia, unspecified hyperlipidemia type    Other chronic pain    Chronic, continuous use of opioids    Insomnia, unspecified type    Type 2 diabetes mellitus with hyperglycemia, without long-term current use of insulin  -     Hemoglobin A1C; Future; Expected date: 02/10/2025    Aspirin long-term  use    S/P cholecystectomy    S/P appendectomy    BMI 29.0-29.9,adult    Screening mammogram for breast cancer  -     Mammo Digital Screening Bilat; Future; Expected date: 03/20/2025    Discussed insomnia issue.  Try trazodone 50 HS.  Check an A1c.  Mammogram ordered.  Follow-up with Gastroenterology regarding her Crohn's.  Follow-up here in 3 months.  All care gaps addressed or discussed.     I, Norberto Ac III, MD, personally performed the services described in this documentation. All medical record entries made by the scribe were at my direction and in my presence. I have reviewed the chart and agree that the record reflects my personal performance and is accurate and complete.

## 2025-02-11 ENCOUNTER — LAB VISIT (OUTPATIENT)
Dept: LAB | Facility: HOSPITAL | Age: 60
End: 2025-02-11
Attending: FAMILY MEDICINE
Payer: COMMERCIAL

## 2025-02-11 DIAGNOSIS — Z79.4 TYPE 2 DIABETES MELLITUS WITH HYPERGLYCEMIA, WITH LONG-TERM CURRENT USE OF INSULIN: ICD-10-CM

## 2025-02-11 DIAGNOSIS — E87.6 HYPOKALEMIA: Primary | ICD-10-CM

## 2025-02-11 DIAGNOSIS — E11.65 TYPE 2 DIABETES MELLITUS WITH HYPERGLYCEMIA, WITH LONG-TERM CURRENT USE OF INSULIN: ICD-10-CM

## 2025-02-11 DIAGNOSIS — E11.65 TYPE 2 DIABETES MELLITUS WITH HYPERGLYCEMIA, WITHOUT LONG-TERM CURRENT USE OF INSULIN: ICD-10-CM

## 2025-02-11 DIAGNOSIS — R79.89 ELEVATED LFTS: ICD-10-CM

## 2025-02-11 LAB
ALBUMIN SERPL BCP-MCNC: 4.3 G/DL (ref 3.5–5.2)
ALP SERPL-CCNC: 37 U/L (ref 55–135)
ALT SERPL W/O P-5'-P-CCNC: 67 U/L (ref 10–44)
ANION GAP SERPL CALC-SCNC: 7 MMOL/L (ref 8–16)
AST SERPL-CCNC: 50 U/L (ref 10–40)
BILIRUB SERPL-MCNC: 0.6 MG/DL (ref 0.1–1)
BUN SERPL-MCNC: 9 MG/DL (ref 6–20)
CALCIUM SERPL-MCNC: 9.1 MG/DL (ref 8.7–10.5)
CHLORIDE SERPL-SCNC: 100 MMOL/L (ref 95–110)
CO2 SERPL-SCNC: 31 MMOL/L (ref 23–29)
CREAT SERPL-MCNC: 0.7 MG/DL (ref 0.5–1.4)
EST. GFR  (NO RACE VARIABLE): >60 ML/MIN/1.73 M^2
ESTIMATED AVG GLUCOSE: 151 MG/DL (ref 68–131)
GLUCOSE SERPL-MCNC: 115 MG/DL (ref 70–110)
HBA1C MFR BLD: 6.9 % (ref 4.5–6.2)
POTASSIUM SERPL-SCNC: 3.3 MMOL/L (ref 3.5–5.1)
PROT SERPL-MCNC: 7.1 G/DL (ref 6–8.4)
SODIUM SERPL-SCNC: 138 MMOL/L (ref 136–145)

## 2025-02-11 PROCEDURE — 80053 COMPREHEN METABOLIC PANEL: CPT

## 2025-02-11 PROCEDURE — 83036 HEMOGLOBIN GLYCOSYLATED A1C: CPT | Performed by: FAMILY MEDICINE

## 2025-02-11 RX ORDER — POTASSIUM CHLORIDE 750 MG/1
10 TABLET, EXTENDED RELEASE ORAL DAILY
Qty: 90 TABLET | Refills: 1 | Status: SHIPPED | OUTPATIENT
Start: 2025-02-11

## 2025-02-12 ENCOUNTER — PATIENT MESSAGE (OUTPATIENT)
Dept: FAMILY MEDICINE | Facility: CLINIC | Age: 60
End: 2025-02-12
Payer: COMMERCIAL

## 2025-02-12 ENCOUNTER — PATIENT MESSAGE (OUTPATIENT)
Dept: OPTOMETRY | Facility: CLINIC | Age: 60
End: 2025-02-12
Payer: COMMERCIAL

## 2025-02-12 NOTE — PROGRESS NOTES
Potassium is still low.  Potassium 10 meq daily sent to your pharmacy.  Liver enzymes are elevated again.  I have ordered some additional blood tests.

## 2025-02-13 ENCOUNTER — LAB VISIT (OUTPATIENT)
Dept: LAB | Facility: HOSPITAL | Age: 60
End: 2025-02-13
Payer: COMMERCIAL

## 2025-02-13 DIAGNOSIS — R79.89 ELEVATED LFTS: ICD-10-CM

## 2025-02-13 LAB
HAV IGM SERPL QL IA: NORMAL
HBV CORE IGM SERPL QL IA: NORMAL
HBV SURFACE AG SERPL QL IA: NORMAL
HCV AB SERPL QL IA: NORMAL

## 2025-02-13 PROCEDURE — 80074 ACUTE HEPATITIS PANEL: CPT

## 2025-02-13 PROCEDURE — 36415 COLL VENOUS BLD VENIPUNCTURE: CPT | Mod: PO

## 2025-02-13 NOTE — TELEPHONE ENCOUNTER
I did see notes from her recent hospital stay and discussed them with Dr Ac who recommended the blood work and the potassium supplement.  I am glad she is feeling better.

## 2025-02-14 ENCOUNTER — PATIENT MESSAGE (OUTPATIENT)
Dept: FAMILY MEDICINE | Facility: CLINIC | Age: 60
End: 2025-02-14
Payer: COMMERCIAL

## 2025-02-19 ENCOUNTER — OFFICE VISIT (OUTPATIENT)
Dept: GASTROENTEROLOGY | Facility: CLINIC | Age: 60
End: 2025-02-19
Payer: COMMERCIAL

## 2025-02-19 ENCOUNTER — PATIENT MESSAGE (OUTPATIENT)
Dept: GASTROENTEROLOGY | Facility: CLINIC | Age: 60
End: 2025-02-19

## 2025-02-19 VITALS — BODY MASS INDEX: 30.26 KG/M2 | HEIGHT: 64 IN | RESPIRATION RATE: 18 BRPM | WEIGHT: 177.25 LBS

## 2025-02-19 DIAGNOSIS — K22.10 ULCER OF ESOPHAGUS WITHOUT BLEEDING: Primary | ICD-10-CM

## 2025-02-19 DIAGNOSIS — K63.9 DISEASE OF INTESTINE, UNSPECIFIED: ICD-10-CM

## 2025-02-19 PROCEDURE — 1111F DSCHRG MED/CURRENT MED MERGE: CPT | Mod: CPTII,S$GLB,COE, | Performed by: INTERNAL MEDICINE

## 2025-02-19 PROCEDURE — 3044F HG A1C LEVEL LT 7.0%: CPT | Mod: CPTII,S$GLB,COE, | Performed by: INTERNAL MEDICINE

## 2025-02-19 PROCEDURE — 99999 PR PBB SHADOW E&M-EST. PATIENT-LVL IV: CPT | Mod: PBBFAC,COE,, | Performed by: INTERNAL MEDICINE

## 2025-02-19 PROCEDURE — 99214 OFFICE O/P EST MOD 30 MIN: CPT | Mod: S$GLB,COE,, | Performed by: INTERNAL MEDICINE

## 2025-02-19 PROCEDURE — 3008F BODY MASS INDEX DOCD: CPT | Mod: CPTII,S$GLB,COE, | Performed by: INTERNAL MEDICINE

## 2025-02-19 PROCEDURE — 1159F MED LIST DOCD IN RCRD: CPT | Mod: CPTII,S$GLB,COE, | Performed by: INTERNAL MEDICINE

## 2025-02-19 PROCEDURE — 3072F LOW RISK FOR RETINOPATHY: CPT | Mod: CPTII,S$GLB,COE, | Performed by: INTERNAL MEDICINE

## 2025-02-19 PROCEDURE — 1160F RVW MEDS BY RX/DR IN RCRD: CPT | Mod: CPTII,S$GLB,COE, | Performed by: INTERNAL MEDICINE

## 2025-02-19 RX ORDER — POLYETHYLENE GLYCOL 3350 17 G/17G
17 POWDER, FOR SOLUTION ORAL DAILY
COMMUNITY

## 2025-02-19 NOTE — H&P (VIEW-ONLY)
"[facility] Note    CC: Small bowel obstruction    HPI 59 y.o. female with history of HTN, DM, GERD, hypothyroidism, esophageal ulcer and ? Crohn's disease presents for hospital follow up after admission 3 weeks ago for an acute small bowel obstruction that resolved with conservative measures. Since discharge she has been feeling well though has restricted her diet. She is not having diarrhea or blood in her stool. Her last colonoscopy was in November 2024 and was notable for left-sided diverticulosis with normal TI. Her last EGD was in October 2024 and was notable for abnormal esophageal motility, esophageal ulcer, esophageal stenosis (dilated 15mm Hg TTS balloon) and H.pylori negative gastritis.     Regarding her ? Crohn's disease she was diagnosed in the 1980's and was treated for a time with steroids however has not had treatment in many years. She is biologic naive and has no history of a bowel resection, though does have a history of multiple abdominal surgeries including cholecystectomy, appendectomy, hysterectomy and oophorectomy.     Past Medical History:   Diagnosis Date    ADHD (attention deficit hyperactivity disorder)     Arthritis     Colon polyp     Crohn's disease     Diabetes mellitus, type 2     GERD (gastroesophageal reflux disease)     Hypertension     Hypothyroidism     Lumbar back pain with radiculopathy affecting left lower extremity     SBO (small bowel obstruction)        Allergies and Medications reviewed     Review of Systems  General ROS: negative for - chills, fever or weight loss  Cardiovascular ROS: no chest pain or dyspnea on exertion  Gastrointestinal ROS: improved abdominal pain, no blood in stool , no current vomiting    Physical Examination  Resp 18   Ht 5' 4" (1.626 m)   Wt 80.4 kg (177 lb 4 oz)   BMI 30.42 kg/m²   General appearance: alert, cooperative, no distress  HENT: Normocephalic, atraumatic, neck symmetrical, no nasal discharge, sclera anicteric   Lungs: " clear to auscultation bilaterally, symmetric chest wall expansion bilaterally  Heart: regular rate and rhythm without rub; no displacement of the PMI   Abdomen: soft, nontender,nondistended, BS Active,no masses appreciated   Extremities: extremities symmetric; no clubbing, cyanosis, or edema        Labs:  Lab Results   Component Value Date    WBC 7.98 01/31/2025    HGB 13.3 01/31/2025    HCT 38.7 01/31/2025    MCV 86 01/31/2025     01/31/2025       CMP  Sodium   Date Value Ref Range Status   02/11/2025 138 136 - 145 mmol/L Final     Potassium   Date Value Ref Range Status   02/11/2025 3.3 (L) 3.5 - 5.1 mmol/L Final     Chloride   Date Value Ref Range Status   02/11/2025 100 95 - 110 mmol/L Final     CO2   Date Value Ref Range Status   02/11/2025 31 (H) 23 - 29 mmol/L Final     Glucose   Date Value Ref Range Status   02/11/2025 115 (H) 70 - 110 mg/dL Final     BUN   Date Value Ref Range Status   02/11/2025 9 6 - 20 mg/dL Final     Creatinine   Date Value Ref Range Status   02/11/2025 0.7 0.5 - 1.4 mg/dL Final     Calcium   Date Value Ref Range Status   02/11/2025 9.1 8.7 - 10.5 mg/dL Final     Total Protein   Date Value Ref Range Status   02/11/2025 7.1 6.0 - 8.4 g/dL Final     Albumin   Date Value Ref Range Status   02/11/2025 4.3 3.5 - 5.2 g/dL Final     Total Bilirubin   Date Value Ref Range Status   02/11/2025 0.6 0.1 - 1.0 mg/dL Final     Comment:     For infants and newborns, interpretation of results should be based  on gestational age, weight and in agreement with clinical  observations.    Premature Infant recommended reference ranges:  Up to 24 hours.............<8.0 mg/dL  Up to 48 hours............<12.0 mg/dL  3-5 days..................<15.0 mg/dL  6-29 days.................<15.0 mg/dL       Alkaline Phosphatase   Date Value Ref Range Status   02/11/2025 37 (L) 55 - 135 U/L Final     AST   Date Value Ref Range Status   02/11/2025 50 (H) 10 - 40 U/L Final     ALT   Date Value Ref Range Status    02/11/2025 67 (H) 10 - 44 U/L Final     Anion Gap   Date Value Ref Range Status   02/11/2025 7 (L) 8 - 16 mmol/L Final     eGFR   Date Value Ref Range Status   02/11/2025 >60.0 >60 mL/min/1.73 m^2 Final       -Acute hepatitis panel- NR  -CRP- normal    Imaging:  CT abdomen/pelvis IV contrast 1/27/25 was independently visualized and reviewed by me and showed 1.  Low-grade mechanical small bowel obstruction with a transition point at the level of the ileum.     2.  Moderate acute on chronic long segment inflammatory small-bowel changes involving the distal ileum as above suggestive of enteritis (while possibly infectious/inflammatory, the pattern and distribution is most suggestive of Crohn's disease), consider correlation with the symptoms, history and attention on follow-up imaging.     3.  Trace ascites.     4.Hepatic steatosis with enlarged liver (19.5 cm)    November 2024 - Esophagram- mild esophageal dysmotility, Slight focal narrowing of the distal 3rd of the esophagus, likely representing region of previous dilatation. No significant residual or recurrent stricture.     Assessment:   59 y.o. female with history of esopahgeal stenosis/ulcer,  ? History of Crohn's disease (off treatment for many years, unrememarkable colonoscopy 2019, 2024) and multiple abdominal surgeries presents to follow up recent SBO that resolved with conservative measures.     Plan:  -Schedule CTE  -Check fecal calprotectin  -Repeat EGD for esophageal ulcer  -Continue daily Omeprazole    Marguerite Gray MD  [facility]  [address], [address]  [place] [address]  Office: [phone]  Fax: [phone]

## 2025-02-19 NOTE — PROGRESS NOTES
"[facility] Note    CC: Small bowel obstruction    HPI 59 y.o. female with history of HTN, DM, GERD, hypothyroidism, esophageal ulcer and ? Crohn's disease presents for hospital follow up after admission 3 weeks ago for an acute small bowel obstruction that resolved with conservative measures. Since discharge she has been feeling well though has restricted her diet. She is not having diarrhea or blood in her stool. Her last colonoscopy was in November 2024 and was notable for left-sided diverticulosis with normal TI. Her last EGD was in October 2024 and was notable for abnormal esophageal motility, esophageal ulcer, esophageal stenosis (dilated 15mm Hg TTS balloon) and H.pylori negative gastritis.     Regarding her ? Crohn's disease she was diagnosed in the 1980's and was treated for a time with steroids however has not had treatment in many years. She is biologic naive and has no history of a bowel resection, though does have a history of multiple abdominal surgeries including cholecystectomy, appendectomy, hysterectomy and oophorectomy.     Past Medical History:   Diagnosis Date    ADHD (attention deficit hyperactivity disorder)     Arthritis     Colon polyp     Crohn's disease     Diabetes mellitus, type 2     GERD (gastroesophageal reflux disease)     Hypertension     Hypothyroidism     Lumbar back pain with radiculopathy affecting left lower extremity     SBO (small bowel obstruction)        Allergies and Medications reviewed     Review of Systems  General ROS: negative for - chills, fever or weight loss  Cardiovascular ROS: no chest pain or dyspnea on exertion  Gastrointestinal ROS: improved abdominal pain, no blood in stool , no current vomiting    Physical Examination  Resp 18   Ht 5' 4" (1.626 m)   Wt 80.4 kg (177 lb 4 oz)   BMI 30.42 kg/m²   General appearance: alert, cooperative, no distress  HENT: Normocephalic, atraumatic, neck symmetrical, no nasal discharge, sclera anicteric   Lungs: " clear to auscultation bilaterally, symmetric chest wall expansion bilaterally  Heart: regular rate and rhythm without rub; no displacement of the PMI   Abdomen: soft, nontender,nondistended, BS Active,no masses appreciated   Extremities: extremities symmetric; no clubbing, cyanosis, or edema        Labs:  Lab Results   Component Value Date    WBC 7.98 01/31/2025    HGB 13.3 01/31/2025    HCT 38.7 01/31/2025    MCV 86 01/31/2025     01/31/2025       CMP  Sodium   Date Value Ref Range Status   02/11/2025 138 136 - 145 mmol/L Final     Potassium   Date Value Ref Range Status   02/11/2025 3.3 (L) 3.5 - 5.1 mmol/L Final     Chloride   Date Value Ref Range Status   02/11/2025 100 95 - 110 mmol/L Final     CO2   Date Value Ref Range Status   02/11/2025 31 (H) 23 - 29 mmol/L Final     Glucose   Date Value Ref Range Status   02/11/2025 115 (H) 70 - 110 mg/dL Final     BUN   Date Value Ref Range Status   02/11/2025 9 6 - 20 mg/dL Final     Creatinine   Date Value Ref Range Status   02/11/2025 0.7 0.5 - 1.4 mg/dL Final     Calcium   Date Value Ref Range Status   02/11/2025 9.1 8.7 - 10.5 mg/dL Final     Total Protein   Date Value Ref Range Status   02/11/2025 7.1 6.0 - 8.4 g/dL Final     Albumin   Date Value Ref Range Status   02/11/2025 4.3 3.5 - 5.2 g/dL Final     Total Bilirubin   Date Value Ref Range Status   02/11/2025 0.6 0.1 - 1.0 mg/dL Final     Comment:     For infants and newborns, interpretation of results should be based  on gestational age, weight and in agreement with clinical  observations.    Premature Infant recommended reference ranges:  Up to 24 hours.............<8.0 mg/dL  Up to 48 hours............<12.0 mg/dL  3-5 days..................<15.0 mg/dL  6-29 days.................<15.0 mg/dL       Alkaline Phosphatase   Date Value Ref Range Status   02/11/2025 37 (L) 55 - 135 U/L Final     AST   Date Value Ref Range Status   02/11/2025 50 (H) 10 - 40 U/L Final     ALT   Date Value Ref Range Status    02/11/2025 67 (H) 10 - 44 U/L Final     Anion Gap   Date Value Ref Range Status   02/11/2025 7 (L) 8 - 16 mmol/L Final     eGFR   Date Value Ref Range Status   02/11/2025 >60.0 >60 mL/min/1.73 m^2 Final       -Acute hepatitis panel- NR  -CRP- normal    Imaging:  CT abdomen/pelvis IV contrast 1/27/25 was independently visualized and reviewed by me and showed 1.  Low-grade mechanical small bowel obstruction with a transition point at the level of the ileum.     2.  Moderate acute on chronic long segment inflammatory small-bowel changes involving the distal ileum as above suggestive of enteritis (while possibly infectious/inflammatory, the pattern and distribution is most suggestive of Crohn's disease), consider correlation with the symptoms, history and attention on follow-up imaging.     3.  Trace ascites.     4.Hepatic steatosis with enlarged liver (19.5 cm)    November 2024 - Esophagram- mild esophageal dysmotility, Slight focal narrowing of the distal 3rd of the esophagus, likely representing region of previous dilatation. No significant residual or recurrent stricture.     Assessment:   59 y.o. female with history of esopahgeal stenosis/ulcer,  ? History of Crohn's disease (off treatment for many years, unrememarkable colonoscopy 2019, 2024) and multiple abdominal surgeries presents to follow up recent SBO that resolved with conservative measures.     Plan:  -Schedule CTE  -Check fecal calprotectin  -Repeat EGD for esophageal ulcer  -Continue daily Omeprazole    Marguerite Gray MD  [facility]  [address], [address]  [place] [address]  Office: [phone]  Fax: [phone]

## 2025-02-20 ENCOUNTER — TELEPHONE (OUTPATIENT)
Dept: GASTROENTEROLOGY | Facility: CLINIC | Age: 60
End: 2025-02-20
Payer: COMMERCIAL

## 2025-02-20 ENCOUNTER — LAB VISIT (OUTPATIENT)
Dept: LAB | Facility: HOSPITAL | Age: 60
End: 2025-02-20
Attending: INTERNAL MEDICINE
Payer: COMMERCIAL

## 2025-02-20 DIAGNOSIS — Z87.19 HISTORY OF CROHN'S DISEASE: Primary | ICD-10-CM

## 2025-02-20 DIAGNOSIS — E78.5 HYPERLIPIDEMIA, UNSPECIFIED: ICD-10-CM

## 2025-02-20 DIAGNOSIS — Z87.19 HISTORY OF CROHN'S DISEASE: ICD-10-CM

## 2025-02-20 PROCEDURE — 83993 ASSAY FOR CALPROTECTIN FECAL: CPT | Performed by: INTERNAL MEDICINE

## 2025-02-20 RX ORDER — ATORVASTATIN CALCIUM 10 MG/1
10 TABLET, FILM COATED ORAL
Qty: 90 TABLET | Refills: 1 | Status: SHIPPED | OUTPATIENT
Start: 2025-02-20

## 2025-02-20 NOTE — TELEPHONE ENCOUNTER
----- Message from Rossana sent at 2/20/2025  9:58 AM CST -----  Regarding: Needs order put in  Hi, patient is here now with sample to drop off at the lab. She went there already but, they sent her here because they said they don't have any orders in for her.

## 2025-02-20 NOTE — TELEPHONE ENCOUNTER
No care due was identified.  [facility] Embedded Care Due Messages. Reference number: [id number].   2/20/2025 12:08:59 AM CST

## 2025-02-20 NOTE — TELEPHONE ENCOUNTER
Refill Decision Note   Tanesha Chamberlain  is requesting a refill authorization.  Brief Assessment and Rationale for Refill:  Approve     Medication Therapy Plan:         Comments:     Note composed:3:00 AM 02/20/2025

## 2025-02-23 RX ORDER — ESTRADIOL 2 MG/1
2 TABLET ORAL DAILY
Qty: 90 TABLET | Refills: 3 | Status: SHIPPED | OUTPATIENT
Start: 2025-02-23

## 2025-02-23 RX ORDER — HYDROCHLOROTHIAZIDE 25 MG/1
25 TABLET ORAL DAILY
Qty: 90 TABLET | Refills: 3 | Status: SHIPPED | OUTPATIENT
Start: 2025-02-23

## 2025-02-23 NOTE — TELEPHONE ENCOUNTER
No care due was identified.  [facility] Embedded Care Due Messages. Reference number: [id number].   2/23/2025 6:49:51 AM CST

## 2025-02-23 NOTE — TELEPHONE ENCOUNTER
Refill Routing Note   Medication(s) are not appropriate for processing by [facility] for the following reason(s):        Required labs abnormal  Drug-disease interaction      [facility] action(s):  Defer             Appointments  past 12m or future 3m with PCP    Date Provider   Last Visit   2/10/2025 Norberto Ac III, MD   Next Visit   5/23/2025 Norberto Ac III, MD   ED visits in past 90 days: 0        Note composed:2:27 PM 02/23/2025

## 2025-02-24 ENCOUNTER — CLINICAL SUPPORT (OUTPATIENT)
Dept: REHABILITATION | Facility: HOSPITAL | Age: 60
End: 2025-02-24
Attending: ORTHOPAEDIC SURGERY
Payer: COMMERCIAL

## 2025-02-24 DIAGNOSIS — S83.242D OTHER TEAR OF MEDIAL MENISCUS OF LEFT KNEE AS CURRENT INJURY, SUBSEQUENT ENCOUNTER: ICD-10-CM

## 2025-02-24 DIAGNOSIS — M25.362 PATELLAR INSTABILITY OF BOTH KNEES: ICD-10-CM

## 2025-02-24 DIAGNOSIS — R29.898 WEAKNESS OF LOWER EXTREMITY, UNSPECIFIED LATERALITY: Primary | ICD-10-CM

## 2025-02-24 DIAGNOSIS — M25.361 PATELLAR INSTABILITY OF BOTH KNEES: ICD-10-CM

## 2025-02-24 LAB — CALPROTECTIN STL-MCNT: 102 MCG/G

## 2025-02-24 PROCEDURE — 97161 PT EVAL LOW COMPLEX 20 MIN: CPT | Mod: PN

## 2025-02-24 PROCEDURE — 97530 THERAPEUTIC ACTIVITIES: CPT | Mod: PN

## 2025-03-03 ENCOUNTER — CLINICAL SUPPORT (OUTPATIENT)
Dept: REHABILITATION | Facility: HOSPITAL | Age: 60
End: 2025-03-03
Payer: COMMERCIAL

## 2025-03-03 DIAGNOSIS — R29.898 WEAKNESS OF LOWER EXTREMITY, UNSPECIFIED LATERALITY: Primary | ICD-10-CM

## 2025-03-03 PROCEDURE — 97110 THERAPEUTIC EXERCISES: CPT | Mod: PN

## 2025-03-03 PROCEDURE — 97530 THERAPEUTIC ACTIVITIES: CPT | Mod: PN

## 2025-03-03 PROCEDURE — 97112 NEUROMUSCULAR REEDUCATION: CPT | Mod: PN

## 2025-03-03 NOTE — PROGRESS NOTES
Outpatient Rehab    Physical Therapy Evaluation    Patient Name: Vanessa Chamberlain  MRN: [id number]  YOB: 1965  Encounter Date: 2/24/2025    Therapy Diagnosis:   Encounter Diagnoses   Name Primary?    Patellar instability of both knees     Other tear of medial meniscus of left knee as current injury, subsequent encounter     Weakness of lower extremity, unspecified laterality Yes     Physician: Karl Munguia,*    Physician Orders: Eval and Treat  Medical Diagnosis: M25.361,M25.362 (ICD-10-CM) - Patellar instability of both knees  S83.242D (ICD-10-CM) - Other tear of medial meniscus of left knee as current injury, subsequent encounter.    Visit # / Visits Authorized:  1 / 1   Date of Evaluation:  2/24/2025   Insurance Authorization Period: 01/02/2025 to 12/31/2025  Plan of Care Certification:  2/24/2025 to 04/24/2025      Time In: 1100   Time Out: 1140  Total Time: 40   Total Billable Time: 40    Intake Outcome Measure for FOTO Survey    Therapist reviewed FOTO scores for Vanessa Chamberlain on 2/24/2025.   FOTO report - see Media section or FOTO account episode details.     Intake Score: 47%         Subjective   History of Present Illness  Vanessa is a 59 y.o. female who reports to physical therapy with a chief concern of Chronic L Knee Pain.     The patient reports a medical diagnosis of M25.361,M25.362 (ICD-10-CM) - Patellar instability of both knees  S83.242D (ICD-10-CM) - Other tear of medial meniscus of left knee as current injury, subsequent encounter.    Diagnostic tests related to this condition: MRI studies.   MRI Studies Details: Impression:     1. Horizontal cleavage tear of the posterior horn and body of the medial meniscus.  2. Mild tricompartmental chondromalacia.    History of Present Condition/Illness: Vanessa reports a chronic history of Left knee pain. She underwent an ACL Reconstruction years ago and her knee has been doing well for the most part, however her knee cap has subluxed  multiple times. Usually she can just put it back in, but recently when it has come out she is having a harder time reducing it and it is becoming more painful. She has pain with squatting activities and navigating stairs. Denies radicular symptoms or numbness/tingling.     Activities of Daily Living  Social history was obtained from Patient.          Patient Responsibilities: Community mobility, Health management, Home management    Previously independent with activities of daily living? Yes     Currently independent with activities of daily living? Yes          Previously independent with instrumental activities of daily living? Yes     Currently independent with instrumental activities of daily living? Yes              Pain     Patient reports a current pain level of 2/10. Pain at best is reported as 2/10. Pain at worst is reported as 7/10.   Location: Left Anterio Knee  Clinical Progression (since onset): Worsening  Pain Qualities: Sharp, Aching  Pain-Relieving Factors: Activity modification  Pain-Aggravating Factors: Squatting, Standing, Stair climbing, Kneeling         Treatment History  Treatments  Previously Received Treatments: No  Currently Receiving Treatments: No    Living Arrangements  Living Situation  Housing: Home independently  Living Arrangements: Spouse/significant other  Support Systems: Spouse/significant other        Employment  Patient does not report that: Does the patient's condition impact their ability to work?  Employment Status: Retired   Retired        Past Medical History/Physical Systems Review:   Tanesha Chamberlain  has a past medical history of ADHD (attention deficit hyperactivity disorder), Arthritis, Colon polyp, Crohn's disease, Diabetes mellitus, type 2, GERD (gastroesophageal reflux disease), Hypertension, Hypothyroidism, Lumbar back pain with radiculopathy affecting left lower extremity, and SBO (small bowel obstruction).    Tanesha Chamberlain  has a past surgical  history that includes Spine surgery; Cholecystectomy; Appendectomy; Transforaminal epidural injection of steroid (Left, 03/10/2021); Bunionectomy (Bilateral); Anterior cruciate ligament repair (Right); Shoulder surgery (Right); Minimally invasive surgical removal of intervertebral disc of spine using microscope (Left, 05/11/2021); Hysterectomy; Excision of mass of back (Left, 09/21/2022); Transforaminal epidural injection of steroid (Left, 11/29/2022); Injection of joint (Right, 05/09/2023); Oophorectomy; Upper gastrointestinal endoscopy; Esophagogastroduodenoscopy (N/A, 10/18/2024); and Colonoscopy (N/A, 11/1/2024).    Tanesha has a current medication list which includes the following prescription(s): aspirin, atorvastatin, bd ultra-fine short pen needle, onetouch ultra blue test strip, blood-glucose meter, trulicity, estradiol, fentanyl, hydrochlorothiazide, hydrocodone-acetaminophen, lancets, naloxone, omeprazole, polyethylene glycol, potassium chloride, tizanidine, trazodone, and tresiba flextouch u-100.    Review of patient's allergies indicates:   Allergen Reactions    Nsaids (non-steroidal anti-inflammatory drug) Other (See Comments)     GI upset  GI upset      Ibuprofen Other (See Comments)     Other reaction(s): crohns flair up  Other reaction(s): crohns flair up        Objective      Knee Palpation  Right Knee Palpation  Unremarkable: Muscle, Bony Prominence/Bursa, and Tendon/Ligament          Left Knee Palpation  Unremarkable: Muscle, Bony Prominence/Bursa, and Tendon/Ligament               Hip Range of Motion   Right Hip   Active (deg) Passive (deg) Pain   Flexion   90     Extension   5     ABduction         ADduction         External Rotation 90/90   45     External Rotation Prone         Internal Rotation 90/90   35     Internal Rotation Prone             Left Hip   Active (deg) Passive (deg) Pain   Flexion   90     Extension   5     ABduction         ADduction         External Rotation 90/90   45      External Rotation Prone         Internal Rotation 90/90   35     Internal Rotation Prone                  Knee Range of Motion   Right Knee   Active (deg) Passive (deg) Pain   Flexion 140 140     Extension 0           Left Knee   Active (deg) Passive (deg) Pain   Flexion 120   Yes   Extension 0                          Hip Strength - Planes of Motion   Right Strength Right Pain Left Strength Left  Pain   Flexion (L2) 5   5     Extension 4-   3-     ABduction 4-   4-     ADduction           Internal Rotation 5   5     External Rotation 4-   4-         Knee Strength   Right Strength Right Pain Left Strength Left  Pain   Flexion (S2) 5   5     Prone Flexion           Extension (L3) 5   5                   Treatment:  Therapeutic Activity  Therapeutic Activity 1: Bridges x10  Therapeutic Activity 2: Clamshells with RTB x10  Therapeutic Activity 3: Hip hinge x10    Assessment & Plan   Assessment  Vanessa presents with a condition of Low complexity.   Presentation of Symptoms: Changing  Will Comorbidities Impact Care: No       Functional Limitations: Activity tolerance, Squatting, Standing tolerance  Impairments: Abnormal gait  Personal Factors Affecting Prognosis: Pain    Patient Goal for Therapy (PT): To decrease L knee pain and improve strength and mobility  Prognosis: Good  Prognosis Details: Chronicity of condition   Assessment Details: Vanessa presents to physical therapy evaluation with reports of chronic Left anterior knee pain. She demonstrates signs and symptoms consistent with patellafemoral pain syndrome. She demonstrates decreased Left knee Range of Motion, hip mobility, and bilateral hip weakness. Symptoms and deficits are limiting her ability to perform prolonged standing, walking, squatting, and stair negotiation.     Plan  From a physical therapy perspective, the patient would benefit from: Skilled Rehab Services    Planned therapy interventions include: Therapeutic exercise, Therapeutic activities,  Neuromuscular re-education, and Manual therapy.            Visit Frequency: 2 times Per Week for 8 Weeks.       This plan was discussed with Patient.   Discussion participants: Agreed Upon Plan of Care  Plan details: Focus on improving hip mobility and lower extremity strength/endurance to offload her Left knee.           Patient's spiritual, cultural, and educational needs considered and patient agreeable to plan of care and goals.     Education  Education was done with Patient. The patient's learning style includes Demonstration and Listening. The patient Demonstrates understanding and Verbalizes understanding.         Home Exercise Program to be performed twice daily        Goals:   Active       Long Term Goals        In 8 weeks        Start:  03/03/25       Pt will improve FOTO score by >/= 10pts to demonstrate improved functional mobility.  Pt will be IND with final HEP to maintain/improve strength and mobility gained in PT.   Pt will report Left knee pain improved by >/= 90% with squatting activities to demonstrate improved condition.   Pt will improve MMT of lower extremity strength deficits to >/= 4+/ 5 to improve tolerance for lifting/carrying activities.   Pt goal: Pt will report confidence in managing her condition upon discharge from PT.               Short Term Goals        In 4 weeks        Start:  03/03/25         Pt will be IND with initial HEP to manage symptoms outside of PT.   Pt will report Left knee pain improved by >/= 50% with prolonged standing/walking to demonstrate improved condition.   Pt will improve MMT of lower extremity strength deficits by >/= 1/5 to improve tolerance for progressing rehab.   Pt will improve Left knee ROM = to uninvolved side to improve tolerance for squatting activities.                  Reynaldo Ca, PT, DPT  Board Certified Clinical Specialist in Orthopedic Physical Therapy  Board Certified Clinical Specialist in Sports Physical Therapy

## 2025-03-03 NOTE — PROGRESS NOTES
"  Outpatient Rehab    Physical Therapy Visit    Patient Name: Vanessa Chamberlain  MRN: [id number]  YOB: 1965  Encounter Date: 3/3/2025    Therapy Diagnosis:   Encounter Diagnosis   Name Primary?    Weakness of lower extremity, unspecified laterality Yes     Physician: Karl Munguia,*    Physician Orders: Eval and Treat  Medical Diagnosis: M25.361,M25.362 (ICD-10-CM) - Patellar instability of both knees  S83.242D (ICD-10-CM) - Other tear of medial meniscus of left knee as current injury, subsequent encounter.     Visit # / Visits Authorized:  1 / 1   Date of Evaluation:  2/24/2025   Insurance Authorization Period: 01/02/2025 to 12/31/2025  Plan of Care Certification:  2/24/2025 to 04/24/2025      Time In: 0800   Time Out: 0853  Total Time: 53   Total Billable Time: 53    FOTO:  Intake Score:  %  Survey Score 1:  %  Survey Score 2:  %         Subjective   A  little sore after the weekend.  Pain reported as 2/10.      Objective          PT extender available to assist with treatment as needed     Treatment:  Therapeutic Exercise  Therapeutic Exercise Activity 1: Upright bike [id number]    Balance/Neuromuscular Re-Education  Balance/Neuromuscular Re-Education Activity 1: Bridges 3x10  Balance/Neuromuscular Re-Education Activity 2: SLR 3x10 each side  Balance/Neuromuscular Re-Education Activity 3: Clamshells with GTB 3x10 each side  Balance/Neuromuscular Re-Education Activity 4: Paloff press with blue sports cord 3x10 with 5s hold each direction    Therapeutic Activity  Therapeutic Activity 1: Hip hinge 3x10  Therapeutic Activity 2: Box squats to 24" box 3x10  Therapeutic Activity 3: Shuttle leg press with 75# 3x10  Therapeutic Activity 4: Shuttle single leg press with 37# 3x10 eachs malvin    Assessment & Plan   Assessment: Vanessa tolerated first follow up visit well with focus on improving hip and lower extremity strength/endurance. Cueing to perform more hip dominant squat movement pattern. WIll progress as " appropriate.  Evaluation/Treatment Tolerance: Patient tolerated treatment well    Patient will continue to benefit from skilled outpatient physical therapy to address the deficits listed in the problem list box on initial evaluation, provide pt/family education and to maximize pt's level of independence in the home and community environment.     Patient's spiritual, cultural, and educational needs considered and patient agreeable to plan of care and goals.     Education  Education was done with Patient.           Continue Home Exercise Program        Plan: Continue POC with focus on improving hip and lower extremity strength/endurance to offlaod L knee.    Goals:   Active       Long Term Goals        In 8 weeks  (Progressing)       Start:  03/03/25       Pt will improve FOTO score by >/= 10pts to demonstrate improved functional mobility.  Pt will be IND with final HEP to maintain/improve strength and mobility gained in PT.   Pt will report Left knee pain improved by >/= 90% with squatting activities to demonstrate improved condition.   Pt will improve MMT of lower extremity strength deficits to >/= 4+/ 5 to improve tolerance for lifting/carrying activities.   Pt goal: Pt will report confidence in managing her condition upon discharge from PT.               Short Term Goals        In 4 weeks  (Progressing)       Start:  03/03/25         Pt will be IND with initial HEP to manage symptoms outside of PT.   Pt will report Left knee pain improved by >/= 50% with prolonged standing/walking to demonstrate improved condition.   Pt will improve MMT of lower extremity strength deficits by >/= 1/5 to improve tolerance for progressing rehab.   Pt will improve Left knee ROM = to uninvolved side to improve tolerance for squatting activities.                  Reynaldo Ca, PT, DPT

## 2025-03-04 ENCOUNTER — PATIENT MESSAGE (OUTPATIENT)
Dept: FAMILY MEDICINE | Facility: CLINIC | Age: 60
End: 2025-03-04
Payer: COMMERCIAL

## 2025-03-04 RX ORDER — HYDROCHLOROTHIAZIDE 25 MG/1
25 TABLET ORAL DAILY
Qty: 90 TABLET | Refills: 3 | Status: SHIPPED | OUTPATIENT
Start: 2025-03-04

## 2025-03-04 RX ORDER — INSULIN DEGLUDEC 100 U/ML
30 INJECTION, SOLUTION SUBCUTANEOUS DAILY
Qty: 30 ML | Refills: 0 | Status: SHIPPED | OUTPATIENT
Start: 2025-03-04

## 2025-03-04 NOTE — TELEPHONE ENCOUNTER
No care due was identified.  [facility] Embedded Care Due Messages. Reference number: [id number].   3/04/2025 6:53:08 AM CST

## 2025-03-04 NOTE — TELEPHONE ENCOUNTER
No care due was identified.  [facility] Embedded Care Due Messages. Reference number: [id number].   3/04/2025 6:54:47 AM CST

## 2025-03-05 ENCOUNTER — TELEPHONE (OUTPATIENT)
Dept: FAMILY MEDICINE | Facility: CLINIC | Age: 60
End: 2025-03-05
Payer: COMMERCIAL

## 2025-03-05 ENCOUNTER — HOSPITAL ENCOUNTER (OUTPATIENT)
Dept: RADIOLOGY | Facility: HOSPITAL | Age: 60
Discharge: HOME | End: 2025-03-05
Attending: INTERNAL MEDICINE
Payer: COMMERCIAL

## 2025-03-05 DIAGNOSIS — K63.9 DISEASE OF INTESTINE, UNSPECIFIED: ICD-10-CM

## 2025-03-05 PROCEDURE — 25500020 PHARM REV CODE 255: Performed by: INTERNAL MEDICINE

## 2025-03-05 PROCEDURE — 74177 CT ABD & PELVIS W/CONTRAST: CPT | Mod: 26,,, | Performed by: RADIOLOGY

## 2025-03-05 PROCEDURE — 74177 CT ABD & PELVIS W/CONTRAST: CPT | Mod: TC

## 2025-03-05 RX ADMIN — IOHEXOL 100 ML: 350 INJECTION, SOLUTION INTRAVENOUS at 01:03

## 2025-03-06 ENCOUNTER — RESULTS FOLLOW-UP (OUTPATIENT)
Dept: GASTROENTEROLOGY | Facility: HOSPITAL | Age: 60
End: 2025-03-06

## 2025-03-07 ENCOUNTER — HOSPITAL ENCOUNTER (OUTPATIENT)
Facility: HOSPITAL | Age: 60
Discharge: HOME | End: 2025-03-07
Attending: INTERNAL MEDICINE | Admitting: FAMILY MEDICINE
Payer: COMMERCIAL

## 2025-03-07 ENCOUNTER — ANESTHESIA (OUTPATIENT)
Dept: ENDOSCOPY | Facility: HOSPITAL | Age: 60
End: 2025-03-07
Payer: COMMERCIAL

## 2025-03-07 ENCOUNTER — ANESTHESIA EVENT (OUTPATIENT)
Dept: ENDOSCOPY | Facility: HOSPITAL | Age: 60
End: 2025-03-07
Payer: COMMERCIAL

## 2025-03-07 VITALS
DIASTOLIC BLOOD PRESSURE: 75 MMHG | HEART RATE: 70 BPM | HEIGHT: 64 IN | WEIGHT: 173 LBS | RESPIRATION RATE: 18 BRPM | SYSTOLIC BLOOD PRESSURE: 153 MMHG | TEMPERATURE: 98 F | BODY MASS INDEX: 29.53 KG/M2 | OXYGEN SATURATION: 98 %

## 2025-03-07 DIAGNOSIS — K22.10 ESOPHAGEAL ULCER WITHOUT BLEEDING: ICD-10-CM

## 2025-03-07 PROCEDURE — 37000008 HC ANESTHESIA 1ST 15 MINUTES: Performed by: INTERNAL MEDICINE

## 2025-03-07 PROCEDURE — C1726 CATH, BAL DIL, NON-VASCULAR: HCPCS | Performed by: INTERNAL MEDICINE

## 2025-03-07 PROCEDURE — 25000003 PHARM REV CODE 250: Performed by: INTERNAL MEDICINE

## 2025-03-07 PROCEDURE — 43249 ESOPH EGD DILATION <30 MM: CPT | Mod: ,,, | Performed by: INTERNAL MEDICINE

## 2025-03-07 PROCEDURE — 63600175 PHARM REV CODE 636 W HCPCS: Performed by: NURSE ANESTHETIST, CERTIFIED REGISTERED

## 2025-03-07 PROCEDURE — 27201012 HC FORCEPS, HOT/COLD, DISP: Performed by: INTERNAL MEDICINE

## 2025-03-07 PROCEDURE — 43239 EGD BIOPSY SINGLE/MULTIPLE: CPT | Mod: 59 | Performed by: INTERNAL MEDICINE

## 2025-03-07 PROCEDURE — 43249 ESOPH EGD DILATION <30 MM: CPT | Performed by: INTERNAL MEDICINE

## 2025-03-07 PROCEDURE — 43239 EGD BIOPSY SINGLE/MULTIPLE: CPT | Mod: 59,,, | Performed by: INTERNAL MEDICINE

## 2025-03-07 RX ORDER — LIDOCAINE HYDROCHLORIDE 20 MG/ML
INJECTION INTRAVENOUS
Status: DISCONTINUED | OUTPATIENT
Start: 2025-03-07 | End: 2025-03-07

## 2025-03-07 RX ORDER — SODIUM CHLORIDE 9 MG/ML
INJECTION, SOLUTION INTRAVENOUS CONTINUOUS
Status: DISCONTINUED | OUTPATIENT
Start: 2025-03-07 | End: 2025-03-07 | Stop reason: HOSPADM

## 2025-03-07 RX ORDER — OMEPRAZOLE 40 MG/1
40 CAPSULE, DELAYED RELEASE ORAL EVERY MORNING
Qty: 90 CAPSULE | Refills: 3 | Status: SHIPPED | OUTPATIENT
Start: 2025-03-07 | End: 2025-05-06

## 2025-03-07 RX ORDER — PROPOFOL 10 MG/ML
VIAL (ML) INTRAVENOUS
Status: DISCONTINUED | OUTPATIENT
Start: 2025-03-07 | End: 2025-03-07

## 2025-03-07 RX ADMIN — PROPOFOL 40 MG: 10 INJECTION, EMULSION INTRAVENOUS at 09:03

## 2025-03-07 RX ADMIN — LIDOCAINE HYDROCHLORIDE 50 MG: 20 INJECTION, SOLUTION INTRAVENOUS at 09:03

## 2025-03-07 RX ADMIN — PROPOFOL 20 MG: 10 INJECTION, EMULSION INTRAVENOUS at 09:03

## 2025-03-07 RX ADMIN — SODIUM CHLORIDE: 9 INJECTION, SOLUTION INTRAVENOUS at 08:03

## 2025-03-07 RX ADMIN — PROPOFOL 100 MG: 10 INJECTION, EMULSION INTRAVENOUS at 09:03

## 2025-03-07 NOTE — ANESTHESIA PREPROCEDURE EVALUATION
03/07/2025  Tanesha Chamberlain is a 59 y.o., female.      Pre-op Assessment          Review of Systems  Cardiovascular:     Hypertension                                    Hypertension         Hepatic/GI:     GERD         Gerd          Musculoskeletal:  Arthritis        Arthritis          Neurological:    Neuromuscular Disease,           Arthritis                         Neuromuscular Disease   Endocrine:  Diabetes Hypothyroidism   Diabetes                   Hypothyroidism          Psych:  Psychiatric History                  Physical Exam  General: Well nourished        Anesthesia Plan  Type of Anesthesia, risks & benefits discussed:    Anesthesia Type: Gen Natural Airway  Intra-op Monitoring Plan: Standard ASA Monitors  Induction:  IV  Informed Consent: Informed consent signed with the Patient and all parties understand the risks and agree with anesthesia plan.  All questions answered.   ASA Score: 3  Day of Surgery Review of History & Physical: H&P Update referred to the surgeon/provider.    Ready For Surgery From Anesthesia Perspective.     .

## 2025-03-07 NOTE — TRANSFER OF CARE
"Anesthesia Transfer of Care Note    Patient: Tanesha Chamberlain    Procedure(s) Performed: Procedure(s) (LRB):  EGD (ESOPHAGOGASTRODUODENOSCOPY) (N/A)    Patient location: GI    Anesthesia Type: general    Transport from OR: Transported from OR on room air with adequate spontaneous ventilation    Post pain: adequate analgesia    Post assessment: no apparent anesthetic complications    Post vital signs: stable    Level of consciousness: responds to stimulation    Nausea/Vomiting: no nausea/vomiting    Complications: none    Transfer of care protocol was followed    Last vitals: Visit Vitals  BP (!) 189/79 (BP Location: Left arm, Patient Position: Lying)   Pulse 81   Temp 36.7 °C (98.1 °F) (Skin)   Resp 16   Ht 5' 4" (1.626 m)   Wt 78.5 kg (173 lb)   SpO2 98%   Breastfeeding No   BMI 29.70 kg/m²     "

## 2025-03-07 NOTE — PROVATION PATIENT INSTRUCTIONS
Discharge Summary/Instructions after an Endoscopic Procedure  Patient Name: Tanesha Walker MRN: [id number]  Patient YOB: 1965 Friday, March 7, 2025  Marguerite Gray MD  Dear patient,  As a result of recent federal legislation (The Federal Cures Act), you may   receive lab or pathology results from your procedure in your [facility]   account before your physician is able to contact you. Your physician or   their representative will relay the results to you with their   recommendations at their soonest availability.  Thank you,  RESTRICTIONS:  During your procedure today, you received medications for sedation.  These   medications may affect your judgment, balance and coordination.  Therefore,   for 24 hours, you have the following restrictions:   - DO NOT drive a car, operate machinery, make legal/financial decisions,   sign important papers or drink alcohol.    ACTIVITY:  Today: no heavy lifting, straining or running due to procedural   sedation/anesthesia.  The following day: return to full activity including work.  DIET:  Eat and drink normally unless instructed otherwise.     TREATMENT FOR COMMON SIDE EFFECTS:  - Mild abdominal pain, nausea, belching, bloating or excessive gas:  rest,   eat lightly and use a heating pad.  - Sore Throat: treat with throat lozenges and/or gargle with warm salt   water.  - Because air was used during the procedure, expelling large amounts of air   from your rectum or belching is normal.  - If a bowel prep was taken, you may not have a bowel movement for 1-3 days.    This is normal.  SYMPTOMS TO WATCH FOR AND REPORT TO YOUR PHYSICIAN:  1. Abdominal pain or bloating, other than gas cramps.  2. Chest pain.  3. Back pain.  4. Signs of infection such as: chills or fever occurring within 24 hours   after the procedure.  5. Rectal bleeding, which would show as bright red, maroon, or black stools.   (A tablespoon of blood from the rectum is not serious,  especially if   hemorrhoids are present.)  6. Vomiting.  7. Weakness or dizziness.  GO DIRECTLY TO THE NEAREST EMERGENCY ROOM IF YOU HAVE ANY OF THE FOLLOWING:      Difficulty breathing              Chills and/or fever over 101 F   Persistent vomiting and/or vomiting blood   Severe abdominal pain   Severe chest pain   Black, tarry stools   Bleeding- more than one tablespoon   Any other symptom or condition that you feel may need urgent attention  Your doctor recommends these additional instructions:  If any biopsies were taken, your doctors clinic will contact you in 1 to 2   weeks with any results.  - Await pathology results.   - Discharge patient to home (with escort).   - Patient has a contact number available for emergencies.  The signs and   symptoms of potential delayed complications were discussed with the   patient.  Return to normal activities tomorrow.  Written discharge   instructions were provided to the patient.   - Resume previous diet.   - Continue daily PPI  For questions, problems or results please call your physician - Marguerite Gray MD at Work:  [phone].  [facility] PHONE NUMBER: [phone]  IF A COMPLICATION OR EMERGENCY SITUATION ARISES AND YOU ARE UNABLE TO REACH   YOUR PHYSICIAN - GO DIRECTLY TO THE EMERGENCY ROOM.  Marguerite Gray MD  3/7/2025 9:59:40 AM  This report has been verified and signed electronically.  Dear patient,  As a result of recent federal legislation (The Federal Cures Act), you may   receive lab or pathology results from your procedure in your [facility]   account before your physician is able to contact you. Your physician or   their representative will relay the results to you with their   recommendations at their soonest availability.  Thank you,  PROVATION

## 2025-03-07 NOTE — PLAN OF CARE
Vss, edward po fluids, denies pain, ambulates easily. IV removed, catheter intact. Discharge instructions provided and states understanding. States ready to go home.  Discharged from facility with family per wheelchair.

## 2025-03-10 NOTE — ANESTHESIA POSTPROCEDURE EVALUATION
Anesthesia Post Evaluation    Patient: Tanesha Chamberlain    Procedure(s) Performed: Procedure(s) (LRB):  EGD (ESOPHAGOGASTRODUODENOSCOPY) (N/A)    Final Anesthesia Type: general      Patient location during evaluation: PACU  Patient participation: Yes- Able to Participate  Level of consciousness: awake and alert  Post-procedure vital signs: reviewed and stable  Pain management: adequate  Airway patency: patent    PONV status at discharge: No PONV  Anesthetic complications: no      Cardiovascular status: blood pressure returned to baseline  Respiratory status: unassisted and room air  Hydration status: euvolemic  Follow-up not needed.              Vitals Value Taken Time   /75 03/07/25 10:15   Temp 36.7 °C (98.1 °F) 03/07/25 10:00   Pulse 68 03/07/25 10:20   Resp 28 03/07/25 10:20   SpO2 98 % 03/07/25 10:20   Vitals shown include unfiled device data.      Event Time   Out of Recovery 10:31:34         Pain/Deb Score: No data recorded

## 2025-03-12 ENCOUNTER — CLINICAL SUPPORT (OUTPATIENT)
Dept: REHABILITATION | Facility: HOSPITAL | Age: 60
End: 2025-03-12
Payer: COMMERCIAL

## 2025-03-12 DIAGNOSIS — R29.898 WEAKNESS OF LOWER EXTREMITY, UNSPECIFIED LATERALITY: Primary | ICD-10-CM

## 2025-03-12 PROCEDURE — 97110 THERAPEUTIC EXERCISES: CPT | Mod: PN,CQ

## 2025-03-12 PROCEDURE — 97112 NEUROMUSCULAR REEDUCATION: CPT | Mod: PN,CQ

## 2025-03-12 PROCEDURE — 97530 THERAPEUTIC ACTIVITIES: CPT | Mod: PN,CQ

## 2025-03-13 NOTE — PROGRESS NOTES
"  Outpatient Rehab    Physical Therapy Visit    Patient Name: Vanessa Chamberlain  MRN: [id number]  YOB: 1965  Encounter Date: 3/12/2025    Therapy Diagnosis:   Encounter Diagnosis   Name Primary?    Weakness of lower extremity, unspecified laterality Yes     Physician: Karl Munguia,*    Physician Orders: Eval and Treat  Medical Diagnosis: M25.361,M25.362 (ICD-10-CM) - Patellar instability of both knees  S83.242D (ICD-10-CM) - Other tear of medial meniscus of left knee as current injury, subsequent encounter.     Visit # / Visits Authorized:  1 / 1   Date of Evaluation:  2/24/2025   Insurance Authorization Period: 01/02/2025 to 12/31/2025  Plan of Care Certification:  2/24/2025 to 04/24/2025      Time In: 1000   Time Out: 1100  Total Time: 60   Total Billable Time: 56    FOTO:  Intake Score:  %  Survey Score 1:  %  Survey Score 2:  %         Subjective   Doing well denies pain upon arrival..  Pain reported as 0/10.      Objective          PT extender available to assist with treatment as needed     Treatment:  Therapeutic Exercise  Therapeutic Exercise Activity 1: Upright bike [id number]    Balance/Neuromuscular Re-Education  Balance/Neuromuscular Re-Education Activity 1: Bridges 3x10  Balance/Neuromuscular Re-Education Activity 2: SLR 3x10 each side  Balance/Neuromuscular Re-Education Activity 3: Clamshells with GTB 3x10 each side  Balance/Neuromuscular Re-Education Activity 4: Paloff press with blue sports cord 3x10 with 5s hold each direction    Therapeutic Activity  Therapeutic Activity 1: Hip hinge 3x10  Therapeutic Activity 2: Box squats to 24" box 3x10  Therapeutic Activity 3: Shuttle leg press with 75# 3x10  Therapeutic Activity 4: Shuttle single leg press with 37# 3x10 eachs malvin    Assessment & Plan   Assessment: Vanessa continues to struggle with proper hip hinge requiring demonstration and instruction. She responded well with improvements noted. Continued focus on improving functional strength " with proper mechanics to improve tolerance to all ADL's.       Patient will continue to benefit from skilled outpatient physical therapy to address the deficits listed in the problem list box on initial evaluation, provide pt/family education and to maximize pt's level of independence in the home and community environment.     Patient's spiritual, cultural, and educational needs considered and patient agreeable to plan of care and goals.             Plan: Continue POC with focus on improving hip and lower extremity strength/endurance to offlaod L knee.    Goals:   Active       Long Term Goals        In 8 weeks  (Progressing)       Start:  03/03/25       Pt will improve FOTO score by >/= 10pts to demonstrate improved functional mobility.  Pt will be IND with final HEP to maintain/improve strength and mobility gained in PT.   Pt will report Left knee pain improved by >/= 90% with squatting activities to demonstrate improved condition.   Pt will improve MMT of lower extremity strength deficits to >/= 4+/ 5 to improve tolerance for lifting/carrying activities.   Pt goal: Pt will report confidence in managing her condition upon discharge from PT.               Short Term Goals        In 4 weeks  (Progressing)       Start:  03/03/25         Pt will be IND with initial HEP to manage symptoms outside of PT.   Pt will report Left knee pain improved by >/= 50% with prolonged standing/walking to demonstrate improved condition.   Pt will improve MMT of lower extremity strength deficits by >/= 1/5 to improve tolerance for progressing rehab.   Pt will improve Left knee ROM = to uninvolved side to improve tolerance for squatting activities.                  Laurel Alfred, PTA

## 2025-03-17 ENCOUNTER — CLINICAL SUPPORT (OUTPATIENT)
Dept: REHABILITATION | Facility: HOSPITAL | Age: 60
End: 2025-03-17
Payer: COMMERCIAL

## 2025-03-17 DIAGNOSIS — R29.898 WEAKNESS OF LOWER EXTREMITY, UNSPECIFIED LATERALITY: Primary | ICD-10-CM

## 2025-03-17 PROCEDURE — 97110 THERAPEUTIC EXERCISES: CPT | Mod: PN

## 2025-03-17 PROCEDURE — 97530 THERAPEUTIC ACTIVITIES: CPT | Mod: PN

## 2025-03-17 PROCEDURE — 97112 NEUROMUSCULAR REEDUCATION: CPT | Mod: PN

## 2025-03-17 NOTE — PROGRESS NOTES
"  Outpatient Rehab    Physical Therapy Visit    Patient Name: Vanessa Chamberlain  MRN: [id number]  YOB: 1965  Encounter Date: 3/17/2025    Therapy Diagnosis:   Encounter Diagnosis   Name Primary?    Weakness of lower extremity, unspecified laterality Yes       Physician: Karl Munguia,*    Physician Orders: Eval and Treat  Medical Diagnosis: M25.361,M25.362 (ICD-10-CM) - Patellar instability of both knees  S83.242D (ICD-10-CM) - Other tear of medial meniscus of left knee as current injury, subsequent encounter.     Visit # / Visits Authorized:  3/15   Date of Evaluation:  2/24/2025   Insurance Authorization Period: 01/02/2025 to 12/31/2025  Plan of Care Certification:  2/24/2025 to 04/24/2025      Time In: 0800   Time Out: 0848  Total Time: 48   Total Billable Time: 48    FOTO:  Intake Score:  %  Survey Score 1:  %  Survey Score 2:  %         Subjective   Knee is doing great, feeling a little weak today may take it easy at PT toady..  Pain reported as 0/10.      Objective          PT extender available to assist with treatment as needed     Treatment:  Therapeutic Exercise  TE 1: Upright bike [id number]    Balance/Neuromuscular Re-Education  NMR 1: Step ups on 6" step no UE assist  NMR 5: SL balance 3x30s each side  NMR 6: SL heel raises 3x10 each side    Therapeutic Activity  TA 1: Hip hinge 3x10  TA 2: Box squats to 24" box 3x10  TA 3: Shuttle leg press with 75# 3x10  TA 4: Shuttle single leg press with 50# 3x10 eachs malvin  TA 5: Lateral walks with RTB [id number] there and back    Assessment & Plan   Assessment: Vanessa tolerated treatment well. Able to increase resistance for shuttle leg press today and initiate SL balance and step ups. WIll continue to progress as appropriate.  Evaluation/Treatment Tolerance: Patient tolerated treatment well    Patient will continue to benefit from skilled outpatient physical therapy to address the deficits listed in the problem list box on initial evaluation, provide " pt/family education and to maximize pt's level of independence in the home and community environment.     Patient's spiritual, cultural, and educational needs considered and patient agreeable to plan of care and goals.             Plan: Continue POC with focus on improving hip and lower extremity strength/endurance to offlaod L knee.    Goals:   Active       Long Term Goals        In 8 weeks  (Progressing)       Start:  03/03/25       Pt will improve FOTO score by >/= 10pts to demonstrate improved functional mobility.  Pt will be IND with final HEP to maintain/improve strength and mobility gained in PT.   Pt will report Left knee pain improved by >/= 90% with squatting activities to demonstrate improved condition.   Pt will improve MMT of lower extremity strength deficits to >/= 4+/ 5 to improve tolerance for lifting/carrying activities.   Pt goal: Pt will report confidence in managing her condition upon discharge from PT.               Short Term Goals        In 4 weeks  (Progressing)       Start:  03/03/25         Pt will be IND with initial HEP to manage symptoms outside of PT.   Pt will report Left knee pain improved by >/= 50% with prolonged standing/walking to demonstrate improved condition.   Pt will improve MMT of lower extremity strength deficits by >/= 1/5 to improve tolerance for progressing rehab.   Pt will improve Left knee ROM = to uninvolved side to improve tolerance for squatting activities.                  Reynaldo Ca, PT, DPT  Board Certified Clinical Specialist in Orthopedic Physical Therapy  Board Certified Clinical Specialist in Sports Physical Therapy

## 2025-03-20 ENCOUNTER — CLINICAL SUPPORT (OUTPATIENT)
Dept: REHABILITATION | Facility: HOSPITAL | Age: 60
End: 2025-03-20
Payer: COMMERCIAL

## 2025-03-20 DIAGNOSIS — R29.898 WEAKNESS OF LOWER EXTREMITY, UNSPECIFIED LATERALITY: Primary | ICD-10-CM

## 2025-03-20 PROCEDURE — 97110 THERAPEUTIC EXERCISES: CPT | Mod: PN,CQ

## 2025-03-20 PROCEDURE — 97112 NEUROMUSCULAR REEDUCATION: CPT | Mod: PN,CQ

## 2025-03-20 PROCEDURE — 97530 THERAPEUTIC ACTIVITIES: CPT | Mod: PN,CQ

## 2025-03-20 NOTE — PROGRESS NOTES
"  Outpatient Rehab    Physical Therapy Visit    Patient Name: Vanessa Chamberlain  MRN: [id number]  YOB: 1965  Encounter Date: 3/20/2025    Therapy Diagnosis:   Encounter Diagnosis   Name Primary?    Weakness of lower extremity, unspecified laterality Yes       Physician: Karl Munguia,*    Physician Orders: Eval and Treat  Medical Diagnosis: M25.361,M25.362 (ICD-10-CM) - Patellar instability of both knees  S83.242D (ICD-10-CM) - Other tear of medial meniscus of left knee as current injury, subsequent encounter.     Visit # / Visits Authorized:  4/15   Date of Evaluation:  2/24/2025   Insurance Authorization Period: 01/02/2025 to 12/31/2025  Plan of Care Certification:  2/24/2025 to 04/24/2025      Time In: 0800   Time Out: 0900  Total Time: 60   Total Billable Time: 48    FOTO:  Intake Score:  %  Survey Score 1:  %  Survey Score 2:  %         Subjective   She has superior patellar pain with step ups..         Objective          PT extender available to assist with treatment as needed     Treatment:  Therapeutic Exercise  TE 1: Upright bike [id number]    Balance/Neuromuscular Re-Education  NMR 1: Step ups on 4" step no UE assist  NMR 7: Single leg knee extension 15 lbs 4 x 8 reps 3 sec holds    Therapeutic Activity  TA 1: Hip hinge 3x10  TA 2: Box squats to 24" box 3x10  TA 3: Shuttle leg press with 87# 3x10  TA 4: Shuttle single leg press with 67# 3x10 eachs malvin  TA 5: Lateral walks with RTB [id number] there and back    Assessment & Plan   Assessment: Due to subjective reporting, incorporated single knee extension to improve quad activation. She verbalized improved concentric tolerance to step ups following but verbalized increased pain with eccentric control. Therefore stopped and perfromed lateral step to improve hip activation/ valgus control with step ups. Modified step ups to 4inch step with improved eccentric tolerance. Vanessa verbalized significant improvement in symptoms post tx. Will continue to " progress as able.       Patient will continue to benefit from skilled outpatient physical therapy to address the deficits listed in the problem list box on initial evaluation, provide pt/family education and to maximize pt's level of independence in the home and community environment.     Patient's spiritual, cultural, and educational needs considered and patient agreeable to plan of care and goals.             Plan: Continue POC with focus on improving hip and lower extremity strength/endurance to offlaod L knee.    Goals:   Active       Long Term Goals        In 8 weeks  (Progressing)       Start:  03/03/25       Pt will improve FOTO score by >/= 10pts to demonstrate improved functional mobility.  Pt will be IND with final HEP to maintain/improve strength and mobility gained in PT.   Pt will report Left knee pain improved by >/= 90% with squatting activities to demonstrate improved condition.   Pt will improve MMT of lower extremity strength deficits to >/= 4+/ 5 to improve tolerance for lifting/carrying activities.   Pt goal: Pt will report confidence in managing her condition upon discharge from PT.               Short Term Goals        In 4 weeks  (Progressing)       Start:  03/03/25         Pt will be IND with initial HEP to manage symptoms outside of PT.   Pt will report Left knee pain improved by >/= 50% with prolonged standing/walking to demonstrate improved condition.   Pt will improve MMT of lower extremity strength deficits by >/= 1/5 to improve tolerance for progressing rehab.   Pt will improve Left knee ROM = to uninvolved side to improve tolerance for squatting activities.                  Laurel Alfred, PTA

## 2025-03-24 ENCOUNTER — CLINICAL SUPPORT (OUTPATIENT)
Dept: REHABILITATION | Facility: HOSPITAL | Age: 60
End: 2025-03-24
Payer: COMMERCIAL

## 2025-03-24 DIAGNOSIS — R29.898 WEAKNESS OF LOWER EXTREMITY, UNSPECIFIED LATERALITY: Primary | ICD-10-CM

## 2025-03-24 PROCEDURE — 97530 THERAPEUTIC ACTIVITIES: CPT | Mod: PN,CQ

## 2025-03-24 PROCEDURE — 97110 THERAPEUTIC EXERCISES: CPT | Mod: PN,CQ

## 2025-03-24 PROCEDURE — 97112 NEUROMUSCULAR REEDUCATION: CPT | Mod: PN,CQ

## 2025-03-24 NOTE — PROGRESS NOTES
"  Outpatient Rehab    Physical Therapy Visit    Patient Name: Vanessa Chamberlain  MRN: [id number]  YOB: 1965  Encounter Date: 3/24/2025    Therapy Diagnosis:   Encounter Diagnosis   Name Primary?    Weakness of lower extremity, unspecified laterality Yes       Physician: Karl Munguia,*    Physician Orders: Eval and Treat  Medical Diagnosis: M25.361,M25.362 (ICD-10-CM) - Patellar instability of both knees  S83.242D (ICD-10-CM) - Other tear of medial meniscus of left knee as current injury, subsequent encounter.     Visit # / Visits Authorized:  5/15   Date of Evaluation:  2/24/2025   Insurance Authorization Period: 01/02/2025 to 12/31/2025  Plan of Care Certification:  2/24/2025 to 04/24/2025      Time In: 0900   Time Out: 1000  Total Time: 60   Total Billable Time: 48    FOTO:  Intake Score:  %  Survey Score 1:  %  Survey Score 2:  %         Subjective   Pt. reported slight pain 1/10 in LLE below knee when walking.  Pain reported as 1/10.      Objective          PT extender available to assist with treatment as needed     Treatment:  Therapeutic Exercise  TE 1: Upright bike [id number]    Balance/Neuromuscular Re-Education  NMR 1: Step ups on 4" step no UE assist  NMR 7: Single leg knee extension 20 lbs 3 x 10 reps 3 sec holds    Therapeutic Activity  TA 1: Hip hinge 1x10  TA 2: Box squats to 24" box 3x10 w/ #10 kettle bell  TA 3: Shuttle leg press with 87# 3x10  TA 4: Shuttle single leg press with 75# 3x10 eachs malvin  TA 5: Lateral walks with RTB [id number] there and back  TA 6: deadlift 10lbs kettlebell to 12" box 3x10 reps    Assessment & Plan   Assessment: Pt. tolerated all exercises during session well and was able to progress functional lifting activitiy and increase resistance on several exercises. Pt. required education on proper form during deadlift. Min verbal cues for form during loaded squat.Good response noted to cueing evident by improved form. Will continue to progress towards LTG as " tolerated.  Evaluation/Treatment Tolerance: Patient tolerated treatment well    Patient will continue to benefit from skilled outpatient physical therapy to address the deficits listed in the problem list box on initial evaluation, provide pt/family education and to maximize pt's level of independence in the home and community environment.     Patient's spiritual, cultural, and educational needs considered and patient agreeable to plan of care and goals.             Plan: Continue POC with focus on improving hip and lower extremity strength/endurance to offlaod L knee.    Goals:   Active       Long Term Goals        In 8 weeks  (Progressing)       Start:  03/03/25       Pt will improve FOTO score by >/= 10pts to demonstrate improved functional mobility.  Pt will be IND with final HEP to maintain/improve strength and mobility gained in PT.   Pt will report Left knee pain improved by >/= 90% with squatting activities to demonstrate improved condition.   Pt will improve MMT of lower extremity strength deficits to >/= 4+/ 5 to improve tolerance for lifting/carrying activities.   Pt goal: Pt will report confidence in managing her condition upon discharge from PT.               Short Term Goals        In 4 weeks  (Progressing)       Start:  03/03/25         Pt will be IND with initial HEP to manage symptoms outside of PT.   Pt will report Left knee pain improved by >/= 50% with prolonged standing/walking to demonstrate improved condition.   Pt will improve MMT of lower extremity strength deficits by >/= 1/5 to improve tolerance for progressing rehab.   Pt will improve Left knee ROM = to uninvolved side to improve tolerance for squatting activities.                Isabel Joy, SPTA     I certify that I was present in the room directing the student in service delivery and guiding them using my skilled judgment. As the co-signing therapist I have reviewed the students documentation and am responsible for the treatment,  assessment, and plan.       Laurel Alfred, PTA

## 2025-03-26 ENCOUNTER — RESULTS FOLLOW-UP (OUTPATIENT)
Dept: GASTROENTEROLOGY | Facility: HOSPITAL | Age: 60
End: 2025-03-26

## 2025-03-26 RX ORDER — TRAZODONE HYDROCHLORIDE 50 MG/1
50 TABLET ORAL NIGHTLY PRN
Qty: 30 TABLET | Refills: 2 | Status: SHIPPED | OUTPATIENT
Start: 2025-03-26

## 2025-03-27 NOTE — TELEPHONE ENCOUNTER
No care due was identified.  [facility] Embedded Care Due Messages. Reference number: [id number].   3/26/2025 8:27:34 PM CDT

## 2025-03-28 ENCOUNTER — CLINICAL SUPPORT (OUTPATIENT)
Dept: REHABILITATION | Facility: HOSPITAL | Age: 60
End: 2025-03-28
Payer: COMMERCIAL

## 2025-03-28 DIAGNOSIS — R29.898 WEAKNESS OF LOWER EXTREMITY, UNSPECIFIED LATERALITY: Primary | ICD-10-CM

## 2025-03-28 PROCEDURE — 97110 THERAPEUTIC EXERCISES: CPT | Mod: PN

## 2025-03-28 PROCEDURE — 97112 NEUROMUSCULAR REEDUCATION: CPT | Mod: PN

## 2025-03-28 PROCEDURE — 97530 THERAPEUTIC ACTIVITIES: CPT | Mod: PN

## 2025-03-28 NOTE — PROGRESS NOTES
"  Outpatient Rehab    Physical Therapy Visit    Patient Name: Vanessa Chamberlain  MRN: [id number]  YOB: 1965  Encounter Date: 3/28/2025    Therapy Diagnosis:   Encounter Diagnosis   Name Primary?    Weakness of lower extremity, unspecified laterality Yes       Physician: Karl Munguia,*    Physician Orders: Eval and Treat  Medical Diagnosis: M25.361,M25.362 (ICD-10-CM) - Patellar instability of both knees  S83.242D (ICD-10-CM) - Other tear of medial meniscus of left knee as current injury, subsequent encounter.     Visit # / Visits Authorized:  5/15   Date of Evaluation:  2/24/2025   Insurance Authorization Period: 01/02/2025 to 12/31/2025  Plan of Care Certification:  2/24/2025 to 04/24/2025      Time In: 0800   Time Out: 0856  Total Time: 56   Total Billable Time: 48    FOTO:  Intake Score:  %  Survey Score 1:  %  Survey Score 2:  %         Subjective   Knee is doing great, performed her band exercises this morning already.         Objective          PT extender available to assist with treatment as needed     Treatment:  Therapeutic Exercise  TE 1: Upright bike [id number]    Balance/Neuromuscular Re-Education  NMR 1: Step ups on 6" step no UE assist 3x10 with 10# kettlebell  NMR 2: SL balance 3x30s  NMR 3: .  NMR 4: .  NMR 5: .  NMR 6: .  NMR 7: .    Therapeutic Activity  TA 2: Box squats to 24" box 3x10 w/ #10 kettle bell  TA 3: Shuttle leg press with 100# 3x10  TA 4: Shuttle single leg press with 75# 3x10 eachs malvin  TA 6: deadlift 10lbs kettlebell to 8" box 3x10 reps    Assessment & Plan   Assessment: Vanessa tolerated treatment well with continued focus on increasing load to L knee. She was able to perform kettlebell deadlift off of a shorter box today and perform step ups with 10# kettlebell. Increased weight on shuttle. Will formally re-assess next week.  Evaluation/Treatment Tolerance: Patient tolerated treatment well    Patient will continue to benefit from skilled outpatient physical therapy " to address the deficits listed in the problem list box on initial evaluation, provide pt/family education and to maximize pt's level of independence in the home and community environment.     Patient's spiritual, cultural, and educational needs considered and patient agreeable to plan of care and goals.             Plan:      Goals:   Active       Long Term Goals        In 8 weeks  (Progressing)       Start:  03/03/25       Pt will improve FOTO score by >/= 10pts to demonstrate improved functional mobility.  Pt will be IND with final HEP to maintain/improve strength and mobility gained in PT.   Pt will report Left knee pain improved by >/= 90% with squatting activities to demonstrate improved condition.   Pt will improve MMT of lower extremity strength deficits to >/= 4+/ 5 to improve tolerance for lifting/carrying activities.   Pt goal: Pt will report confidence in managing her condition upon discharge from PT.               Short Term Goals        In 4 weeks  (Progressing)       Start:  03/03/25         Pt will be IND with initial HEP to manage symptoms outside of PT.   Pt will report Left knee pain improved by >/= 50% with prolonged standing/walking to demonstrate improved condition.   Pt will improve MMT of lower extremity strength deficits by >/= 1/5 to improve tolerance for progressing rehab.   Pt will improve Left knee ROM = to uninvolved side to improve tolerance for squatting activities.                  Reynaldo Ca, PT, DPT  Board Certified Clinical Specialist in Orthopedic Physical Therapy  Board Certified Clinical Specialist in Sports Physical Therapy

## 2025-03-31 ENCOUNTER — CLINICAL SUPPORT (OUTPATIENT)
Dept: REHABILITATION | Facility: HOSPITAL | Age: 60
End: 2025-03-31
Payer: COMMERCIAL

## 2025-03-31 DIAGNOSIS — R29.898 WEAKNESS OF LOWER EXTREMITY, UNSPECIFIED LATERALITY: Primary | ICD-10-CM

## 2025-03-31 PROCEDURE — 97530 THERAPEUTIC ACTIVITIES: CPT | Mod: PN,CQ

## 2025-03-31 PROCEDURE — 97110 THERAPEUTIC EXERCISES: CPT | Mod: PN,CQ

## 2025-03-31 PROCEDURE — 97112 NEUROMUSCULAR REEDUCATION: CPT | Mod: PN,CQ

## 2025-03-31 NOTE — PROGRESS NOTES
"  Outpatient Rehab    Physical Therapy Visit    Patient Name: Vanessa Chamberlain  MRN: [id number]  YOB: 1965  Encounter Date: 3/31/2025    Therapy Diagnosis:   Encounter Diagnosis   Name Primary?    Weakness of lower extremity, unspecified laterality Yes       Physician: Karl Munguia,*    Physician Orders: Eval and Treat  Medical Diagnosis: M25.361,M25.362 (ICD-10-CM) - Patellar instability of both knees  S83.242D (ICD-10-CM) - Other tear of medial meniscus of left knee as current injury, subsequent encounter.     Visit # / Visits Authorized:  6/15   Date of Evaluation:  2/24/2025   Insurance Authorization Period: 01/02/2025 to 12/31/2025  Plan of Care Certification:  2/24/2025 to 04/24/2025      Time In: 0802   Time Out: 0900  Total Time: 58   Total Billable Time: 58    FOTO:  Intake Score:  %  Survey Score 1:  %  Survey Score 2:  %         Subjective   Knee has been popping over the last 24 hours. "I don't know why." She reports she can't think of anything that would have caused it..  Pain reported as 0/10.      Objective            Treatment:  Therapeutic Exercise  TE 1: Upright bike [id number]    Balance/Neuromuscular Re-Education  NMR 1: Step ups on 6" step no UE assist 3x10 with 10# kettlebell  NMR 2: SL balance 3x30s  NMR 3: Single leg knee extension 20 lbs 3 x 10 reps 3 sec holds    Therapeutic Activity  TA 1: Hip hinge 1x10  TA 2: Box squats to 24" box 3x10 w/ #10 kettle bell  TA 3: Shuttle leg press with 125# 3x10  TA 4: Shuttle single leg press with 75# 3x10 each side  TA 6: deadlift 10lbs kettlebell to 8" box 3x10 reps  TA 7: single leg RDL 3 x 10 reps    Assessment & Plan   Assessment: Vanessa was able to progress exercises well. Included single leg RDL's.  She noted with some continued hip weakness evident by rotational compensation. Able to improve with cueing. Will continue to progress as able.       Patient will continue to benefit from skilled outpatient physical therapy to address the " deficits listed in the problem list box on initial evaluation, provide pt/family education and to maximize pt's level of independence in the home and community environment.     Patient's spiritual, cultural, and educational needs considered and patient agreeable to plan of care and goals.             Plan: Continue POC with focus on improving hip and lower extremity strength/endurance to offlaod L knee.    Goals:   Active       Long Term Goals        In 8 weeks  (Progressing)       Start:  03/03/25       Pt will improve FOTO score by >/= 10pts to demonstrate improved functional mobility.  Pt will be IND with final HEP to maintain/improve strength and mobility gained in PT.   Pt will report Left knee pain improved by >/= 90% with squatting activities to demonstrate improved condition.   Pt will improve MMT of lower extremity strength deficits to >/= 4+/ 5 to improve tolerance for lifting/carrying activities.   Pt goal: Pt will report confidence in managing her condition upon discharge from PT.               Short Term Goals        In 4 weeks  (Progressing)       Start:  03/03/25         Pt will be IND with initial HEP to manage symptoms outside of PT.   Pt will report Left knee pain improved by >/= 50% with prolonged standing/walking to demonstrate improved condition.   Pt will improve MMT of lower extremity strength deficits by >/= 1/5 to improve tolerance for progressing rehab.   Pt will improve Left knee ROM = to uninvolved side to improve tolerance for squatting activities.                  Laurel Alfred, PTA

## 2025-04-03 ENCOUNTER — CLINICAL SUPPORT (OUTPATIENT)
Dept: REHABILITATION | Facility: HOSPITAL | Age: 60
End: 2025-04-03
Payer: COMMERCIAL

## 2025-04-03 DIAGNOSIS — R29.898 WEAKNESS OF LOWER EXTREMITY, UNSPECIFIED LATERALITY: Primary | ICD-10-CM

## 2025-04-03 PROCEDURE — 97110 THERAPEUTIC EXERCISES: CPT | Mod: PN

## 2025-04-03 PROCEDURE — 97530 THERAPEUTIC ACTIVITIES: CPT | Mod: PN

## 2025-04-03 PROCEDURE — 97112 NEUROMUSCULAR REEDUCATION: CPT | Mod: PN

## 2025-04-03 NOTE — PROGRESS NOTES
"  Outpatient Rehab    Physical Therapy Progress Note    Patient Name: Vanessa Chamberlain  MRN: [id number]  YOB: 1965  Encounter Date: 4/3/2025    Therapy Diagnosis:   Encounter Diagnosis   Name Primary?    Weakness of lower extremity, unspecified laterality Yes     Physician: Karl Munguia,*    Physician Orders: Eval and Treat  Medical Diagnosis: Patellar instability of both knees  Other tear of medial meniscus of left knee as current injury, subsequent encounter    Visit # / Visits Authorized:  8 / 15  Insurance Authorization Period: 2/24/2025 to 12/31/2025  Date of Evaluation: 02/24/2025   Plan of Care Certification: 02/24/2025 to 04/24/2025     PT/PTA:     Number of PTA visits since last PT visit:   Time In: 0800   Time Out: 0856  Total Time: 56   Total Billable Time: 56    FOTO:  Intake Score:  %  Survey Score 1:  %  Survey Score 2:  %         Subjective   Knee is doing well, has had some pain recently but definitely notes improvement in stability she feel sin her knee.  Pain reported as 0/10.      Objective            Hip Strength - Planes of Motion   Right Strength Right Pain Left Strength Left  Pain   Flexion (L2) 5   5     Extension 4   4     ABduction 4   4     ADduction           Internal Rotation 5   5     External Rotation 4   4                Squat Testing  The patient completed 5 squat repetitions.     Improved hip dominant movement pattern     Single Leg Squat Testing - Right Leg  The patient completed 5 squat repetitions on the right leg.   Observations: Pain               Single Leg Squat Testing - Left Leg  The patient completed 5 squat repetitions on the left leg.   Observations: Pain                      Treatment:  Therapeutic Exercise  TE 1: Upright bike [id number]  TE 2: Assessment as above  Balance/Neuromuscular Re-Education  NMR 1: Step ups on 6" step no UE assist 3x10 with 10# kettlebell  NMR 2: SL balance 3x30s with contralateral hip flexion/extension  NMR 3: .  Therapeutic " "Activity  TA 1: Hip hinge 3x10  TA 2: Box squats to 24" box 3x10 w/ #10 kettle bell  TA 3: Shuttle leg press with 125# 3x10  TA 4: Shuttle single leg press with 75# 3x10 each side  TA 6: deadlift 20lbs kettlebell to 8" box 3x10 reps  TA 7: single leg RDL 3 x 10 reps    Time Entry(in minutes):  Neuromuscular Re-Education Time Entry: 10  Therapeutic Activity Time Entry: 28  Therapeutic Exercise Time Entry: 18    Assessment & Plan   Assessment: Vanessa has been seen for 8 visits over the past month and demonstrates improved tolerance to squatting as well as improved lower extremity strength. Although improving she has continued to experience patellar instability at home and would continue to benefit from skilled outpatient PT to address remaining deficits. Will progress as appropriate.  Evaluation/Treatment Tolerance: Patient tolerated treatment well    Patient will continue to benefit from skilled outpatient physical therapy to address the deficits listed in the problem list box on initial evaluation, provide pt/family education and to maximize pt's level of independence in the home and community environment.     Patient's spiritual, cultural, and educational needs considered and patient agreeable to plan of care and goals.           Plan: Continue POC with focus on improving hip and lower extremity strength/endurance to offlaod L knee.    Goals:   Active       Long Term Goals        In 8 weeks  (Progressing)       Start:  03/03/25       Pt will improve FOTO score by >/= 10pts to demonstrate improved functional mobility.  Pt will be IND with final HEP to maintain/improve strength and mobility gained in PT.   Pt will report Left knee pain improved by >/= 90% with squatting activities to demonstrate improved condition.   Pt will improve MMT of lower extremity strength deficits to >/= 4+/ 5 to improve tolerance for lifting/carrying activities.   Pt goal: Pt will report confidence in managing her condition upon discharge " from PT.               Short Term Goals        In 4 weeks  (Progressing)       Start:  03/03/25         Pt will be IND with initial HEP to manage symptoms outside of PT.   Pt will report Left knee pain improved by >/= 50% with prolonged standing/walking to demonstrate improved condition.   Pt will improve MMT of lower extremity strength deficits by >/= 1/5 to improve tolerance for progressing rehab.   Pt will improve Left knee ROM = to uninvolved side to improve tolerance for squatting activities.                  Reynaldo Ca, PT, DPT

## 2025-04-07 ENCOUNTER — CLINICAL SUPPORT (OUTPATIENT)
Dept: REHABILITATION | Facility: HOSPITAL | Age: 60
End: 2025-04-07
Payer: COMMERCIAL

## 2025-04-07 DIAGNOSIS — R29.898 WEAKNESS OF LOWER EXTREMITY, UNSPECIFIED LATERALITY: Primary | ICD-10-CM

## 2025-04-07 PROCEDURE — 97110 THERAPEUTIC EXERCISES: CPT | Mod: PN,CQ

## 2025-04-07 PROCEDURE — 97112 NEUROMUSCULAR REEDUCATION: CPT | Mod: PN,CQ

## 2025-04-07 PROCEDURE — 97530 THERAPEUTIC ACTIVITIES: CPT | Mod: PN,CQ

## 2025-04-07 NOTE — PROGRESS NOTES
"  Outpatient Rehab    Physical Therapy Progress Note    Patient Name: Vanessa Chamberlain  MRN: [id number]  YOB: 1965  Encounter Date: 4/7/2025    Therapy Diagnosis:   Encounter Diagnosis   Name Primary?    Weakness of lower extremity, unspecified laterality Yes     Physician: Karl Munguia,*    Physician Orders: Eval and Treat  Medical Diagnosis: Patellar instability of both knees  Other tear of medial meniscus of left knee as current injury, subsequent encounter    Visit # / Visits Authorized:  9 / 15  Insurance Authorization Period: 2/24/2025 to 12/31/2025  Date of Evaluation: 02/24/2025   Plan of Care Certification: 02/24/2025 to 04/24/2025     PT/PTA:     Number of PTA visits since last PT visit:   Time In: 0800   Time Out: 0900  Total Time: 60   Total Billable Time: 53      Physical Therapy technician assisted with treatment under direct supervision of treating therapist as needed.     FOTO:  Intake Score:  %  Survey Score 1:  %  Survey Score 2:  %         Subjective   Arrives without complaints of pain this date. States, "My knees are doing well today.".  Pain reported as 0/10.      Objective           Treatment:  Therapeutic Exercise  TE 1: Upright bike [id number]  Balance/Neuromuscular Re-Education  NMR 1: Step ups on 6" step no UE assist 3x10 with 10# kettlebell  NMR 2: SL balance 3x30s with contralateral hip flexion/extension  Therapeutic Activity  TA 1: Hip hinge 3x10  TA 2: Box squats to 24" box 3x10 w/ #10 kettle bell  TA 3: Shuttle leg press with 125# 3x10  TA 4: Shuttle single leg press with 75# 3x10 each side  TA 5: Lateral walks with RTB [id number] there and back  TA 6: deadlift 20lbs kettlebell to 8" box 3x10 reps  TA 7: single leg RDL 3 x 10 reps    Time Entry(in minutes):  Neuromuscular Re-Education Time Entry: 10  Therapeutic Activity Time Entry: 30  Therapeutic Exercise Time Entry: 10    Assessment & Plan   Assessment: Vanessa thompson with good tolerance to tx. Able to complete all " recomended exercises without pain or discomfort. Will continue to develop strength deficits to allow her to return to all recreational activities without pain or limitations.       Patient will continue to benefit from skilled outpatient physical therapy to address the deficits listed in the problem list box on initial evaluation, provide pt/family education and to maximize pt's level of independence in the home and community environment.     Patient's spiritual, cultural, and educational needs considered and patient agreeable to plan of care and goals.           Plan: Continue POC with focus on improving hip and lower extremity strength/endurance to offlaod L knee.    Goals:   Active       Long Term Goals        In 8 weeks  (Progressing)       Start:  03/03/25       Pt will improve FOTO score by >/= 10pts to demonstrate improved functional mobility.  Pt will be IND with final HEP to maintain/improve strength and mobility gained in PT.   Pt will report Left knee pain improved by >/= 90% with squatting activities to demonstrate improved condition.   Pt will improve MMT of lower extremity strength deficits to >/= 4+/ 5 to improve tolerance for lifting/carrying activities.   Pt goal: Pt will report confidence in managing her condition upon discharge from PT.               Short Term Goals        In 4 weeks  (Progressing)       Start:  03/03/25         Pt will be IND with initial HEP to manage symptoms outside of PT.   Pt will report Left knee pain improved by >/= 50% with prolonged standing/walking to demonstrate improved condition.   Pt will improve MMT of lower extremity strength deficits by >/= 1/5 to improve tolerance for progressing rehab.   Pt will improve Left knee ROM = to uninvolved side to improve tolerance for squatting activities.                  Laurel Alfred, PTA

## 2025-04-10 ENCOUNTER — CLINICAL SUPPORT (OUTPATIENT)
Dept: REHABILITATION | Facility: HOSPITAL | Age: 60
End: 2025-04-10
Payer: COMMERCIAL

## 2025-04-10 DIAGNOSIS — R29.898 WEAKNESS OF LOWER EXTREMITY, UNSPECIFIED LATERALITY: Primary | ICD-10-CM

## 2025-04-10 PROCEDURE — 97530 THERAPEUTIC ACTIVITIES: CPT | Mod: PN

## 2025-04-10 PROCEDURE — 97112 NEUROMUSCULAR REEDUCATION: CPT | Mod: PN

## 2025-04-10 PROCEDURE — 97110 THERAPEUTIC EXERCISES: CPT | Mod: PN

## 2025-04-10 NOTE — PROGRESS NOTES
"  Outpatient Rehab    Physical Therapy Progress Note    Patient Name: Vanessa Chamberlain  MRN: [id number]  YOB: 1965  Encounter Date: 4/10/2025    Therapy Diagnosis:   Encounter Diagnosis   Name Primary?    Weakness of lower extremity, unspecified laterality Yes     Physician: Karl Munguia,*    Physician Orders: Eval and Treat  Medical Diagnosis: Patellar instability of both knees  Other tear of medial meniscus of left knee as current injury, subsequent encounter    Visit # / Visits Authorized:  10 / 15  Insurance Authorization Period: 2/24/2025 to 12/31/2025  Date of Evaluation: 02/24/2025   Plan of Care Certification: 02/24/2025 to 04/24/2025     PT/PTA:     Number of PTA visits since last PT visit:   Time In: 0800   Time Out: 0856  Total Time: 56   Total Billable Time: 56      Physical Therapy technician assisted with treatment under direct supervision of treating therapist as needed.     FOTO:  Intake Score:  %  Survey Score 1:  %  Survey Score 2:  %         Subjective   Knee continues to do well feeling stronger, she did have one episode of L anterior knee pain yesterday but it went away..         Objective           Treatment:  Therapeutic Exercise  TE 1: Elliptical [id number]  Balance/Neuromuscular Re-Education  NMR 1: Step ups on 6" step no UE assist 3x10 with 20# kettlebell  NMR 2: Sl balance 3x30s on airex  NMR 3: SL RDL with UE assist 3x10 eachs malvin  Therapeutic Activity  TA 2: Box squats to 24" box 3x10 w/ #20 kettle bell  TA 3: Shuttle leg press with 125# 3x10  TA 4: Shuttle single leg press with 75# 3x10 each side  TA 5: Standing clams with rearfoot on wall 3x10 each side  TA 6: deadlift 20lbs kettlebell to 8" box 3x10 reps    Time Entry(in minutes):  Neuromuscular Re-Education Time Entry: 16  Therapeutic Activity Time Entry: 30  Therapeutic Exercise Time Entry: 10    Assessment & Plan   Assessment: Vanessa tolerated treamtent well. Able to increase resistance for squatting and step ups. " Pt has difficulty balancing on unstable surface on LLE. WIll progress as appropriate.  Evaluation/Treatment Tolerance: Patient tolerated treatment well    Patient will continue to benefit from skilled outpatient physical therapy to address the deficits listed in the problem list box on initial evaluation, provide pt/family education and to maximize pt's level of independence in the home and community environment.     Patient's spiritual, cultural, and educational needs considered and patient agreeable to plan of care and goals.           Plan: Continue POC with focus on improving hip and lower extremity strength/endurance to offlaod L knee.    Goals:   Active       Long Term Goals        In 8 weeks  (Progressing)       Start:  03/03/25       Pt will improve FOTO score by >/= 10pts to demonstrate improved functional mobility.  Pt will be IND with final HEP to maintain/improve strength and mobility gained in PT.   Pt will report Left knee pain improved by >/= 90% with squatting activities to demonstrate improved condition.   Pt will improve MMT of lower extremity strength deficits to >/= 4+/ 5 to improve tolerance for lifting/carrying activities.   Pt goal: Pt will report confidence in managing her condition upon discharge from PT.               Short Term Goals        In 4 weeks  (Progressing)       Start:  03/03/25         Pt will be IND with initial HEP to manage symptoms outside of PT.   Pt will report Left knee pain improved by >/= 50% with prolonged standing/walking to demonstrate improved condition.   Pt will improve MMT of lower extremity strength deficits by >/= 1/5 to improve tolerance for progressing rehab.   Pt will improve Left knee ROM = to uninvolved side to improve tolerance for squatting activities.                  Reynaldo Ca, PT, DPT  Board Certified Clinical Specialist in Orthopedic Physical Therapy  Board Certified Clinical Specialist in Sports Physical Therapy

## 2025-04-14 ENCOUNTER — CLINICAL SUPPORT (OUTPATIENT)
Dept: REHABILITATION | Facility: HOSPITAL | Age: 60
End: 2025-04-14
Payer: COMMERCIAL

## 2025-04-14 DIAGNOSIS — R29.898 WEAKNESS OF LOWER EXTREMITY, UNSPECIFIED LATERALITY: Primary | ICD-10-CM

## 2025-04-14 PROCEDURE — 97112 NEUROMUSCULAR REEDUCATION: CPT | Mod: PN

## 2025-04-14 PROCEDURE — 97530 THERAPEUTIC ACTIVITIES: CPT | Mod: PN

## 2025-04-14 PROCEDURE — 97110 THERAPEUTIC EXERCISES: CPT | Mod: PN

## 2025-04-14 NOTE — PROGRESS NOTES
Outpatient Rehab    Physical Therapy Progress Note    Patient Name: Vanessa Chamberlain  MRN: [id number]  YOB: 1965  Encounter Date: 4/14/2025    Therapy Diagnosis:   Encounter Diagnosis   Name Primary?    Weakness of lower extremity, unspecified laterality Yes       Physician: Karl Munguia,*    Physician Orders: Eval and Treat  Medical Diagnosis: Patellar instability of both knees  Other tear of medial meniscus of left knee as current injury, subsequent encounter    Visit # / Visits Authorized:  11 / 15  Insurance Authorization Period: 2/24/2025 to 12/31/2025  Date of Evaluation: 02/24/2025   Plan of Care Certification: 02/24/2025 to 04/24/2025     PT/PTA:     Number of PTA visits since last PT visit:   Time In: 0800   Time Out: 0855  Total Time: 55   Total Billable Time: 55      Physical Therapy technician assisted with treatment under direct supervision of treating therapist as needed.     FOTO:  Intake Score:  %  Survey Score 1:  %  Survey Score 2:  %         Subjective   Pt reports R knee cap subluxed yesterday while squatting down to pick something up off of the ground. Presents with antalgic gait..  Pain reported as 3/10.      Objective           Treatment:  Therapeutic Exercise  TE 1: Nustep [id number] secondary to R anterior knee pain while attempting upright bike  Balance/Neuromuscular Re-Education  NMR 4: Bridges 3x12  NMR 5: Clamshells with RTB 3x10 each side  NMR 6: SLR 3x10 each side  NMR 7: TKE with GTB 3x10 each side  Therapeutic Activity  TA 1: Hip hinge 3x10  TA 2: Shuttle leg press 50# 3x10, SL press 50# 3x10    Time Entry(in minutes):  Neuromuscular Re-Education Time Entry: 30  Therapeutic Activity Time Entry: 16  Therapeutic Exercise Time Entry: 10    Assessment & Plan   Assessment: Vanessa presents with increased R knee pain today following a patella subluxation yesterday. We decreased intensity of exercise today. Will continue to monitor and increase load as  appropriate.  Evaluation/Treatment Tolerance: Patient tolerated treatment well    Patient will continue to benefit from skilled outpatient physical therapy to address the deficits listed in the problem list box on initial evaluation, provide pt/family education and to maximize pt's level of independence in the home and community environment.     Patient's spiritual, cultural, and educational needs considered and patient agreeable to plan of care and goals.           Plan: Continue POC with focus on improving hip and lower extremity strength/endurance to offlaod L knee.    Goals:   Active       Long Term Goals        In 8 weeks  (Progressing)       Start:  03/03/25       Pt will improve FOTO score by >/= 10pts to demonstrate improved functional mobility.  Pt will be IND with final HEP to maintain/improve strength and mobility gained in PT.   Pt will report Left knee pain improved by >/= 90% with squatting activities to demonstrate improved condition.   Pt will improve MMT of lower extremity strength deficits to >/= 4+/ 5 to improve tolerance for lifting/carrying activities.   Pt goal: Pt will report confidence in managing her condition upon discharge from PT.               Short Term Goals        In 4 weeks  (Progressing)       Start:  03/03/25         Pt will be IND with initial HEP to manage symptoms outside of PT.   Pt will report Left knee pain improved by >/= 50% with prolonged standing/walking to demonstrate improved condition.   Pt will improve MMT of lower extremity strength deficits by >/= 1/5 to improve tolerance for progressing rehab.   Pt will improve Left knee ROM = to uninvolved side to improve tolerance for squatting activities.                  Reynaldo Ca, PT, DPT  Board Certified Clinical Specialist in Orthopedic Physical Therapy  Board Certified Clinical Specialist in Sports Physical Therapy

## 2025-04-22 ENCOUNTER — PATIENT MESSAGE (OUTPATIENT)
Dept: FAMILY MEDICINE | Facility: CLINIC | Age: 60
End: 2025-04-22
Payer: COMMERCIAL

## 2025-04-22 DIAGNOSIS — E11.65 TYPE 2 DIABETES MELLITUS WITH HYPERGLYCEMIA, WITHOUT LONG-TERM CURRENT USE OF INSULIN: ICD-10-CM

## 2025-04-22 RX ORDER — PEN NEEDLE, DIABETIC 31 GX5/16"
1 NEEDLE, DISPOSABLE MISCELLANEOUS DAILY
Qty: 100 EACH | Refills: 3 | Status: SHIPPED | OUTPATIENT
Start: 2025-04-22

## 2025-04-22 NOTE — TELEPHONE ENCOUNTER
No care due was identified.  [facility] Embedded Care Due Messages. Reference number: [id number].   4/22/2025 4:02:20 AM CDT

## 2025-05-02 ENCOUNTER — RESULTS FOLLOW-UP (OUTPATIENT)
Dept: FAMILY MEDICINE | Facility: CLINIC | Age: 60
End: 2025-05-02

## 2025-05-02 ENCOUNTER — HOSPITAL ENCOUNTER (OUTPATIENT)
Dept: RADIOLOGY | Facility: CLINIC | Age: 60
Discharge: HOME | End: 2025-05-02
Attending: FAMILY MEDICINE
Payer: COMMERCIAL

## 2025-05-02 DIAGNOSIS — Z12.31 SCREENING MAMMOGRAM FOR BREAST CANCER: ICD-10-CM

## 2025-05-02 PROCEDURE — 77067 SCR MAMMO BI INCL CAD: CPT | Mod: 26,COE,, | Performed by: RADIOLOGY

## 2025-05-02 PROCEDURE — 77067 SCR MAMMO BI INCL CAD: CPT | Mod: TC,PO

## 2025-05-02 PROCEDURE — 77063 BREAST TOMOSYNTHESIS BI: CPT | Mod: 26,COE,, | Performed by: RADIOLOGY

## 2025-05-16 ENCOUNTER — PATIENT MESSAGE (OUTPATIENT)
Dept: FAMILY MEDICINE | Facility: CLINIC | Age: 60
End: 2025-05-16
Payer: COMMERCIAL

## 2025-05-23 ENCOUNTER — TELEPHONE (OUTPATIENT)
Dept: ORTHOPEDICS | Facility: CLINIC | Age: 60
End: 2025-05-23
Payer: MEDICARE

## 2025-05-23 ENCOUNTER — OFFICE VISIT (OUTPATIENT)
Dept: FAMILY MEDICINE | Facility: CLINIC | Age: 60
End: 2025-05-23
Payer: COMMERCIAL

## 2025-05-23 VITALS
DIASTOLIC BLOOD PRESSURE: 68 MMHG | HEIGHT: 64 IN | OXYGEN SATURATION: 98 % | TEMPERATURE: 98 F | HEART RATE: 69 BPM | WEIGHT: 171.19 LBS | SYSTOLIC BLOOD PRESSURE: 106 MMHG | BODY MASS INDEX: 29.23 KG/M2

## 2025-05-23 DIAGNOSIS — E78.5 HYPERLIPIDEMIA, UNSPECIFIED HYPERLIPIDEMIA TYPE: ICD-10-CM

## 2025-05-23 DIAGNOSIS — M25.561 RIGHT KNEE PAIN, UNSPECIFIED CHRONICITY: ICD-10-CM

## 2025-05-23 DIAGNOSIS — G47.00 INSOMNIA, UNSPECIFIED TYPE: ICD-10-CM

## 2025-05-23 DIAGNOSIS — K21.9 GASTROESOPHAGEAL REFLUX DISEASE, UNSPECIFIED WHETHER ESOPHAGITIS PRESENT: ICD-10-CM

## 2025-05-23 DIAGNOSIS — F11.90 CHRONIC, CONTINUOUS USE OF OPIOIDS: ICD-10-CM

## 2025-05-23 DIAGNOSIS — Z90.49 S/P CHOLECYSTECTOMY: ICD-10-CM

## 2025-05-23 DIAGNOSIS — K50.919 CROHN'S DISEASE WITH COMPLICATION, UNSPECIFIED GASTROINTESTINAL TRACT LOCATION: ICD-10-CM

## 2025-05-23 DIAGNOSIS — M25.561 ARTHRALGIA OF RIGHT KNEE: ICD-10-CM

## 2025-05-23 DIAGNOSIS — E11.65 TYPE 2 DIABETES MELLITUS WITH HYPERGLYCEMIA, WITHOUT LONG-TERM CURRENT USE OF INSULIN: ICD-10-CM

## 2025-05-23 DIAGNOSIS — F98.8 ATTENTION DEFICIT DISORDER, UNSPECIFIED TYPE: Primary | ICD-10-CM

## 2025-05-23 DIAGNOSIS — Z90.49 S/P APPENDECTOMY: ICD-10-CM

## 2025-05-23 DIAGNOSIS — Z79.82 ASPIRIN LONG-TERM USE: ICD-10-CM

## 2025-05-23 PROCEDURE — 3074F SYST BP LT 130 MM HG: CPT | Mod: CPTII,S$GLB,COE, | Performed by: FAMILY MEDICINE

## 2025-05-23 PROCEDURE — 3078F DIAST BP <80 MM HG: CPT | Mod: CPTII,S$GLB,COE, | Performed by: FAMILY MEDICINE

## 2025-05-23 PROCEDURE — 99214 OFFICE O/P EST MOD 30 MIN: CPT | Mod: S$GLB,COE,, | Performed by: FAMILY MEDICINE

## 2025-05-23 PROCEDURE — 1159F MED LIST DOCD IN RCRD: CPT | Mod: CPTII,S$GLB,COE, | Performed by: FAMILY MEDICINE

## 2025-05-23 PROCEDURE — 3044F HG A1C LEVEL LT 7.0%: CPT | Mod: CPTII,S$GLB,COE, | Performed by: FAMILY MEDICINE

## 2025-05-23 PROCEDURE — 3072F LOW RISK FOR RETINOPATHY: CPT | Mod: CPTII,S$GLB,COE, | Performed by: FAMILY MEDICINE

## 2025-05-23 PROCEDURE — 3008F BODY MASS INDEX DOCD: CPT | Mod: CPTII,S$GLB,COE, | Performed by: FAMILY MEDICINE

## 2025-05-23 PROCEDURE — 99999 PR PBB SHADOW E&M-EST. PATIENT-LVL V: CPT | Mod: PBBFAC,COE,, | Performed by: FAMILY MEDICINE

## 2025-05-23 PROCEDURE — 1160F RVW MEDS BY RX/DR IN RCRD: CPT | Mod: CPTII,S$GLB,COE, | Performed by: FAMILY MEDICINE

## 2025-05-23 RX ORDER — NALOXONE HYDROCHLORIDE 4 MG/.1ML
1 SPRAY NASAL ONCE
Qty: 1 EACH | Refills: 0 | Status: SHIPPED | OUTPATIENT
Start: 2025-05-23 | End: 2025-05-23

## 2025-05-23 RX ORDER — TRAZODONE HYDROCHLORIDE 100 MG/1
100 TABLET ORAL NIGHTLY PRN
Qty: 90 TABLET | Refills: 3 | Status: SHIPPED | OUTPATIENT
Start: 2025-05-23

## 2025-05-23 NOTE — PROGRESS NOTES
SCRIBE #1 NOTE: I, Brandon Angel, am scribing for, and in the presence of, Norberto Ac III, MD. I have scribed the entire note.     Subjective:       Patient ID: Tanesha Chamberlain is a 59 y.o. female.    Chief Complaint: Annual Exam    Ms. Tanesha Chamberlain is here for a 3-month follow up after her last appointment on 2/10/2025. Accompanied by her spouse. S/P cholecystectomy. S/P appendectomy. Right knee dislocation. The patient reports that she dislocated her right knee on 1/14/25 and would like a referral for an orthopedist. She is unable to squat. The patient also had an MRI done on her left knee which showed an MCL tear. She has done 6 weeks of physical therapy. BMI of 29.38, down 10 lbs. Cardiovascular good. No chest pain. No palpitations. Blood pressure is 106/68. Hyperlipidemia. Type II DM. Using Trulicity. Crohn's disease. She will follow up with Dr. Gray next week. Chronic opioid use. Getting spinal implant. GERD. The patient reports that she aspirates at night. Reduced Omeprazole from 80 mg to 40 mg. Insomnia. Taking 50 mg Trazodone bid. Dosage will be increased to 100 mg. ADD. Adderall refill needed. Eye exam is coming up in 6/25.    Review of Systems   Constitutional:  Negative for chills and fever.   HENT:  Negative for congestion and sore throat.    Eyes:  Negative for visual disturbance.   Respiratory:  Negative for chest tightness and shortness of breath.    Cardiovascular:  Negative for chest pain.   Gastrointestinal:  Negative for nausea.   Endocrine: Negative for polydipsia and polyuria.   Genitourinary:  Negative for dysuria and flank pain.   Musculoskeletal:  Positive for arthralgias (Right knee). Negative for back pain, neck pain and neck stiffness.   Skin:  Negative for rash.   Neurological:  Negative for weakness.   Hematological:  Does not bruise/bleed easily.   Psychiatric/Behavioral:  Negative for behavioral problems.    All other systems reviewed and are negative.      Objective:       Physical examination: Vital signs noted. No acute distress. No carotid bruit. Regular heart rate and rhythm. Lungs clear to auscultation bilaterally. Abdomen bowel sounds positive soft and nontender. Extremities without edema. 2+ pedal pulses. Right knee slightly tender.      Assessment:       1. Attention deficit disorder, unspecified type    2. Hyperlipidemia, unspecified hyperlipidemia type    3. Crohn's disease with complication, unspecified gastrointestinal tract location    4. Type 2 diabetes mellitus with hyperglycemia, without long-term current use of insulin    5. Aspirin long-term use    6. Chronic, continuous use of opioids    7. S/P appendectomy    8. S/P cholecystectomy    9. Gastroesophageal reflux disease, unspecified whether esophagitis present    10. BMI 29.0-29.9,adult    11. Right knee pain, unspecified chronicity    12. Insomnia, unspecified type        Plan:       Attention deficit disorder, unspecified type    Hyperlipidemia, unspecified hyperlipidemia type  -     Lipid Panel; Future; Expected date: 05/23/2025    Crohn's disease with complication, unspecified gastrointestinal tract location    Type 2 diabetes mellitus with hyperglycemia, without long-term current use of insulin  -     Hemoglobin A1C; Future; Expected date: 05/23/2025    Aspirin long-term use  -     CBC Auto Differential; Future; Expected date: 05/23/2025    Chronic, continuous use of opioids    S/P appendectomy    S/P cholecystectomy    Gastroesophageal reflux disease, unspecified whether esophagitis present  -     Comprehensive Metabolic Panel; Future; Expected date: 05/23/2025    BMI 29.0-29.9,adult    Right knee pain, unspecified chronicity  -     Ambulatory referral/consult to Orthopedics; Future; Expected date: 05/30/2025    Insomnia, unspecified type  -     TSH; Future; Expected date: 05/23/2025    Crohn's is doing well.  MRI of the right knee due to the instability in dislocation.  Refer to Dr. Araujo after the MRI.   Prescription for Narcan.  Continue pain clinic follow-up.  Go for eye exam.  Trazodone 100 mg HS 90 with a refill.  Adderall 20 mg daily 30 pills.  CBC CMP A1c lipids and TSH ordered.  All care gaps addressed or discussed.     I, Norberto Ac III, MD, personally performed the services described in this documentation. All medical record entries made by the scribe were at my direction and in my presence. I have reviewed the chart and agree that the record reflects my personal performance and is accurate and complete.

## 2025-05-24 PROBLEM — K21.9 GASTROESOPHAGEAL REFLUX DISEASE: Status: ACTIVE | Noted: 2025-05-24

## 2025-05-26 ENCOUNTER — HOSPITAL ENCOUNTER (OUTPATIENT)
Dept: RADIOLOGY | Facility: HOSPITAL | Age: 60
Discharge: HOME | End: 2025-05-26
Attending: FAMILY MEDICINE
Payer: COMMERCIAL

## 2025-05-26 ENCOUNTER — RESULTS FOLLOW-UP (OUTPATIENT)
Dept: FAMILY MEDICINE | Facility: CLINIC | Age: 60
End: 2025-05-26

## 2025-05-26 DIAGNOSIS — M25.561 ARTHRALGIA OF RIGHT KNEE: ICD-10-CM

## 2025-05-26 PROCEDURE — 73721 MRI JNT OF LWR EXTRE W/O DYE: CPT | Mod: 26,RT,, | Performed by: RADIOLOGY

## 2025-05-26 PROCEDURE — 73721 MRI JNT OF LWR EXTRE W/O DYE: CPT | Mod: TC,PO,RT

## 2025-05-27 ENCOUNTER — OFFICE VISIT (OUTPATIENT)
Dept: ORTHOPEDICS | Facility: CLINIC | Age: 60
End: 2025-05-27
Payer: COMMERCIAL

## 2025-05-27 VITALS — WEIGHT: 171.31 LBS | BODY MASS INDEX: 29.24 KG/M2 | HEIGHT: 64 IN

## 2025-05-27 DIAGNOSIS — M17.11 PRIMARY OSTEOARTHRITIS OF RIGHT KNEE: ICD-10-CM

## 2025-05-27 DIAGNOSIS — S83.281A ACUTE LATERAL MENISCUS TEAR OF RIGHT KNEE, INITIAL ENCOUNTER: ICD-10-CM

## 2025-05-27 DIAGNOSIS — S83.241A ACUTE MEDIAL MENISCUS TEAR OF RIGHT KNEE, INITIAL ENCOUNTER: Primary | ICD-10-CM

## 2025-05-27 PROCEDURE — 3072F LOW RISK FOR RETINOPATHY: CPT | Mod: CPTII,S$GLB,COE, | Performed by: ORTHOPAEDIC SURGERY

## 2025-05-27 PROCEDURE — 99213 OFFICE O/P EST LOW 20 MIN: CPT | Mod: S$GLB,COE,, | Performed by: ORTHOPAEDIC SURGERY

## 2025-05-27 PROCEDURE — 3008F BODY MASS INDEX DOCD: CPT | Mod: CPTII,S$GLB,COE, | Performed by: ORTHOPAEDIC SURGERY

## 2025-05-27 PROCEDURE — 1159F MED LIST DOCD IN RCRD: CPT | Mod: CPTII,S$GLB,COE, | Performed by: ORTHOPAEDIC SURGERY

## 2025-05-27 PROCEDURE — 1160F RVW MEDS BY RX/DR IN RCRD: CPT | Mod: CPTII,S$GLB,COE, | Performed by: ORTHOPAEDIC SURGERY

## 2025-05-27 PROCEDURE — 99999 PR PBB SHADOW E&M-EST. PATIENT-LVL IV: CPT | Mod: PBBFAC,COE,, | Performed by: ORTHOPAEDIC SURGERY

## 2025-05-27 PROCEDURE — 3044F HG A1C LEVEL LT 7.0%: CPT | Mod: CPTII,S$GLB,COE, | Performed by: ORTHOPAEDIC SURGERY

## 2025-05-27 RX ORDER — PEN NEEDLE, DIABETIC 31 GX5/16"
NEEDLE, DISPOSABLE MISCELLANEOUS
COMMUNITY
Start: 2025-04-22

## 2025-05-27 RX ORDER — OXYCODONE AND ACETAMINOPHEN 10; 325 MG/1; MG/1
1 TABLET ORAL EVERY 6 HOURS
COMMUNITY
Start: 2025-05-20

## 2025-05-27 RX ORDER — INSULIN DEGLUDEC 100 U/ML
30 INJECTION, SOLUTION SUBCUTANEOUS DAILY
Qty: 30 ML | Refills: 0 | Status: SHIPPED | OUTPATIENT
Start: 2025-05-27

## 2025-05-27 NOTE — PROGRESS NOTES
[facility]     Subjective:    Chief Complaint:   Chief Complaint   Patient presents with    Right Knee - Pain     Right knee pain for many years and has gotten progressively worse. Back in January had an incident to where the knee badly dislocated. Did go through  weeks of PT, painful to bend the knee. Has feeling of wanting to give way. Had MRI yesterday        Past Medical History:   Diagnosis Date    ADHD (attention deficit hyperactivity disorder)     Arthritis     Colon polyp     Crohn's disease     Diabetes mellitus, type 2     GERD (gastroesophageal reflux disease)     Hypertension     Hypothyroidism     Lumbar back pain with radiculopathy affecting left lower extremity     SBO (small bowel obstruction)        Past Surgical History:   Procedure Laterality Date    ANTERIOR CRUCIATE LIGAMENT REPAIR Right     APPENDECTOMY      1988    BUNIONECTOMY Bilateral     CHOLECYSTECTOMY      2005    COLONOSCOPY N/A 11/1/2024    Procedure: COLONOSCOPY(Instructions sent 10/25);  Surgeon: Marguerite Gray MD;  Location: [facility];  Service: Endoscopy;  Laterality: N/A;    ESOPHAGOGASTRODUODENOSCOPY N/A 10/18/2024    Procedure: EGD (ESOPHAGOGASTRODUODENOSCOPY);  Surgeon: Marguerite Gray MD;  Location: [facility];  Service: Endoscopy;  Laterality: N/A;    ESOPHAGOGASTRODUODENOSCOPY N/A 3/7/2025    Procedure: EGD (ESOPHAGOGASTRODUODENOSCOPY);  Surgeon: Marguerite Gray MD;  Location: [facility];  Service: Endoscopy;  Laterality: N/A;    EXCISION OF MASS OF BACK Left 09/21/2022    Procedure: EXCISION, MASS, BACK;  Surgeon: Sae Collins III, MD;  Location: [facility] OR;  Service: General;  Laterality: Left;    HYSTERECTOMY      INJECTION OF JOINT Right 05/09/2023    Procedure: INJECTION, JOINT, RIGHT PIRIFORMIS AND RIGHT GTB;  Surgeon: Loreta Brito MD;  Location: [facility];  Service: Pain Management;  Laterality: Right;    MINIMALLY INVASIVE SURGICAL REMOVAL OF INTERVERTEBRAL DISC OF SPINE USING  "MICROSCOPE Left 2021    Procedure: Left L4-5 Far Lateral Disectomy, MIS;  Surgeon: Johnny Cruz MD;  Location: [facility] OR [address] [place] [address];  Service: Neurosurgery;  Laterality: Left;    OOPHORECTOMY      SHOULDER SURGERY Right     SPINE SURGERY      TRANSFORAMINAL EPIDURAL INJECTION OF STEROID Left 03/10/2021    Procedure: INJECTION, STEROID, EPIDURAL, TRANSFORAMINAL APPROACH  L4-5;  Surgeon: Jaylon Tyson MD;  Location: [facility];  Service: Pain Management;  Laterality: Left;  consent needed  ([employer] [facility])    TRANSFORAMINAL EPIDURAL INJECTION OF STEROID Left 2022    Procedure: LUMBAR TRANSFORAMINAL LEFT L4/5 MEDICALLY URGENT;  Surgeon: Loreta Brito MD;  Location: [facility];  Service: Pain Management;  Laterality: Left;    UPPER GASTROINTESTINAL ENDOSCOPY      10 years ago [facility]       Current Outpatient Medications   Medication Sig    aspirin (ECOTRIN) 81 MG EC tablet Take 81 mg by mouth once daily.    atorvastatin (LIPITOR) 10 MG tablet TAKE 1 TABLET BY MOUTH EVERY DAY    BD ULTRA-FINE MINI PEN NEEDLE 31 gauge x 3/16" Ndle SMARTSI pen needle SUB-Q Daily    BD ULTRA-FINE SHORT PEN NEEDLE 31 gauge x 5/16" Ndle Inject 1 Needle into the skin once daily.    blood sugar diagnostic (ONETOUCH ULTRA BLUE TEST STRIP) Strp Check blood glucose readings twice a day-- before meal and 1 to 2 hours post meal    blood-glucose meter Misc Check blood glucose readings twice a day-- before meal and 1 to 2 hours post meal    dulaglutide (TRULICITY) 3 mg/0.5 mL pen injector Inject 3 mg into the skin every 7 days.    estradioL (ESTRACE) 2 MG tablet Take 1 tablet (2 mg total) by mouth once daily.    fentaNYL (DURAGESIC) 75 mcg/hr Place 1 patch onto the skin every 72 hours.    hydroCHLOROthiazide (HYDRODIURIL) 25 MG tablet Take 1 tablet (25 mg total) by mouth once daily.    lancets 33 gauge Misc Check blood glucose readings twice a day-- before meal and 1 to 2 hours post meal    oxyCODONE-acetaminophen " (PERCOCET)  mg per tablet Take 1 tablet by mouth every 6 (six) hours.    polyethylene glycol (GLYCOLAX) 17 gram/dose powder Take 17 g by mouth once daily.    traZODone (DESYREL) 100 MG tablet Take 1 tablet (100 mg total) by mouth nightly as needed for Insomnia.    TRESIBA FLEXTOUCH U-100 100 unit/mL (3 mL) insulin pen Inject 30 Units into the skin once daily.    HYDROcodone-acetaminophen (NORCO)  mg per tablet Take 1-2 tablets by mouth every 8 (eight) hours. (Patient not taking: Reported on 5/23/2025)    omeprazole (PRILOSEC) 40 MG capsule Take 1 capsule (40 mg total) by mouth every morning.    potassium chloride (KLOR-CON) 10 MEQ TbSR Take 1 tablet (10 mEq total) by mouth once daily. (Patient not taking: Reported on 5/27/2025)    tiZANidine (ZANAFLEX) 4 MG tablet Take 4 mg by mouth 4 (four) times daily as needed (Muscle Spasms). (Patient not taking: Reported on 5/23/2025)     No current facility-administered medications for this visit.       Review of patient's allergies indicates:   Allergen Reactions    Nsaids (non-steroidal anti-inflammatory drug) Other (See Comments)     GI upset  GI upset      Ibuprofen Other (See Comments)     Other reaction(s): crohns flair up  Other reaction(s): crohns flair up       Family History   Problem Relation Name Age of Onset    Crohn's disease Other      Cirrhosis Neg Hx      Ulcerative colitis Neg Hx         Social History[1]    History of present illness:  59-year-old female presenting as a new patient with right knee pain that has been present for several years and has been progressively getting worse with time.  Here to discuss recent MRI results of the right knee.  She states that she dislocated her patella back in January and has gone through formal physical therapy without any significant relief of her symptoms.  She has had corticosteroid injections of the right knee in the past also without any significant relief of her symptoms.  She states that her right  knee has a feeling of wanting to give out on her at times.  She admits to clicking and popping of the right knee with flexion-extension.  Her pain increases with prolonged ambulation increased activity.      Review of Systems:    Constitution: Negative for chills, fever, and sweats.  Negative for unexplained weight loss.    HENT:  Negative for headaches and blurry vision.    Cardiovascular:Negative for chest pain or irregular heart beat. Negative for hypertension.    Respiratory:  Negative for cough and shortness of breath.    Gastrointestinal: Negative for abdominal pain, heartburn, melena, nausea, and vomitting.    Genitourinary:  Negative bladder incontinence and dysuria.    Musculoskeletal:  See HPI for details.    Neurological: Negative for numbness.    Psychiatric/Behavioral: Negative for depression.  The patient is not nervous/anxious.      Endocrine: Negative for polyuria    Hematologic/Lymphatic: Negative for bleeding problem.  Does not bruise/bleed easily.    Skin: Negative for poor would healing and rash    Objective:     Physical Examination:    Vital Signs: VITALS@    Body mass index is 29.4 kg/m².    This a well-developed, well nourished patient in no acute distress.  They are alert and oriented and cooperative to examination.        Right knee exam: Skin overlying the right knee is clean dry and intact.  No erythema or ecchymosis.  No signs or symptoms of infection.  Range of motion right knee 0-120 degrees.  Stable varus valgus stress.  Negative anterior-posterior drawer test.  Positive Abebe's test.  Negative Homans on the right.  Negative straight leg raise test on the right.  Distally neurovascularly intact.    Pertinent New Results:    XRAY Report / Interpretation:  No new radiographs taken at today's visit.    Assessment/Plan:     Medial and lateral meniscus tear of right knee.  We discussed treatment options today whether it be proceeding with right knee arthroscopy with partial medial and  "lateral meniscectomies versus continuing to monitor this with time periodic corticosteroid injections.  We discussed the details of the surgery and answered all of her questions.  She wishes to proceed.  Surgical consents were obtained today.  We also informed her on the possibility of having to do a total knee arthroplasty in the future based on findings intraoperatively as well as symptom relief following the surgery.      Risks of the procedure were reviewed with the patient.  This includes, but is not limited to: infection, bleeding, pain, swelling, decreased range of motion, nerve injury/damage, deep vein thrombosis, pulmonary embolism, wound dehiscence, and possible need for further surgery.    Russ Brownlee, Physician Assistant, served in the capacity as a "scribe" for this patient encounter.  A "face-to-face" encounter occurred with Dr. Brock Araujo on this date.  The treatment plan and medical decision-making is outlined above. Patient was seen and examined with a chaperone.     This note was created using Dragon voice recognition software that occasionally misinterpreted phrases or words.       [1]   Social History  Socioeconomic History    Marital status:    Occupational History    Occupation: Disabled     Comment: Previous occupation:    Tobacco Use    Smoking status: Former     Current packs/day: 0.00     Types: Cigarettes     Quit date: 2020     Years since quittin.4    Smokeless tobacco: Former    Tobacco comments:     in the 8 th grade   Substance and Sexual Activity    Alcohol use: Yes     Alcohol/week: 4.0 standard drinks of alcohol     Types: 4 Cans of beer per week     Comment: 1 beer , once in 2 weeks    Drug use: No    Sexual activity: Yes     Partners: Female     Comment: Never had pregnant      Social Drivers of Health     Financial Resource Strain: Patient Declined (2025)    Overall Financial Resource Strain (CARDIA)     Difficulty of Paying " Living Expenses: Patient declined   Food Insecurity: Patient Declined (1/28/2025)    Hunger Vital Sign     Worried About Running Out of Food in the Last Year: Patient declined     Ran Out of Food in the Last Year: Patient declined   Transportation Needs: Patient Declined (1/28/2025)    TRANSPORTATION NEEDS     Transportation : Patient declined   Physical Activity: Insufficiently Active (5/31/2024)    Exercise Vital Sign     Days of Exercise per Week: 1 day     Minutes of Exercise per Session: 30 min   Stress: Patient Declined (1/28/2025)    [employer] - Occupational Stress Questionnaire     Feeling of Stress : Patient declined   Housing Stability: Patient Declined (1/28/2025)    Housing Stability Vital Sign     Unable to Pay for Housing in the Last Year: Patient declined     Homeless in the Last Year: Patient declined

## 2025-05-27 NOTE — TELEPHONE ENCOUNTER
Care Due:                  Date            Visit Type   Department     Provider  --------------------------------------------------------------------------------                                EP -                              PRIMARY      [facility]  Last Visit: 05-      [facility]  Next Visit: None Scheduled  None         None Found                                                            Last  Test          Frequency    Reason                     Performed    Due Date  --------------------------------------------------------------------------------    HBA1C.......  6 months...  TRESIBA..................  02-   08-    Lipid Panel.  12 months..  atorvastatin.............  08- 08-    [employer] Embedded Care Due Messages. Reference number: [id number].   5/27/2025 7:30:19 AM CDT

## 2025-05-28 ENCOUNTER — OFFICE VISIT (OUTPATIENT)
Dept: GASTROENTEROLOGY | Facility: CLINIC | Age: 60
End: 2025-05-28
Payer: COMMERCIAL

## 2025-05-28 VITALS — RESPIRATION RATE: 18 BRPM

## 2025-05-28 DIAGNOSIS — S83.281A ACUTE LATERAL MENISCUS TEAR OF RIGHT KNEE, INITIAL ENCOUNTER: ICD-10-CM

## 2025-05-28 DIAGNOSIS — S83.241A ACUTE MEDIAL MENISCUS TEAR OF RIGHT KNEE, INITIAL ENCOUNTER: Primary | ICD-10-CM

## 2025-05-28 DIAGNOSIS — Z01.818 PRE-OP TESTING: ICD-10-CM

## 2025-05-28 DIAGNOSIS — R13.10 DYSPHAGIA, UNSPECIFIED TYPE: Primary | ICD-10-CM

## 2025-05-28 PROCEDURE — 3044F HG A1C LEVEL LT 7.0%: CPT | Mod: CPTII,S$GLB,COE, | Performed by: INTERNAL MEDICINE

## 2025-05-28 PROCEDURE — 99999 PR PBB SHADOW E&M-EST. PATIENT-LVL III: CPT | Mod: PBBFAC,COE,, | Performed by: INTERNAL MEDICINE

## 2025-05-28 PROCEDURE — 99214 OFFICE O/P EST MOD 30 MIN: CPT | Mod: S$GLB,COE,, | Performed by: INTERNAL MEDICINE

## 2025-05-28 PROCEDURE — 3072F LOW RISK FOR RETINOPATHY: CPT | Mod: CPTII,S$GLB,COE, | Performed by: INTERNAL MEDICINE

## 2025-05-28 RX ORDER — CEFAZOLIN SODIUM 2 G/50ML
2 SOLUTION INTRAVENOUS
OUTPATIENT
Start: 2025-05-28

## 2025-05-28 NOTE — PROGRESS NOTES
[facility]    CC: Dysphagia    HPI 59 y.o. female with history of HTN, DM, GERD, hypothyroidism, esophageal ulcer, Schatzki ring and ? Crohn's disease , presents for follow up of 4 weeks of persistent, moderate, esophageal dysphagia for liquids, not associated with significant solid food dysphagia. The temperature of the liquid does not alter her dysphagia. She decreased her Omeprazole from BID to daily several weeks ago and is unsure if this led to her dysphagia for liquids.  She has no recent odynophagia. Her bowel habits have improved with Magnesium and Probiotic supplement daily. Her last EGD was in March, notable for Schatzki ring dilated to 19mm with TTS balloon, healed esophageal ulcer and H.pylori negative gastritis.     Past Medical History:   Diagnosis Date    ADHD (attention deficit hyperactivity disorder)     Arthritis     Colon polyp     Crohn's disease     Diabetes mellitus, type 2     GERD (gastroesophageal reflux disease)     Hypertension     Hypothyroidism     Lumbar back pain with radiculopathy affecting left lower extremity     SBO (small bowel obstruction)        Allergies and Medications reviewed     Review of Systems  General ROS: negative for - chills, fever or weight loss  Cardiovascular ROS: no chest pain or dyspnea on exertion  Gastrointestinal ROS: + dysphagia for liquids, no blood in stool, no diarrhea    Physical Examination  Resp 18   General appearance: alert, cooperative, no distress  HENT: Normocephalic, atraumatic, neck symmetrical, no nasal discharge, sclera anicteric   Lungs: clear to auscultation bilaterally, symmetric chest wall expansion bilaterally  Heart: regular rate and rhythm without rub; no displacement of the PMI   Abdomen: soft, nontender, nondistended, BS Active ,no masses appreciated   Extremities: extremities symmetric; no clubbing, cyanosis, or edema        Labs:  Lab Results   Component Value Date    WBC 7.98 01/31/2025    HGB 13.3 01/31/2025     HCT 38.7 01/31/2025    MCV 86 01/31/2025     01/31/2025       CMP  Sodium   Date Value Ref Range Status   02/11/2025 138 136 - 145 mmol/L Final     Potassium   Date Value Ref Range Status   02/11/2025 3.3 (L) 3.5 - 5.1 mmol/L Final     Chloride   Date Value Ref Range Status   02/11/2025 100 95 - 110 mmol/L Final     CO2   Date Value Ref Range Status   02/11/2025 31 (H) 23 - 29 mmol/L Final     Glucose   Date Value Ref Range Status   02/11/2025 115 (H) 70 - 110 mg/dL Final     BUN   Date Value Ref Range Status   02/11/2025 9 6 - 20 mg/dL Final     Creatinine   Date Value Ref Range Status   02/11/2025 0.7 0.5 - 1.4 mg/dL Final     Calcium   Date Value Ref Range Status   02/11/2025 9.1 8.7 - 10.5 mg/dL Final     Total Protein   Date Value Ref Range Status   02/11/2025 7.1 6.0 - 8.4 g/dL Final     Albumin   Date Value Ref Range Status   02/11/2025 4.3 3.5 - 5.2 g/dL Final     Total Bilirubin   Date Value Ref Range Status   02/11/2025 0.6 0.1 - 1.0 mg/dL Final     Comment:     For infants and newborns, interpretation of results should be based  on gestational age, weight and in agreement with clinical  observations.    Premature Infant recommended reference ranges:  Up to 24 hours.............<8.0 mg/dL  Up to 48 hours............<12.0 mg/dL  3-5 days..................<15.0 mg/dL  6-29 days.................<15.0 mg/dL       Alkaline Phosphatase   Date Value Ref Range Status   02/11/2025 37 (L) 55 - 135 U/L Final     AST   Date Value Ref Range Status   02/11/2025 50 (H) 10 - 40 U/L Final     ALT   Date Value Ref Range Status   02/11/2025 67 (H) 10 - 44 U/L Final     Anion Gap   Date Value Ref Range Status   02/11/2025 7 (L) 8 - 16 mmol/L Final     eGFR   Date Value Ref Range Status   02/11/2025 >60.0 >60 mL/min/1.73 m^2 Final         Imaging:  Esophagram November 2024 was independently visualized and reviewed by me and showed Mild esophageal dysmotility.Slight focal narrowing of the distal 3rd of the esophagus,  likely representing region of previous dilatation.  No significant residual or recurrent stricture.    Assessment:   59 y.o. female with history of Schatzki ring and previous esophagram showing esophageal dysmotility, presents for follow up of new dysphagia for liquids.     Plan:  -Schedule EGD  -Continue daily Omeprazole    Marguerite Gray MD  [facility]  [address], [address]  [place] [address]  Office: [phone]  Fax: [phone]

## 2025-06-06 ENCOUNTER — HOSPITAL ENCOUNTER (OUTPATIENT)
Facility: HOSPITAL | Age: 60
Discharge: HOME | End: 2025-06-06
Attending: INTERNAL MEDICINE | Admitting: INTERNAL MEDICINE
Payer: COMMERCIAL

## 2025-06-06 ENCOUNTER — ANESTHESIA EVENT (OUTPATIENT)
Dept: ENDOSCOPY | Facility: HOSPITAL | Age: 60
End: 2025-06-06
Payer: COMMERCIAL

## 2025-06-06 ENCOUNTER — ANESTHESIA (OUTPATIENT)
Dept: ENDOSCOPY | Facility: HOSPITAL | Age: 60
End: 2025-06-06
Payer: COMMERCIAL

## 2025-06-06 VITALS
WEIGHT: 171 LBS | TEMPERATURE: 99 F | OXYGEN SATURATION: 97 % | BODY MASS INDEX: 29.19 KG/M2 | RESPIRATION RATE: 16 BRPM | SYSTOLIC BLOOD PRESSURE: 106 MMHG | HEIGHT: 64 IN | HEART RATE: 69 BPM | DIASTOLIC BLOOD PRESSURE: 59 MMHG

## 2025-06-06 DIAGNOSIS — R13.10 DYSPHAGIA: ICD-10-CM

## 2025-06-06 PROCEDURE — D9220A PRA ANESTHESIA: Mod: CRNA,,, | Performed by: NURSE ANESTHETIST, CERTIFIED REGISTERED

## 2025-06-06 PROCEDURE — 37000008 HC ANESTHESIA 1ST 15 MINUTES: Performed by: INTERNAL MEDICINE

## 2025-06-06 PROCEDURE — 43249 ESOPH EGD DILATION <30 MM: CPT | Mod: ,,, | Performed by: INTERNAL MEDICINE

## 2025-06-06 PROCEDURE — C1726 CATH, BAL DIL, NON-VASCULAR: HCPCS | Performed by: INTERNAL MEDICINE

## 2025-06-06 PROCEDURE — 43239 EGD BIOPSY SINGLE/MULTIPLE: CPT | Mod: XS,,, | Performed by: INTERNAL MEDICINE

## 2025-06-06 PROCEDURE — 37000009 HC ANESTHESIA EA ADD 15 MINS: Performed by: INTERNAL MEDICINE

## 2025-06-06 PROCEDURE — 27201012 HC FORCEPS, HOT/COLD, DISP: Performed by: INTERNAL MEDICINE

## 2025-06-06 PROCEDURE — 25000003 PHARM REV CODE 250: Performed by: INTERNAL MEDICINE

## 2025-06-06 PROCEDURE — 63600175 PHARM REV CODE 636 W HCPCS: Performed by: NURSE ANESTHETIST, CERTIFIED REGISTERED

## 2025-06-06 PROCEDURE — 43239 EGD BIOPSY SINGLE/MULTIPLE: CPT | Mod: XS | Performed by: INTERNAL MEDICINE

## 2025-06-06 PROCEDURE — 43249 ESOPH EGD DILATION <30 MM: CPT | Performed by: INTERNAL MEDICINE

## 2025-06-06 PROCEDURE — D9220A PRA ANESTHESIA: Mod: ANES,,, | Performed by: ANESTHESIOLOGY

## 2025-06-06 RX ORDER — SODIUM CHLORIDE 9 MG/ML
INJECTION, SOLUTION INTRAVENOUS CONTINUOUS
Status: DISCONTINUED | OUTPATIENT
Start: 2025-06-06 | End: 2025-06-06 | Stop reason: HOSPADM

## 2025-06-06 RX ORDER — LIDOCAINE HYDROCHLORIDE 20 MG/ML
INJECTION INTRAVENOUS
Status: DISCONTINUED | OUTPATIENT
Start: 2025-06-06 | End: 2025-06-06

## 2025-06-06 RX ORDER — PROPOFOL 10 MG/ML
VIAL (ML) INTRAVENOUS
Status: DISCONTINUED | OUTPATIENT
Start: 2025-06-06 | End: 2025-06-06

## 2025-06-06 RX ADMIN — LIDOCAINE HYDROCHLORIDE 100 MG: 20 INJECTION, SOLUTION INTRAVENOUS at 12:06

## 2025-06-06 RX ADMIN — PROPOFOL 50 MG: 10 INJECTION, EMULSION INTRAVENOUS at 12:06

## 2025-06-06 RX ADMIN — SODIUM CHLORIDE: 9 INJECTION, SOLUTION INTRAVENOUS at 11:06

## 2025-06-06 RX ADMIN — PROPOFOL 150 MG: 10 INJECTION, EMULSION INTRAVENOUS at 12:06

## 2025-06-06 NOTE — H&P
[facility]    CC: Dysphagia    HPI 59 y.o. female presents for evaluation of dysphagia    Past Medical History:   Diagnosis Date    ADHD (attention deficit hyperactivity disorder)     Arthritis     Colon polyp     Crohn's disease     Diabetes mellitus, type 2     GERD (gastroesophageal reflux disease)     Hypertension     Hypothyroidism     Lumbar back pain with radiculopathy affecting left lower extremity     SBO (small bowel obstruction)        Allergies and Medications reviewed     Review of Systems  General ROS: negative for - chills, fever or weight loss  Cardiovascular ROS: no chest pain or dyspnea on exertion  Gastrointestinal ROS: + dysphagia    Physical Examination  There were no vitals taken for this visit.  General appearance: alert, cooperative, no distress  HENT: Normocephalic, atraumatic, neck symmetrical, no nasal discharge, sclera anicteric   Lungs: clear to auscultation bilaterally, symmetric chest wall expansion bilaterally  Heart: regular rate and rhythm without rub; no displacement of the PMI   Abdomen: soft  Extremities: extremities symmetric; no clubbing, cyanosis, or edema        Labs:  Lab Results   Component Value Date    WBC 7.98 01/31/2025    HGB 13.3 01/31/2025    HCT 38.7 01/31/2025    MCV 86 01/31/2025     01/31/2025           Assessment:   59 y.o. female presents for eGD    Plan:  Proceed to EGD    Marguerite Gray MD  [facility]  [address], [address]  [place] [address]  Office: [phone]  Fax: [phone]

## 2025-06-06 NOTE — PROVATION PATIENT INSTRUCTIONS
Discharge Summary/Instructions after an Endoscopic Procedure  Patient Name: Tanesha Walker MRN: [id number]  Patient YOB: 1965 Friday, June 6, 2025  Marguerite Gray MD  Dear patient,  As a result of recent federal legislation (The Federal Cures Act), you may   receive lab or pathology results from your procedure in your [facility]   account before your physician is able to contact you. Your physician or   their representative will relay the results to you with their   recommendations at their soonest availability.  Thank you,  RESTRICTIONS:  During your procedure today, you received medications for sedation.  These   medications may affect your judgment, balance and coordination.  Therefore,   for 24 hours, you have the following restrictions:   - DO NOT drive a car, operate machinery, make legal/financial decisions,   sign important papers or drink alcohol.    ACTIVITY:  Today: no heavy lifting, straining or running due to procedural   sedation/anesthesia.  The following day: return to full activity including work.  DIET:  Eat and drink normally unless instructed otherwise.     TREATMENT FOR COMMON SIDE EFFECTS:  - Mild abdominal pain, nausea, belching, bloating or excessive gas:  rest,   eat lightly and use a heating pad.  - Sore Throat: treat with throat lozenges and/or gargle with warm salt   water.  - Because air was used during the procedure, expelling large amounts of air   from your rectum or belching is normal.  - If a bowel prep was taken, you may not have a bowel movement for 1-3 days.    This is normal.  SYMPTOMS TO WATCH FOR AND REPORT TO YOUR PHYSICIAN:  1. Abdominal pain or bloating, other than gas cramps.  2. Chest pain.  3. Back pain.  4. Signs of infection such as: chills or fever occurring within 24 hours   after the procedure.  5. Rectal bleeding, which would show as bright red, maroon, or black stools.   (A tablespoon of blood from the rectum is not serious,  especially if   hemorrhoids are present.)  6. Vomiting.  7. Weakness or dizziness.  GO DIRECTLY TO THE NEAREST EMERGENCY ROOM IF YOU HAVE ANY OF THE FOLLOWING:      Difficulty breathing              Chills and/or fever over 101 F   Persistent vomiting and/or vomiting blood   Severe abdominal pain   Severe chest pain   Black, tarry stools   Bleeding- more than one tablespoon   Any other symptom or condition that you feel may need urgent attention  Your doctor recommends these additional instructions:  If any biopsies were taken, your doctors clinic will contact you in 1 to 2   weeks with any results.  - Await pathology results.   - Discharge patient to home (with escort).   - Patient has a contact number available for emergencies.  The signs and   symptoms of potential delayed complications were discussed with the   patient.  Return to normal activities tomorrow.  Written discharge   instructions were provided to the patient.   - Resume previous diet.   - Continue present medications.   -Consider esophagram  For questions, problems or results please call your physician - Marguerite Gray MD at Work:  [phone].  [facility], EMERGENCY ROOM PHONE NUMBER: [phone]  IF A COMPLICATION OR EMERGENCY SITUATION ARISES AND YOU ARE UNABLE TO REACH   YOUR PHYSICIAN - GO DIRECTLY TO THE EMERGENCY ROOM.  Marguerite Gray MD  6/6/2025 1:02:39 PM  This report has been verified and signed electronically.  Dear patient,  As a result of recent federal legislation (The Federal Cures Act), you may   receive lab or pathology results from your procedure in your [facility]   account before your physician is able to contact you. Your physician or   their representative will relay the results to you with their   recommendations at their soonest availability.  Thank you,  PROVATION

## 2025-06-06 NOTE — TRANSFER OF CARE
"Anesthesia Transfer of Care Note    Patient: Tanesha Chamberlain    Procedure(s) Performed: Procedure(s) (LRB):  EGD (ESOPHAGOGASTRODUODENOSCOPY) (N/A)    Patient location: PACU    Anesthesia Type: general    Transport from OR: Transported from OR on room air with adequate spontaneous ventilation    Post pain: adequate analgesia    Post assessment: no apparent anesthetic complications    Post vital signs: stable    Level of consciousness: sedated and responds to stimulation    Nausea/Vomiting: no nausea/vomiting    Complications: none    Transfer of care protocol was followed      Last vitals: Visit Vitals  /60 (BP Location: Left arm, Patient Position: Lying)   Pulse 82   Temp 37 °C (98.6 °F) (Skin)   Resp 18   Ht 5' 4" (1.626 m)   Wt 77.6 kg (171 lb)   SpO2 (!) 94%   Breastfeeding No   BMI 29.35 kg/m²     "

## 2025-06-06 NOTE — ANESTHESIA PREPROCEDURE EVALUATION
06/06/2025  Tanesha Chamberlain is a 59 y.o., female.      Pre-op Assessment    I have reviewed the Patient Summary Reports.     I have reviewed the Nursing Notes. I have reviewed the NPO Status.   I have reviewed the Medications.     Review of Systems  Anesthesia Hx:             Denies Family Hx of Anesthesia complications.    Denies Personal Hx of Anesthesia complications.                    Cardiovascular:     Hypertension                                          Hepatic/GI:     GERD                Musculoskeletal:         Spine Disorders: lumbar Chronic Pain           Endocrine:  Diabetes Hypothyroidism        Obesity / BMI > 30  Psych:  Psychiatric History   Opiate use               Physical Exam  General: Well nourished, Cooperative, Alert and Oriented    Airway:  Mallampati: II   Mouth Opening: Normal  TM Distance: Normal  Tongue: Normal  Neck ROM: Normal ROM    Dental:  Dentures        Anesthesia Plan  Type of Anesthesia, risks & benefits discussed:    Anesthesia Type: Gen Natural Airway  Intra-op Monitoring Plan: Standard ASA Monitors  Induction:  IV  Informed Consent: Informed consent signed with the Patient and all parties understand the risks and agree with anesthesia plan.  All questions answered.   ASA Score: 2    Ready For Surgery From Anesthesia Perspective.     .

## 2025-06-09 ENCOUNTER — PATIENT MESSAGE (OUTPATIENT)
Dept: FAMILY MEDICINE | Facility: CLINIC | Age: 60
End: 2025-06-09
Payer: MEDICARE

## 2025-06-09 DIAGNOSIS — E11.65 TYPE 2 DIABETES MELLITUS WITH HYPERGLYCEMIA, WITH LONG-TERM CURRENT USE OF INSULIN: ICD-10-CM

## 2025-06-09 DIAGNOSIS — Z79.4 TYPE 2 DIABETES MELLITUS WITH HYPERGLYCEMIA, WITH LONG-TERM CURRENT USE OF INSULIN: ICD-10-CM

## 2025-06-09 NOTE — ANESTHESIA POSTPROCEDURE EVALUATION
Anesthesia Post Evaluation    Patient: Tanesha Chamberlain    Procedure(s) Performed: Procedure(s) (LRB):  EGD (ESOPHAGOGASTRODUODENOSCOPY) (N/A)    Final Anesthesia Type: general      Patient location during evaluation: PACU  Patient participation: Yes- Able to Participate  Level of consciousness: awake and alert  Post-procedure vital signs: reviewed and stable  Pain management: adequate  Airway patency: patent    PONV status at discharge: No PONV  Anesthetic complications: no      Cardiovascular status: blood pressure returned to baseline  Respiratory status: unassisted and room air  Hydration status: euvolemic  Follow-up not needed.              Vitals Value Taken Time   /59 06/06/25 13:15   Temp 37 °C (98.6 °F) 06/06/25 13:00   Pulse 69 06/06/25 13:15   Resp 16 06/06/25 13:15   SpO2 97 % 06/06/25 13:15         Event Time   Out of Recovery 13:28:57         Pain/Deb Score: No data recorded

## 2025-06-10 ENCOUNTER — PATIENT MESSAGE (OUTPATIENT)
Dept: GASTROENTEROLOGY | Facility: CLINIC | Age: 60
End: 2025-06-10
Payer: MEDICARE

## 2025-06-11 RX ORDER — DULAGLUTIDE 3 MG/.5ML
3 INJECTION, SOLUTION SUBCUTANEOUS
Qty: 4 PEN | Refills: 5 | Status: SHIPPED | OUTPATIENT
Start: 2025-06-11 | End: 2026-06-11

## 2025-06-17 ENCOUNTER — OFFICE VISIT (OUTPATIENT)
Dept: OPHTHALMOLOGY | Facility: CLINIC | Age: 60
End: 2025-06-17
Payer: COMMERCIAL

## 2025-06-17 DIAGNOSIS — H25.13 NUCLEAR SCLEROTIC CATARACT OF BOTH EYES: ICD-10-CM

## 2025-06-17 DIAGNOSIS — D31.31 CHOROIDAL NEVUS, RIGHT EYE: ICD-10-CM

## 2025-06-17 DIAGNOSIS — H04.123 DRY EYE SYNDROME OF BOTH EYES: ICD-10-CM

## 2025-06-17 DIAGNOSIS — E11.9 DIABETES MELLITUS WITHOUT OPHTHALMIC MANIFESTATIONS: Primary | ICD-10-CM

## 2025-06-17 PROCEDURE — 3044F HG A1C LEVEL LT 7.0%: CPT | Mod: CPTII,S$GLB,COE, | Performed by: STUDENT IN AN ORGANIZED HEALTH CARE EDUCATION/TRAINING PROGRAM

## 2025-06-17 PROCEDURE — 1160F RVW MEDS BY RX/DR IN RCRD: CPT | Mod: CPTII,S$GLB,COE, | Performed by: STUDENT IN AN ORGANIZED HEALTH CARE EDUCATION/TRAINING PROGRAM

## 2025-06-17 PROCEDURE — 99214 OFFICE O/P EST MOD 30 MIN: CPT | Mod: S$GLB,COE,, | Performed by: STUDENT IN AN ORGANIZED HEALTH CARE EDUCATION/TRAINING PROGRAM

## 2025-06-17 PROCEDURE — 2023F DILAT RTA XM W/O RTNOPTHY: CPT | Mod: CPTII,S$GLB,COE, | Performed by: STUDENT IN AN ORGANIZED HEALTH CARE EDUCATION/TRAINING PROGRAM

## 2025-06-17 PROCEDURE — 92134 CPTRZ OPH DX IMG PST SGM RTA: CPT | Mod: S$GLB,COE,, | Performed by: STUDENT IN AN ORGANIZED HEALTH CARE EDUCATION/TRAINING PROGRAM

## 2025-06-17 PROCEDURE — 92015 DETERMINE REFRACTIVE STATE: CPT | Mod: S$GLB,COE,, | Performed by: STUDENT IN AN ORGANIZED HEALTH CARE EDUCATION/TRAINING PROGRAM

## 2025-06-17 PROCEDURE — 99999 PR PBB SHADOW E&M-EST. PATIENT-LVL III: CPT | Mod: PBBFAC,COE,, | Performed by: STUDENT IN AN ORGANIZED HEALTH CARE EDUCATION/TRAINING PROGRAM

## 2025-06-17 PROCEDURE — 1159F MED LIST DOCD IN RCRD: CPT | Mod: CPTII,S$GLB,COE, | Performed by: STUDENT IN AN ORGANIZED HEALTH CARE EDUCATION/TRAINING PROGRAM

## 2025-06-17 NOTE — PROGRESS NOTES
HPI     Diabetic Eye Exam     Additional comments: Ldme 07/2024 -   Choroidal nevus OD   NSC OU   Contact lens wearer ( not doing contacts today ) *            Comments    Pt states vision is stable since last exam w Dr Vazquez ,. Needs new specs rx   , currently using modified rx for over cl wear , wants regular pals   Using pataday BID Gtts used   Denies F/F  Pt reports hypertension , states it is controlled w meds   Pt is diabetic last A1C - did mac oct today *   Hemoglobin A1C       Date                     Value               Ref Range             Status                02/11/2025               6.9 (H)             4.5 - 6.2 %           Final                        Last edited by Su Franklin on 6/17/2025  1:45 PM.            Assessment /Plan     For exam results, see Encounter Report.    Diabetes mellitus without ophthalmic manifestations    Nuclear sclerotic cataract of both eyes    Dry eye syndrome of both eyes    Choroidal nevus, right eye  -     OCT, Retina - OU - Both Eyes; Future  -     Color Fundus Photography - OU - Both Eyes; Future  -     Cancel: Color Fundus Photography - OU - Both Eyes; Future      A1c 6.9%. No DR or DME. Continue strict glucose and bp control. RTC 1 year for diabetic DFE.   NVS. CTM.  Aritificial tears  Baseline optos obtained. Small, flat. CTM.    RTC 1 year diabetic DFE

## 2025-06-18 ENCOUNTER — HOSPITAL ENCOUNTER (OUTPATIENT)
Dept: PREADMISSION TESTING | Facility: HOSPITAL | Age: 60
Discharge: HOME | End: 2025-06-18
Attending: ORTHOPAEDIC SURGERY
Payer: COMMERCIAL

## 2025-06-18 VITALS — BODY MASS INDEX: 28.17 KG/M2 | WEIGHT: 165 LBS | HEIGHT: 64 IN

## 2025-06-18 DIAGNOSIS — S83.241A ACUTE MEDIAL MENISCUS TEAR OF RIGHT KNEE, INITIAL ENCOUNTER: ICD-10-CM

## 2025-06-18 DIAGNOSIS — Z01.818 PRE-OP TESTING: ICD-10-CM

## 2025-06-18 DIAGNOSIS — S83.281A ACUTE LATERAL MENISCUS TEAR OF RIGHT KNEE, INITIAL ENCOUNTER: ICD-10-CM

## 2025-06-18 LAB
ABSOLUTE EOSINOPHIL (SMH): 0.54 K/UL
ABSOLUTE MONOCYTE (SMH): 0.6 K/UL (ref 0.3–1)
ABSOLUTE NEUTROPHIL COUNT (SMH): 6.4 K/UL (ref 1.8–7.7)
ANION GAP (SMH): 11 MMOL/L (ref 8–16)
BASOPHILS # BLD AUTO: 0.09 K/UL
BASOPHILS NFR BLD AUTO: 0.9 %
BUN SERPL-MCNC: 12 MG/DL (ref 6–20)
CALCIUM SERPL-MCNC: 9.8 MG/DL (ref 8.7–10.5)
CHLORIDE SERPL-SCNC: 99 MMOL/L (ref 95–110)
CO2 SERPL-SCNC: 31 MMOL/L (ref 23–29)
CREAT SERPL-MCNC: 0.8 MG/DL (ref 0.5–1.4)
ERYTHROCYTE [DISTWIDTH] IN BLOOD BY AUTOMATED COUNT: 13.4 % (ref 11.5–14.5)
GFR SERPLBLD CREATININE-BSD FMLA CKD-EPI: >60 ML/MIN/1.73/M2
GLUCOSE SERPL-MCNC: 99 MG/DL (ref 70–110)
HCT VFR BLD AUTO: 47.7 % (ref 37–48.5)
HGB BLD-MCNC: 15.7 GM/DL (ref 12–16)
IMM GRANULOCYTES # BLD AUTO: 0.03 K/UL (ref 0–0.04)
IMM GRANULOCYTES NFR BLD AUTO: 0.3 % (ref 0–0.5)
LYMPHOCYTES # BLD AUTO: 2.83 K/UL (ref 1–4.8)
MCH RBC QN AUTO: 29.7 PG (ref 27–31)
MCHC RBC AUTO-ENTMCNC: 32.9 G/DL (ref 32–36)
MCV RBC AUTO: 90 FL (ref 82–98)
NUCLEATED RBC (/100WBC) (SMH): 0 /100 WBC
OHS QRS DURATION: 82 MS
OHS QTC CALCULATION: 441 MS
PLATELET # BLD AUTO: 373 K/UL (ref 150–450)
PMV BLD AUTO: 9.6 FL (ref 9.2–12.9)
POTASSIUM SERPL-SCNC: 4.1 MMOL/L (ref 3.5–5.1)
RBC # BLD AUTO: 5.28 M/UL (ref 4–5.4)
RELATIVE EOSINOPHIL (SMH): 5.2 % (ref 0–8)
RELATIVE LYMPHOCYTE (SMH): 27 % (ref 18–48)
RELATIVE MONOCYTE (SMH): 5.7 % (ref 4–15)
RELATIVE NEUTROPHIL (SMH): 60.9 % (ref 38–73)
SODIUM SERPL-SCNC: 141 MMOL/L (ref 136–145)
WBC # BLD AUTO: 10.47 K/UL (ref 3.9–12.7)

## 2025-06-18 PROCEDURE — 93005 ELECTROCARDIOGRAM TRACING: CPT | Performed by: GENERAL PRACTICE

## 2025-06-18 PROCEDURE — 93010 ELECTROCARDIOGRAM REPORT: CPT | Mod: ,,, | Performed by: GENERAL PRACTICE

## 2025-06-18 PROCEDURE — 84295 ASSAY OF SERUM SODIUM: CPT | Performed by: ORTHOPAEDIC SURGERY

## 2025-06-18 PROCEDURE — 36415 COLL VENOUS BLD VENIPUNCTURE: CPT | Performed by: ORTHOPAEDIC SURGERY

## 2025-06-18 PROCEDURE — 85025 COMPLETE CBC W/AUTO DIFF WBC: CPT | Performed by: ORTHOPAEDIC SURGERY

## 2025-06-18 NOTE — DISCHARGE INSTRUCTIONS
INSTRUCTIONS  To confirm your doctor has scheduled your surgery for:  7/2/25 with Dr Araujo    Morning of surgery please check in at [facility].     Preop nurses will call the afternoon prior to surgery between 4:00 and 6:00 PM with your final arrival time.  PLEASE NOTE:  The surgery schedule has many variables which may affect the time of your surgery case. Family members should be available if your surgery time changes. Plan to be here the day of your procedure between 4-6 hours.    TAKE ONLY THESE MEDICATIONS WITH A SMALL SIP OF WATER THE MORNING OF SURGERY: see list    DO NOT TAKE THESE MEDICATIONS 5-7 DAYS PRIOR to your procedure per your surgeon's request: ASPIRIN, ALEVE, BC powder, MICH SELTZER, IBUPROFEN, FISH OIL, VITAMIN E, OR HERBALS   (May take Tylenol)    If you are prescribed any types of blood thinners (Aspirin, Coumadin, Plavix, Pradaxa, Xarelto, Aggrenox, Effient, Eliquis, Savasya, Brilinta or any other), please ask your surgeon how many days before scheduled procedure should you stop taking them. You may also need to verify with prescribing physician if it is OK to stop your blood thinners.      INSTRUCTIONS IMPORTANT!!  Do not eat anything after midnight. OK to drink clear liquids until 2 hours before arrival time.  ONLY if you are diabetic, check your sugar in the morning before your procedure.  Please hold GLP-1 medications one week prior to surgery (Ozempic, Mounjaro, Wegovy, Zepbound)  Do not smoke, vape or drink alcoholic beverages 24 hours prior to your procedure.  Shower the night before AND the morning of your procedure with a Chlorhexidine wash such as hibiclens or Dial antibacterial soap from neck down. You may use your own shampoo and face wash. This helps your skin to be as bacteria free as possible.  If you wear contact lenses, dentures, hearing aids or glasses, bring a container to put them in and give to a family member.    Please leave all jewelry,  piercings and valuables at home.  OK to shave hair from surgical site with ELECTRIC RAZOR ONLY.  If your condition changes such as fever, cough, etc, please notify your surgeon.   ONLY if you have been diagnosed with sleep apnea please bring your C-PAP machine.  ONLY if you wear home oxygen please bring your portable oxygen tank the day of your procedure.   ONLY for patients requiring bowel prep, written instructions will be given by your doctor's office.  ONLY if you have a neuro stimulator, please bring the controller with you the morning of surgery  Make arrangements in advance for transportation home by a responsible adult.  You must make arrangements for transportation, TAXI'S, UBER'S OR LYFTS ARE NOT ALLOWED.        If you have any questions about these instructions, call Pre-Op Admit  Nursing at [phone] or the Pre-Op Day Surgery Unit at [phone].

## 2025-06-18 NOTE — PLAN OF CARE
Pre op assessment completed.  Health history reviewed along with medications.  Instructions reviewed with pt and she verbalized understanding.   Escorted to lab for blood work.

## 2025-07-02 ENCOUNTER — HOSPITAL ENCOUNTER (OUTPATIENT)
Facility: HOSPITAL | Age: 60
Discharge: HOME | End: 2025-07-02
Attending: ORTHOPAEDIC SURGERY | Admitting: ORTHOPAEDIC SURGERY
Payer: COMMERCIAL

## 2025-07-02 ENCOUNTER — ANESTHESIA (OUTPATIENT)
Dept: SURGERY | Facility: HOSPITAL | Age: 60
End: 2025-07-02
Payer: MEDICARE

## 2025-07-02 ENCOUNTER — ANESTHESIA EVENT (OUTPATIENT)
Dept: SURGERY | Facility: HOSPITAL | Age: 60
End: 2025-07-02
Payer: COMMERCIAL

## 2025-07-02 VITALS
SYSTOLIC BLOOD PRESSURE: 110 MMHG | RESPIRATION RATE: 16 BRPM | TEMPERATURE: 98 F | BODY MASS INDEX: 28.17 KG/M2 | WEIGHT: 165 LBS | HEART RATE: 68 BPM | DIASTOLIC BLOOD PRESSURE: 68 MMHG | OXYGEN SATURATION: 98 % | HEIGHT: 64 IN

## 2025-07-02 DIAGNOSIS — S83.241A ACUTE MEDIAL MENISCUS TEAR OF RIGHT KNEE, INITIAL ENCOUNTER: ICD-10-CM

## 2025-07-02 DIAGNOSIS — S83.281A ACUTE LATERAL MENISCUS TEAR OF RIGHT KNEE, INITIAL ENCOUNTER: Primary | ICD-10-CM

## 2025-07-02 DIAGNOSIS — Z01.818 PRE-OP TESTING: ICD-10-CM

## 2025-07-02 LAB
POCT GLUCOSE: 101 MG/DL (ref 70–110)
POCT GLUCOSE: 108 MG/DL (ref 70–110)

## 2025-07-02 PROCEDURE — 36000710: Performed by: ORTHOPAEDIC SURGERY

## 2025-07-02 PROCEDURE — 82962 GLUCOSE BLOOD TEST: CPT | Performed by: ORTHOPAEDIC SURGERY

## 2025-07-02 PROCEDURE — 29881 ARTHRS KNE SRG MNISECTMY M/L: CPT | Mod: RT,,, | Performed by: ORTHOPAEDIC SURGERY

## 2025-07-02 PROCEDURE — 36000711: Performed by: ORTHOPAEDIC SURGERY

## 2025-07-02 PROCEDURE — 71000033 HC RECOVERY, INTIAL HOUR: Performed by: ORTHOPAEDIC SURGERY

## 2025-07-02 PROCEDURE — 71000039 HC RECOVERY, EACH ADD'L HOUR: Performed by: ORTHOPAEDIC SURGERY

## 2025-07-02 PROCEDURE — 37000008 HC ANESTHESIA 1ST 15 MINUTES: Performed by: ORTHOPAEDIC SURGERY

## 2025-07-02 PROCEDURE — 63600175 PHARM REV CODE 636 W HCPCS

## 2025-07-02 PROCEDURE — 37000009 HC ANESTHESIA EA ADD 15 MINS: Performed by: ORTHOPAEDIC SURGERY

## 2025-07-02 PROCEDURE — 63600175 PHARM REV CODE 636 W HCPCS: Mod: JZ | Performed by: ANESTHESIOLOGY

## 2025-07-02 PROCEDURE — 27201423 OPTIME MED/SURG SUP & DEVICES STERILE SUPPLY: Performed by: ORTHOPAEDIC SURGERY

## 2025-07-02 PROCEDURE — 25000003 PHARM REV CODE 250

## 2025-07-02 PROCEDURE — 25000003 PHARM REV CODE 250: Performed by: ANESTHESIOLOGY

## 2025-07-02 PROCEDURE — 71000015 HC POSTOP RECOV 1ST HR: Performed by: ORTHOPAEDIC SURGERY

## 2025-07-02 RX ORDER — MIDAZOLAM HYDROCHLORIDE 1 MG/ML
INJECTION INTRAMUSCULAR; INTRAVENOUS
Status: DISCONTINUED | OUTPATIENT
Start: 2025-07-02 | End: 2025-07-02

## 2025-07-02 RX ORDER — LIDOCAINE HYDROCHLORIDE 20 MG/ML
INJECTION, SOLUTION EPIDURAL; INFILTRATION; INTRACAUDAL; PERINEURAL
Status: DISCONTINUED | OUTPATIENT
Start: 2025-07-02 | End: 2025-07-02

## 2025-07-02 RX ORDER — HYDROMORPHONE HYDROCHLORIDE 1 MG/ML
0.2 INJECTION, SOLUTION INTRAMUSCULAR; INTRAVENOUS; SUBCUTANEOUS EVERY 5 MIN PRN
Status: COMPLETED | OUTPATIENT
Start: 2025-07-02 | End: 2025-07-02

## 2025-07-02 RX ORDER — CEFAZOLIN 2 G/1
2 INJECTION, POWDER, FOR SOLUTION INTRAMUSCULAR; INTRAVENOUS
Status: DISCONTINUED | OUTPATIENT
Start: 2025-07-02 | End: 2025-07-02 | Stop reason: HOSPADM

## 2025-07-02 RX ORDER — SODIUM CHLORIDE, SODIUM LACTATE, POTASSIUM CHLORIDE, CALCIUM CHLORIDE 600; 310; 30; 20 MG/100ML; MG/100ML; MG/100ML; MG/100ML
INJECTION, SOLUTION INTRAVENOUS CONTINUOUS PRN
Status: DISCONTINUED | OUTPATIENT
Start: 2025-07-02 | End: 2025-07-02

## 2025-07-02 RX ORDER — OXYCODONE HYDROCHLORIDE 5 MG/1
5 TABLET ORAL ONCE AS NEEDED
Status: COMPLETED | OUTPATIENT
Start: 2025-07-02 | End: 2025-07-02

## 2025-07-02 RX ORDER — MUPIROCIN 20 MG/G
OINTMENT TOPICAL 2 TIMES DAILY
Status: CANCELLED | OUTPATIENT
Start: 2025-07-02 | End: 2025-07-04

## 2025-07-02 RX ORDER — HYDROCODONE BITARTRATE AND ACETAMINOPHEN 5; 325 MG/1; MG/1
1 TABLET ORAL EVERY 4 HOURS PRN
Refills: 0 | Status: CANCELLED | OUTPATIENT
Start: 2025-07-02

## 2025-07-02 RX ORDER — OXYCODONE HYDROCHLORIDE 5 MG/1
5 TABLET ORAL
Status: DISCONTINUED | OUTPATIENT
Start: 2025-07-02 | End: 2025-07-02 | Stop reason: HOSPADM

## 2025-07-02 RX ORDER — PROPOFOL 10 MG/ML
VIAL (ML) INTRAVENOUS
Status: DISCONTINUED | OUTPATIENT
Start: 2025-07-02 | End: 2025-07-02

## 2025-07-02 RX ORDER — FENTANYL CITRATE 50 UG/ML
INJECTION, SOLUTION INTRAMUSCULAR; INTRAVENOUS
Status: DISCONTINUED | OUTPATIENT
Start: 2025-07-02 | End: 2025-07-02

## 2025-07-02 RX ORDER — DIPHENHYDRAMINE HYDROCHLORIDE 50 MG/ML
12.5 INJECTION, SOLUTION INTRAMUSCULAR; INTRAVENOUS
Status: DISCONTINUED | OUTPATIENT
Start: 2025-07-02 | End: 2025-07-02 | Stop reason: HOSPADM

## 2025-07-02 RX ORDER — FAMOTIDINE 10 MG/ML
INJECTION, SOLUTION INTRAVENOUS
Status: DISCONTINUED | OUTPATIENT
Start: 2025-07-02 | End: 2025-07-02

## 2025-07-02 RX ORDER — ONDANSETRON HYDROCHLORIDE 2 MG/ML
INJECTION, SOLUTION INTRAMUSCULAR; INTRAVENOUS
Status: DISCONTINUED | OUTPATIENT
Start: 2025-07-02 | End: 2025-07-02

## 2025-07-02 RX ORDER — GLUCAGON 1 MG
1 KIT INJECTION
Status: DISCONTINUED | OUTPATIENT
Start: 2025-07-02 | End: 2025-07-02 | Stop reason: HOSPADM

## 2025-07-02 RX ORDER — ONDANSETRON HYDROCHLORIDE 2 MG/ML
4 INJECTION, SOLUTION INTRAVENOUS DAILY PRN
Status: DISCONTINUED | OUTPATIENT
Start: 2025-07-02 | End: 2025-07-02 | Stop reason: HOSPADM

## 2025-07-02 RX ADMIN — OXYCODONE HYDROCHLORIDE 5 MG: 5 TABLET ORAL at 09:07

## 2025-07-02 RX ADMIN — HYDROMORPHONE HYDROCHLORIDE 0.2 MG: 1 INJECTION, SOLUTION INTRAMUSCULAR; INTRAVENOUS; SUBCUTANEOUS at 09:07

## 2025-07-02 RX ADMIN — HYDROMORPHONE HYDROCHLORIDE 0.2 MG: 1 INJECTION, SOLUTION INTRAMUSCULAR; INTRAVENOUS; SUBCUTANEOUS at 08:07

## 2025-07-02 RX ADMIN — SODIUM CHLORIDE, SODIUM LACTATE, POTASSIUM CHLORIDE, AND CALCIUM CHLORIDE: .6; .31; .03; .02 INJECTION, SOLUTION INTRAVENOUS at 07:07

## 2025-07-02 RX ADMIN — DEXTROSE 2 G: 50 INJECTION, SOLUTION INTRAVENOUS at 07:07

## 2025-07-02 RX ADMIN — MIDAZOLAM HYDROCHLORIDE 2 MG: 1 INJECTION, SOLUTION INTRAMUSCULAR; INTRAVENOUS at 07:07

## 2025-07-02 RX ADMIN — LIDOCAINE HYDROCHLORIDE 80 MG: 20 INJECTION, SOLUTION INTRAVENOUS at 07:07

## 2025-07-02 RX ADMIN — PROPOFOL 20 MG: 10 INJECTION, EMULSION INTRAVENOUS at 07:07

## 2025-07-02 RX ADMIN — FAMOTIDINE 20 MG: 10 INJECTION, SOLUTION INTRAVENOUS at 07:07

## 2025-07-02 RX ADMIN — PROPOFOL 180 MG: 10 INJECTION, EMULSION INTRAVENOUS at 07:07

## 2025-07-02 RX ADMIN — FENTANYL CITRATE 50 MCG: 50 INJECTION, SOLUTION INTRAMUSCULAR; INTRAVENOUS at 07:07

## 2025-07-02 RX ADMIN — ONDANSETRON 4 MG: 2 INJECTION INTRAMUSCULAR; INTRAVENOUS at 07:07

## 2025-07-02 RX ADMIN — OXYCODONE HYDROCHLORIDE 5 MG: 5 TABLET ORAL at 08:07

## 2025-07-02 NOTE — ANESTHESIA PREPROCEDURE EVALUATION
07/02/2025  Tanesha Chamberlain is a 59 y.o., female.      Problem List[1]    Past Surgical History:   Procedure Laterality Date    ANTERIOR CRUCIATE LIGAMENT REPAIR Right     APPENDECTOMY      1988    BUNIONECTOMY Bilateral     CHOLECYSTECTOMY      2005    COLONOSCOPY N/A 11/01/2024    Procedure: COLONOSCOPY(Instructions sent 10/25);  Surgeon: Marguerite Gray MD;  Location: [facility];  Service: Endoscopy;  Laterality: N/A;    ESOPHAGOGASTRODUODENOSCOPY N/A 10/18/2024    Procedure: EGD (ESOPHAGOGASTRODUODENOSCOPY);  Surgeon: Marguerite Gray MD;  Location: [facility];  Service: Endoscopy;  Laterality: N/A;    ESOPHAGOGASTRODUODENOSCOPY N/A 03/07/2025    Procedure: EGD (ESOPHAGOGASTRODUODENOSCOPY);  Surgeon: Marguerite Gray MD;  Location: [facility];  Service: Endoscopy;  Laterality: N/A;    ESOPHAGOGASTRODUODENOSCOPY N/A 06/06/2025    Procedure: EGD (ESOPHAGOGASTRODUODENOSCOPY);  Surgeon: Marguerite Gray MD;  Location: [facility];  Service: Endoscopy;  Laterality: N/A;    EXCISION OF MASS OF BACK Left 09/21/2022    Procedure: EXCISION, MASS, BACK;  Surgeon: Sae Collins III, MD;  Location: [facility] OR;  Service: General;  Laterality: Left;    HYSTERECTOMY      INJECTION OF JOINT Right 05/09/2023    Procedure: INJECTION, JOINT, RIGHT PIRIFORMIS AND RIGHT GTB;  Surgeon: Loreta Brito MD;  Location: [facility] PAIN [facility]GT;  Service: Pain Management;  Laterality: Right;    MINIMALLY INVASIVE SURGICAL REMOVAL OF INTERVERTEBRAL DISC OF SPINE USING MICROSCOPE Left 05/11/2021    Procedure: Left L4-5 Far Lateral Disectomy, MIS;  Surgeon: Johnny Cruz MD;  [address];  Service: Neurosurgery;  Laterality: Left;    OOPHORECTOMY      SHOULDER SURGERY Right     SPINE SURGERY      TRANSFORAMINAL EPIDURAL INJECTION OF STEROID Left 03/10/2021    Procedure: INJECTION, STEROID, EPIDURAL, TRANSFORAMINAL  APPROACH  L4-5;  Surgeon: Jaylon Tyson MD;  Location: [facility] PAIN T;  Service: Pain Management;  Laterality: Left;  consent needed  ([employer] [facility])    TRANSFORAMINAL EPIDURAL INJECTION OF STEROID Left 11/29/2022    Procedure: LUMBAR TRANSFORAMINAL LEFT L4/5 MEDICALLY URGENT;  Surgeon: Loreta Brito MD;  Location: [facility] PAIN T;  Service: Pain Management;  Laterality: Left;    UPPER GASTROINTESTINAL ENDOSCOPY      10 years ago [facility]        Tobacco Use:  The patient  reports that she quit smoking about 4 years ago. Her smoking use included cigarettes. She has quit using smokeless tobacco.     Results for orders placed or performed during the hospital encounter of 06/18/25   EKG 12-lead    Collection Time: 06/18/25  2:00 PM   Result Value Ref Range    QRS Duration 82 ms    [facility] QTC Calculation 441 ms    Narrative    Test Reason : Z01.818,    Vent. Rate :  83 BPM     Atrial Rate :  83 BPM     P-R Int : 166 ms          QRS Dur :  82 ms      QT Int : 376 ms       P-R-T Axes :   7  24  26 degrees    QTcB Int : 441 ms    Normal sinus rhythm  Normal ECG  No previous ECGs available  Confirmed by Jim Forbes ([id number]) on 6/18/2025 9:01:29 PM    Referred By:            Confirmed By: Jim Forbes             Lab Results   Component Value Date    WBC 10.47 06/18/2025    HGB 15.7 06/18/2025    HCT 47.7 06/18/2025    MCV 90 06/18/2025     06/18/2025     BMP  Lab Results   Component Value Date     06/18/2025    K 4.1 06/18/2025    CL 99 06/18/2025    CO2 31 (H) 06/18/2025    BUN 12 06/18/2025    CREATININE 0.8 06/18/2025    CALCIUM 9.8 06/18/2025    ANIONGAP 11 06/18/2025    GLU 99 06/18/2025     (H) 02/11/2025     (H) 01/31/2025       No results found for this or any previous visit.              Pre-op Assessment    I have reviewed the Patient Summary Reports.     I have reviewed the Nursing Notes. I have reviewed the NPO Status.   I have reviewed the Medications.     Review of  Systems  Anesthesia Hx:  No problems with previous Anesthesia             Denies Family Hx of Anesthesia complications.    Denies Personal Hx of Anesthesia complications.                    Social:  Former Smoker       Hematology/Oncology:  Hematology Normal                                     Cardiovascular:     Hypertension, well controlled           hyperlipidemia                               Pulmonary:  Pulmonary Normal                       Hepatic/GI:     GERD Liver Disease, (hx of fatty liver)  Crohn's               Musculoskeletal:         Spine Disorders: lumbar Degenerative disease and Disc disease           Neurological:    Neuromuscular Disease, (chronic low back pain with intemittent bilat leg symptoms)             Chronic Pain Syndrome (chronic opioids - fentanyl patch 75mcg/hr)                         Endocrine:  Diabetes, well controlled, type 2 Hypothyroidism (s/p partial thyroidectomy - no current meds)          Psych:  Psychiatric History (ADHD)                  Physical Exam  General: Well nourished, Cooperative, Alert and Oriented    Airway:  Mallampati: III / II  Mouth Opening: Normal  TM Distance: Normal  Tongue: Normal  Neck ROM: Normal ROM    Dental:  Dentures    Chest/Lungs:  Clear to auscultation    Heart:  Rate: Normal  Rhythm: Regular Rhythm  Sounds: Normal    Abdomen:  Normal, Soft, Nontender        Anesthesia Plan  Type of Anesthesia, risks & benefits discussed:    Anesthesia Type: Gen Supraglottic Airway  Intra-op Monitoring Plan: Standard ASA Monitors  Post Op Pain Control Plan: multimodal analgesia and IV/PO Opioids PRN  Induction:  IV  Airway Plan: Video, Post-Induction  Informed Consent: Informed consent signed with the Patient and all parties understand the risks and agree with anesthesia plan.  All questions answered.   ASA Score: 3  Anesthesia Plan Notes:   General LMA  Versed for transport to OR - anxiety  No tylenol - elevated LFTs  No decadron  Zofran Pepcid    Ready For  Surgery From Anesthesia Perspective.     .           [1]   Patient Active Problem List  Diagnosis    Essential hypertension    Type 2 diabetes mellitus with hyperglycemia    Former smoker    Hormone replacement therapy (HRT)    Chronic, continuous use of opioids    Crohn's disease    History of MRSA infection    History of fatty infiltration of liver    H/O partial thyroidectomy    Chronic pain    Elevated liver enzymes    Wears contact lenses    Lumbar disc herniation with radiculopathy    Mass on back    DDD (degenerative disc disease), lumbosacral    BMI 30.0-30.9,adult    Hyperlipidemia    Attention deficit disorder    Dyslipidemia associated with type 2 diabetes mellitus    Thyroid nodule    SBO (small bowel obstruction)    Aspirin long-term use    S/P cholecystectomy    Insomnia    Lower extremity weakness    Gastroesophageal reflux disease    BMI 29.0-29.9,adult

## 2025-07-02 NOTE — H&P
CC/Indication for Procedure: 59 y.o. female with Acute medial meniscus tear of right knee, initial encounter [S83.241A]  Acute lateral meniscus tear of right knee, initial encounter [S83.281A]  Pre-op testing [Z01.818].    Patient scheduled for LA KNEE SCOPE MED/LAT MENISCECTOMY [44201] (ARTHROSCOPY, KNEE, WITH MENISCECTOMY, RIGHT).    Past Medical History:   Diagnosis Date    ADHD (attention deficit hyperactivity disorder)     Arthritis     Colon polyp     Crohn's disease     Diabetes mellitus, type 2     GERD (gastroesophageal reflux disease)     Hypertension     Hypothyroidism     Lumbar back pain with radiculopathy affecting left lower extremity     SBO (small bowel obstruction)      Past Surgical History:   Procedure Laterality Date    ANTERIOR CRUCIATE LIGAMENT REPAIR Right     APPENDECTOMY      1988    BUNIONECTOMY Bilateral     CHOLECYSTECTOMY      2005    COLONOSCOPY N/A 11/01/2024    Procedure: COLONOSCOPY(Instructions sent 10/25);  Surgeon: Marguerite Gray MD;  Location: [facility];  Service: Endoscopy;  Laterality: N/A;    ESOPHAGOGASTRODUODENOSCOPY N/A 10/18/2024    Procedure: EGD (ESOPHAGOGASTRODUODENOSCOPY);  Surgeon: Marguerite Gray MD;  Location: [facility];  Service: Endoscopy;  Laterality: N/A;    ESOPHAGOGASTRODUODENOSCOPY N/A 03/07/2025    Procedure: EGD (ESOPHAGOGASTRODUODENOSCOPY);  Surgeon: Marguerite Gray MD;  Location: [facility];  Service: Endoscopy;  Laterality: N/A;    ESOPHAGOGASTRODUODENOSCOPY N/A 06/06/2025    Procedure: EGD (ESOPHAGOGASTRODUODENOSCOPY);  Surgeon: Marguerite Gray MD;  Location: [facility];  Service: Endoscopy;  Laterality: N/A;    EXCISION OF MASS OF BACK Left 09/21/2022    Procedure: EXCISION, MASS, BACK;  Surgeon: Sae Collins III, MD;  Location: [facility] OR;  Service: General;  Laterality: Left;    HYSTERECTOMY      INJECTION OF JOINT Right 05/09/2023    Procedure: INJECTION, JOINT, RIGHT PIRIFORMIS AND RIGHT GTB;  Surgeon: Loreta Brito MD;   Location: [facility] PAIN MGT;  Service: Pain Management;  Laterality: Right;    MINIMALLY INVASIVE SURGICAL REMOVAL OF INTERVERTEBRAL DISC OF SPINE USING MICROSCOPE Left 2021    Procedure: Left L4-5 Far Lateral Disectomy, MIS;  Surgeon: Johnny Cruz MD;  [facility];  Service: Neurosurgery;  Laterality: Left;    OOPHORECTOMY      SHOULDER SURGERY Right     SPINE SURGERY      TRANSFORAMINAL EPIDURAL INJECTION OF STEROID Left 03/10/2021    Procedure: INJECTION, STEROID, EPIDURAL, TRANSFORAMINAL APPROACH  L4-5;  Surgeon: Jaylon Tyson MD;  Location: [facility] PAIN MGT;  Service: Pain Management;  Laterality: Left;  consent needed  ([employer] [facility])    TRANSFORAMINAL EPIDURAL INJECTION OF STEROID Left 2022    Procedure: LUMBAR TRANSFORAMINAL LEFT L4/5 MEDICALLY URGENT;  Surgeon: Loreta Brito MD;  Location: [facility] PAIN MGT;  Service: Pain Management;  Laterality: Left;    UPPER GASTROINTESTINAL ENDOSCOPY      10 years ago [facility]     Family History   Problem Relation Name Age of Onset    Crohn's disease Other      Cirrhosis Neg Hx      Ulcerative colitis Neg Hx       Social History     Socioeconomic History    Marital status:    Occupational History    Occupation: Disabled     Comment: Previous occupation:    Tobacco Use    Smoking status: Former     Current packs/day: 0.00     Types: Cigarettes     Quit date: 2020     Years since quittin.5    Smokeless tobacco: Former    Tobacco comments:     in the 8 th grade   Substance and Sexual Activity    Alcohol use: Yes     Alcohol/week: 4.0 standard drinks of alcohol     Types: 4 Cans of beer per week     Comment: 1 beer , once in 2 weeks    Drug use: No    Sexual activity: Yes     Partners: Female     Comment: Never had pregnant      Social Drivers of Health     Financial Resource Strain: Patient Declined (2025)    Overall Financial Resource Strain (CARDIA)     Difficulty of Paying Living Expenses: Patient  declined   Food Insecurity: Patient Declined (1/28/2025)    Hunger Vital Sign     Worried About Running Out of Food in the Last Year: Patient declined     Ran Out of Food in the Last Year: Patient declined   Transportation Needs: Patient Declined (1/28/2025)    TRANSPORTATION NEEDS     Transportation : Patient declined   Physical Activity: Insufficiently Active (5/31/2024)    Exercise Vital Sign     Days of Exercise per Week: 1 day     Minutes of Exercise per Session: 30 min   Stress: Patient Declined (1/28/2025)    [employer] - Occupational Stress Questionnaire     Feeling of Stress : Patient declined   Housing Stability: Patient Declined (1/28/2025)    Housing Stability Vital Sign     Unable to Pay for Housing in the Last Year: Patient declined     Homeless in the Last Year: Patient declined       Review of patient's allergies indicates:   Allergen Reactions    Nsaids (non-steroidal anti-inflammatory drug) Other (See Comments)     GI upset  GI upset      Ibuprofen Other (See Comments)     Other reaction(s): crohns flair up  Other reaction(s): crohns flair up       Current Medications[1]    ROS:    Denies chest pain or palpitations  Denies shortness of breath  Denies fevers or chills  Denies chest pain  Denies abdominal pain    PE:    General Appearance: Well nourished  Orientation: Oriented to time, place, person  Mental Status: Alert  Heart: RRR  Lungs: CTA  Abdomen: Soft and non-tender    Anesthesia/Surgery risks, benefits and alternative options discussed and understood by patient/family.    This note was created using Dragon voice recognition software that occasionally misinterpreted phrases or words.       [1] No current facility-administered medications for this encounter.

## 2025-07-02 NOTE — PLAN OF CARE
0930- pt is alert and oriented breathing even unlabored with no complaints of pain. Surgical site is clean and dry with no redness or swelling noted. Good pedal pulse to RLE. Pt is sitting up drinking fluids. 1015- pt tolerated po intake with no n/v. Surgical site is clean and dry with no redness or swelling noted. Good RLE pedal pulse. Detailed discharge instructions given to the pt and her wife.

## 2025-07-02 NOTE — ANESTHESIA POSTPROCEDURE EVALUATION
Anesthesia Post Evaluation    Patient: Tanesha Chamberlain    Procedure(s) Performed: Procedure(s) (LRB):  ARTHROSCOPY, KNEE, WITH MENISCECTOMY (Right)    Final Anesthesia Type: general      Patient location during evaluation: PACU  Patient participation: Yes- Able to Participate  Level of consciousness: awake and alert and oriented  Post-procedure vital signs: reviewed and stable  Pain management: adequate  Airway patency: patent    PONV status at discharge: No PONV  Anesthetic complications: no      Cardiovascular status: blood pressure returned to baseline and hemodynamically stable  Respiratory status: unassisted, spontaneous ventilation and room air  Hydration status: euvolemic  Follow-up not needed.              Vitals Value Taken Time   /68 07/02/25 10:00   Temp 36.5 °C (97.7 °F) 07/02/25 09:30   Pulse 68 07/02/25 10:00   Resp 16 07/02/25 10:00   SpO2 98 % 07/02/25 10:00         Event Time   Out of Recovery 09:27:00         Pain/Deb Score: Pain Rating Prior to Med Admin: 3 (7/2/2025  9:20 AM)  Deb Score: 10 (7/2/2025 10:00 AM)

## 2025-07-02 NOTE — OP NOTE
[facility]  Surgery Department  Operative Note    SUMMARY     Date of Procedure: 7/2/2025     Procedure:  Partial medial and lateral meniscectomies right knee                       Loose body removal right knee 2 cm x 2 cm  Surgeons and Role:     * Brock Araujo MD - Primary    Assisting Surgeon:  Jamal    Pre-Operative Diagnosis: Acute medial meniscus tear of right knee, initial encounter [S83.241A]  Acute lateral meniscus tear of right knee, initial encounter [S83.281A]  Pre-op testing [Z01.818]    Post-Operative Diagnosis: Post-Op Diagnosis Codes:     * Acute medial meniscus tear of right knee, initial encounter [S83.241A]     * Acute lateral meniscus tear of right knee, initial encounter [S83.281A]     * Pre-op testing [Z01.818]    Anesthesia: General    Intraoperative Findings:  The patellofemoral joint was noted to have grade 3 chondromalacia of the patella.  The patellofemoral joint tracked centrally.  The ACL and PCL were intact.  There was a 2 cm x 2 cm loose body in the notch.  The lateral compartment demonstrated grade 3 chondromalacia on the tibia.  Grade 2 chondromalacia on the femur.  There was a posterior horn lateral meniscal tear consisting of 60% of the posterior horn of the lateral meniscus.  The medial compartment demonstrated grade 3 chondromalacia of the medial femoral condyle there was a posterior horn medial meniscal tear consisting of 70% of the posterior horn of the medial meniscus    Description of the Findings of the Procedure:  Patient was placed on the operative table in supine position.  She was prepped and draped in the usual sterile manner for surgery.  A time-out was undertaken and completed successfully.  Inferomedial and inferolateral portals were established and diagnostic arthroscopy was undertaken the findings of those stated above.  At this point I turned my attention to the loose body.  Using a biter I was able to morselized loose body into multiple  fragments.  The loose body was now excised using a shaver.  I now turned my attention to the lateral compartment there I used a series of Neville and biters and smoothed the lateral meniscus back to a stable rim.  Approximately 70% of the meniscus was excised and balanced into the body and anterior horn.  Similarly the medial compartment was addressed with a series of Neville and biters a proximally 80% of the posterior horn of the medial meniscus was excised and balanced into the body and the anterior horn.  At this point I removed the arthroscopic equipment.  The portals were closed with nylon stitches.  Sterile dressings were applied and the patient was noted to be in stable condition    Complications: No    Estimated Blood Loss (EBL): * No values recorded between 7/2/2025  7:06 AM and 7/2/2025  7:48 AM *           Implants: * No implants in log *    Specimens:   Specimen (24h ago, onward)      None                    Condition: Good    Disposition: PACU - hemodynamically stable.              Discharge Note    SUMMARY     Admit Date: 7/2/2025    Discharge Date and Time:  07/02/2025 7:48 AM    Hospital Course (synopsis of major diagnoses, care, treatment, and services provided during the course of the hospital stay): Uneventful      Final Diagnosis: Post-Op Diagnosis Codes:     * Acute medial meniscus tear of right knee, initial encounter [S83.241A]     * Acute lateral meniscus tear of right knee, initial encounter [S83.281A]     * Pre-op testing [Z01.818]    Disposition: Home or Self Care    Follow Up/Patient Instructions:     Medications:  Reconciled Home Medications:   Current Discharge Medication List        CONTINUE these medications which have NOT CHANGED    Details   atorvastatin (LIPITOR) 10 MG tablet TAKE 1 TABLET BY MOUTH EVERY DAY  Qty: 90 tablet, Refills: 1    Associated Diagnoses: Hyperlipidemia, unspecified      estradioL (ESTRACE) 2 MG tablet Take 1 tablet (2 mg total) by mouth once daily.  Qty: 90  "tablet, Refills: 3      fentaNYL (DURAGESIC) 75 mcg/hr Place 1 patch onto the skin every 72 hours.      hydroCHLOROthiazide (HYDRODIURIL) 25 MG tablet Take 1 tablet (25 mg total) by mouth once daily.  Qty: 90 tablet, Refills: 3    Comments: .      omeprazole (PRILOSEC) 40 MG capsule Take 1 capsule (40 mg total) by mouth every morning.  Qty: 90 capsule, Refills: 3      oxyCODONE-acetaminophen (PERCOCET)  mg per tablet Take 1 tablet by mouth every 6 (six) hours.      traZODone (DESYREL) 100 MG tablet Take 1 tablet (100 mg total) by mouth nightly as needed for Insomnia.  Qty: 90 tablet, Refills: 3      TRESIBA FLEXTOUCH U-100 100 unit/mL (3 mL) insulin pen Inject 30 Units into the skin once daily.  Qty: 30 mL, Refills: 0      aspirin (ECOTRIN) 81 MG EC tablet Take 81 mg by mouth once daily.      BD ULTRA-FINE MINI PEN NEEDLE 31 gauge x 3/16" Ndle SMARTSI pen needle SUB-Q Daily      BD ULTRA-FINE SHORT PEN NEEDLE 31 gauge x 5/16" Ndle Inject 1 Needle into the skin once daily.  Qty: 100 each, Refills: 3    Associated Diagnoses: Type 2 diabetes mellitus with hyperglycemia, without long-term current use of insulin      blood sugar diagnostic (ONETOUCH ULTRA BLUE TEST STRIP) Strp Check blood glucose readings twice a day-- before meal and 1 to 2 hours post meal  Qty: 100 strip, Refills: 0      blood-glucose meter Misc Check blood glucose readings twice a day-- before meal and 1 to 2 hours post meal  Qty: 1 each, Refills: 0      dulaglutide (TRULICITY) 3 mg/0.5 mL pen injector Inject 3 mg into the skin every 7 days.  Qty: 4 pen , Refills: 5    Associated Diagnoses: Type 2 diabetes mellitus with hyperglycemia, with long-term current use of insulin      lancets 33 gauge Misc Check blood glucose readings twice a day-- before meal and 1 to 2 hours post meal  Qty: 100 each, Refills: 0      polyethylene glycol (GLYCOLAX) 17 gram/dose powder Take 17 g by mouth once daily.      potassium chloride (KLOR-CON) 10 MEQ TbSR Take " 1 tablet (10 mEq total) by mouth once daily.  Qty: 90 tablet, Refills: 1    Associated Diagnoses: Hypokalemia           Discharge Procedure Orders   Diet general     Activity as tolerated     Sponge bath only until clinic visit     Ice to affected area     Weight bearing restrictions (specify)     Remove dressing in 24 hours     Call MD for:  temperature >100.4     Call MD for:  persistent nausea and vomiting     Call MD for:  severe uncontrolled pain     Call MD for:  difficulty breathing, headache or visual disturbances     Call MD for:  redness, tenderness, or signs of infection (pain, swelling, redness, odor or green/yellow discharge around incision site)     Call MD for:  hives     Call MD for:  persistent dizziness or light-headedness     Call MD for:  extreme fatigue     Shower on day dressing removed (No bath)

## 2025-07-02 NOTE — ANESTHESIA PROCEDURE NOTES
Intubation    Date/Time: 7/2/2025 7:22 AM    Performed by: Isabel Braga CRNA  Authorized by: Franck Paez MD    Intubation:     Induction:  Intravenous    Intubated:  Postinduction    Mask Ventilation:  Easy mask    Attempts:  1    Attempted By:  CRNA    Difficult Airway Encountered?: No      Complications:  None    Airway Device:  Supraglottic airway/LMA    Airway Device Size:  4.0    Secured at:  The lips    Placement Verified By:  Capnometry    Complicating Factors:  None    Findings Post-Intubation:  BS equal bilateral and atraumatic/condition of teeth unchanged

## 2025-07-03 ENCOUNTER — OFFICE VISIT (OUTPATIENT)
Dept: ORTHOPEDICS | Facility: CLINIC | Age: 60
End: 2025-07-03
Payer: COMMERCIAL

## 2025-07-03 VITALS — HEIGHT: 64 IN | WEIGHT: 164.88 LBS | BODY MASS INDEX: 28.15 KG/M2

## 2025-07-03 DIAGNOSIS — Z98.890 S/P RIGHT KNEE ARTHROSCOPY: Primary | ICD-10-CM

## 2025-07-03 PROCEDURE — 99999 PR PBB SHADOW E&M-EST. PATIENT-LVL III: CPT | Mod: PBBFAC,COE,, | Performed by: ORTHOPAEDIC SURGERY

## 2025-07-03 NOTE — PROGRESS NOTES
[facility] POST-OP NOTE    Subjective:           Chief Complaint:   Chief Complaint   Patient presents with    Right Knee - Post-op Evaluation     POD1 R KNEE SCOPE 7/2/25 was able to sleep last night, just sore and tender but doing good,        Past Medical History:   Diagnosis Date    ADHD (attention deficit hyperactivity disorder)     Arthritis     Colon polyp     Crohn's disease     Diabetes mellitus, type 2     GERD (gastroesophageal reflux disease)     Hypertension     Hypothyroidism     Lumbar back pain with radiculopathy affecting left lower extremity     SBO (small bowel obstruction)        Past Surgical History:   Procedure Laterality Date    ANTERIOR CRUCIATE LIGAMENT REPAIR Right     APPENDECTOMY      1988    BUNIONECTOMY Bilateral     CHOLECYSTECTOMY      2005    COLONOSCOPY N/A 11/01/2024    Procedure: COLONOSCOPY(Instructions sent 10/25);  Surgeon: Marguerite Gray MD;  Location: [facility];  Service: Endoscopy;  Laterality: N/A;    ESOPHAGOGASTRODUODENOSCOPY N/A 10/18/2024    Procedure: EGD (ESOPHAGOGASTRODUODENOSCOPY);  Surgeon: Marguerite Gray MD;  Location: [facility];  Service: Endoscopy;  Laterality: N/A;    ESOPHAGOGASTRODUODENOSCOPY N/A 03/07/2025    Procedure: EGD (ESOPHAGOGASTRODUODENOSCOPY);  Surgeon: Marguerite Gray MD;  Location: [facility];  Service: Endoscopy;  Laterality: N/A;    ESOPHAGOGASTRODUODENOSCOPY N/A 06/06/2025    Procedure: EGD (ESOPHAGOGASTRODUODENOSCOPY);  Surgeon: Marguerite Gray MD;  Location: [facility];  Service: Endoscopy;  Laterality: N/A;    EXCISION OF MASS OF BACK Left 09/21/2022    Procedure: EXCISION, MASS, BACK;  Surgeon: Sae Collins III, MD;  Location: [facility] OR;  Service: General;  Laterality: Left;    HYSTERECTOMY      INJECTION OF JOINT Right 05/09/2023    Procedure: INJECTION, JOINT, RIGHT PIRIFORMIS AND RIGHT GTB;  Surgeon: Loreta Brito MD;  Location: [facility] PAIN MGT;  Service: Pain Management;  Laterality: Right;     "MINIMALLY INVASIVE SURGICAL REMOVAL OF INTERVERTEBRAL DISC OF SPINE USING MICROSCOPE Left 2021    Procedure: Left L4-5 Far Lateral Disectomy, MIS;  Surgeon: Johnny Cruz MD;  Location: [facility] OR [address] [place] [address];  Service: Neurosurgery;  Laterality: Left;    OOPHORECTOMY      SHOULDER SURGERY Right     SPINE SURGERY      TRANSFORAMINAL EPIDURAL INJECTION OF STEROID Left 03/10/2021    Procedure: INJECTION, STEROID, EPIDURAL, TRANSFORAMINAL APPROACH  L4-5;  Surgeon: Jaylon Tyson MD;  Location: [facility] PAIN T;  Service: Pain Management;  Laterality: Left;  consent needed  ([employer] [facility])    TRANSFORAMINAL EPIDURAL INJECTION OF STEROID Left 2022    Procedure: LUMBAR TRANSFORAMINAL LEFT L4/5 MEDICALLY URGENT;  Surgeon: Loreta Brito MD;  Location: [facility] PAIN MGT;  Service: Pain Management;  Laterality: Left;    UPPER GASTROINTESTINAL ENDOSCOPY      10 years ago [facility]       Current Outpatient Medications   Medication Sig    aspirin (ECOTRIN) 81 MG EC tablet Take 81 mg by mouth once daily.    atorvastatin (LIPITOR) 10 MG tablet TAKE 1 TABLET BY MOUTH EVERY DAY    BD ULTRA-FINE MINI PEN NEEDLE 31 gauge x 3/16" Ndle SMARTSI pen needle SUB-Q Daily    BD ULTRA-FINE SHORT PEN NEEDLE 31 gauge x 5/16" Ndle Inject 1 Needle into the skin once daily.    blood sugar diagnostic (ONETOUCH ULTRA BLUE TEST STRIP) Strp Check blood glucose readings twice a day-- before meal and 1 to 2 hours post meal    blood-glucose meter Misc Check blood glucose readings twice a day-- before meal and 1 to 2 hours post meal    dulaglutide (TRULICITY) 3 mg/0.5 mL pen injector Inject 3 mg into the skin every 7 days.    estradioL (ESTRACE) 2 MG tablet Take 1 tablet (2 mg total) by mouth once daily.    fentaNYL (DURAGESIC) 75 mcg/hr Place 1 patch onto the skin every 72 hours.    hydroCHLOROthiazide (HYDRODIURIL) 25 MG tablet Take 1 tablet (25 mg total) by mouth once daily.    lancets 33 gauge Misc Check blood glucose readings twice a day-- " before meal and 1 to 2 hours post meal    oxyCODONE-acetaminophen (PERCOCET)  mg per tablet Take 1 tablet by mouth every 6 (six) hours.    polyethylene glycol (GLYCOLAX) 17 gram/dose powder Take 17 g by mouth once daily.    potassium chloride (KLOR-CON) 10 MEQ TbSR Take 1 tablet (10 mEq total) by mouth once daily.    traZODone (DESYREL) 100 MG tablet Take 1 tablet (100 mg total) by mouth nightly as needed for Insomnia.    TRESIBA FLEXTOUCH U-100 100 unit/mL (3 mL) insulin pen Inject 30 Units into the skin once daily. (Patient taking differently: Inject 38 Units into the skin once daily.)    omeprazole (PRILOSEC) 40 MG capsule Take 1 capsule (40 mg total) by mouth every morning.     No current facility-administered medications for this visit.       Review of patient's allergies indicates:   Allergen Reactions    Nsaids (non-steroidal anti-inflammatory drug) Other (See Comments)     GI upset  GI upset      Ibuprofen Other (See Comments)     Other reaction(s): crohns flair up  Other reaction(s): crohns flair up       Family History   Problem Relation Name Age of Onset    Crohn's disease Other      Cirrhosis Neg Hx      Ulcerative colitis Neg Hx         Social History[1]    History of present illness:   Vanessa comes in today postop day 1 right knee arthroscopy.  Comes in today for wound check, dressing change, pain control assessment.  She had partial medial and lateral meniscectomies.  She had a loose body removal.  She is found to have grade 3 changes tricompartmentally.    Review of Systems:    Musculoskeletal:  See HPI      Objective:        Physical Examination:    Vital Signs:  There were no vitals filed for this visit.    Body mass index is 28.31 kg/m².    This a well-developed, well nourished patient in no acute distress.  They are alert and oriented and cooperative to examination.          Right knee exam: Skin to right knee clean dry and intact.  No erythema or ecchymosis.  No signs or symptoms of  "infection.  Neurovascularly intact throughout the right lower extremity.  Negative Homans on the right.  Surgical incisions healing well without wound dehiscence or drainage.      Pertinent New Results:    XRAY Report / Interpretation:     No new radiographs taken on today's clinic visit.    Assessment/Plan:        1. Status post right knee arthroscopy with partial medial and lateral meniscectomies and loose body removal.        Dressing change today.  She tolerated this well.  Advised to clean the operative site once a day with warm soapy water.  Do not apply any topical creams or ointments.  Do not submerge underwater in a pool or bathtub type environment to allow after suture removal.  She can resume /continue regular activities of daily living as pain tolerates.  We will see her back in 7-10 days for wound check and suture removal.    Jim Garcia, Physician Assistant, served in the capacity as a "scribe" for this patient encounter.  A "face-to-face" encounter occurred with Dr. Brock Araujo on this date.  The treatment plan and medical decision-making is outlined above. Patient was seen and examined with a chaperone.     This note was created using Dragon voice recognition software that occasionally misinterpreted phrases or words.             [1]   Social History  Socioeconomic History    Marital status:    Occupational History    Occupation: Disabled     Comment: Previous occupation:    Tobacco Use    Smoking status: Former     Current packs/day: 0.00     Types: Cigarettes     Quit date: 2020     Years since quittin.5    Smokeless tobacco: Former    Tobacco comments:     in the 8 th grade   Substance and Sexual Activity    Alcohol use: Yes     Alcohol/week: 4.0 standard drinks of alcohol     Types: 4 Cans of beer per week     Comment: 1 beer , once in 2 weeks    Drug use: No    Sexual activity: Yes     Partners: Female     Comment: Never had pregnant  "     Social Drivers of Health     Financial Resource Strain: Patient Declined (1/28/2025)    Overall Financial Resource Strain (CARDIA)     Difficulty of Paying Living Expenses: Patient declined   Food Insecurity: Patient Declined (1/28/2025)    Hunger Vital Sign     Worried About Running Out of Food in the Last Year: Patient declined     Ran Out of Food in the Last Year: Patient declined   Transportation Needs: Patient Declined (1/28/2025)    TRANSPORTATION NEEDS     Transportation : Patient declined   Physical Activity: Insufficiently Active (5/31/2024)    Exercise Vital Sign     Days of Exercise per Week: 1 day     Minutes of Exercise per Session: 30 min   Stress: Patient Declined (1/28/2025)    [employer] - Occupational Stress Questionnaire     Feeling of Stress : Patient declined   Housing Stability: Patient Declined (1/28/2025)    Housing Stability Vital Sign     Unable to Pay for Housing in the Last Year: Patient declined     Homeless in the Last Year: Patient declined

## 2025-07-11 ENCOUNTER — CLINICAL SUPPORT (OUTPATIENT)
Dept: ORTHOPEDICS | Facility: CLINIC | Age: 60
End: 2025-07-11
Payer: COMMERCIAL

## 2025-07-11 VITALS — HEIGHT: 64 IN | WEIGHT: 164.88 LBS | BODY MASS INDEX: 28.15 KG/M2

## 2025-07-11 DIAGNOSIS — Z98.890 S/P RIGHT KNEE ARTHROSCOPY: Primary | ICD-10-CM

## 2025-07-11 PROCEDURE — 99999 PR PBB SHADOW E&M-EST. PATIENT-LVL III: CPT | Mod: PBBFAC,COE,,

## 2025-07-11 NOTE — PROGRESS NOTES
[facility] POST-OP NOTE    Subjective:           Chief Complaint:   Chief Complaint   Patient presents with    Right Knee - Post-op Evaluation       Past Medical History:   Diagnosis Date    ADHD (attention deficit hyperactivity disorder)     Arthritis     Colon polyp     Crohn's disease     Diabetes mellitus, type 2     GERD (gastroesophageal reflux disease)     Hypertension     Hypothyroidism     Lumbar back pain with radiculopathy affecting left lower extremity     SBO (small bowel obstruction)        Past Surgical History:   Procedure Laterality Date    ANTERIOR CRUCIATE LIGAMENT REPAIR Right     APPENDECTOMY      1988    BUNIONECTOMY Bilateral     CHOLECYSTECTOMY      2005    COLONOSCOPY N/A 11/01/2024    Procedure: COLONOSCOPY(Instructions sent 10/25);  Surgeon: Marguerite Gray MD;  Location: [facility];  Service: Endoscopy;  Laterality: N/A;    ESOPHAGOGASTRODUODENOSCOPY N/A 10/18/2024    Procedure: EGD (ESOPHAGOGASTRODUODENOSCOPY);  Surgeon: Marguerite Gray MD;  Location: [facility];  Service: Endoscopy;  Laterality: N/A;    ESOPHAGOGASTRODUODENOSCOPY N/A 03/07/2025    Procedure: EGD (ESOPHAGOGASTRODUODENOSCOPY);  Surgeon: Marguerite Gray MD;  Location: [facility];  Service: Endoscopy;  Laterality: N/A;    ESOPHAGOGASTRODUODENOSCOPY N/A 06/06/2025    Procedure: EGD (ESOPHAGOGASTRODUODENOSCOPY);  Surgeon: Marguerite Gray MD;  Location: [facility];  Service: Endoscopy;  Laterality: N/A;    EXCISION OF MASS OF BACK Left 09/21/2022    Procedure: EXCISION, MASS, BACK;  Surgeon: Sae Collins III, MD;  Location: [facility] OR;  Service: General;  Laterality: Left;    HYSTERECTOMY      INJECTION OF JOINT Right 05/09/2023    Procedure: INJECTION, JOINT, RIGHT PIRIFORMIS AND RIGHT GTB;  Surgeon: Loreta Brito MD;  Location: [facility];  Service: Pain Management;  Laterality: Right;    KNEE ARTHROSCOPY W/ MENISCECTOMY Right 7/2/2025    Procedure: ARTHROSCOPY, KNEE, WITH MENISCECTOMY;   "Surgeon: Brock Araujo MD;  Location: [facility] OR;  Service: Orthopedics;  Laterality: Right;    MINIMALLY INVASIVE SURGICAL REMOVAL OF INTERVERTEBRAL DISC OF SPINE USING MICROSCOPE Left 2021    Procedure: Left L4-5 Far Lateral Disectomy, MIS;  Surgeon: Johnny Cruz MD;  Location: [facility] OR 2ND FLR;  Service: Neurosurgery;  Laterality: Left;    OOPHORECTOMY      SHOULDER SURGERY Right     SPINE SURGERY      TRANSFORAMINAL EPIDURAL INJECTION OF STEROID Left 03/10/2021    Procedure: INJECTION, STEROID, EPIDURAL, TRANSFORAMINAL APPROACH  L4-5;  Surgeon: Jaylon Tyson MD;  Location: [facility]GT;  Service: Pain Management;  Laterality: Left;  consent needed  ([facility])    TRANSFORAMINAL EPIDURAL INJECTION OF STEROID Left 2022    Procedure: LUMBAR TRANSFORAMINAL LEFT L4/5 MEDICALLY URGENT;  Surgeon: Loreta Brito MD;  Location: [facility]IN [facility]GT;  Service: Pain Management;  Laterality: Left;    UPPER GASTROINTESTINAL ENDOSCOPY      10 years ago [facility]       Current Outpatient Medications   Medication Sig    aspirin (ECOTRIN) 81 MG EC tablet Take 81 mg by mouth once daily.    atorvastatin (LIPITOR) 10 MG tablet TAKE 1 TABLET BY MOUTH EVERY DAY    BD ULTRA-FINE MINI PEN NEEDLE 31 gauge x 3/16" Ndle SMARTSI pen needle SUB-Q Daily    BD ULTRA-FINE SHORT PEN NEEDLE 31 gauge x 5/16" Ndle Inject 1 Needle into the skin once daily.    blood sugar diagnostic (ONETOUCH ULTRA BLUE TEST STRIP) Strp Check blood glucose readings twice a day-- before meal and 1 to 2 hours post meal    blood-glucose meter Misc Check blood glucose readings twice a day-- before meal and 1 to 2 hours post meal    dulaglutide (TRULICITY) 3 mg/0.5 mL pen injector Inject 3 mg into the skin every 7 days.    estradioL (ESTRACE) 2 MG tablet Take 1 tablet (2 mg total) by mouth once daily.    fentaNYL (DURAGESIC) 75 mcg/hr Place 1 patch onto the skin every 72 hours.    hydroCHLOROthiazide (HYDRODIURIL) 25 MG tablet Take 1 tablet (25 mg " total) by mouth once daily.    lancets 33 gauge Misc Check blood glucose readings twice a day-- before meal and 1 to 2 hours post meal    omeprazole (PRILOSEC) 40 MG capsule Take 1 capsule (40 mg total) by mouth every morning.    oxyCODONE-acetaminophen (PERCOCET)  mg per tablet Take 1 tablet by mouth every 6 (six) hours.    polyethylene glycol (GLYCOLAX) 17 gram/dose powder Take 17 g by mouth once daily.    potassium chloride (KLOR-CON) 10 MEQ TbSR Take 1 tablet (10 mEq total) by mouth once daily.    traZODone (DESYREL) 100 MG tablet Take 1 tablet (100 mg total) by mouth nightly as needed for Insomnia.    TRESIBA FLEXTOUCH U-100 100 unit/mL (3 mL) insulin pen Inject 30 Units into the skin once daily. (Patient taking differently: Inject 38 Units into the skin once daily.)     No current facility-administered medications for this visit.       Review of patient's allergies indicates:   Allergen Reactions    Nsaids (non-steroidal anti-inflammatory drug) Other (See Comments)     GI upset  GI upset      Ibuprofen Other (See Comments)     Other reaction(s): crohns flair up  Other reaction(s): crohns flair up       Family History   Problem Relation Name Age of Onset    Crohn's disease Other      Cirrhosis Neg Hx      Ulcerative colitis Neg Hx         Social History[1]    History of present illness:  aisha comes in today for follow-up for her right knee arthroscopy.  Comes in today for wound check and suture removal.  She had partial medial and lateral meniscectomies with loose body removal.  She is doing well.  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  Overall, she is actually doing quite well.    Review of Systems:    Musculoskeletal:  See HPI      Objective:        Physical Examination:    Vital Signs:  There were no vitals filed for this visit.    There is no  height or weight on file to calculate BMI.    This a well-developed, well nourished patient in no acute distress.  They are alert and oriented and cooperative to examination.          Right knee exam: Skin to right knee clean dry and intact.  No erythema or ecchymosis.  No signs or symptoms of infection.  Neurovascularly intact throughout the right lower extremity.  Negative Homans on the right.  Two arthroscopic portals healing well without wound dehiscence or drainage.  She can weightbear as tolerated right lower extremity.  Nonantalgic gait.  She ambulates without an aid.      Pertinent New Results:    XRAY Report / Interpretation:   No new radiographs taken on today's clinic visit.    Assessment/Plan:        1. Status post right knee arthroscopy with partial medial and lateral meniscectomies with loose body removal.      Sutures removed today.  She tolerated this well.  Advised to clean the operative site once a day with warm soapy water.  Do not apply any topical creams or ointments.  Do not submerge underwater in a pool or bathtub type environment for least 48 hours.  She can resume /continue her regular activities of daily living as pain tolerates.  We will check her back in 1 month to assess her postoperative progress.  If she is having any lingering discomfort from her underlying osteoarthritis, consideration of intra-articular injection with corticosteroid at that time may be warranted.      Jim Garcia, MARA, PAS-C    This note was created using Dragon voice recognition software that occasionally misinterpreted phrases or words.             [1]   Social History  Socioeconomic History    Marital status:    Occupational History    Occupation: Disabled     Comment: Previous occupation:    Tobacco Use    Smoking status: Former     Current packs/day: 0.00     Types: Cigarettes     Quit date: 2020     Years since quittin.5    Smokeless tobacco: Former     Tobacco comments:     in the 8 th grade   Substance and Sexual Activity    Alcohol use: Yes     Alcohol/week: 4.0 standard drinks of alcohol     Types: 4 Cans of beer per week     Comment: 1 beer , once in 2 weeks    Drug use: No    Sexual activity: Yes     Partners: Female     Comment: Never had pregnant      Social Drivers of Health     Financial Resource Strain: Patient Declined (1/28/2025)    Overall Financial Resource Strain (CARDIA)     Difficulty of Paying Living Expenses: Patient declined   Food Insecurity: Patient Declined (1/28/2025)    Hunger Vital Sign     Worried About Running Out of Food in the Last Year: Patient declined     Ran Out of Food in the Last Year: Patient declined   Transportation Needs: Patient Declined (1/28/2025)    TRANSPORTATION NEEDS     Transportation : Patient declined   Physical Activity: Insufficiently Active (5/31/2024)    Exercise Vital Sign     Days of Exercise per Week: 1 day     Minutes of Exercise per Session: 30 min   Stress: Patient Declined (1/28/2025)    [employer] - Occupational Stress Questionnaire     Feeling of Stress : Patient declined   Housing Stability: Patient Declined (1/28/2025)    Housing Stability Vital Sign     Unable to Pay for Housing in the Last Year: Patient declined     Homeless in the Last Year: Patient declined

## 2025-08-11 ENCOUNTER — OFFICE VISIT (OUTPATIENT)
Dept: ORTHOPEDICS | Facility: CLINIC | Age: 60
End: 2025-08-11
Payer: COMMERCIAL

## 2025-08-11 ENCOUNTER — PATIENT MESSAGE (OUTPATIENT)
Dept: ORTHOPEDICS | Facility: CLINIC | Age: 60
End: 2025-08-11

## 2025-08-11 VITALS — WEIGHT: 164.88 LBS | BODY MASS INDEX: 28.15 KG/M2 | HEIGHT: 64 IN

## 2025-08-11 DIAGNOSIS — Z98.890 S/P RIGHT KNEE ARTHROSCOPY: Primary | ICD-10-CM

## 2025-08-11 DIAGNOSIS — M17.11 PRIMARY OSTEOARTHRITIS OF RIGHT KNEE: ICD-10-CM

## 2025-08-11 PROCEDURE — 99999 PR PBB SHADOW E&M-EST. PATIENT-LVL III: CPT | Mod: PBBFAC,COE,,

## 2025-08-11 PROCEDURE — 99024 POSTOP FOLLOW-UP VISIT: CPT | Mod: S$GLB,COE,,

## 2025-08-11 RX ORDER — INSULIN DEGLUDEC 100 U/ML
30 INJECTION, SOLUTION SUBCUTANEOUS DAILY
Qty: 30 ML | Refills: 0 | Status: SHIPPED | OUTPATIENT
Start: 2025-08-11 | End: 2025-08-12 | Stop reason: SDUPTHER

## 2025-08-11 RX ORDER — TRIAMCINOLONE ACETONIDE 40 MG/ML
40 INJECTION, SUSPENSION INTRA-ARTICULAR; INTRAMUSCULAR
Status: DISCONTINUED | OUTPATIENT
Start: 2025-08-11 | End: 2025-08-11 | Stop reason: HOSPADM

## 2025-08-11 RX ADMIN — TRIAMCINOLONE ACETONIDE 40 MG: 40 INJECTION, SUSPENSION INTRA-ARTICULAR; INTRAMUSCULAR at 08:08

## 2025-08-12 ENCOUNTER — PATIENT MESSAGE (OUTPATIENT)
Dept: FAMILY MEDICINE | Facility: CLINIC | Age: 60
End: 2025-08-12
Payer: MEDICARE

## 2025-08-12 RX ORDER — INSULIN DEGLUDEC 100 U/ML
38 INJECTION, SOLUTION SUBCUTANEOUS DAILY
Qty: 30 ML | Refills: 0 | Status: SHIPPED | OUTPATIENT
Start: 2025-08-12

## 2025-08-13 DIAGNOSIS — E11.9 TYPE 2 DIABETES MELLITUS WITHOUT COMPLICATION: ICD-10-CM

## 2025-08-14 ENCOUNTER — PATIENT MESSAGE (OUTPATIENT)
Dept: OPHTHALMOLOGY | Facility: CLINIC | Age: 60
End: 2025-08-14
Payer: MEDICARE

## 2025-08-15 DIAGNOSIS — E78.5 HYPERLIPIDEMIA, UNSPECIFIED: ICD-10-CM

## 2025-08-15 RX ORDER — ATORVASTATIN CALCIUM 10 MG/1
10 TABLET, FILM COATED ORAL
Qty: 90 TABLET | Refills: 1 | Status: SHIPPED | OUTPATIENT
Start: 2025-08-15

## (undated) DEVICE — CUTTER MENISCUS AGGRESSIVE 4.0

## (undated) DEVICE — SUTURE MONOCRYL 4-0 PS-2 27 MCP426H

## (undated) DEVICE — SUTURE VICRYL 3-0 18 VCP774D

## (undated) DEVICE — PACK ARTHROSCOPY SMH

## (undated) DEVICE — MARKER SKIN STND TIP BLUE BARR

## (undated) DEVICE — SUT MCRYL PLUS 4-0 PS2 27IN

## (undated) DEVICE — COVER CAMERA OPERATING ROOM

## (undated) DEVICE — DRESSING LEUKOPLAST FLEX 1X3IN

## (undated) DEVICE — DRAPE STERI-DRAPE 1000 17X11IN

## (undated) DEVICE — BLADE ELECTRO EDGE INSULATED

## (undated) DEVICE — DURAPREP SURG SCRUB 26ML

## (undated) DEVICE — DIFFUSER

## (undated) DEVICE — SEE MEDLINE ITEM 156905

## (undated) DEVICE — UNDERGLOVE BIOGEL PI MICRO BLUE SZ 8

## (undated) DEVICE — KIT SPINAL PATIENT CARE JACK

## (undated) DEVICE — SUT VICRYL+ 27 UR-6 VIOL

## (undated) DEVICE — DRAPE INCISE IOBAN 2 23X17IN

## (undated) DEVICE — SEE MEDLINE ITEM 157150

## (undated) DEVICE — COVER LIGHT HANDLE 80/CA

## (undated) DEVICE — SUT D SPECIAL VICRYL 2-0

## (undated) DEVICE — TRAY GENERAL SURGERY

## (undated) DEVICE — SOL NACL IRR 3000ML

## (undated) DEVICE — GLOVE SENSICARE PI GRN 9

## (undated) DEVICE — CARTRIDGE OIL

## (undated) DEVICE — PAD BOVIE ADULT

## (undated) DEVICE — ADHESIVE DERMABOND ADVANCED

## (undated) DEVICE — TUBE FRAZIER 5MM 2FT SOFT TIP

## (undated) DEVICE — DRESSING SURGICAL 1/2X1/2

## (undated) DEVICE — DRAPE C-ARMOR EQUIPMENT COVER

## (undated) DEVICE — DISSECTOR 5MM ENDOPATH

## (undated) DEVICE — CLIPPER BLADE MOD 4406 (CAREF)

## (undated) DEVICE — BUR BONE CUT MICRO TPS 3X3.8MM

## (undated) DEVICE — DRAPE OPMI STERILE

## (undated) DEVICE — TRAY FOLEY 16FR INFECTION CONT

## (undated) DEVICE — ELECTRODE REM PLYHSV RETURN 9

## (undated) DEVICE — TOURNIQUET SB QC DP 34X4IN

## (undated) DEVICE — SET IRR CYSTO TUR Y-TYPE 90IN

## (undated) DEVICE — DRESSING MEPILEX BORDER 4 X 4

## (undated) DEVICE — GLOVE SENSICARE PI GRN 8.5

## (undated) DEVICE — COVER LIGHT HANDLE LB53

## (undated) DEVICE — DRAPE ABDOMINAL TIBURON 14X11

## (undated) DEVICE — DRESSING AQUACEL FOAM 5 X 5

## (undated) DEVICE — GLOVE SENSICARE PI SURG 8.5

## (undated) DEVICE — DERMABOND HVD MINI  DHVM12

## (undated) DEVICE — SOL NACL IRR 1000ML BTL

## (undated) DEVICE — DRAPE STERI INSTRUMENT 1018

## (undated) DEVICE — DRAPE C ARM 42 X 120 10/BX

## (undated) DEVICE — SYRINGE HYPODERMC LL 22G 1.5 ECLIPSE 30

## (undated) DEVICE — CORD BIPOLAR 12 FOOT

## (undated) DEVICE — NDL SPINAL 18GX3.5 SPINOCAN

## (undated) DEVICE — GLOVE BIOGEL MICRO SURGEON PINK SZ 7.5

## (undated) DEVICE — DRAPE LAPAROTOMY TRANSVERSE 89281

## (undated) DEVICE — SOLUTION IRRI NS BOTTLE 1000ML R5200-01

## (undated) DEVICE — GAUZE SPONGE 4X4 12PLY